# Patient Record
Sex: FEMALE | Race: WHITE | NOT HISPANIC OR LATINO | Employment: OTHER | URBAN - METROPOLITAN AREA
[De-identification: names, ages, dates, MRNs, and addresses within clinical notes are randomized per-mention and may not be internally consistent; named-entity substitution may affect disease eponyms.]

---

## 2024-01-02 NOTE — PROGRESS NOTES
Physical Therapy    Physical Therapy Evaluation and Treatment      Patient Name: Meryl Hester  MRN: [id number]  Today's Date: 1/3/2024  Time Calculation  Start Time: 0947  Stop Time: 1030  Time Calculation (min): 43 min    Assessment:    The patient presents to Physical Therapy with signs and symptoms consistent with the medical diagnosis of Lumbar radiculopathy. Key impairments include: reduced lumbar ROM, difficulty laying supine, reduced right ankle DF strength, and difficulty with functional activities such as group exercise, walking/standing, sleeping.    Standardized testing and measures administered today, including ROM, strength, joint mobility testing, and observation which reveal that the patient has multiple impairments in body structure and functions, activity limitations, and participation restrictions. The patient has personal factors and comorbidities that may serve as barriers affecting plan of care. Today's findings indicate that the patient is of low complexity and would benefit from skilled PT to make measurable and meaningful changes in the above outcome measures and achieve improvements in the patient's functional status and individual goals. The patient verbalized understanding and is in agreement with all goals and plan of care.      Plan:   Pt will be seen 1-2x/week for 10 visits. Potential to achieve rehab goals is good. Plan of care was developed with input and agreement by the patient.    Treatment may include: Therapeutic Exercise (PROM, AA/AROM, Flexibility, Strengthening, Stabilization, HEP instruction). Therapeutic Activities (Transfers, Body Mechanics, Work Related Activities, Closed Chain, Agility and Power). Manual Techniques (Soft tissue Mobilization, Joint Mobilization/Distraction, Muscle Energy Techniques, Lymphatic Drainage, Dry Needling). Neuromuscular Reeducation (Postural Training, Balance/Proprioception, Relaxation Techniques). Biofeedback. Aquatic Exercise. Modalities:  Ultrasound, Cryotherapy, Vasopneumatic with or without Cryotherapy. Electrical Stimulation (TENS/IFC/ Premodulated for pain relief, NMES for Muscle reeducation). Gait Training. Orthotic Fit and Training. Strapping, Kinesiotaping.     Current Problem:   1. Joint stiffness of spine        2. Lumbago with sciatica, right side  Referral to Physical Therapy    Follow Up In Physical Therapy    Follow Up In Physical Therapy      3. Radiculopathy of lumbar region            Subjective    Pt is a 89  year old female with complaints of R sided sciatica and low back pain. Down lateral thigh, lower leg and lateral ankle. Pt goes to fitness center ~5x/week, she exercises regularly.   Sleep: Wakes every 3 hours and sometimes need to distract herself from the pain  PMH: L leg numbness  Pt was not recommended for fusion 10 years ago due to severity of degeneration  Onset: Nov 15, 2023  Aggravating factors/ Functional Limitations: Prolonged standing walking  Alleviating factors: Prednisone, gabapentin before bed, heating pad (tried ice without much help)  Imaging: From [facility]    Pt denies night pain, unrelenting pain, unexplained weight loss 10-20lb in the last 4 weeks, loss of appetite, unusual lumps or growth, unusual fatigue.  Pt denies numbness/ tingling. Pt denies bowel or bladder changes.    Precautions:  Precautions  Precautions Comment: No fall sin last 6 mo    Pain:  Pain Assessment  Pain Assessment: 0-10  Pain Score: 8    Objective     Lumbar ROM (degrees):  Flexion: 75%  Extension: 75%  Sidebend R/L: 75%/50%  Rotation R/L: 100%    HIP AROM (degrees):  Hip Internal Rotation R/L: ~30 rosy  Hip External Rotation R/L:  45 rosy    Flexibility (degrees):   Hamstring 90/90 position R/L: 40 rosy + discomfort    Strength Testing:  Hip Flexion R/L: 3+/3+  Knee extension R/L:  4/4  Knee Flexion R/L: 4-/4-  DF R/L: 4/5    Posture: rounded shoulder  Sitting:  PPT  Standing position: hip flexion  Gait: forward flexed, relying on  RW, reduced dean, reduced initial contact    Observation:  Spasm/Tenderness:  Gait:  SLS:  Stairs:    Outcome Measures:  Other Measures  5x Sit to Stand: 26.6  Oswestry Disablity Index (MADELINE): 44%     Treatments:  Access Code: [email or web address]  URL: [email or web address]  Date: 01/03/2024  Prepared by: Jacqui Ng    Exercises  - Supine Transversus Abdominis Bracing - Hands on Stomach  - 1 x daily - 7 x weekly - 3 sets - 10 reps  - Supine March  - 1 x daily - 7 x weekly - 3 sets - 10 reps  - Seated Flexion Stretch with Swiss Ball  - 1 x daily - 7 x weekly - 3 sets - 10 reps  - Seated Thoracic Flexion and Rotation with Swiss Ball  - 1 x daily - 7 x weekly - 3 sets - 10 reps    Sheet with exercise descriptions and images issued. Skilled intervention utilized in the appropriate selection & application of above exercises. Verbal and tactile cues provided for proper form and technique. Pt demonstrated appropriate form & verbalized understanding of optimal technique for above exercises.      Goals:    Patient will be able to perform 45 minutes of aquatic exercise with reported </= 2/10 pain level   Patient will ambulate up/ down pool ramp without UE assist or decrease UE assist .   Patient will display improved gait quality on pool deck demonstrating improved dean /step through pattern   Patient will ascend/ descend 8 inch step in 3 1/2 feet water with good control without UE assist   Patient will demonstrate dynamic lumbar stabilization (DLS) with good control, performing with UE involvement and paddle resistance at level 5. (level 5 is the hardest/ level 1 is the easiest)   Patient will perform 5x sit to stand from bench without the use of upper extremities </= 15 seconds to show improved lower extremity functional strength.  Patient will perform single leg stance (SLS) in 4 foot water depth without the use of upper extremity assist.   Patient will be able to enter/exit pool via pool ramp and will  not require assistance of chair lift to participate with aquatic session.  Patient will perform/recall >/= 85% of aquatic program without cueing.

## 2024-01-03 ENCOUNTER — EVALUATION (OUTPATIENT)
Dept: PHYSICAL THERAPY | Facility: CLINIC | Age: 89
End: 2024-01-03
Payer: MEDICARE

## 2024-01-03 DIAGNOSIS — M54.16 RADICULOPATHY OF LUMBAR REGION: ICD-10-CM

## 2024-01-03 DIAGNOSIS — M25.60 JOINT STIFFNESS OF SPINE: Primary | ICD-10-CM

## 2024-01-03 DIAGNOSIS — M54.41 LUMBAGO WITH SCIATICA, RIGHT SIDE: ICD-10-CM

## 2024-01-03 PROBLEM — R26.9 GAIT DIFFICULTY: Status: ACTIVE | Noted: 2024-01-03

## 2024-01-03 PROCEDURE — 97110 THERAPEUTIC EXERCISES: CPT | Mod: GP

## 2024-01-03 PROCEDURE — 97161 PT EVAL LOW COMPLEX 20 MIN: CPT | Mod: GP

## 2024-01-03 ASSESSMENT — PAIN SCALES - GENERAL: PAINLEVEL_OUTOF10: 8

## 2024-01-03 ASSESSMENT — PAIN - FUNCTIONAL ASSESSMENT: PAIN_FUNCTIONAL_ASSESSMENT: 0-10

## 2024-01-15 ENCOUNTER — TREATMENT (OUTPATIENT)
Dept: PHYSICAL THERAPY | Facility: CLINIC | Age: 89
End: 2024-01-15
Payer: MEDICARE

## 2024-01-15 DIAGNOSIS — M54.41 LUMBAGO WITH SCIATICA, RIGHT SIDE: ICD-10-CM

## 2024-01-15 DIAGNOSIS — M25.60 JOINT STIFFNESS OF SPINE: Primary | ICD-10-CM

## 2024-01-15 DIAGNOSIS — M54.16 RADICULOPATHY OF LUMBAR REGION: ICD-10-CM

## 2024-01-15 PROCEDURE — 97113 AQUATIC THERAPY/EXERCISES: CPT | Mod: GP,CQ

## 2024-01-15 NOTE — PROGRESS NOTES
Patient Name: Meryl Hester  MRN: [id number]  Today's Date: 1/15/2024  POC   1-2x/week for 10 visits.   #2 of 10     Subjective:  Reports R LE pain down to ankle.  States that she has noticed pain in L LE as well at times to L quad .    Objective:  Instructed in initial skilled aquatic there ex     Assessment:   Pt demos decreased tolerance to loading and active R LE movements. Low reps and exercises introduced due to anticipation of increased soreness. Pt is a fitness center member and completes aquatic based classes independently therefore she demos good awareness on current limitations and is aware of body mechanics.    Plan:   Cont with building current program to improve function.    Treatment :   AQUATIC THERAPEUTIC EXERCISE 73437   30 minutes 2 units   Aquatic gait forward/Bwd/Lateral  with Db support 2 laps each   March 1 minute   HS/GS Stretches NV   RLE nerve glides x10   Hip abduction x10   Hip circles cw/ccw x5 each way   Squats  x10   DLS 3 way Trunk against wall x10 level 1 x10 each  Trunk Rotation with noodle x10    Noodle Pull down  elbows bent  x10   Rows with noodle x10   SKTC 1 x30  Noodle hang                Current Problem:  1. Joint stiffness of spine        2. Lumbago with sciatica, right side  Follow Up In Physical Therapy      3. Radiculopathy of lumbar region                Pain:  6/10   Location: LB/R LE to calf    Precautions: No recent falls

## 2024-01-22 ENCOUNTER — TREATMENT (OUTPATIENT)
Dept: PHYSICAL THERAPY | Facility: CLINIC | Age: 89
End: 2024-01-22
Payer: MEDICARE

## 2024-01-22 DIAGNOSIS — M54.41 LUMBAGO WITH SCIATICA, RIGHT SIDE: ICD-10-CM

## 2024-01-22 PROCEDURE — 97113 AQUATIC THERAPY/EXERCISES: CPT | Mod: GP,CQ

## 2024-01-22 NOTE — PROGRESS NOTES
"Patient Name: Meryl Hester  MRN: [id number]  Today's Date: 1/22/2024  Time Calculation  Start Time: 0928  Stop Time: 1000  Time Calculation (min): 32 min  INSURANCE   #3  of 10      Subjective:  States that today is a good day in relation to B LE pain. Denies any pain currently in LE 's.  States that over the weekend pain resolved in R LE however had increased L groin /lateral pain for 2 days . \"Today woke up and no pain at all in LB /Les.    Objective:  Dumbbell assist for aquatic walking .    Assessment:   Progression of exercises held due to + response with aquatic session.  Dumbbell assist required only for improved trunk control.     Plan:   Progress as able .    Treatment :   AQUATIC THERAPEUTIC EXERCISE 52735   30 minutes 2 units   Aquatic gait forward/Bwd/Lateral  with Db support 2 laps each   March 1 minute   HS/GS Stretches NV   RLE nerve glides x10   Hip abduction x10   Hip circles cw/ccw x5 each way   Squats  x10   DLS 3 way Trunk against wall x10 level 1 x10 each  Trunk Rotation with noodle x10    Noodle Pull down  elbows bent  x10   Rows with noodle x10   SKTC 1 x30  Noodle hang        Current Problem:  1. Lumbago with sciatica, right side  Follow Up In Physical Therapy          Pain:  No pain currently     Precautions: No recent falls        "

## 2024-01-29 ENCOUNTER — TREATMENT (OUTPATIENT)
Dept: PHYSICAL THERAPY | Facility: CLINIC | Age: 89
End: 2024-01-29
Payer: MEDICARE

## 2024-01-29 DIAGNOSIS — M54.16 RADICULOPATHY OF LUMBAR REGION: ICD-10-CM

## 2024-01-29 DIAGNOSIS — M25.60 JOINT STIFFNESS OF SPINE: ICD-10-CM

## 2024-01-29 DIAGNOSIS — M54.41 LUMBAGO WITH SCIATICA, RIGHT SIDE: ICD-10-CM

## 2024-01-29 DIAGNOSIS — R26.9 GAIT DIFFICULTY: Primary | ICD-10-CM

## 2024-01-29 PROCEDURE — 97110 THERAPEUTIC EXERCISES: CPT | Mod: GP

## 2024-01-29 ASSESSMENT — PAIN - FUNCTIONAL ASSESSMENT: PAIN_FUNCTIONAL_ASSESSMENT: 0-10

## 2024-01-29 ASSESSMENT — PAIN SCALES - GENERAL: PAINLEVEL_OUTOF10: 2

## 2024-01-29 NOTE — PROGRESS NOTES
"Physical Therapy    Physical Therapy Treatment      Patient Name: Meryl Hester  MRN: [id number]  Today's Date: 1/29/2024  Time Calculation  Start Time: 1045  Stop Time: 1130  Time Calculation (min): 45 min    Assessment:  Pt able to tolerate most of presented exercises, ~ 25% unable due to pain. Therapy seems to be centralizing symptoms. Pt will benefit from instruction on home exercise and discharge to independent program, as she is regularly very active.        Plan:  Continue per plan of care as tolerated. Patient to continue with HEP each day independently.    Current Problem:   1. Gait difficulty        2. Lumbago with sciatica, right side  Follow Up In Physical Therapy      3. Radiculopathy of lumbar region        4. Joint stiffness of spine              Subjective    Pt reports leg is better, but knee and catching in hip is an issue.  Pain is in middle of back.  Swelling improving with help of cardiologist.     Precautions:  Precautions  Precautions Comment: No fall sin last 6 mo    Pain:  Pain Assessment  Pain Assessment: 0-10  Pain Score: 2    Objective   L>R knee pain with nustep    Lumbar ROM (degrees):  Flexion: 75%  Extension: 75%  Sidebend R/L: 75%/50%  Rotation R/L: 100%    Treatments:  Access Code: [email or web address]    Therapeutic Exercises  Nustep L1 3 min  Supine Transversus Abdominis Bracing - Hands on Stomach    Supine March    Seated Flexion Stretch with Swiss Ball    Seated Thoracic Flexion and Rotation with Swiss Ball  RTB SA row  RTB anti rotation  Posterior lean  Reclined lumbar rotation  Reclined to hooklying march  Reclined BKFO  Reclined SAQ x10 ea  4\" step calf stretch gentle  STS 3x5  Standing balance tandem   Standing march  "

## 2024-02-05 ENCOUNTER — TREATMENT (OUTPATIENT)
Dept: PHYSICAL THERAPY | Facility: CLINIC | Age: 89
End: 2024-02-05
Payer: MEDICARE

## 2024-02-05 DIAGNOSIS — M54.16 RADICULOPATHY OF LUMBAR REGION: ICD-10-CM

## 2024-02-05 DIAGNOSIS — M25.60 JOINT STIFFNESS OF SPINE: ICD-10-CM

## 2024-02-05 DIAGNOSIS — M54.41 LUMBAGO WITH SCIATICA, RIGHT SIDE: ICD-10-CM

## 2024-02-05 DIAGNOSIS — R26.9 GAIT DIFFICULTY: Primary | ICD-10-CM

## 2024-02-05 PROCEDURE — 97110 THERAPEUTIC EXERCISES: CPT | Mod: GP

## 2024-02-05 ASSESSMENT — PAIN - FUNCTIONAL ASSESSMENT: PAIN_FUNCTIONAL_ASSESSMENT: 0-10

## 2024-02-05 ASSESSMENT — PAIN SCALES - GENERAL: PAINLEVEL_OUTOF10: 5 - MODERATE PAIN

## 2024-02-05 NOTE — PROGRESS NOTES
"Physical Therapy    Physical Therapy Treatment      Patient Name: Meryl Hester  MRN: [id number]  Today's Date: 2/5/2024  Time Calculation  Start Time: 1045  Stop Time: 1130  Time Calculation (min): 45 min    Assessment:  Pt prefers to perform HEP independently, and continue her aquatic exercise at fitness center. Updated HEP printed and discussed/demonstrated. Pt will benefit from regular activity to strengthen her knees and back to reduce stress through jt surfaces.      Plan:  Continue per plan of care as tolerated. Patient to continue with HEP each day independently.    Current Problem:   1. Gait difficulty        2. Radiculopathy of lumbar region        3. Joint stiffness of spine                Subjective    Pt reports waking up with more general jt pain than usual. Pt swam for 1 hour yesterday    Precautions:  Precautions  Precautions Comment: No fall sin last 6 mo    Pain:  Pain Assessment  Pain Assessment: 0-10  Pain Score: 2    Objective   Knee discomfort with nustep    Treatments:  Access Code: [email or web address]    Therapeutic Exercises  Nustep L1 3 min  Supine Transversus Abdominis Bracing - Hands on Stomach    Supine March    Seated Flexion Stretch with Swiss Ball    Seated Thoracic Flexion and Rotation with Swiss Ball  RTB SA row  RTB anti rotation  Posterior lean  Reclined lumbar rotation  Reclined to hooklying march  Reclined BKFO  Reclined SAQ x10 ea  4\" step calf stretch gentle  STS 3x5  Standing balance tandem   Standing march  "

## 2024-02-12 ENCOUNTER — APPOINTMENT (OUTPATIENT)
Dept: PHYSICAL THERAPY | Facility: CLINIC | Age: 89
End: 2024-02-12
Payer: MEDICARE

## 2024-02-19 ENCOUNTER — APPOINTMENT (OUTPATIENT)
Dept: PHYSICAL THERAPY | Facility: CLINIC | Age: 89
End: 2024-02-19
Payer: MEDICARE

## 2024-08-07 ENCOUNTER — APPOINTMENT (OUTPATIENT)
Dept: RADIOLOGY | Facility: HOSPITAL | Age: 89
End: 2024-08-07
Payer: MEDICARE

## 2024-08-07 ENCOUNTER — APPOINTMENT (OUTPATIENT)
Dept: CARDIOLOGY | Facility: HOSPITAL | Age: 89
End: 2024-08-07
Payer: MEDICARE

## 2024-08-07 ENCOUNTER — HOSPITAL ENCOUNTER (INPATIENT)
Facility: HOSPITAL | Age: 89
LOS: 2 days | Discharge: HOME HEALTH | End: 2024-08-10
Attending: EMERGENCY MEDICINE | Admitting: HOSPITALIST
Payer: MEDICARE

## 2024-08-07 DIAGNOSIS — R93.89 ABNORMAL CT OF THE CHEST: ICD-10-CM

## 2024-08-07 DIAGNOSIS — R79.89 OTHER SPECIFIED ABNORMAL FINDINGS OF BLOOD CHEMISTRY: ICD-10-CM

## 2024-08-07 DIAGNOSIS — I21.4 NSTEMI (NON-ST ELEVATED MYOCARDIAL INFARCTION) (MULTI): ICD-10-CM

## 2024-08-07 DIAGNOSIS — R55 SYNCOPE, UNSPECIFIED SYNCOPE TYPE: Primary | ICD-10-CM

## 2024-08-07 DIAGNOSIS — I95.9 HYPOTENSION, UNSPECIFIED: ICD-10-CM

## 2024-08-07 DIAGNOSIS — N39.0 URINARY TRACT INFECTION WITHOUT HEMATURIA, SITE UNSPECIFIED: ICD-10-CM

## 2024-08-07 DIAGNOSIS — I10 HYPERTENSION, UNSPECIFIED TYPE: ICD-10-CM

## 2024-08-07 DIAGNOSIS — D64.9 ANEMIA, UNSPECIFIED TYPE: ICD-10-CM

## 2024-08-07 DIAGNOSIS — I27.20 PULMONARY HYPERTENSION (MULTI): ICD-10-CM

## 2024-08-07 DIAGNOSIS — I48.0 PAROXYSMAL ATRIAL FIBRILLATION (MULTI): ICD-10-CM

## 2024-08-07 DIAGNOSIS — I48.91 UNSPECIFIED ATRIAL FIBRILLATION (MULTI): ICD-10-CM

## 2024-08-07 DIAGNOSIS — R91.8 LUNG NODULES: ICD-10-CM

## 2024-08-07 LAB
ABO GROUP (TYPE) IN BLOOD: NORMAL
ABO GROUP (TYPE) IN BLOOD: NORMAL
ALBUMIN SERPL BCP-MCNC: 3.3 G/DL (ref 3.4–5)
ALP SERPL-CCNC: 80 U/L (ref 33–136)
ALT SERPL W P-5'-P-CCNC: 11 U/L (ref 7–45)
ANION GAP SERPL CALC-SCNC: 11 MMOL/L (ref 10–20)
ANTIBODY SCREEN: NORMAL
APPEARANCE UR: CLEAR
APTT PPP: 27 SECONDS (ref 27–38)
AST SERPL W P-5'-P-CCNC: 15 U/L (ref 9–39)
BACTERIA #/AREA URNS AUTO: ABNORMAL /HPF
BASOPHILS # BLD AUTO: 0.04 X10*3/UL (ref 0–0.1)
BASOPHILS NFR BLD AUTO: 0.4 %
BILIRUB SERPL-MCNC: 0.6 MG/DL (ref 0–1.2)
BILIRUB UR STRIP.AUTO-MCNC: NEGATIVE MG/DL
BNP SERPL-MCNC: 238 PG/ML (ref 0–99)
BUN SERPL-MCNC: 7 MG/DL (ref 6–23)
CALCIUM SERPL-MCNC: 8.2 MG/DL (ref 8.6–10.3)
CARDIAC TROPONIN I PNL SERPL HS: 750 NG/L (ref 0–13)
CARDIAC TROPONIN I PNL SERPL HS: 845 NG/L (ref 0–13)
CHLORIDE SERPL-SCNC: 106 MMOL/L (ref 98–107)
CO2 SERPL-SCNC: 23 MMOL/L (ref 21–32)
COLOR UR: COLORLESS
CREAT SERPL-MCNC: 0.83 MG/DL (ref 0.5–1.05)
D DIMER PPP FEU-MCNC: 910 NG/ML FEU
EGFRCR SERPLBLD CKD-EPI 2021: 66 ML/MIN/1.73M*2
EOSINOPHIL # BLD AUTO: 0.17 X10*3/UL (ref 0–0.4)
EOSINOPHIL NFR BLD AUTO: 1.9 %
ERYTHROCYTE [DISTWIDTH] IN BLOOD BY AUTOMATED COUNT: 14.9 % (ref 11.5–14.5)
GLUCOSE SERPL-MCNC: 167 MG/DL (ref 74–99)
GLUCOSE UR STRIP.AUTO-MCNC: NORMAL MG/DL
HCT VFR BLD AUTO: 26 % (ref 36–46)
HGB BLD-MCNC: 8.2 G/DL (ref 12–16)
HOLD SPECIMEN: NORMAL
IMM GRANULOCYTES # BLD AUTO: 0.04 X10*3/UL (ref 0–0.5)
IMM GRANULOCYTES NFR BLD AUTO: 0.4 % (ref 0–0.9)
INR PPP: 1.1 (ref 0.9–1.1)
KETONES UR STRIP.AUTO-MCNC: NEGATIVE MG/DL
LACTATE SERPL-SCNC: 1.3 MMOL/L (ref 0.4–2)
LACTATE SERPL-SCNC: 2.2 MMOL/L (ref 0.4–2)
LEUKOCYTE ESTERASE UR QL STRIP.AUTO: ABNORMAL
LIPASE SERPL-CCNC: 9 U/L (ref 9–82)
LYMPHOCYTES # BLD AUTO: 1.71 X10*3/UL (ref 0.8–3)
LYMPHOCYTES NFR BLD AUTO: 18.8 %
MAGNESIUM SERPL-MCNC: 1.9 MG/DL (ref 1.6–2.4)
MCH RBC QN AUTO: 29.7 PG (ref 26–34)
MCHC RBC AUTO-ENTMCNC: 31.5 G/DL (ref 32–36)
MCV RBC AUTO: 94 FL (ref 80–100)
MONOCYTES # BLD AUTO: 0.77 X10*3/UL (ref 0.05–0.8)
MONOCYTES NFR BLD AUTO: 8.5 %
NEUTROPHILS # BLD AUTO: 6.38 X10*3/UL (ref 1.6–5.5)
NEUTROPHILS NFR BLD AUTO: 70 %
NITRITE UR QL STRIP.AUTO: NEGATIVE
NRBC BLD-RTO: 0 /100 WBCS (ref 0–0)
PH UR STRIP.AUTO: 5.5 [PH]
PLATELET # BLD AUTO: 232 X10*3/UL (ref 150–450)
POTASSIUM SERPL-SCNC: 3.3 MMOL/L (ref 3.5–5.3)
PROT SERPL-MCNC: 5.5 G/DL (ref 6.4–8.2)
PROT UR STRIP.AUTO-MCNC: NEGATIVE MG/DL
PROTHROMBIN TIME: 12.7 SECONDS (ref 9.8–12.8)
RBC # BLD AUTO: 2.76 X10*6/UL (ref 4–5.2)
RBC # UR STRIP.AUTO: NEGATIVE /UL
RBC #/AREA URNS AUTO: ABNORMAL /HPF
RH FACTOR (ANTIGEN D): NORMAL
RH FACTOR (ANTIGEN D): NORMAL
SODIUM SERPL-SCNC: 137 MMOL/L (ref 136–145)
SP GR UR STRIP.AUTO: 1.01
UROBILINOGEN UR STRIP.AUTO-MCNC: NORMAL MG/DL
WBC # BLD AUTO: 9.1 X10*3/UL (ref 4.4–11.3)
WBC #/AREA URNS AUTO: ABNORMAL /HPF

## 2024-08-07 PROCEDURE — 2500000001 HC RX 250 WO HCPCS SELF ADMINISTERED DRUGS (ALT 637 FOR MEDICARE OP): Performed by: HOSPITALIST

## 2024-08-07 PROCEDURE — 99223 1ST HOSP IP/OBS HIGH 75: CPT | Performed by: HOSPITALIST

## 2024-08-07 PROCEDURE — 85610 PROTHROMBIN TIME: CPT | Performed by: EMERGENCY MEDICINE

## 2024-08-07 PROCEDURE — 70450 CT HEAD/BRAIN W/O DYE: CPT | Performed by: RADIOLOGY

## 2024-08-07 PROCEDURE — 2500000004 HC RX 250 GENERAL PHARMACY W/ HCPCS (ALT 636 FOR OP/ED): Performed by: EMERGENCY MEDICINE

## 2024-08-07 PROCEDURE — 85025 COMPLETE CBC W/AUTO DIFF WBC: CPT | Performed by: EMERGENCY MEDICINE

## 2024-08-07 PROCEDURE — 2500000004 HC RX 250 GENERAL PHARMACY W/ HCPCS (ALT 636 FOR OP/ED): Performed by: HOSPITALIST

## 2024-08-07 PROCEDURE — G0378 HOSPITAL OBSERVATION PER HR: HCPCS

## 2024-08-07 PROCEDURE — 71045 X-RAY EXAM CHEST 1 VIEW: CPT

## 2024-08-07 PROCEDURE — 81001 URINALYSIS AUTO W/SCOPE: CPT | Performed by: EMERGENCY MEDICINE

## 2024-08-07 PROCEDURE — 86901 BLOOD TYPING SEROLOGIC RH(D): CPT | Performed by: EMERGENCY MEDICINE

## 2024-08-07 PROCEDURE — 83605 ASSAY OF LACTIC ACID: CPT | Performed by: EMERGENCY MEDICINE

## 2024-08-07 PROCEDURE — 76705 ECHO EXAM OF ABDOMEN: CPT

## 2024-08-07 PROCEDURE — 76705 ECHO EXAM OF ABDOMEN: CPT | Performed by: RADIOLOGY

## 2024-08-07 PROCEDURE — 36415 COLL VENOUS BLD VENIPUNCTURE: CPT | Performed by: EMERGENCY MEDICINE

## 2024-08-07 PROCEDURE — 84484 ASSAY OF TROPONIN QUANT: CPT | Performed by: EMERGENCY MEDICINE

## 2024-08-07 PROCEDURE — 99291 CRITICAL CARE FIRST HOUR: CPT | Mod: 25 | Performed by: EMERGENCY MEDICINE

## 2024-08-07 PROCEDURE — 84075 ASSAY ALKALINE PHOSPHATASE: CPT | Performed by: EMERGENCY MEDICINE

## 2024-08-07 PROCEDURE — 93005 ELECTROCARDIOGRAM TRACING: CPT

## 2024-08-07 PROCEDURE — 83735 ASSAY OF MAGNESIUM: CPT | Performed by: EMERGENCY MEDICINE

## 2024-08-07 PROCEDURE — 2500000001 HC RX 250 WO HCPCS SELF ADMINISTERED DRUGS (ALT 637 FOR MEDICARE OP): Performed by: EMERGENCY MEDICINE

## 2024-08-07 PROCEDURE — 71045 X-RAY EXAM CHEST 1 VIEW: CPT | Performed by: RADIOLOGY

## 2024-08-07 PROCEDURE — 87040 BLOOD CULTURE FOR BACTERIA: CPT | Mod: ELYLAB | Performed by: EMERGENCY MEDICINE

## 2024-08-07 PROCEDURE — 2500000002 HC RX 250 W HCPCS SELF ADMINISTERED DRUGS (ALT 637 FOR MEDICARE OP, ALT 636 FOR OP/ED): Performed by: EMERGENCY MEDICINE

## 2024-08-07 PROCEDURE — 70450 CT HEAD/BRAIN W/O DYE: CPT

## 2024-08-07 PROCEDURE — 85379 FIBRIN DEGRADATION QUANT: CPT | Performed by: EMERGENCY MEDICINE

## 2024-08-07 PROCEDURE — 99221 1ST HOSP IP/OBS SF/LOW 40: CPT | Performed by: SURGERY

## 2024-08-07 PROCEDURE — 87086 URINE CULTURE/COLONY COUNT: CPT | Mod: ELYLAB | Performed by: EMERGENCY MEDICINE

## 2024-08-07 PROCEDURE — 85730 THROMBOPLASTIN TIME PARTIAL: CPT | Performed by: EMERGENCY MEDICINE

## 2024-08-07 PROCEDURE — 2500000002 HC RX 250 W HCPCS SELF ADMINISTERED DRUGS (ALT 637 FOR MEDICARE OP, ALT 636 FOR OP/ED): Performed by: HOSPITALIST

## 2024-08-07 PROCEDURE — 96365 THER/PROPH/DIAG IV INF INIT: CPT

## 2024-08-07 PROCEDURE — 83880 ASSAY OF NATRIURETIC PEPTIDE: CPT | Performed by: EMERGENCY MEDICINE

## 2024-08-07 PROCEDURE — 96361 HYDRATE IV INFUSION ADD-ON: CPT

## 2024-08-07 PROCEDURE — 71275 CT ANGIOGRAPHY CHEST: CPT

## 2024-08-07 PROCEDURE — 83690 ASSAY OF LIPASE: CPT | Performed by: EMERGENCY MEDICINE

## 2024-08-07 PROCEDURE — 2550000001 HC RX 255 CONTRASTS: Performed by: EMERGENCY MEDICINE

## 2024-08-07 RX ORDER — SODIUM CHLORIDE 9 MG/ML
75 INJECTION, SOLUTION INTRAVENOUS CONTINUOUS
Status: DISCONTINUED | OUTPATIENT
Start: 2024-08-07 | End: 2024-08-08

## 2024-08-07 RX ORDER — MV-MIN/FA/VIT K/LUTEIN/ZEAXANT 200MCG-5MG
1 CAPSULE ORAL 2 TIMES DAILY
COMMUNITY

## 2024-08-07 RX ORDER — ACETAMINOPHEN 650 MG/1
650 SUPPOSITORY RECTAL EVERY 4 HOURS PRN
Status: DISCONTINUED | OUTPATIENT
Start: 2024-08-07 | End: 2024-08-10 | Stop reason: HOSPADM

## 2024-08-07 RX ORDER — MULTIVIT-MIN/FA/LYCOPEN/LUTEIN .4-300-25
1 TABLET ORAL DAILY
COMMUNITY

## 2024-08-07 RX ORDER — DULOXETIN HYDROCHLORIDE 30 MG/1
30 CAPSULE, DELAYED RELEASE ORAL
COMMUNITY
Start: 2024-07-03 | End: 2025-06-28

## 2024-08-07 RX ORDER — LANOLIN ALCOHOL/MO/W.PET/CERES
400 CREAM (GRAM) TOPICAL DAILY
Status: DISCONTINUED | OUTPATIENT
Start: 2024-08-07 | End: 2024-08-10 | Stop reason: HOSPADM

## 2024-08-07 RX ORDER — LEVOTHYROXINE SODIUM 88 UG/1
88 TABLET ORAL
COMMUNITY
Start: 2024-04-30

## 2024-08-07 RX ORDER — ACETAMINOPHEN 325 MG/1
650 TABLET ORAL EVERY 6 HOURS PRN
COMMUNITY
Start: 2024-06-27

## 2024-08-07 RX ORDER — DOCUSATE SODIUM 100 MG/1
100 CAPSULE, LIQUID FILLED ORAL 2 TIMES DAILY
COMMUNITY

## 2024-08-07 RX ORDER — CEFTRIAXONE 1 G/50ML
1 INJECTION, SOLUTION INTRAVENOUS ONCE
Status: COMPLETED | OUTPATIENT
Start: 2024-08-07 | End: 2024-08-07

## 2024-08-07 RX ORDER — NAPROXEN SODIUM 220 MG/1
324 TABLET, FILM COATED ORAL ONCE
Status: COMPLETED | OUTPATIENT
Start: 2024-08-07 | End: 2024-08-07

## 2024-08-07 RX ORDER — GABAPENTIN 300 MG/1
300 CAPSULE ORAL NIGHTLY
Status: DISCONTINUED | OUTPATIENT
Start: 2024-08-07 | End: 2024-08-10 | Stop reason: HOSPADM

## 2024-08-07 RX ORDER — FLUTICASONE PROPIONATE 50 MCG
1 SPRAY, SUSPENSION (ML) NASAL DAILY PRN
COMMUNITY

## 2024-08-07 RX ORDER — ONDANSETRON HYDROCHLORIDE 2 MG/ML
4 INJECTION, SOLUTION INTRAVENOUS EVERY 8 HOURS PRN
Status: DISCONTINUED | OUTPATIENT
Start: 2024-08-07 | End: 2024-08-10 | Stop reason: HOSPADM

## 2024-08-07 RX ORDER — ROSUVASTATIN CALCIUM 20 MG/1
20 TABLET, COATED ORAL NIGHTLY
COMMUNITY
Start: 2024-04-30

## 2024-08-07 RX ORDER — ROSUVASTATIN CALCIUM 20 MG/1
20 TABLET, COATED ORAL NIGHTLY
Status: DISCONTINUED | OUTPATIENT
Start: 2024-08-07 | End: 2024-08-10 | Stop reason: HOSPADM

## 2024-08-07 RX ORDER — ACETAMINOPHEN 160 MG/5ML
650 SOLUTION ORAL EVERY 4 HOURS PRN
Status: DISCONTINUED | OUTPATIENT
Start: 2024-08-07 | End: 2024-08-10 | Stop reason: HOSPADM

## 2024-08-07 RX ORDER — ONDANSETRON 4 MG/1
4 TABLET, FILM COATED ORAL EVERY 8 HOURS PRN
Status: DISCONTINUED | OUTPATIENT
Start: 2024-08-07 | End: 2024-08-10 | Stop reason: HOSPADM

## 2024-08-07 RX ORDER — NAPROXEN SODIUM 220 MG/1
81 TABLET, FILM COATED ORAL DAILY
Status: DISCONTINUED | OUTPATIENT
Start: 2024-08-08 | End: 2024-08-08

## 2024-08-07 RX ORDER — ENOXAPARIN SODIUM 100 MG/ML
40 INJECTION SUBCUTANEOUS EVERY 24 HOURS
Status: DISCONTINUED | OUTPATIENT
Start: 2024-08-07 | End: 2024-08-07

## 2024-08-07 RX ORDER — LEVOTHYROXINE SODIUM 88 UG/1
88 TABLET ORAL DAILY
Status: DISCONTINUED | OUTPATIENT
Start: 2024-08-08 | End: 2024-08-10 | Stop reason: HOSPADM

## 2024-08-07 RX ORDER — FLUTICASONE PROPIONATE 50 MCG
1 SPRAY, SUSPENSION (ML) NASAL DAILY PRN
Status: DISCONTINUED | OUTPATIENT
Start: 2024-08-07 | End: 2024-08-10 | Stop reason: HOSPADM

## 2024-08-07 RX ORDER — FUROSEMIDE 20 MG/1
20 TABLET ORAL
COMMUNITY
Start: 2024-04-30

## 2024-08-07 RX ORDER — BACLOFEN 20 MG
500 TABLET ORAL DAILY
COMMUNITY

## 2024-08-07 RX ORDER — BISOPROLOL FUMARATE 5 MG/1
7.5 TABLET, FILM COATED ORAL
Status: DISCONTINUED | OUTPATIENT
Start: 2024-08-08 | End: 2024-08-10 | Stop reason: HOSPADM

## 2024-08-07 RX ORDER — LOSARTAN POTASSIUM 25 MG/1
25 TABLET ORAL
COMMUNITY
Start: 2024-04-30 | End: 2024-08-10 | Stop reason: HOSPADM

## 2024-08-07 RX ORDER — POTASSIUM CHLORIDE 20 MEQ/1
20 TABLET, EXTENDED RELEASE ORAL
COMMUNITY
Start: 2024-04-30

## 2024-08-07 RX ORDER — DULOXETIN HYDROCHLORIDE 30 MG/1
30 CAPSULE, DELAYED RELEASE ORAL
Status: DISCONTINUED | OUTPATIENT
Start: 2024-08-08 | End: 2024-08-10 | Stop reason: HOSPADM

## 2024-08-07 RX ORDER — BISOPROLOL FUMARATE 5 MG/1
7.5 TABLET, FILM COATED ORAL
COMMUNITY
Start: 2024-05-01

## 2024-08-07 RX ORDER — ACETAMINOPHEN 325 MG/1
650 TABLET ORAL EVERY 4 HOURS PRN
Status: DISCONTINUED | OUTPATIENT
Start: 2024-08-07 | End: 2024-08-10 | Stop reason: HOSPADM

## 2024-08-07 RX ORDER — FERROUS SULFATE, DRIED 160(50) MG
1 TABLET, EXTENDED RELEASE ORAL DAILY
COMMUNITY

## 2024-08-07 RX ORDER — MECLIZINE HYDROCHLORIDE 25 MG/1
12.5 TABLET ORAL EVERY 12 HOURS PRN
COMMUNITY
Start: 2024-04-30

## 2024-08-07 RX ORDER — GABAPENTIN 300 MG/1
300 CAPSULE ORAL NIGHTLY
COMMUNITY
Start: 2024-04-30 | End: 2025-04-30

## 2024-08-07 RX ORDER — PANTOPRAZOLE SODIUM 40 MG/1
40 TABLET, DELAYED RELEASE ORAL
Status: DISCONTINUED | OUTPATIENT
Start: 2024-08-08 | End: 2024-08-10 | Stop reason: HOSPADM

## 2024-08-07 RX ORDER — OMEPRAZOLE 40 MG/1
40 CAPSULE, DELAYED RELEASE ORAL
COMMUNITY
Start: 2024-04-30

## 2024-08-07 RX ORDER — POTASSIUM CHLORIDE 20 MEQ/1
40 TABLET, EXTENDED RELEASE ORAL ONCE
Status: COMPLETED | OUTPATIENT
Start: 2024-08-07 | End: 2024-08-07

## 2024-08-07 RX ORDER — MV/FA/DHA/EPA/FISH OIL/SAW/GNK 400MCG-200
500 COMBINATION PACKAGE (EA) ORAL 2 TIMES DAILY
COMMUNITY

## 2024-08-07 RX ORDER — POLYETHYLENE GLYCOL 3350 17 G/17G
17 POWDER, FOR SOLUTION ORAL DAILY PRN
Status: DISCONTINUED | OUTPATIENT
Start: 2024-08-07 | End: 2024-08-10 | Stop reason: HOSPADM

## 2024-08-07 SDOH — SOCIAL STABILITY: SOCIAL INSECURITY: HAS ANYONE EVER THREATENED TO HURT YOUR FAMILY OR YOUR PETS?: NO

## 2024-08-07 SDOH — SOCIAL STABILITY: SOCIAL INSECURITY: DO YOU FEEL ANYONE HAS EXPLOITED OR TAKEN ADVANTAGE OF YOU FINANCIALLY OR OF YOUR PERSONAL PROPERTY?: NO

## 2024-08-07 SDOH — SOCIAL STABILITY: SOCIAL INSECURITY: WERE YOU ABLE TO COMPLETE ALL THE BEHAVIORAL HEALTH SCREENINGS?: YES

## 2024-08-07 SDOH — SOCIAL STABILITY: SOCIAL INSECURITY: HAVE YOU HAD ANY THOUGHTS OF HARMING ANYONE ELSE?: NO

## 2024-08-07 SDOH — SOCIAL STABILITY: SOCIAL INSECURITY: ARE YOU OR HAVE YOU BEEN THREATENED OR ABUSED PHYSICALLY, EMOTIONALLY, OR SEXUALLY BY ANYONE?: NO

## 2024-08-07 SDOH — SOCIAL STABILITY: SOCIAL INSECURITY: DO YOU FEEL UNSAFE GOING BACK TO THE PLACE WHERE YOU ARE LIVING?: NO

## 2024-08-07 SDOH — SOCIAL STABILITY: SOCIAL INSECURITY: ARE THERE ANY APPARENT SIGNS OF INJURIES/BEHAVIORS THAT COULD BE RELATED TO ABUSE/NEGLECT?: NO

## 2024-08-07 SDOH — SOCIAL STABILITY: SOCIAL INSECURITY: HAVE YOU HAD THOUGHTS OF HARMING ANYONE ELSE?: NO

## 2024-08-07 SDOH — SOCIAL STABILITY: SOCIAL INSECURITY: ABUSE: ADULT

## 2024-08-07 SDOH — SOCIAL STABILITY: SOCIAL INSECURITY: DOES ANYONE TRY TO KEEP YOU FROM HAVING/CONTACTING OTHER FRIENDS OR DOING THINGS OUTSIDE YOUR HOME?: NO

## 2024-08-07 ASSESSMENT — ACTIVITIES OF DAILY LIVING (ADL)
JUDGMENT_ADEQUATE_SAFELY_COMPLETE_DAILY_ACTIVITIES: YES
HEARING - RIGHT EAR: DIFFICULTY WITH NOISE
HEARING - LEFT EAR: DIFFICULTY WITH NOISE
ADEQUATE_TO_COMPLETE_ADL: YES
LACK_OF_TRANSPORTATION: NO
WALKS IN HOME: NEEDS ASSISTANCE
GROOMING: NEEDS ASSISTANCE
PATIENT'S MEMORY ADEQUATE TO SAFELY COMPLETE DAILY ACTIVITIES?: YES
DRESSING YOURSELF: NEEDS ASSISTANCE
BATHING: NEEDS ASSISTANCE
FEEDING YOURSELF: NEEDS ASSISTANCE
ASSISTIVE_DEVICE: OTHER (COMMENT)
TOILETING: NEEDS ASSISTANCE
LACK_OF_TRANSPORTATION: NO

## 2024-08-07 ASSESSMENT — LIFESTYLE VARIABLES
HOW OFTEN DO YOU HAVE A DRINK CONTAINING ALCOHOL: NEVER
HAVE YOU EVER FELT YOU SHOULD CUT DOWN ON YOUR DRINKING: NO
AUDIT-C TOTAL SCORE: 0
EVER HAD A DRINK FIRST THING IN THE MORNING TO STEADY YOUR NERVES TO GET RID OF A HANGOVER: NO
TOTAL SCORE: 0
HOW MANY STANDARD DRINKS CONTAINING ALCOHOL DO YOU HAVE ON A TYPICAL DAY: PATIENT DOES NOT DRINK
SKIP TO QUESTIONS 9-10: 1
SUBSTANCE_ABUSE_PAST_12_MONTHS: NO
PRESCIPTION_ABUSE_PAST_12_MONTHS: NO
AUDIT-C TOTAL SCORE: 0
HOW OFTEN DO YOU HAVE 6 OR MORE DRINKS ON ONE OCCASION: NEVER
EVER FELT BAD OR GUILTY ABOUT YOUR DRINKING: NO
HAVE PEOPLE ANNOYED YOU BY CRITICIZING YOUR DRINKING: NO

## 2024-08-07 ASSESSMENT — COGNITIVE AND FUNCTIONAL STATUS - GENERAL
DAILY ACTIVITIY SCORE: 18
MOVING TO AND FROM BED TO CHAIR: A LITTLE
CLIMB 3 TO 5 STEPS WITH RAILING: A LOT
TOILETING: A LITTLE
PATIENT BASELINE BEDBOUND: NO
TURNING FROM BACK TO SIDE WHILE IN FLAT BAD: A LITTLE
EATING MEALS: A LITTLE
STANDING UP FROM CHAIR USING ARMS: A LOT
STANDING UP FROM CHAIR USING ARMS: A LITTLE
DAILY ACTIVITIY SCORE: 18
WALKING IN HOSPITAL ROOM: A LOT
MOVING TO AND FROM BED TO CHAIR: A LITTLE
DRESSING REGULAR UPPER BODY CLOTHING: A LITTLE
MOBILITY SCORE: 18
DRESSING REGULAR LOWER BODY CLOTHING: A LITTLE
TURNING FROM BACK TO SIDE WHILE IN FLAT BAD: A LITTLE
MOVING FROM LYING ON BACK TO SITTING ON SIDE OF FLAT BED WITH BEDRAILS: A LITTLE
EATING MEALS: A LITTLE
WALKING IN HOSPITAL ROOM: A LITTLE
CLIMB 3 TO 5 STEPS WITH RAILING: A LITTLE
DRESSING REGULAR UPPER BODY CLOTHING: A LITTLE
TOILETING: A LITTLE
MOVING FROM LYING ON BACK TO SITTING ON SIDE OF FLAT BED WITH BEDRAILS: A LITTLE
PERSONAL GROOMING: A LITTLE
MOBILITY SCORE: 15
HELP NEEDED FOR BATHING: A LITTLE
HELP NEEDED FOR BATHING: A LITTLE
PERSONAL GROOMING: A LITTLE
DRESSING REGULAR LOWER BODY CLOTHING: A LITTLE

## 2024-08-07 ASSESSMENT — ENCOUNTER SYMPTOMS
UNEXPECTED WEIGHT CHANGE: 0
HEMATOLOGIC/LYMPHATIC NEGATIVE: 1
DIAPHORESIS: 0
BLOOD IN STOOL: 0
FACIAL ASYMMETRY: 0
NUMBNESS: 0
CHILLS: 0
TROUBLE SWALLOWING: 0
FACIAL SWELLING: 0
LIGHT-HEADEDNESS: 1
ABDOMINAL DISTENTION: 0
SPEECH DIFFICULTY: 0
MUSCULOSKELETAL NEGATIVE: 1
CARDIOVASCULAR NEGATIVE: 1
ACTIVITY CHANGE: 0
PSYCHIATRIC NEGATIVE: 1
SORE THROAT: 0
RESPIRATORY NEGATIVE: 1
FATIGUE: 1
FEVER: 0
SEIZURES: 0
RECTAL PAIN: 0
TREMORS: 0
DIARRHEA: 1
VOMITING: 0
ABDOMINAL PAIN: 0
NAUSEA: 1
WEAKNESS: 0
DIZZINESS: 1
CONSTIPATION: 0
HEADACHES: 0
ENDOCRINE NEGATIVE: 1
APPETITE CHANGE: 0
EYES NEGATIVE: 1
VOICE CHANGE: 0
ANAL BLEEDING: 0

## 2024-08-07 ASSESSMENT — COLUMBIA-SUICIDE SEVERITY RATING SCALE - C-SSRS
6. HAVE YOU EVER DONE ANYTHING, STARTED TO DO ANYTHING, OR PREPARED TO DO ANYTHING TO END YOUR LIFE?: NO
1. IN THE PAST MONTH, HAVE YOU WISHED YOU WERE DEAD OR WISHED YOU COULD GO TO SLEEP AND NOT WAKE UP?: NO
1. IN THE PAST MONTH, HAVE YOU WISHED YOU WERE DEAD OR WISHED YOU COULD GO TO SLEEP AND NOT WAKE UP?: NO
6. HAVE YOU EVER DONE ANYTHING, STARTED TO DO ANYTHING, OR PREPARED TO DO ANYTHING TO END YOUR LIFE?: NO
2. HAVE YOU ACTUALLY HAD ANY THOUGHTS OF KILLING YOURSELF?: NO
2. HAVE YOU ACTUALLY HAD ANY THOUGHTS OF KILLING YOURSELF?: NO

## 2024-08-07 ASSESSMENT — PAIN - FUNCTIONAL ASSESSMENT
PAIN_FUNCTIONAL_ASSESSMENT: 0-10

## 2024-08-07 ASSESSMENT — PATIENT HEALTH QUESTIONNAIRE - PHQ9
SUM OF ALL RESPONSES TO PHQ9 QUESTIONS 1 & 2: 0
2. FEELING DOWN, DEPRESSED OR HOPELESS: NOT AT ALL
1. LITTLE INTEREST OR PLEASURE IN DOING THINGS: NOT AT ALL

## 2024-08-07 ASSESSMENT — PAIN SCALES - GENERAL
PAINLEVEL_OUTOF10: 0 - NO PAIN

## 2024-08-07 NOTE — CONSULTS
"Reason For Consult abnormal CT      History Of Present Illness  Meryl Hester is a 89 y.o. female presenting with near syncope patient was recently at [facility] with GI bleed CT showed active extravasation in the sigmoid but follow-up colonoscopy showed only diverticulosis embolization was not performed as angiogram showed no bleeding.  Presented with low blood pressure.  Ultrasound CT shows inflammation or thickening of the gallbladder wall however she has no complaints no nausea no vomiting.     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    Surgical History  She has a past surgical history that includes Other surgical history (11/29/2016); Cataract extraction (11/29/2016); and Other surgical history (11/29/2016).     Social History  She reports that she has quit smoking. Her smoking use included cigarettes. She has never used smokeless tobacco. No history on file for alcohol use and drug use.    Family History  No family history on file.     Allergies  Sulfa (sulfonamide antibiotics)    Review of Systems  Positive for syncope negative for any GI complaints     Physical Exam  Abdomen soft nontender all quadrants     Last Recorded Vitals  Blood pressure 136/63, pulse 87, temperature 36.9 °C (98.4 °F), temperature source Temporal, resp. rate 20, height 1.499 m (4' 11\"), weight 65.2 kg (143 lb 11.8 oz), SpO2 95%.    Relevant Results  US right upper quadrant    Result Date: 8/7/2024  Interpreted By:  Lizbeth Hay, STUDY: US RIGHT UPPER QUADRANT;  8/7/2024 2:40 pm   INDICATION: Signs/Symptoms:abnormal ct, " diarrhea.   COMPARISON: None.   ACCESSION NUMBER(S): [id number]   ORDERING CLINICIAN: AMBROSIO MARIA   TECHNIQUE: Multiple images of the right upper quadrant were obtained.   FINDINGS: LIVER: The liver measures 14.2 cm in longest axis. No definite focal nodules.     GALLBLADDER: The gallbladder is distended, and demonstrates multiple gallstones. No gallbladder wall thickening or surrounding fluid. The gallbladder wall thickness is 0.4 cm. Sonographic Hendrickson's sign is negative.     BILE DUCTS: No evidence of intra or extrahepatic biliary dilatation is identified; the common bile duct measures 1.0 cm.   PANCREAS: Pancreatic body and tail obscured by bowel gas and suboptimally evaluated.   RIGHT KIDNEY: The right kidney measures 10.4 cm in length. The renal cortical echogenicity and thickness are within normal limit.  No hydronephrosis or renal calculi are seen.       Distended gallbladder with gallstones and mild gallbladder wall thickening. No ultrasonic Hendrickson sign. Findings are suspicious for acute or chronic cholecystitis. Nonspecific prominence of the common bile duct.   MACRO: None   Signed by: Lizbeth Hay 8/7/2024 2:54 PM Dictation workstation:   [id number]    ECG 12 lead    Result Date: 8/7/2024  Sinus rhythm with Premature atrial complexes Left axis deviation Right bundle branch block Minimal voltage criteria for LVH, may be normal variant Abnormal ECG When compared with ECG of 07-AUG-2024 12:18, (unconfirmed) Premature atrial complexes are now Present    CT angio chest for pulmonary embolism    Result Date: 8/7/2024  Interpreted By:  Ada Saavedra, STUDY: CT ANGIO CHEST FOR PULMONARY EMBOLISM;  8/7/2024 1:27 pm   INDICATION: Signs/Symptoms:elevated d dimer, elevated trop, syncope.   COMPARISON: 03/04/2011   ACCESSION NUMBER(S): [id number]   ORDERING CLINICIAN: AMBROSIO MARIA   TECHNIQUE: CT of the chest was performed. Sagittal and coronal reconstructions were generated. 75 ML Omnipaque 350 intravenous  contrast given for the examination.  Multiplanar reconstructions of the pulmonary vessels were created on an independent workstation and provided for review.   FINDINGS: Artifact related to overlying upper extremities and motion limits evaluation.   CHEST WALL AND LOWER NECK: No significant axillary lymphadenopathy. 2 cm fat attenuation probable lipoma in the musculature anterior to the left shoulder.   MEDIASTINUM AND LUAN:  1.4 cm precarinal node with fatty hilum. 1.3 cm subcarinal node. Mild gaseous distention of the proximal esophagus.   HEART AND VESSELS:  Limited assessment for PE due to timing of IV contrast bolus and motion artifact. No central PE identified however limited assessment for basilar/peripheral PE. The heart is enlarged. Small pericardial effusion. No thoracic aortic aneurysm. Multifocal atherosclerotic calcifications including the coronary arteries.   LUNGS, PLEURA, LARGE AIRWAYS:  Respiratory motion artifact limits parenchymal evaluation. Mild bullous/emphysematous changes. Subtle interstitial opacities in both lungs. 1.3 cm right mid chest nodule image 130/299. Irregular 3.0 x 1.8 cm opacity with air bronchograms in the right lower lobe. Probable 1.7 cm nodular opacity in the right infrahilar region image 186/299. No significant pleural effusion. The central airways are patent.   UPPER ABDOMEN:  Distended gallbladder with questionable wall thickening and pericholecystic fluid or intramural edema.   BONES:  Kyphoscoliosis and degenerative changes of the thoracic spine.       No central PE however limited assessment for peripheral PE due to technical factors and motion artifact.   Several nodules/masses in the right lung in the setting of emphysema with mild mediastinal lymphadenopathy. Findings could reflect inflammatory or neoplastic/metastatic disease. Further evaluation recommended. Subtle background pulmonary heterogeneity could reflect acute or chronic interstitial disease, the former  including interstitial pneumonia and edema.   Distended gallbladder with questionable wall thickening and pericholecystic fluid. Findings are concerning for acute cholecystitis. Clinical correlation recommended. Further evaluation with ultrasound may be helpful.   MACRO: None.   Signed by: Ada Saavedra 8/7/2024 1:56 PM Dictation workstation:   [id number]    CT head wo IV contrast    Result Date: 8/7/2024  Interpreted By:  Ada Saavedra, STUDY: CT HEAD WO IV CONTRAST;  8/7/2024 1:25 pm   INDICATION: Signs/Symptoms:dizziness.   COMPARISON: None.   ACCESSION NUMBER(S): [id number]   ORDERING CLINICIAN: AMBROSIO MARIA   TECHNIQUE: Unenhanced CT images of the head were obtained.   FINDINGS: Diffuse atrophy with enlargement of the extra-axial spaces, cerebral sulci and ventricular system. 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 Additional patchy periventricular white matter hypodensities bilaterally. No acute intracranial hemorrhage or mass-effect. No midline shift. No extra-axial fluid collection   No focal calvarial lesion. 14 mm partially calcified nodule in the left frontal scalp. 9 mm dense nodule in the left occipital scalp.   The visualized paranasal sinuses are clear.       No acute intracranial hemorrhage or mass-effect.   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   2 nonspecific scalp nodules.   MACRO: None.   Signed by: Ada Saavedra 8/7/2024 1:44 PM Dictation workstation:   [id number]    XR chest 1 view    Result Date: 8/7/2024  Interpreted By:  Lee Sahu, STUDY: XR CHEST 1 VIEW;  8/7/2024 12:31 pm   INDICATION: Signs/Symptoms:syncope.   COMPARISON: 04/18/2015   ACCESSION NUMBER(S): [id number]   ORDERING CLINICIAN: AMBROSIO MARIA   FINDINGS: CARDIOMEDIASTINAL SILHOUETTE AND VASCULATURE:   Cardiac size:  Within normal limits. Aortic shadow:  Within normal limits.   Mediastinal  contours: Within normal limits.   Pulmonary vasculature:  The central vasculature is unremarkable   LUNGS: Lungs are clear.   ABDOMEN AND OTHER FINDINGS: No remarkable upper abdominal findings.   BONES: No acute osseous changes.       1.  No active cardiopulmonary disease.  There has not been significant interval change from the prior exam.   Signed by: Lee Sahu 8/7/2024 12:41 PM Dictation workstation:   [id number]    ECG 12 lead    Result Date: 8/7/2024  Normal sinus rhythm Left axis deviation Right bundle branch block Minimal voltage criteria for LVH, may be normal variant Abnormal ECG When compared with ECG of 19-APR-2015 04:13, Premature ventricular complexes are no longer Present Right bundle branch block is now Present    Flexible Sigmoidoscopy    Result Date: 8/5/2024  [facility] Gastrointestinal Endoscopy Patient Name: Meryl Hester Procedure Date: 8/5/2024 9:15 AM MRN: [id number] Account Number: [id number] YOB: 1931 Admit Type: Inpatient Age: 89 Room: [place] [address] Gender: Female Note Status: Finalized Attending MD: Keo Mark MD, [id number] Procedure:             Colonoscopy Indications:           Hematochezia Providers:             Keo Mark MD Patient Profile:       This is a 89 year old female. Refer to note in                        patient chart for documentation of history and                        physical. Last Colonoscopy: 2012. Referring Physician:   Karen Moore (cnp) (Referring MD) Medicines:             Monitored Anesthesia Care Complications:         No immediate complications. Requesting Provider:   Procedure:             Pre-Anesthesia Assessment:                        - Prior to the procedure, a History and Physical                        was performed, and patient medications and                        allergies were reviewed. The patient is competent.                        The risks and benefits of the procedure and the                        sedation options and  risks were discussed with the                        patient. All questions were answered and informed                        consent was obtained. Patient identification and                        proposed procedure were verified by the physician,                        the nurse, the anesthetist and the technician in                        the procedure room. Mental Status Examination:                        alert and oriented. Airway Examination: normal                        oropharyngeal airway and neck mobility. Respiratory                        Examination: clear to auscultation. CV Examination:                        irregularly irregular rate and rhythm and                        tachycardia noted. Prophylactic Antibiotics: The                        patient does not require prophylactic antibiotics.                        Prior Anticoagulants: The patient has taken Eliquis                        (apixaban), last dose was 2 days prior to                        procedure. ASA Grade Assessment: III - A patient                        with severe systemic disease. After reviewing the                        risks and benefits, the patient was deemed in                        satisfactory condition to undergo the procedure.                        The anesthesia plan was to use monitored anesthesia                        care (MAC). Immediately prior to administration of                        medications, the patient was re-assessed for                        adequacy to receive sedatives. The heart rate,                        respiratory rate, oxygen saturations, blood                        pressure, adequacy of pulmonary ventilation, and                        response to care were monitored throughout the                        procedure. The physical status of the patient was                        re-assessed after the procedure.                        After I obtained informed consent, the scope was                         passed under direct vision. Throughout the                        procedure, the patient's blood pressure, pulse, and                        oxygen saturations were monitored continuously. The                        Colonoscope was introduced through the anus and                        advanced to the transverse colon for evaluation.                        This was the intended extent. The colonoscopy was                        performed without difficulty. The patient tolerated                        the procedure well. The quality of the bowel                        preparation was fair. The rectum was photographed. Moderate Sedation:      MAC anesthesia was administered by the anesthesia team. Total Procedure Duration: 0 hours 3 minutes 48 seconds Findings:      The perianal and digital rectal examinations were normal.      Multiple large-mouthed, medium-mouthed and small-mouthed diverticula      were found in the entire colon. There was no evidence of diverticular      bleeding.      Internal hemorrhoids were found during retroflexion. The hemorrhoids      were medium-sized. Impression:            - Preparation of the colon was fair.                        - Severe diverticulosis in the entire examined                        colon. There was no evidence of diverticular                        bleeding.                        - Internal hemorrhoids.                        - Extent of the exam was to the distal transverse                        colon. This was a limited exam on purpose due to                        patient being in A.fib with RVR. No active                        bleeding. Brown stool was seen throughout the colon Recommendation:        - Advance diet as tolerated and clear liquid diet.                        - Return patient to [facility] for ongoing care.                        - Resume Eliquis (apixaban) at prior dose in 5-7                        days. Discussed with the  patient Risk of recurrent                        bleeding                        - Repeat colonoscopy is not recommended.                        - Patient has a contact number available for                        emergencies. The signs and symptoms of potential                        delayed complications were discussed with the                        patient. Return to normal activities tomorrow.                        Written discharge instructions were provided to the                        patient.                        - Continue present medications. Procedure Code(s):     --- Professional ---                        46743, 53, Colonoscopy, flexible; diagnostic,                        including collection of specimen(s) by brushing or                        washing, when performed (separate procedure) Diagnosis Code(s):     --- Professional ---                        K64.8, Other hemorrhoids                        K92.1, Melena (includes Hematochezia)                        K57.30, Diverticulosis of large intestine without                        perforation or abscess without bleeding CPT copyright 2021 [employer]. All rights reserved. The codes documented in this report are preliminary and upon  review may be revised to meet current compliance requirements. Attending Participation:      I personally performed the entire procedure. Scope In: 9:32:47 AM Scope Out: 9:36:35 AM Keo Mark MD 8/5/2024 9:42:15 AM This report has been signed electronically by Keo Mark MD Number of Addenda: 0 Note Initiated On: 8/5/2024 9:15 AM Estimated Blood Loss:      Estimated blood loss: none.    IR VISCERAL ANGIO (AV,FV,MM,UN)    Result Date: 8/4/2024  * * *Final Report* * * DATE OF EXAM: Aug  4 2024  2:07AM   [facility]   0824  -  IR VISCERAL ARTERY  / ACCESSION #  [id number] PROCEDURE REASON:      * * * * Physician Interpretation * * * *  PROCEDURE: Mesenteric angiography Procedural Personnel Attending  physician(s): YUSEF Son DO Pre-procedure diagnosis: GI bleed Post-procedure diagnosis: Same Indication: Gastrointestinal bleeding Additional clinical history: CTA positive for active hemorrhage into the sigmoid.  Patient on Eliquis.  Reversal agent given. _______________________________________________________________ PROCEDURE SUMMARY: - Arterial access with ultrasound guidance - Selective mesenteric angiography: Inferior mesenteric angiography - Superselective mesenteric angiography: Performed as described below - Additional procedure(s): None PROCEDURE DETAILS: Pre-procedure Consent: Risks, benefits, treatment options, potential complications and personnel to be involved were discussed (including the risks of radiation exposure, contrast and anesthesia administration, and any equipment needed for the procedure to ensure best possible outcome) with the patient and all questions were answered and consent was obtained prior to procedure. Premedicated for contrast allergy: n/a Transfusion of blood products: No Medication reconciliation: The patient's medications and allergies were reviewed in the electronic medical record and reconciled to the proposed procedure/treatment. Jo-procedure discussion: The appropriate elements of the pre-procedure discussion, safety check list and sign-out were performed. Time out: A time out was performed immediately prior to procedure start with the nursing and interventional team, correctly identifying the name, date of birth, procedure, anatomy (including marking of site and side if applicable), patient position, procedure consent form, relevant diagnostic and radiology test results, antibiotic administration if applicable, safety precautions, and procedure-specific equipment needs. Start of procedure: 0126 End of procedure: 0207 Patient position:  Supine Preparation: The site was prepared and draped using all elements of maximal sterile barrier technique including  sterile gloves, sterile gown, cap, mask, large sterile sheet, hand hygiene and cutaneous antisepsis. Antibiotics: None Contrast Contrast agent: OMNIPAQUE 300 Contrast volume (mL): 50 Image Guidance:  Fluoroscopic and sonographic guidance with digital image storage Radiation Dose FLUOROSCOPIC RADIATION SUMMARY: Plane A, Air Kerma:  335.4 mGy Dose Area Product (DAP):   6463.5 uGym2 Fluoro Time:  6:00 min:sec Radiation dose exceed 5 Gy: No If radiation dose exceeded 5 Gy, was counseling and instructional brochure provided:  N/A Anesthesia/sedation Level of anesthesia/sedation: No sedation Anesthesia/sedation administered by: Independent trained observer under attending supervision with continuous monitoring of the patient?s level of consciousness and physiologic status Total intra-service sedation time (minutes): 0 Local anesthesia: 1 % lidocaine Prep The patient was prepped and draped using all elements of maximal sterile barrier technique (cap, mask, sterile gown, sterile gloves, a large sterile sheet, hand hygiene and cutaneous antisepsis), sterile ultrasound gel and sterile ultrasound probe covers. Access Local anesthesia was administered. The vessel was sonographically evaluated and judged to be patent. Real time ultrasound was used to visualize needle entry into the vessel and a permanent image was stored. A 5 F sheath was placed. Vessel accessed: Right common femoral artery Access technique: Micropuncture set with 21 gauge needle Aortography: Not performed Mesenteric angiography and interventions The mesenteric arterial system was catheterized using a 5F SOS catheter. Variant anatomy: None Vessel catheterized: Inferior mesenteric artery Findings: Left colic, sigmoid, and superior rectal vessels patent.  No active bleeding. Vessel catheterized: Super selective catheterization of 3 third order vessels off the DOC supplying the sigmoid and superior rectal area.  This region was known to be the site of active  bleeding Findings: No active bleeding Closure Access site angiography performed: Yes Findings: Patent vessel with appropriate access level Arterial closure technique: Mynx Hemostasis achieved from closure technique: Yes Duration of manual compression (minutes): 4 Additional Details Additional description of procedure: None Equipment details: None Estimated blood loss (mL): Less than 10 Number and Type of Removed Specimens:  0:  Surgical pathology Standardized report: [email or web address] Complications There were no immediate complications . Conclusion The patient was comfortable and was transferred to the recovery room in stable condition. The procedure was performed by the:attending radiologist, without an assistant. The attending radiologist performed the following procedural activities: Entire procedure    IMPRESSION: DOC angiography and superselective angiography of sigmoid branches supplying the known region of bleeding based on CTA.  None of the performed angiographic runs demonstrated active bleeding therefore no intervention was performed.   PLAN: Continued close observation, repeat CBC, and consideration for colonoscopy. Attestation Signer name: DYAN Griffiths DO I attest that I was present for the entire procedure. I reviewed the stored images and agree with the report as written. : PSCLUIS   Transcribe Date/Time: Aug  4 2024  2:50A Dictated by : DYAN GRIFFITHS DO This examination was interpreted and the report reviewed and electronically signed by: DYAN GRIFFITHS DO on Aug  4 2024  3:02AM  EST    FL US guidance for vascular access    Result Date: 8/4/2024  * * *Final Report* * * DATE OF EXAM: Aug  4 2024  2:07AM   [facility]   7765  -  IR  VASCULAR ACCESS GUIDE  / ACCESSION #  [id number] PROCEDURE REASON: bleed      * * * * Physician Interpretation * * * *  PROCEDURE: Mesenteric angiography Procedural Personnel Attending physician(s): YUSEF Griffiths DO Pre-procedure diagnosis: GI bleed  Post-procedure diagnosis: Same Indication: Gastrointestinal bleeding Additional clinical history: CTA positive for active hemorrhage into the sigmoid.  Patient on Eliquis.  Reversal agent given. _______________________________________________________________ PROCEDURE SUMMARY: - Arterial access with ultrasound guidance - Selective mesenteric angiography: Inferior mesenteric angiography - Superselective mesenteric angiography: Performed as described below - Additional procedure(s): None PROCEDURE DETAILS: Pre-procedure Consent: Risks, benefits, treatment options, potential complications and personnel to be involved were discussed (including the risks of radiation exposure, contrast and anesthesia administration, and any equipment needed for the procedure to ensure best possible outcome) with the patient and all questions were answered and consent was obtained prior to procedure. Premedicated for contrast allergy: n/a Transfusion of blood products: No Medication reconciliation: The patient's medications and allergies were reviewed in the electronic medical record and reconciled to the proposed procedure/treatment. Jo-procedure discussion: The appropriate elements of the pre-procedure discussion, safety check list and sign-out were performed. Time out: A time out was performed immediately prior to procedure start with the nursing and interventional team, correctly identifying the name, date of birth, procedure, anatomy (including marking of site and side if applicable), patient position, procedure consent form, relevant diagnostic and radiology test results, antibiotic administration if applicable, safety precautions, and procedure-specific equipment needs. Start of procedure: 0126 End of procedure: 0207 Patient position:  Supine Preparation: The site was prepared and draped using all elements of maximal sterile barrier technique including sterile gloves, sterile gown, cap, mask, large sterile sheet, hand hygiene and  cutaneous antisepsis. Antibiotics: None Contrast Contrast agent: OMNIPAQUE 300 Contrast volume (mL): 50 Image Guidance:  Fluoroscopic and sonographic guidance with digital image storage Radiation Dose FLUOROSCOPIC RADIATION SUMMARY: Plane A, Air Kerma:  335.4 mGy Dose Area Product (DAP):   6463.5 uGym2 Fluoro Time:  6:00 min:sec Radiation dose exceed 5 Gy: No If radiation dose exceeded 5 Gy, was counseling and instructional brochure provided:  N/A Anesthesia/sedation Level of anesthesia/sedation: No sedation Anesthesia/sedation administered by: Independent trained observer under attending supervision with continuous monitoring of the patient?s level of consciousness and physiologic status Total intra-service sedation time (minutes): 0 Local anesthesia: 1 % lidocaine Prep The patient was prepped and draped using all elements of maximal sterile barrier technique (cap, mask, sterile gown, sterile gloves, a large sterile sheet, hand hygiene and cutaneous antisepsis), sterile ultrasound gel and sterile ultrasound probe covers. Access Local anesthesia was administered. The vessel was sonographically evaluated and judged to be patent. Real time ultrasound was used to visualize needle entry into the vessel and a permanent image was stored. A 5 F sheath was placed. Vessel accessed: Right common femoral artery Access technique: Micropuncture set with 21 gauge needle Aortography: Not performed Mesenteric angiography and interventions The mesenteric arterial system was catheterized using a 5F SOS catheter. Variant anatomy: None Vessel catheterized: Inferior mesenteric artery Findings: Left colic, sigmoid, and superior rectal vessels patent.  No active bleeding. Vessel catheterized: Super selective catheterization of 3 third order vessels off the DOC supplying the sigmoid and superior rectal area.  This region was known to be the site of active bleeding Findings: No active bleeding Closure Access site angiography performed: Yes  Findings: Patent vessel with appropriate access level Arterial closure technique: Mynx Hemostasis achieved from closure technique: Yes Duration of manual compression (minutes): 4 Additional Details Additional description of procedure: None Equipment details: None Estimated blood loss (mL): Less than 10 Number and Type of Removed Specimens:  0:  Surgical pathology Standardized report: [email or web address] Complications There were no immediate complications . Conclusion The patient was comfortable and was transferred to the recovery room in stable condition. The procedure was performed by the:attending radiologist, without an assistant. The attending radiologist performed the following procedural activities: Entire procedure    IMPRESSION: DOC angiography and superselective angiography of sigmoid branches supplying the known region of bleeding based on CTA.  None of the performed angiographic runs demonstrated active bleeding therefore no intervention was performed.   PLAN: Continued close observation, repeat CBC, and consideration for colonoscopy. Attestation Signer name: DYAN Griffiths DO I attest that I was present for the entire procedure. I reviewed the stored images and agree with the report as written. : [facility]   Transcribe Date/Time: Aug  4 2024  2:50A Dictated by : DYAN GRIFFITHS DO This examination was interpreted and the report reviewed and electronically signed by: DYAN GRIFFITHS DO on Aug  4 2024  3:02AM  EST    CT angio abdomen pelvis w and or wo IV IV contrast    Result Date: 8/3/2024  * * *Final Report* * * DATE OF EXAM: Aug  3 2024 10:15PM   [facility]   [id number]  -  CTA ABD/PELV WO/W IVCON  / ACCESSION #  [id number] PROCEDURE REASON: GI bleed      * * * * Physician Interpretation * * * *  CT angiography of the abdomen and pelvis with IV contrast Provided history: *  89 years old Female *  GI bleed COMPARISON STUDY: 08/10/2011, CT chest dated 06/20/2024 CT Radiation dose: Integrated Dose-length product  (DLP) for this visit =   1410 mGy*cm. CT Dose Reduction Employed: Automated exposure control(AEC) and iterative recon Multiple axial images were obtained during the arterial phase and at a 90 second delay. Coronal and sagittal reconstructions of both phases were performed. The liver and spleen enhance appropriately without evidence for mass or lesion. The gallbladder is present. The adrenal glands and pancreas are within normal limits. The kidneys are symmetric. No hydronephrosis or hydroureter is seen. Active extravasation is noted in the sigmoid colon in the left lower quadrant just distal to the descending colon.  Intraluminal contrast increases on the delayed images. There is diffuse diverticulosis of the colon. There is atherosclerosis without aneurysm. Masslike opacity is again noted within the right lower lobe and is incompletely included on this study.    IMPRESSION: 1.  Active arterial extravasation within the proximal aspect of the sigmoid colon. 2.  Redemonstration of suspicious mass in the right lower lobe. CRITICAL TEST/RESULTS: Acuity: Critical Finding: Active (arterial) bleeding, e.g. GI, retroperitoneal, aortic rupture, leaking/ruptured aneurysm, etc. Communication:  Communicated with Dr. Cristi Noel on  8/3/2024 10:37 PM   via EPIC Secure Chat. --END OF FINDING-- : [facility]   Transcribe Date/Time: Aug  3 2024 10:42P Dictated by : HAYDER JACKSON MD This examination was interpreted and the report reviewed and electronically signed by: HAYDER JACKSON MD on Aug  3 2024 10:49PM  EST    OCT MACULA CIRRUS OU (BOTH EYES)    Result Date: 7/30/2024  Date of Procedure 7/30/2024. Technician Information Imaging Technician: danny. Interpretation Right Eye Findings include Intraretinal fluid, Subretinal fluid, PED, Drusen, Vitelliform lesion. Left Eye Findings include Drusen, Vitelliform lesion; Negative for Intraretinal fluid, Subretinal fluid. Interval Change Left Eye Stable.     XR foot left 3+  views    Result Date: 7/18/2024  * * *Final Report* * * DATE OF EXAM: Jul 18 2024  2:38PM   [facility]   [id number]  -  XR FOOT 3V AP/LAT/OBL LT  / ACCESSION #  [id number] PROCEDURE REASON: Nondisplaced fracture of proximal phalanx of left great toe, subsequent encounte      * * * * Physician Interpretation * * * *  HISTORY:  Nondisplaced fracture of proximal phalanx of left great toe, subsequent encounter for fracture with routine healing   .  Follow up for fracture of LEFT Great toe TECHNIQUE: XR FOOT 3V AP/LAT/OBL LT COMPARISON: 06/25/2024 radiographs and CT RESULT: Healing intra-articular fracture first proximal phalanx with increased bone resorption at the fracture lines.  Unchanged articular surface depression of the proximal phalanx base.  No other changes. No other significant abnormality. ---------------------------------------------    IMPRESSION: HEALING FRACTURE(S) PROXIMAL PHALANX : [facility]   Transcribe Date/Time: Jul 18 2024  3:19P Dictated by : RASHMI YU MD This examination was interpreted and the report reviewed and electronically signed by: RASHMI YU MD on Jul 18 2024  3:22PM  EST        Assessment/Plan     Abnormal appearing gallbladder however no symptoms recommend HIDA scan    I spent 35 minutes in the professional and overall care of this patient.      Richy Shipley MD

## 2024-08-07 NOTE — CARE PLAN
The clinical goals for the shift include Maintain comfort and safety      Problem: Fall/Injury  Goal: Not fall by end of shift  Outcome: Progressing  Goal: Be free from injury by end of the shift  Outcome: Progressing  Goal: Verbalize understanding of personal risk factors for fall in the hospital  Outcome: Progressing  Goal: Verbalize understanding of risk factor reduction measures to prevent injury from fall in the home  Outcome: Progressing  Goal: Use assistive devices by end of the shift  Outcome: Progressing  Goal: Pace activities to prevent fatigue by end of the shift  Outcome: Progressing

## 2024-08-07 NOTE — H&P
History Of Present Illness  Meryl Hester is a 89 y.o. female with a PMH of HTN, CVA, afib, diverticular disease, HFpEF, hypothyroidism, arthritis presenting to the ED via EMS with near syncope/syncope. Her two daughters are at bedside. Patient reports she was sitting at the kitchen table playing cards with her daughter when she started to feel lightheaded, experiencing cold sweats, nausea, felt like she was going to lose consciousness. Her daughter reports patient did have an episode of diarrhea while this was happening. Patient reports a few episodes of diarrhea yesterday and once this morning. Patient states she did not check her blood pressure this morning, but she did take her losartan. EMS reported low blood pressure with a systolic of 47. Patient was discharged from [facility] yesterday as she was admitted for GI bleed. During her stay there she had a colonoscopy on 8/5 and was diagnosed with a diverticular bleed. Patient denies headache, fevers, chills, hearing changes, vision changes, vertigo, weight loss, abdominal pain, urinary frequency, urinary urgency, dysuria.     In the ED she arrived via EMS where she was found to have low blood pressure with a systolic of 47. EMS gave her a fluid bolus as well as push dose epi and as her blood pressure improved her mental status improved as well. EKG showed normal sinus rhythm with rate of 80. QRS prolonged at 130 as well as right bundle branch block. Poor r wave progression. LAD. Nonspecific T wave inversions in inferior anterior leads.   Blood cultures pending. Lactate 1.3. UA did she UTI. Patient started on Rocephin. . Troponin 845, 750. Occult blood stool negative. H&H low. Hypokalemia. Patient received potassium replacement. Magnesium normal. Lipase normal. PTT/INR normal. US RUQ showed cholecystitis. CTA negative or PE but showed several nodule that are known to the patient. CT head showed no acute intracranial hemorrhage. CXR showed no  active cardiopulmonary disease.      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    Surgical History  She has a past surgical history that includes Other surgical history (11/29/2016); Cataract extraction (11/29/2016); and Other surgical history (11/29/2016).     Social History  She has no history on file for tobacco use, alcohol use, and drug use.    Family History  No family history on file.     Allergies  Sulfa (sulfonamide antibiotics)    Review of Systems   Constitutional:  Positive for fatigue. Negative for activity change, appetite change, chills, diaphoresis, fever and unexpected weight change.   HENT:  Negative for congestion, dental problem, drooling, ear discharge, ear pain, facial swelling, hearing loss, sneezing, sore throat, tinnitus, trouble swallowing and voice change.    Eyes: Negative.    Respiratory: Negative.     Cardiovascular: Negative.    Gastrointestinal:  Positive for diarrhea and nausea. Negative for abdominal distention, abdominal pain, anal bleeding, blood in stool, constipation, rectal pain and vomiting.   Endocrine: Negative.    Genitourinary: Negative.    Musculoskeletal: Negative.    Neurological:  Positive for dizziness, syncope and light-headedness. Negative for tremors, seizures, facial asymmetry, speech difficulty, weakness, numbness and headaches.   Hematological: Negative.    Psychiatric/Behavioral: Negative.            Physical Exam:     General: Alert and oriented x 3, no distress, dry mucous membranes  Cardiovascular: Normal S1-S2, sinus rhythm, no murmurs  Respiratory:  course breath sounds bilaterally  Abdomen: Abdomen is soft, not distended with no tenderness  Extremities: No edema or deformities  Neurology: Alert and oriented x 3, no acute focal deficits      Last Recorded Vitals  /63   Pulse 79   Temp 36 °C (96.8 °F) (Temporal)   Resp 15   Wt 64.4 kg (142 lb)   SpO2 99%          Assessment/Plan   #Syncope/ near syncope   #Hypotension   #Elevated troponin likely secondary to demand ischemia from hypotension   #PAF  Observation status.   Hypotension with EMS with systolic of 47.   Blood pressure currently stable at 109/56. Pulse regular.   Continue to hold blood pressure medications for now.   Orthostatic vitals orders.   Continue fluids.   Monitor on tele   Cardiology consulted.     #Cholecystitis :   Patient does report RUQ pain described as sharp and intermittent in the past. Currently no symptoms.   Will consult surgery     #Anemia   H&H 8.2/26  Patient discharged from [facility] yesterday for diverticular bleed. Patient has colonoscopy with reports of no additional bleeding.   Eliquis on hold for 7 days per gastroenterology team at [place].   Patient currently stable.   Occult blood stool negative.     #Hypokalemia   Replaced and monitor.     #Lung nodules   This is a known condition to the patient.   Consider outpatient follow-up with pulmonology.     DVT ppx: SCDs

## 2024-08-07 NOTE — ED PROVIDER NOTES
89-year-old female presents emergency department via EMS with report of near syncope/syncope.  Patient was reportedly sitting at the kitchen table when she felt like and appeared like she was going to lose consciousness.  Patient did become significantly less responsive and was incontinent of stool.  She was found by EMS to have low blood pressures 47 systolic.  EMS gave patient fluid bolus as well as push dose epi and as her blood pressure improved her mental status improved as well.  She denies any fevers, coughing, or congestion.  Denies any chest pain or significant difficulty breathing.  She did report some nausea no vomiting.  Denies abdominal pain.  Dysuria or constipation.  Patient reports she has been having dark-colored stools and diarrhea.  She has significant history of recent admission at [facility] for GI bleed in which she was evaluated by IR, but they reported the bleeding had stopped.  Patient was taken off of her Eliquis and she had a colonoscopy performed. Chart review shows patient was admitted from 8/3/2024 until yesterday.  Patient was admitted for GI bleed.  During her admission she also history of atrial fibrillation patient was found to have diverticulosis on colonoscopy.  Patient received amiodarone as well as digoxin while admitted for her atrial fibrillation with RVR.  She then converted to normal sinus rhythm.      History provided by:  Patient, medical records, EMS personnel and relative   used: No           ------------------------------------------------------------------------------------------------------------------------------------------    VS: As documented in the triage note and EMR flowsheet from this visit were reviewed.    Review of Systems  Constitutional: no fever, chills reports fatigue and malaise  Eyes: no redness, discharge, pain  HENT: no sore throat, nose bleeds, congestion, rhinorrhea   Cardiovascular: no chest pain, leg edema,  palpitations  Respiratory: no shortness of breath, cough, wheezing  GI: Admits to nausea and diarrhea as well as dark-colored stools no pain, vomiting, constipation, BRBPR, melena  : no dysuria, frequency, hematuria  Musculoskeletal: no neck pain, stiffness,  no joint deformity, swelling  Skin: no rash, erythema, wounds  Neurological: no headache numbness, or tingling reports lightheadedness/dizziness as well as generalized weakness.  Admits to episode of syncope  Psychiatric: no suicidal thoughts, confusion, agitation  Metabolic: no polyuria or polydipsia  Hematologic: Patient has recent GI bleed and was taken off of her Eliquis.  Immunology: No immunocompromise state    [facility]  Nursing notes reviewed and confirmed by me.  Chart review performed including medications, allergies, and medical, surgical, and family history  Visit Vitals  /61   Pulse 84   Temp 35.9 °C (96.6 °F)   Resp 19     Physical Exam  Vitals and nursing note reviewed.   Constitutional:       General: She is not in acute distress.     Appearance: Normal appearance. She is ill-appearing.   HENT:      Head: Normocephalic and atraumatic.      Right Ear: External ear normal.      Left Ear: External ear normal.      Nose: Nose normal. No congestion or rhinorrhea.      Mouth/Throat:      Mouth: Mucous membranes are dry.      Pharynx: No oropharyngeal exudate or posterior oropharyngeal erythema.   Eyes:      Extraocular Movements: Extraocular movements intact.      Conjunctiva/sclera: Conjunctivae normal.      Pupils: Pupils are equal, round, and reactive to light.   Cardiovascular:      Rate and Rhythm: Normal rate and regular rhythm.      Pulses: Normal pulses.      Heart sounds: Normal heart sounds.   Pulmonary:      Effort: Pulmonary effort is normal. No respiratory distress.      Breath sounds: No stridor. No wheezing, rhonchi or rales.      Comments: Coarse breath sounds bilaterally  Abdominal:      General: There is no distension.       Palpations: Abdomen is soft.      Tenderness: There is no abdominal tenderness. There is no guarding or rebound.   Genitourinary:     Comments: Brown watery stool Hemoccult negative  Musculoskeletal:         General: No swelling or deformity. Normal range of motion.      Cervical back: Normal range of motion and neck supple. No rigidity.      Right lower leg: No edema.      Left lower leg: No edema.      Comments: No calf tenderness    Skin:     General: Skin is warm and dry.      Capillary Refill: Capillary refill takes less than 2 seconds.      Coloration: Skin is not jaundiced.      Findings: No rash.   Neurological:      General: No focal deficit present.      Mental Status: She is alert and oriented to person, place, and time.      Sensory: No sensory deficit.      Motor: No weakness.      Comments: Cranial nerves II through XII grossly intact.  NIH stroke scale is 0.   Psychiatric:         Mood and Affect: Mood normal.         Behavior: Behavior normal.        Past Medical History:   Diagnosis Date    Diverticulitis of intestine, part unspecified, without perforation or abscess without bleeding     Diverticulitis    Personal history of other diseases of the circulatory system     History of hypertension    Personal history of other endocrine, nutritional and metabolic disease     History of hypercholesterolemia    Personal history of other endocrine, nutritional and metabolic disease     History of thyroid disease    Vitreomacular adhesion, left eye 12/04/2017    Vitreomacular traction syndrome of left eye    Vitreous degeneration, right eye 12/04/2017    Posterior vitreous detachment of right eye      Past Surgical History:   Procedure Laterality Date    CATARACT EXTRACTION  11/29/2016    Cataract Surgery    OTHER SURGICAL HISTORY  11/29/2016    Iridotomy By YAG Laser    OTHER SURGICAL HISTORY  11/29/2016    Discission Of Secondary Membranous Cataract By Laser      Social History     Socioeconomic History     Marital status:    Tobacco Use    Smoking status: Former     Types: Cigarettes    Smokeless tobacco: Never     Social Determinants of Health     Financial Resource Strain: Low Risk  (8/7/2024)    Overall Financial Resource Strain (CARDIA)     Difficulty of Paying Living Expenses: Not hard at all   Food Insecurity: No Food Insecurity (6/26/2024)    Received from [facility]    Hunger Vital Sign     Worried About Running Out of Food in the Last Year: Never true     Ran Out of Food in the Last Year: Never true   Transportation Needs: No Transportation Needs (8/7/2024)    PRAPARE - Transportation     Lack of Transportation (Medical): No     Lack of Transportation (Non-Medical): No   Physical Activity: Inactive (3/6/2024)    Received from [facility]    Exercise Vital Sign     Days of Exercise per Week: 0 days     Minutes of Exercise per Session: 20 min   Stress: No Stress Concern Present (8/30/2022)    Received from [facility] [employer] - Occupational Stress Questionnaire     Feeling of Stress : Not at all   Social Connections: Moderately Integrated (8/30/2022)    Received from [facility]    Social Connection and Isolation Panel [NHANES]     Frequency of Communication with Friends and Family: More than three times a week     Frequency of Social Gatherings with Friends and Family: More than three times a week     Attends Denominational Services: More than 4 times per year     Active Member of Clubs or Organizations: Yes     Attends Club or Organization Meetings: More than 4 times per year     Marital Status:    Housing Stability: Low Risk  (8/7/2024)    Housing Stability Vital Sign     Unable to Pay for Housing in the Last Year: No     Number of Times Moved in the Last Year: 1     Homeless in the Last Year: No       ------------------------------------------------------------------------------------------------------------------------------------------  US right upper quadrant   Final Result   Distended gallbladder with gallstones and mild gallbladder wall   thickening. No ultrasonic Hendrickson sign. Findings are suspicious for   acute or chronic cholecystitis. Nonspecific prominence of the common   bile duct.        MACRO:   None        Signed by: Lizbeth Hay 8/7/2024 2:54 PM   Dictation workstation:   [id number]      CT angio chest for pulmonary embolism   Final Result   No central PE however limited assessment for peripheral PE due to   technical factors and motion artifact.        Several nodules/masses in the right lung in the setting of emphysema   with mild mediastinal lymphadenopathy. Findings could reflect   inflammatory or neoplastic/metastatic disease. Further evaluation   recommended. Subtle background pulmonary heterogeneity could reflect   acute or chronic interstitial disease, the former including   interstitial pneumonia and edema.        Distended gallbladder with questionable wall thickening and   pericholecystic fluid. Findings are concerning for acute   cholecystitis. Clinical correlation recommended. Further evaluation   with ultrasound may be helpful.        MACRO:   None.        Signed by: Ada Saavedra 8/7/2024 1:56 PM   Dictation workstation:   [id number]      CT head wo IV contrast   Final Result   No acute intracranial hemorrhage or mass-effect.        Low-density probable remote infarct/encephalomalacia in the right   temporoparietal region.        2 nonspecific scalp nodules.        MACRO:   None.        Signed by: Ada Saavedra 8/7/2024 1:44 PM   Dictation workstation:   [id number]      XR chest 1 view   Final Result   1.  No active cardiopulmonary disease.  There has not been   significant interval change from the prior exam.        Signed by: Lee Sahu 8/7/2024 12:41 PM   Dictation workstation:    [id number]      NM hepatobiliary    (Results Pending)      Labs Reviewed   CBC WITH AUTO DIFFERENTIAL - Abnormal       Result Value    WBC 9.1      nRBC 0.0      RBC 2.76 (*)     Hemoglobin 8.2 (*)     Hematocrit 26.0 (*)     MCV 94      MCH 29.7      MCHC 31.5 (*)     RDW 14.9 (*)     Platelets 232      Neutrophils % 70.0      Immature Granulocytes %, Automated 0.4      Lymphocytes % 18.8      Monocytes % 8.5      Eosinophils % 1.9      Basophils % 0.4      Neutrophils Absolute 6.38 (*)     Immature Granulocytes Absolute, Automated 0.04      Lymphocytes Absolute 1.71      Monocytes Absolute 0.77      Eosinophils Absolute 0.17      Basophils Absolute 0.04     COMPREHENSIVE METABOLIC PANEL - Abnormal    Glucose 167 (*)     Sodium 137      Potassium 3.3 (*)     Chloride 106      Bicarbonate 23      Anion Gap 11      Urea Nitrogen 7      Creatinine 0.83      eGFR 66      Calcium 8.2 (*)     Albumin 3.3 (*)     Alkaline Phosphatase 80      Total Protein 5.5 (*)     AST 15      Bilirubin, Total 0.6      ALT 11     B-TYPE NATRIURETIC PEPTIDE - Abnormal     (*)     Narrative:        <100 pg/mL - Heart failure unlikely  100-299 pg/mL - Intermediate probability of acute heart                  failure exacerbation. Correlate with clinical                  context and patient history.    >=300 pg/mL - Heart Failure likely. Correlate with clinical                  context and patient history.    BNP testing is performed using different testing methodology at [facility] than at other [facility] hospitals. Direct result comparisons should only be made within the same method.      D-DIMER, VTE EXCLUSION - Abnormal    D-Dimer, Quantitative VTE Exclusion 910 (*)     Narrative:     The VTE Exclusion D-Dimer assay is reported in ng/mL Fibrinogen Equivalent Units (FEU).    Per 's instructions for use, a value of less than 500 ng/mL (FEU) may help to exclude DVT or PE in outpatients when the assay is  used with a clinical pretest probability assessment.(AEMR must utilize and document eCalc 'Wells Score Deep Vein Thrombosis Risk' for DVT exclusion only. Emergency Department should utilize  Guidelines for Emergency Department Use of the VTE Exclusion D-Dimer and Clinical Pretest probability assessment model for DVT or PE exclusion.)   LACTATE - Abnormal    Lactate 2.2 (*)     Narrative:     Venipuncture immediately after or during the administration of Metamizole may lead to falsely low results. Testing should be performed immediately  prior to Metamizole dosing.   SERIAL TROPONIN-INITIAL - Abnormal    Troponin I, High Sensitivity 845 (*)     Narrative:     Less than 99th percentile of normal range cutoff-  Female and children under 18 years old <14 ng/L; Male <21 ng/L: Negative  Repeat testing should be performed if clinically indicated.     Female and children under 18 years old 14-50 ng/L; Male 21-50 ng/L:  Consistent with possible cardiac damage and possible increased clinical   risk. Serial measurements may help to assess extent of myocardial damage.     >50 ng/L: Consistent with cardiac damage, increased clinical risk and  myocardial infarction. Serial measurements may help assess extent of   myocardial damage.      NOTE: Children less than 1 year old may have higher baseline troponin   levels and results should be interpreted in conjunction with the overall   clinical context.     NOTE: Troponin I testing is performed using a different   testing methodology at [facility] than at other   [facility]. Direct result comparisons should only   be made within the same method.   URINALYSIS WITH REFLEX CULTURE AND MICROSCOPIC - Abnormal    Color, Urine Colorless (*)     Appearance, Urine Clear      Specific Gravity, Urine 1.009      pH, Urine 5.5      Protein, Urine NEGATIVE      Glucose, Urine Normal      Blood, Urine NEGATIVE      Ketones, Urine NEGATIVE      Bilirubin, Urine NEGATIVE       Urobilinogen, Urine Normal      Nitrite, Urine NEGATIVE      Leukocyte Esterase, Urine 250 Ashtyn/µL (*)    SERIAL TROPONIN, 1 HOUR - Abnormal    Troponin I, High Sensitivity 750 (*)     Narrative:     Less than 99th percentile of normal range cutoff-  Female and children under 18 years old <14 ng/L; Male <21 ng/L: Negative  Repeat testing should be performed if clinically indicated.     Female and children under 18 years old 14-50 ng/L; Male 21-50 ng/L:  Consistent with possible cardiac damage and possible increased clinical   risk. Serial measurements may help to assess extent of myocardial damage.     >50 ng/L: Consistent with cardiac damage, increased clinical risk and  myocardial infarction. Serial measurements may help assess extent of   myocardial damage.      NOTE: Children less than 1 year old may have higher baseline troponin   levels and results should be interpreted in conjunction with the overall   clinical context.     NOTE: Troponin I testing is performed using a different   testing methodology at [facility] than at other   [facility]. Direct result comparisons should only   be made within the same method.   MICROSCOPIC ONLY, URINE - Abnormal    WBC, Urine 6-10 (*)     RBC, Urine 3-5      Bacteria, Urine 1+ (*)    BLOOD CULTURE - Normal    Blood Culture Loaded on Instrument - Culture in progress     MAGNESIUM - Normal    Magnesium 1.90     LIPASE - Normal    Lipase 9      Narrative:     Venipuncture immediately after or during the administration of Metamizole may lead to falsely low results. Testing should be performed immediately prior to Metamizole dosing.   PROTIME-INR - Normal    Protime 12.7      INR 1.1     APTT - Normal    aPTT 27      Narrative:     The APTT is no longer used for monitoring Unfractionated Heparin Therapy. For monitoring Heparin Therapy, use the Heparin Assay.   LACTATE - Normal    Lactate 1.3      Narrative:     Venipuncture immediately after or during the  administration of Metamizole may lead to falsely low results. Testing should be performed immediately  prior to Metamizole dosing.   URINE CULTURE   BLOOD CULTURE   TROPONIN SERIES- (INITIAL, 1 HR)    Narrative:     The following orders were created for panel order Troponin I Series, High Sensitivity (0, 1 HR).  Procedure                               Abnormality         Status                     ---------                               -----------         ------                     Troponin I, High Sensiti...[[id number]]  Abnormal            Final result               Troponin, High Sensitivi...[[id number]]  Abnormal            Final result                 Please view results for these tests on the individual orders.   TYPE AND SCREEN    ABO TYPE A      Rh TYPE POS      ANTIBODY SCREEN POS     ABORH    ABO TYPE A      Rh TYPE POS     URINALYSIS WITH REFLEX CULTURE AND MICROSCOPIC    Narrative:     The following orders were created for panel order Urinalysis with Reflex Culture and Microscopic.  Procedure                               Abnormality         Status                     ---------                               -----------         ------                     Urinalysis with Reflex C...[[id number]]  Abnormal            Final result               Extra Urine Gray Tube[[id number]]                            In process                   Please view results for these tests on the individual orders.   EXTRA URINE GRAY TUBE   BB ORDER ONLY - ANTIBODY IDENTIFICATION   POCT OCCULT BLOOD STOOL        Medical Decision Making  EKG interpreted by ED physician: Normal sinus rhythm rate of 80.  [place] and QTc are within normal range.  QRS is prolonged at 130 EKG is consistent with right bundle branch block.  Poor R wave progression.  Left axis deviation.  Nonspecific T wave inversions in inferior anterior leads.  Findings of LVH.  Right bundle branch block is changed from her previous EKG in 2015.    Repeat EKG interpreted by ED  physician: Sinus rhythm with PACs rate of 95.  [place] and QTc within normal range.  QRS is prolonged at 132 consistent with a right bundle branch block.  No significant ST elevations or depressions.  No significant Q waves.  Poor R wave progression.  Left axis deviation.  Nonspecific T wave inversions in inferior and anterior leads.  Findings of LVH.    89-year-old female presents emergency department with report of syncope/near syncope, low blood pressure, and diarrhea.  She has significant history of recent GI bleed.  Per EMS patient required push dose epi and route.  On my exam of the patient she is alert and interactive without focal neurologic deficit.  She is generally weak.  Her blood pressure is maintained with IV fluids.  A thorough workup was obtained given her presenting symptoms.  Lipase within normal range I do not suspect pancreatitis.  D-dimer is elevated and subsequent CT PE study does not show pulmonary embolus.  CT PE study does show multiple lung nodules on the right side which patient and family report are known to them.  I have a low suspicion for pneumonia as patient denies cough, fever, or congestion.  In addition her white count is normal.  CT PE study did show distention of the gallbladder and pericholecystic fluid.  Right upper quadrant is subsequently ordered and pending at time of disposition.  Patient does not have any significant abdominal pain in the right upper quadrant on my exam.  Hospitalist on-call is advised of the pending ultrasound.  UA is concerning for urinary tract infection patient treated with ceftriaxone.  CT head shows findings of previous stroke which is known to the patient no acute intracranial hemorrhage or mass effect.  Chest x-ray does not show acute cardiopulmonary process such as pneumonia, pleural effusion, or pulmonary edema.  BNP is mildly elevated, but chest imaging does not show findings of a CHF exacerbation.  CBC shows anemia that is slightly downtrending from  nines to eights when compared to outside hospital record.  CMP shows hypokalemia which is repleted orally no other significant metabolic abnormalities.  Cardiac enzymes x 2 are significantly elevated, but not increasing.  Patient is given aspirin after discussion of risk and benefits with the patient's history of recent GI bleed.  Will hold off on heparin drip as the troponin is downtrending, patient does not have chest pain, and she has a history of recent GI bleed on Eliquis.  EKG does not show any acute ACS or significant arrhythmia.  I also considered and discussed with patient and family her elevated enzyme may be due to demand ischemia as patient reportedly had very low blood pressures in the field requiring push dose epi.  Given the patient's findings of syncope, UTI, abnormal chest CT, hypokalemia, and elevated cardiac enzyme I recommend admission for further evaluation and treatment.  Patient and family agreeable with this plan.  Case is discussed with hospitalist on-call.       Diagnoses as of 08/07/24 2202   Syncope, unspecified syncope type   NSTEMI (non-ST elevated myocardial infarction) (Multi)   Urinary tract infection without hematuria, site unspecified   Abnormal CT of the chest   Lung nodules   Anemia, unspecified type      1. Syncope, unspecified syncope type        2. NSTEMI (non-ST elevated myocardial infarction) (Multi)        3. Urinary tract infection without hematuria, site unspecified        4. Abnormal CT of the chest        5. Lung nodules        6. Anemia, unspecified type           Critical Care    Performed by: Jak Carbajal DO  Authorized by: Jak Carbajal DO    Critical care provider statement:     Critical care time (minutes):  40    Critical care time was exclusive of:  Separately billable procedures and treating other patients    Critical care was necessary to treat or prevent imminent or life-threatening deterioration of the following conditions:  Cardiac failure (NSTEMI)     Critical care was time spent personally by me on the following activities:  Development of treatment plan with patient or surrogate, evaluation of patient's response to treatment, examination of patient, re-evaluation of patient's condition, pulse oximetry, ordering and review of radiographic studies, ordering and review of laboratory studies, ordering and performing treatments and interventions and review of old charts    Care discussed with: admitting provider         This note was dictated using dragon software and may contain errors related to dictation interpretation errors.      Jak Carbajal,   08/07/24 9822

## 2024-08-08 ENCOUNTER — APPOINTMENT (OUTPATIENT)
Dept: RADIOLOGY | Facility: HOSPITAL | Age: 89
End: 2024-08-08
Payer: MEDICARE

## 2024-08-08 ENCOUNTER — APPOINTMENT (OUTPATIENT)
Dept: CARDIOLOGY | Facility: HOSPITAL | Age: 89
End: 2024-08-08
Payer: MEDICARE

## 2024-08-08 PROBLEM — R55 SYNCOPE, UNSPECIFIED SYNCOPE TYPE: Status: ACTIVE | Noted: 2024-08-08

## 2024-08-08 LAB
ALBUMIN SERPL BCP-MCNC: 3.2 G/DL (ref 3.4–5)
ALP SERPL-CCNC: 79 U/L (ref 33–136)
ALT SERPL W P-5'-P-CCNC: 9 U/L (ref 7–45)
ANION GAP SERPL CALC-SCNC: 9 MMOL/L (ref 10–20)
AORTIC VALVE MEAN GRADIENT: 10 MMHG
AORTIC VALVE PEAK VELOCITY: 2.29 M/S
AST SERPL W P-5'-P-CCNC: 14 U/L (ref 9–39)
AV PEAK GRADIENT: 21 MMHG
AVA (PEAK VEL): 1.18 CM2
AVA (VTI): 1.26 CM2
BILIRUB SERPL-MCNC: 0.4 MG/DL (ref 0–1.2)
BUN SERPL-MCNC: 4 MG/DL (ref 6–23)
CALCIUM SERPL-MCNC: 8.4 MG/DL (ref 8.6–10.3)
CARDIAC TROPONIN I PNL SERPL HS: 838 NG/L (ref 0–13)
CHLORIDE SERPL-SCNC: 108 MMOL/L (ref 98–107)
CO2 SERPL-SCNC: 27 MMOL/L (ref 21–32)
CREAT SERPL-MCNC: 0.64 MG/DL (ref 0.5–1.05)
EGFRCR SERPLBLD CKD-EPI 2021: 83 ML/MIN/1.73M*2
EJECTION FRACTION APICAL 4 CHAMBER: 55.6
EJECTION FRACTION: 48 %
ERYTHROCYTE [DISTWIDTH] IN BLOOD BY AUTOMATED COUNT: 15 % (ref 11.5–14.5)
GLUCOSE SERPL-MCNC: 98 MG/DL (ref 74–99)
HCT VFR BLD AUTO: 24.4 % (ref 36–46)
HGB BLD-MCNC: 7.8 G/DL (ref 12–16)
HOLD SPECIMEN: NORMAL
HOLD SPECIMEN: NORMAL
LEFT ATRIUM VOLUME AREA LENGTH INDEX BSA: 63.7 ML/M2
LEFT VENTRICLE INTERNAL DIMENSION DIASTOLE: 4.5 CM (ref 3.5–6)
LEFT VENTRICULAR OUTFLOW TRACT DIAMETER: 2 CM
LV EJECTION FRACTION BIPLANE: 50 %
MAGNESIUM SERPL-MCNC: 1.87 MG/DL (ref 1.6–2.4)
MCH RBC QN AUTO: 30.4 PG (ref 26–34)
MCHC RBC AUTO-ENTMCNC: 32 G/DL (ref 32–36)
MCV RBC AUTO: 95 FL (ref 80–100)
MITRAL VALVE E/A RATIO: 1.59
NRBC BLD-RTO: 0 /100 WBCS (ref 0–0)
PLATELET # BLD AUTO: 243 X10*3/UL (ref 150–450)
POTASSIUM SERPL-SCNC: 3.7 MMOL/L (ref 3.5–5.3)
PROT SERPL-MCNC: 5.4 G/DL (ref 6.4–8.2)
RBC # BLD AUTO: 2.57 X10*6/UL (ref 4–5.2)
RIGHT VENTRICLE FREE WALL PEAK S': 12.7 CM/S
RIGHT VENTRICLE PEAK SYSTOLIC PRESSURE: 84.4 MMHG
SODIUM SERPL-SCNC: 140 MMOL/L (ref 136–145)
TRICUSPID ANNULAR PLANE SYSTOLIC EXCURSION: 1.7 CM
WBC # BLD AUTO: 6.8 X10*3/UL (ref 4.4–11.3)

## 2024-08-08 PROCEDURE — 93306 TTE W/DOPPLER COMPLETE: CPT | Performed by: INTERNAL MEDICINE

## 2024-08-08 PROCEDURE — 2500000004 HC RX 250 GENERAL PHARMACY W/ HCPCS (ALT 636 FOR OP/ED): Performed by: HOSPITALIST

## 2024-08-08 PROCEDURE — 99223 1ST HOSP IP/OBS HIGH 75: CPT | Performed by: INTERNAL MEDICINE

## 2024-08-08 PROCEDURE — 84484 ASSAY OF TROPONIN QUANT: CPT | Performed by: HOSPITALIST

## 2024-08-08 PROCEDURE — 78226 HEPATOBILIARY SYSTEM IMAGING: CPT

## 2024-08-08 PROCEDURE — A9537 TC99M MEBROFENIN: HCPCS | Performed by: HOSPITALIST

## 2024-08-08 PROCEDURE — 85027 COMPLETE CBC AUTOMATED: CPT | Performed by: NURSE PRACTITIONER

## 2024-08-08 PROCEDURE — 36415 COLL VENOUS BLD VENIPUNCTURE: CPT | Performed by: NURSE PRACTITIONER

## 2024-08-08 PROCEDURE — 97165 OT EVAL LOW COMPLEX 30 MIN: CPT | Mod: GO

## 2024-08-08 PROCEDURE — 84075 ASSAY ALKALINE PHOSPHATASE: CPT | Performed by: NURSE PRACTITIONER

## 2024-08-08 PROCEDURE — 78226 HEPATOBILIARY SYSTEM IMAGING: CPT | Performed by: NUCLEAR MEDICINE

## 2024-08-08 PROCEDURE — 2500000001 HC RX 250 WO HCPCS SELF ADMINISTERED DRUGS (ALT 637 FOR MEDICARE OP): Performed by: HOSPITALIST

## 2024-08-08 PROCEDURE — 99221 1ST HOSP IP/OBS SF/LOW 40: CPT

## 2024-08-08 PROCEDURE — 1200000002 HC GENERAL ROOM WITH TELEMETRY DAILY

## 2024-08-08 PROCEDURE — 83735 ASSAY OF MAGNESIUM: CPT | Performed by: NURSE PRACTITIONER

## 2024-08-08 PROCEDURE — 93306 TTE W/DOPPLER COMPLETE: CPT

## 2024-08-08 PROCEDURE — 2500000002 HC RX 250 W HCPCS SELF ADMINISTERED DRUGS (ALT 637 FOR MEDICARE OP, ALT 636 FOR OP/ED): Performed by: HOSPITALIST

## 2024-08-08 PROCEDURE — 99232 SBSQ HOSP IP/OBS MODERATE 35: CPT | Performed by: HOSPITALIST

## 2024-08-08 PROCEDURE — 97161 PT EVAL LOW COMPLEX 20 MIN: CPT | Mod: GP | Performed by: PHYSICAL THERAPIST

## 2024-08-08 PROCEDURE — 3430000001 HC RX 343 DIAGNOSTIC RADIOPHARMACEUTICALS: Performed by: HOSPITALIST

## 2024-08-08 RX ORDER — KIT FOR THE PREPARATION OF TECHNETIUM TC 99M MEBROFENIN 45 MG/10ML
5.3 INJECTION, POWDER, LYOPHILIZED, FOR SOLUTION INTRAVENOUS
Status: COMPLETED | OUTPATIENT
Start: 2024-08-08 | End: 2024-08-08

## 2024-08-08 RX ORDER — LOSARTAN POTASSIUM 25 MG/1
25 TABLET ORAL DAILY
Status: DISCONTINUED | OUTPATIENT
Start: 2024-08-08 | End: 2024-08-09

## 2024-08-08 ASSESSMENT — COGNITIVE AND FUNCTIONAL STATUS - GENERAL
DRESSING REGULAR UPPER BODY CLOTHING: A LITTLE
MOVING TO AND FROM BED TO CHAIR: A LITTLE
DAILY ACTIVITIY SCORE: 19
WALKING IN HOSPITAL ROOM: A LITTLE
DRESSING REGULAR LOWER BODY CLOTHING: A LITTLE
EATING MEALS: A LITTLE
DRESSING REGULAR UPPER BODY CLOTHING: A LITTLE
DAILY ACTIVITIY SCORE: 18
MOVING FROM LYING ON BACK TO SITTING ON SIDE OF FLAT BED WITH BEDRAILS: A LITTLE
MOVING TO AND FROM BED TO CHAIR: A LITTLE
PERSONAL GROOMING: A LITTLE
CLIMB 3 TO 5 STEPS WITH RAILING: A LITTLE
PERSONAL GROOMING: A LITTLE
MOBILITY SCORE: 18
STANDING UP FROM CHAIR USING ARMS: A LITTLE
STANDING UP FROM CHAIR USING ARMS: A LITTLE
HELP NEEDED FOR BATHING: A LITTLE
TOILETING: A LITTLE
DRESSING REGULAR LOWER BODY CLOTHING: A LITTLE
TURNING FROM BACK TO SIDE WHILE IN FLAT BAD: A LITTLE
MOVING FROM LYING ON BACK TO SITTING ON SIDE OF FLAT BED WITH BEDRAILS: A LITTLE
TURNING FROM BACK TO SIDE WHILE IN FLAT BAD: A LITTLE
CLIMB 3 TO 5 STEPS WITH RAILING: A LITTLE
MOBILITY SCORE: 18
TOILETING: A LITTLE
WALKING IN HOSPITAL ROOM: A LITTLE
HELP NEEDED FOR BATHING: A LITTLE

## 2024-08-08 ASSESSMENT — PAIN - FUNCTIONAL ASSESSMENT
PAIN_FUNCTIONAL_ASSESSMENT: 0-10

## 2024-08-08 ASSESSMENT — ACTIVITIES OF DAILY LIVING (ADL): BATHING_ASSISTANCE: MINIMAL

## 2024-08-08 ASSESSMENT — PAIN SCALES - GENERAL
PAINLEVEL_OUTOF10: 0 - NO PAIN

## 2024-08-08 NOTE — CONSULTS
Inpatient consult to Cardiology  Consult performed by: RICHY Gibbons-CNP  Consult ordered by: Nima Villegas MD  Reason for consult: syncope                                                          Date:   8/8/2024  Patient name:  Meryl Hester  Date of admission:  8/7/2024 12:11 PM  MRN:   [id number]  YOB: 1931  Time of Consult:  8:17 AM    Consulting Cardiologist: RICHY Foster, CNP  Primary Cardiologist:  Dr. Villalobos ([facility])  Referring Provider: Dr. Villegas      Admission Diagnosis:     Syncope and collapse      History of Present Illness:      89-year-old female with past medical history of hypertension, L MCA CVA in February 2024, SIADH, hypothyroidism, paroxysmal atrial fibrillation on anticoagulation, diverticular disease, HFpEF, arthritis who presented to [facility] emergency department with complaints of syncope.  She was accompanied by both of her daughters.  Apparently the patient was sitting at the kitchen table with her family playing cards when she noticed to be feeling lightheaded and experiencing cold sweats, nausea and felt like she was going to lose consciousness.  Her daughter reported that she did have an episode of diarrhea while this was happening.  The patient also reports a few episodes of diarrhea 2 days ago and then once yesterday.  She unfortunately did not take her blood pressure that morning but she did take her losartan.  EMS did report a blood pressure with a systolic reading of 47.  She was just discharged from [facility] 2 days ago.  In the emergency department she was found to have severe low blood pressures.  She was fluid bolused as well as given push dose epinephrine and her blood pressure and mental status improved.  Initial lab work showed a potassium of 3.3, calcium 8.2, albumin 3.3, total protein 5.5, lactate 2.2, , troponin 845, D-dimer 910, hemoglobin 8.2, hematocrit 26.0.  CXR was  negative.  Initial EKG showed normal sinus rhythm with right bundle branch block.  Nonspecific ST wave abnormalities.  No STEMI criteria.  CT angio chest  showed nodules/masses in the right lung in the setting of emphysema with mild mediastinal lymphadenopathy. Findings could reflect inflammatory or neoplastic/metastatic disease. Further evaluation recommended. Subtle background pulmonary heterogeneity could reflect acute or chronic interstitial disease, the former including interstitial pneumonia and edema. Distended gallbladder with questionable wall thickening and pericholecystic fluid. Findings are concerning for acute cholecystitis. Clinical correlation recommended. Further evaluation with ultrasound may be helpful. it seems as though her hemoglobin was 9.3 on discharge from [facility].  Blood pressures have since improved.  General cardiology was consulted for syncope.    Of note she was recently admitted to [facility] due to rectal bleeding.  On presentation to the hospital she was hypertensive with BPs of 180/80.  Labs revealed hemoglobin of 10.6 with a baseline around 12-13.  CTA was performed revealing active arterial extravasation within the proximal aspect of sigmoid colon.  IR was consulted and she underwent an emergent angiography which did not demonstrate active bleeding so no interventions were performed.  While on the stepdown unit she continued to have bloody bowel movements but she remained hemodynamically stable and her lab values revealed stable hemoglobin.  GI was consulted and they recommended to undergo a colonoscopy for further evaluation of colon and bleed.  C-scope was performed on August 5 revealing severe diverticulosis throughout the sigmoid and descending colon however the prep was very poor.  There was no active source of bleeding identified and no interventions were done.  GI recommend holding Eliquis to 5 to 7 days prior to resumption.  They did tell her she is at  high risk for rebleeding with the severe diverticulosis.  Her hospital course was complicated by multiple episodes of A-fib with RVR in which she received multiple boluses of amiodarone along with digoxin.  She subsequently converted to sinus rhythm before discharge.    Allergies:     Allergies   Allergen Reactions    Sulfa (Sulfonamide Antibiotics) Unknown         Past Medical History:     Past Medical History:   Diagnosis Date    Diverticulitis of intestine, part unspecified, without perforation or abscess without bleeding     Diverticulitis    Personal history of other diseases of the circulatory system     History of hypertension    Personal history of other endocrine, nutritional and metabolic disease     History of hypercholesterolemia    Personal history of other endocrine, nutritional and metabolic disease     History of thyroid disease    Vitreomacular adhesion, left eye 12/04/2017    Vitreomacular traction syndrome of left eye    Vitreous degeneration, right eye 12/04/2017    Posterior vitreous detachment of right eye       Past Surgical History:     Past Surgical History:   Procedure Laterality Date    CATARACT EXTRACTION  11/29/2016    Cataract Surgery    OTHER SURGICAL HISTORY  11/29/2016    Iridotomy By YAG Laser    OTHER SURGICAL HISTORY  11/29/2016    Discission Of Secondary Membranous Cataract By Laser       Family History:     No family history on file.    Social History:     Social History     Tobacco Use    Smoking status: Former     Types: Cigarettes    Smokeless tobacco: Never       CURRENT INPATIENT MEDICATIONS    aspirin, 81 mg, oral, Daily  bisoprolol, 7.5 mg, oral, Daily  DULoxetine, 30 mg, oral, Daily  gabapentin, 300 mg, oral, Nightly  levothyroxine, 88 mcg, oral, Daily  magnesium oxide, 400 mg, oral, Daily  pantoprazole, 40 mg, oral, Daily before breakfast  rosuvastatin, 20 mg, oral, Nightly         Current Outpatient Medications   Medication Instructions    acetaminophen (TYLENOL) 650  mg, oral, Every 6 hours PRN    bisoprolol (ZEBETA) 7.5 mg, oral, Daily RT    calcium carbonate-vitamin D3 500 mg-5 mcg (200 unit) tablet 1 tablet, oral, Daily    carboxymethylcellulose (THERATears) 0.25 % ophthalmic solution 1 drop, Both Eyes, 3 times daily PRN    docusate sodium (COLACE) 100 mg, oral, 2 times daily    DULoxetine (CYMBALTA) 30 mg, oral, Daily RT    fluticasone (Flonase) 50 mcg/actuation nasal spray 1 spray, Each Nostril, Daily PRN    furosemide (LASIX) 20 mg, oral, Daily RT    gabapentin (NEURONTIN) 300 mg, oral, Nightly    krill oil 500 mg, oral, 2 times daily    LACTOBACILLUS ACIDOPHILUS ORAL 1 tablet, oral, Daily    levothyroxine (SYNTHROID, LEVOXYL) 88 mcg, oral, Daily RT    losartan (COZAAR) 25 mg, oral, Daily RT    magnesium oxide 500 mg, oral, Daily    meclizine (ANTIVERT) 12.5 mg, oral, Every 12 hours PRN    multivitamin with minerals iron-free (Centrum Silver) 1 tablet, oral, Daily    mv-min-FA-vit K-lutein-zeaxant (PreserVision AREDS 2 Plus MV) 200 mcg-15 mcg- 5 mg-1 mg capsule 1 capsule, oral, 2 times daily    omeprazole (PRILOSEC) 40 mg, oral, Daily RT    potassium chloride CR 20 mEq ER tablet 20 mEq, oral, Daily RT    rosuvastatin (CRESTOR) 20 mg, oral, Nightly        Review of Systems:      12 point review of systems was obtained in detail and is negative other than that detailed above.    Vital Signs:     Vitals:    08/08/24 0336 08/08/24 0801 08/08/24 0804 08/08/24 0806   BP: 122/58 144/67 147/68 170/78   Patient Position:  Lying Sitting Standing   Pulse: 85 86 95 104   Resp:  20 18 20   Temp: 36.2 °C (97.2 °F) 37.1 °C (98.8 °F)     TempSrc:       SpO2: (!) 84% 93% (!) 88% 92%   Weight:       Height:           Intake/Output Summary (Last 24 hours) at 8/8/2024 0817  Last data filed at 8/8/2024 0656  Gross per 24 hour   Intake 1973.75 ml   Output --   Net 1973.75 ml       Wt Readings from Last 4 Encounters:   08/07/24 65.2 kg (143 lb 11.8 oz)       Physical Examination:     Physical  Exam  Vitals and nursing note reviewed.   HENT:      Mouth/Throat:      Mouth: Mucous membranes are moist.   Eyes:      Pupils: Pupils are equal, round, and reactive to light.   Cardiovascular:      Rate and Rhythm: Normal rate and regular rhythm.   Pulmonary:      Effort: Pulmonary effort is normal.      Breath sounds: Decreased air movement present.   Abdominal:      Palpations: Abdomen is soft.   Musculoskeletal:      Cervical back: Normal range of motion.   Skin:     General: Skin is warm and dry.      Capillary Refill: Capillary refill takes 2 to 3 seconds.      Coloration: Skin is pale.   Neurological:      General: No focal deficit present.      Mental Status: She is alert and oriented to person, place, and time. Mental status is at baseline.   Psychiatric:         Mood and Affect: Mood normal.           Lab:     CBC:   Results from last 7 days   Lab Units 08/07/24  1223   WBC AUTO x10*3/uL 9.1   RBC AUTO x10*6/uL 2.76*   HEMOGLOBIN g/dL 8.2*   HEMATOCRIT % 26.0*   MCV fL 94   MCH pg 29.7   MCHC g/dL 31.5*   RDW % 14.9*   PLATELETS AUTO x10*3/uL 232     CMP:    Results from last 7 days   Lab Units 08/07/24  1223   SODIUM mmol/L 137   POTASSIUM mmol/L 3.3*   CHLORIDE mmol/L 106   CO2 mmol/L 23   BUN mg/dL 7   CREATININE mg/dL 0.83   GLUCOSE mg/dL 167*   PROTEIN TOTAL g/dL 5.5*   CALCIUM mg/dL 8.2*   BILIRUBIN TOTAL mg/dL 0.6   ALK PHOS U/L 80   AST U/L 15   ALT U/L 11     BMP:    Results from last 7 days   Lab Units 08/07/24  1223   SODIUM mmol/L 137   POTASSIUM mmol/L 3.3*   CHLORIDE mmol/L 106   CO2 mmol/L 23   BUN mg/dL 7   CREATININE mg/dL 0.83   CALCIUM mg/dL 8.2*   GLUCOSE mg/dL 167*     Magnesium:  Results from last 7 days   Lab Units 08/07/24  1223   MAGNESIUM mg/dL 1.90     Troponin:    Results from last 7 days   Lab Units 08/07/24  1324 08/07/24  1223   TROPHS ng/L 750* 845*     BNP:   Results from last 7 days   Lab Units 08/07/24  1223   BNP pg/mL 238*     Lipid Panel:         Diagnostic Studies:    Echocardiogram completed February 2024  CONCLUSIONS:   - Technically difficult exam due to body habitus, suboptimal positioning and   unable to follow requests,stroke,hr ~130-140bpm.   - Exam indication: Stroke   - The left ventricle is normal in size. Left ventricular systolic function is   normal. EF = 55 ± 5% (visual est.) Left ventricular diastolic function was not   evaluated due to AF.   - The right ventricle is normal in size. Right ventricular systolic function is   normal.   - The left atrial cavity is severely dilated.   - The right atrial cavity is dilated.   - Agitated saline contrast study performed (clip#[id number]) - negative for intracardiac   shunting   - Exam was compared with the prior  echocardiographic exam performed on 7/22/19.    Similar findings.     * * *Final Report* * *     DATE OF EXAM: Apr 13 2021  4:59PM      AFN   0006  -  NM CARDIAC PERF STRESS/PHARM  / ACCESSION #  [id number]     PROCEDURE REASON: Shortness of breath          * * * * Physician Interpretation * * * *      PATIENT:   Name: ANNA SINGH   MRN: [id number]   Age: 89 years   Gender: F     CONCLUSIONS:    1. SPECT Perfusion Study: Normal.    2. There is no scintigraphic evidence for inducible ischemia.    3. No evidence of scarred myocardium.    4. Left ventricle is normal in size. The left ventricle systolic   function is normal.    5. Right ventricle is normal in size.    6. This is a low risk scan.             Gated Stress FBP    LVEF % 59         Prior Study Comparison No prior nuclear cardiology exam available for   comparison; previous outside stress report only - 3/1/11.   Nuclear Med Report:1-Day Tc-Tetrofosmin Gated SPECT Myocardial Perfusion   with Regadenoson Stress: Myocardial perfusion imaging was performed at   rest 30 minutes following the IV injection of Tc-99m tetrofosmin. The   patient received 0.4 mg of regadenoson, via rapid IV push, immediately   followed by Tc-99m tetrofosmin IV. Gated post stress tomographic  imaging   was performed 30 to 60 minutes later. See administered doses below.   [facility]   Date of service: 4/13/2021 12:13:13 PM   Accession #: [id number]   Ordering Physician: Erickson Morin   Requesting Physician:   Indication: SOB with exertion   Interpreting physician: Bernice Carmona MD   Height: 162.56 cm BSA: 1.78 m²   Weight: 69.85 kg  BMI: 26.4 kg/m²     Imaging Protocol         Limitation Reason G.I. uptake, scaling artifact and Diaphragmatic   attenuation.       Exam Type:        Rest                            Stress     Radiopharm: Tc-99m Tetrofosmin               Tc-99m Tetrofosmin    Dosage(mCi):        13.1                             33.4   Stress Agent:                    Regadenoson 0.4mg Supply provided from   Central                                                      Pharmacy       Resting Blood Press: 178/90 mmHg       Image Quality   The overall study imaging quality was deemed to be poor. The following   technical issues were noted: G.I. uptake, scaling artifact and   Diaphragmatic attenuation.     FINDINGS:   Left Ventricle   Wall Motion:   Stress IR:3D - All segments are normal.   Rest IR:3D -   Gated Stress FBP -   Reversibility -     Stress IR:3D                  Stress IR:3D Gated Stress FBP        LVEF:                    59 %   ED Volume:                   54 ml   ES Volume:                   22 ml         TID:     0.89       Perfusion Findings   Stress IR:3D - Summed Score=0   All segments demonstrate normal perfusion.   Rest IR:3D - Summed Score=0   All segments demonstrate normal perfusion.    Stress IR:3D   Rest IR:3D   Summed Score=0 Summed Score=0         LEFT VENTRICLE   The left ventricle is normal in size. Left ventricular systolic function   is normal.       Right Ventricle   The right ventricle is normal in size.     Stress Test Findings:   There is no scintigraphic evidence for inducible ischemia. There is no   evidence of scarring.           US right upper  quadrant    Result Date: 8/7/2024  Interpreted By:  Lizbeth Hay, STUDY: US RIGHT UPPER QUADRANT;  8/7/2024 2:40 pm   INDICATION: Signs/Symptoms:abnormal ct, diarrhea.   COMPARISON: None.   ACCESSION NUMBER(S): [id number]   ORDERING CLINICIAN: AMBROSIO MARIA   TECHNIQUE: Multiple images of the right upper quadrant were obtained.   FINDINGS: LIVER: The liver measures 14.2 cm in longest axis. No definite focal nodules.     GALLBLADDER: The gallbladder is distended, and demonstrates multiple gallstones. No gallbladder wall thickening or surrounding fluid. The gallbladder wall thickness is 0.4 cm. Sonographic Hendrickson's sign is negative.     BILE DUCTS: No evidence of intra or extrahepatic biliary dilatation is identified; the common bile duct measures 1.0 cm.   PANCREAS: Pancreatic body and tail obscured by bowel gas and suboptimally evaluated.   RIGHT KIDNEY: The right kidney measures 10.4 cm in length. The renal cortical echogenicity and thickness are within normal limit.  No hydronephrosis or renal calculi are seen.       Distended gallbladder with gallstones and mild gallbladder wall thickening. No ultrasonic Hendrickson sign. Findings are suspicious for acute or chronic cholecystitis. Nonspecific prominence of the common bile duct.   MACRO: None   Signed by: Lizbeth Hay 8/7/2024 2:54 PM Dictation workstation:   [id number]    ECG 12 lead    Result Date: 8/7/2024  Sinus rhythm with Premature atrial complexes Left axis deviation Right bundle branch block Minimal voltage criteria for LVH, may be normal variant Abnormal ECG When compared with ECG of 07-AUG-2024 12:18, (unconfirmed) Premature atrial complexes are now Present    CT angio chest for pulmonary embolism    Result Date: 8/7/2024  Interpreted By:  Ada Saavedra, STUDY: CT ANGIO CHEST FOR PULMONARY EMBOLISM;  8/7/2024 1:27 pm   INDICATION: Signs/Symptoms:elevated d dimer, elevated trop, syncope.   COMPARISON: 03/04/2011   ACCESSION NUMBER(S): [id number]    ORDERING CLINICIAN: AMBROSIO MARIA   TECHNIQUE: CT of the chest was performed. Sagittal and coronal reconstructions were generated. 75 ML Omnipaque 350 intravenous contrast given for the examination.  Multiplanar reconstructions of the pulmonary vessels were created on an independent workstation and provided for review.   FINDINGS: Artifact related to overlying upper extremities and motion limits evaluation.   CHEST WALL AND LOWER NECK: No significant axillary lymphadenopathy. 2 cm fat attenuation probable lipoma in the musculature anterior to the left shoulder.   MEDIASTINUM AND LUAN:  1.4 cm precarinal node with fatty hilum. 1.3 cm subcarinal node. Mild gaseous distention of the proximal esophagus.   HEART AND VESSELS:  Limited assessment for PE due to timing of IV contrast bolus and motion artifact. No central PE identified however limited assessment for basilar/peripheral PE. The heart is enlarged. Small pericardial effusion. No thoracic aortic aneurysm. Multifocal atherosclerotic calcifications including the coronary arteries.   LUNGS, PLEURA, LARGE AIRWAYS:  Respiratory motion artifact limits parenchymal evaluation. Mild bullous/emphysematous changes. Subtle interstitial opacities in both lungs. 1.3 cm right mid chest nodule image 130/299. Irregular 3.0 x 1.8 cm opacity with air bronchograms in the right lower lobe. Probable 1.7 cm nodular opacity in the right infrahilar region image 186/299. No significant pleural effusion. The central airways are patent.   UPPER ABDOMEN:  Distended gallbladder with questionable wall thickening and pericholecystic fluid or intramural edema.   BONES:  Kyphoscoliosis and degenerative changes of the thoracic spine.       No central PE however limited assessment for peripheral PE due to technical factors and motion artifact.   Several nodules/masses in the right lung in the setting of emphysema with mild mediastinal lymphadenopathy. Findings could reflect inflammatory or  neoplastic/metastatic disease. Further evaluation recommended. Subtle background pulmonary heterogeneity could reflect acute or chronic interstitial disease, the former including interstitial pneumonia and edema.   Distended gallbladder with questionable wall thickening and pericholecystic fluid. Findings are concerning for acute cholecystitis. Clinical correlation recommended. Further evaluation with ultrasound may be helpful.   MACRO: None.   Signed by: Ada Saavedra 8/7/2024 1:56 PM Dictation workstation:   [id number]    CT head wo IV contrast    Result Date: 8/7/2024  Interpreted By:  Ada Saavedra, STUDY: CT HEAD WO IV CONTRAST;  8/7/2024 1:25 pm   INDICATION: Signs/Symptoms:dizziness.   COMPARISON: None.   ACCESSION NUMBER(S): [id number]   ORDERING CLINICIAN: AMBROSIO MARIA   TECHNIQUE: Unenhanced CT images of the head were obtained.   FINDINGS: Diffuse atrophy with enlargement of the extra-axial spaces, cerebral sulci and ventricular system. 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 Additional patchy periventricular white matter hypodensities bilaterally. No acute intracranial hemorrhage or mass-effect. No midline shift. No extra-axial fluid collection   No focal calvarial lesion. 14 mm partially calcified nodule in the left frontal scalp. 9 mm dense nodule in the left occipital scalp.   The visualized paranasal sinuses are clear.       No acute intracranial hemorrhage or mass-effect.   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   2 nonspecific scalp nodules.   MACRO: None.   Signed by: dAa Saavedra 8/7/2024 1:44 PM Dictation workstation:   [id number]    XR chest 1 view    Result Date: 8/7/2024  Interpreted By:  Lee Sahu, STUDY: XR CHEST 1 VIEW;  8/7/2024 12:31 pm   INDICATION: Signs/Symptoms:syncope.   COMPARISON: 04/18/2015   ACCESSION NUMBER(S): [id number]   ORDERING CLINICIAN:  AMBROSIO MARIA   FINDINGS: CARDIOMEDIASTINAL SILHOUETTE AND VASCULATURE:   Cardiac size:  Within normal limits. Aortic shadow:  Within normal limits.   Mediastinal contours: Within normal limits.   Pulmonary vasculature:  The central vasculature is unremarkable   LUNGS: Lungs are clear.   ABDOMEN AND OTHER FINDINGS: No remarkable upper abdominal findings.   BONES: No acute osseous changes.       1.  No active cardiopulmonary disease.  There has not been significant interval change from the prior exam.   Signed by: Lee Sahu 8/7/2024 12:41 PM Dictation workstation:   [id number]    ECG 12 lead    Result Date: 8/7/2024  Normal sinus rhythm Left axis deviation Right bundle branch block Minimal voltage criteria for LVH, may be normal variant Abnormal ECG When compared with ECG of 19-APR-2015 04:13, Premature ventricular complexes are no longer Present Right bundle branch block is now Present        Radiology:     US right upper quadrant   Final Result   Distended gallbladder with gallstones and mild gallbladder wall   thickening. No ultrasonic Hendrickson sign. Findings are suspicious for   acute or chronic cholecystitis. Nonspecific prominence of the common   bile duct.        MACRO:   None        Signed by: Lizbeth Hay 8/7/2024 2:54 PM   Dictation workstation:   [id number]      CT angio chest for pulmonary embolism   Final Result   No central PE however limited assessment for peripheral PE due to   technical factors and motion artifact.        Several nodules/masses in the right lung in the setting of emphysema   with mild mediastinal lymphadenopathy. Findings could reflect   inflammatory or neoplastic/metastatic disease. Further evaluation   recommended. Subtle background pulmonary heterogeneity could reflect   acute or chronic interstitial disease, the former including   interstitial pneumonia and edema.        Distended gallbladder with questionable wall thickening and   pericholecystic fluid. Findings are concerning  for acute   cholecystitis. Clinical correlation recommended. Further evaluation   with ultrasound may be helpful.        MACRO:   None.        Signed by: Ada Saavedra 8/7/2024 1:56 PM   Dictation workstation:   [id number]      CT head wo IV contrast   Final Result   No acute intracranial hemorrhage or mass-effect.        Low-density probable remote infarct/encephalomalacia in the right   temporoparietal region.        2 nonspecific scalp nodules.        MACRO:   None.        Signed by: Ada Saavedra 8/7/2024 1:44 PM   Dictation workstation:   [id number]      XR chest 1 view   Final Result   1.  No active cardiopulmonary disease.  There has not been   significant interval change from the prior exam.        Signed by: Lee Sahu 8/7/2024 12:41 PM   Dictation workstation:   [id number]      NM hepatobiliary    (Results Pending)       Problem List:     Patient Active Problem List   Diagnosis    Radiculopathy of lumbar region    Joint stiffness of spine    Gait difficulty    Syncope and collapse       Assessment:   Syncope  Paroxysmal atrial fibrillation   Remote history of left MCA CVA  Hypothyroidism  Diverticular disease  History of hypertension  Remote history of GI bleeding    Plan:   Admitted to medicine.  Remains on telemetry.  Telemetry shows normal sinus rhythm.  Reviewed EKGs which show sinus rhythm with PACs.  Right bundle branch block.  Nonspecific ST wave abnormalities.  No STEMI criteria.  Supplemental O2, keep SpO2 greater than 92%  Monitor electrolytes, keep potassium greater than 4 and magnesium greater than 2.  Potassium was 3.3 on admission.  Patient has a history of paroxysmal atrial fibrillation and was previously anticoagulated with Eliquis.  She was recently evaluated for active GI bleed at [facility].  She was instructed to stop her Eliquis and has been off of her Eliquis since discharge from the hospital.  Continue beta-blocker  Check orthostatic vital signs  May benefit from JODI  hose  This remains a risk versus benefit situation in regards to anticoagulation.  At her current age and comorbidities along with active GI bleeding would recommend to continue holding any anticoagulation due to severe risk of acute GI bleeding.  I am not sure at her age she would be a good candidate for a Watchman unfortunately.  Low suspicion for ACS, troponins mildly elevated most likely secondary to demand ischemia and hypotension.  She denies any chest pain or increased shortness of breath.  Blood pressure is much better today.  May need to adjust her antihypertensives.  Check orthostatic vital signs  Will continue to follow along with you        [facility]  Adult Gerontology Acute Care Nurse Practitioner  [facility]   [facility]  [phone]    CARDIOLOGIST CONSULT NOTE  I have personally seen and examined patient as well as personally formulated treatment plan.  I have reviewed this note as created by RICHY Newman, CNP and agree with his findings and physical exam.    I have reviewed records from our emergency room as well as recent admissions to [facility]  89-year-old woman with new onset of atrial fibrillation in February of this year diagnosed at time of right MCA stroke.  Was on Eliquis until recent hospitalization at [facility] with significant GI bleeding felt probably related to diverticular disease.  24 hours after discharge from [facility] was sitting playing cards around 10 in the morning suddenly slumped to the table but was still talking to family.  Paramedics came and reportedly blood pressure 74 systolic which did respond to fluid resuscitation also was given a dose of epinephrine.  By the time of coming to the emergency department blood pressures of 120/80.  She reports no shortness of breath or chest tightness prior to this or currently.  She has had no history of coronary disease  excellent control of LDL cholesterol under 70 and normal perfusion study 2021 normal echo as to LV function in February of this year.  She has had no history of myocardial infarction.  She leads a fairly sedentary lifestyle.  She has had no further rectal bleeding since she left [facility] 48 hours ago.  He had slight diarrhea only yesterday.    Currently notes no particular abdominal pain and has no chest tightness.    ECG review 8/7/2024 1334 hrs. normal sinus rhythm right bundle branch block no acute ST changes nonspecific lateral T abnormality not significantly changed from study 1 hour previous  8/7/2024 1218 hrs. normal sinus rhythm right bundle branch block no acute ST change    Troponin flat pattern albeit 800.    Exam: Generally sitting comfortably.  Lungs are clear cardiac exam is a regular rhythm and rate occasional extrasystoles abdomen is soft extremities show no edema    Telemetry: 1 4 beat run of ventricular tachycardia ONE 4 beat run of SVT possibly atrial flutter rate 150    Impression  1.  Near syncopal episode.  She did not have loss of consciousness she was talking to her family during the entire episode.  Nonetheless was quite hypotensive.  Paramedics did not describe rapid or slow heart rate but full details are not available.  She had normal heart rate and blood pressure upon arrival to the emergency room.  In that she responded to fluid resuscitation volume depletion is a possibility.  She just had a colonoscopy the day before and says was not eating great when she was at the hospital.  She did have a light breakfast the morning she had the episode however.  It is possible she was simply volume depleted exacerbated by sitting for a while playing cards and a subsequent vagal event secondary to hypotension.  Is also very possible she had a run of rapid atrial fibrillation or even ventricular tachycardia given the amount of ventricular ectopy evident.  2.   Also with significant  hypokalemia that could have predisposed abnormal rhythm.  Important to maintain normal electrolytes  3.   Would monitor another 24 to 48 hours for atrial fibrillation or ventricular tachycardia.  If either would occur assuming LV function is normal could utilize either sotalol or amiodarone need to have LV function to further assess.  4.  Elevated troponins: Despite troponin elevation ECG is unchanged from prior studies.  She has had no angina or shortness of breath.  I suspect it was related to the hypotension in the setting of severe anemia.  Acute coronary event is unlikely.  Important to assess echo to assure no new wall motion abnormalities.  5.   Paroxysmal atrial fibrillation: She has been described as having atrial fibrillation at [facility] since February.  Evidently required several boluses of IV amnio to control when in intensive care unit recently at the clinic but no antiarrhythmics were begun.  I cannot find documentation as to the exact reason thereof.  I believe she will need antiarrhythmic therapy should she have episodes of rapid A-fib or SVT.  Continue to monitor  6.   Ventricular ectopy: Keep electrolytes controlled, continue bisoprolol, and check echocardiogram  7.   Severe anemia: Recent GI bleeding.  Absolute contraindication for anticoagulation.  No clear reversible source was documented.  She is a candidate for a watchman and should be referred for same.  All of her care has been through [facility] and she at this time would prefer that to be the case and I have told her daughter to contact the cardiologist at [facility] to get referral started for a watchman.  8.  Anemia: She did have colonoscopy diverticulosis of the likely cause may need to transfuse if hemoglobin drops below 8  9.  Aspirin: She has had no evidence of clear-cut vascular disease and aspirin does little if any to reduce risk of embolism from atrial fibrillation.  Also she has significant bleeding may  consider resumption Eliquis without aspirin depending on clinical course    Wilmer Corona MD      Of note, this documentation is completed using the Dragon Dictation system (voice recognition software). There may be spelling and/or grammatical errors that were not corrected prior to final submission.      Electronically signed by LEONOR Gibbons, on 8/8/2024 at 8:17 AM

## 2024-08-08 NOTE — H&P (VIEW-ONLY)
Inpatient consult to Cardiology  Consult performed by: RICHY Gibbons-CNP  Consult ordered by: Nima Villegas MD  Reason for consult: syncope                                                          Date:   8/8/2024  Patient name:  Meryl Hester  Date of admission:  8/7/2024 12:11 PM  MRN:   [id number]  YOB: 1931  Time of Consult:  8:17 AM    Consulting Cardiologist: RICHY Foster, CNP  Primary Cardiologist:  Dr. Villalobos ([facility])  Referring Provider: Dr. Villegas      Admission Diagnosis:     Syncope and collapse      History of Present Illness:      89-year-old female with past medical history of hypertension, L MCA CVA in February 2024, SIADH, hypothyroidism, paroxysmal atrial fibrillation on anticoagulation, diverticular disease, HFpEF, arthritis who presented to [facility] emergency department with complaints of syncope.  She was accompanied by both of her daughters.  Apparently the patient was sitting at the kitchen table with her family playing cards when she noticed to be feeling lightheaded and experiencing cold sweats, nausea and felt like she was going to lose consciousness.  Her daughter reported that she did have an episode of diarrhea while this was happening.  The patient also reports a few episodes of diarrhea 2 days ago and then once yesterday.  She unfortunately did not take her blood pressure that morning but she did take her losartan.  EMS did report a blood pressure with a systolic reading of 47.  She was just discharged from [facility] 2 days ago.  In the emergency department she was found to have severe low blood pressures.  She was fluid bolused as well as given push dose epinephrine and her blood pressure and mental status improved.  Initial lab work showed a potassium of 3.3, calcium 8.2, albumin 3.3, total protein 5.5, lactate 2.2, , troponin 845, D-dimer 910, hemoglobin 8.2, hematocrit 26.0.  CXR was  negative.  Initial EKG showed normal sinus rhythm with right bundle branch block.  Nonspecific ST wave abnormalities.  No STEMI criteria.  CT angio chest  showed nodules/masses in the right lung in the setting of emphysema with mild mediastinal lymphadenopathy. Findings could reflect inflammatory or neoplastic/metastatic disease. Further evaluation recommended. Subtle background pulmonary heterogeneity could reflect acute or chronic interstitial disease, the former including interstitial pneumonia and edema. Distended gallbladder with questionable wall thickening and pericholecystic fluid. Findings are concerning for acute cholecystitis. Clinical correlation recommended. Further evaluation with ultrasound may be helpful. it seems as though her hemoglobin was 9.3 on discharge from [facility].  Blood pressures have since improved.  General cardiology was consulted for syncope.    Of note she was recently admitted to [facility] due to rectal bleeding.  On presentation to the hospital she was hypertensive with BPs of 180/80.  Labs revealed hemoglobin of 10.6 with a baseline around 12-13.  CTA was performed revealing active arterial extravasation within the proximal aspect of sigmoid colon.  IR was consulted and she underwent an emergent angiography which did not demonstrate active bleeding so no interventions were performed.  While on the stepdown unit she continued to have bloody bowel movements but she remained hemodynamically stable and her lab values revealed stable hemoglobin.  GI was consulted and they recommended to undergo a colonoscopy for further evaluation of colon and bleed.  C-scope was performed on August 5 revealing severe diverticulosis throughout the sigmoid and descending colon however the prep was very poor.  There was no active source of bleeding identified and no interventions were done.  GI recommend holding Eliquis to 5 to 7 days prior to resumption.  They did tell her she is at  high risk for rebleeding with the severe diverticulosis.  Her hospital course was complicated by multiple episodes of A-fib with RVR in which she received multiple boluses of amiodarone along with digoxin.  She subsequently converted to sinus rhythm before discharge.    Allergies:     Allergies   Allergen Reactions    Sulfa (Sulfonamide Antibiotics) Unknown         Past Medical History:     Past Medical History:   Diagnosis Date    Diverticulitis of intestine, part unspecified, without perforation or abscess without bleeding     Diverticulitis    Personal history of other diseases of the circulatory system     History of hypertension    Personal history of other endocrine, nutritional and metabolic disease     History of hypercholesterolemia    Personal history of other endocrine, nutritional and metabolic disease     History of thyroid disease    Vitreomacular adhesion, left eye 12/04/2017    Vitreomacular traction syndrome of left eye    Vitreous degeneration, right eye 12/04/2017    Posterior vitreous detachment of right eye       Past Surgical History:     Past Surgical History:   Procedure Laterality Date    CATARACT EXTRACTION  11/29/2016    Cataract Surgery    OTHER SURGICAL HISTORY  11/29/2016    Iridotomy By YAG Laser    OTHER SURGICAL HISTORY  11/29/2016    Discission Of Secondary Membranous Cataract By Laser       Family History:     No family history on file.    Social History:     Social History     Tobacco Use    Smoking status: Former     Types: Cigarettes    Smokeless tobacco: Never       CURRENT INPATIENT MEDICATIONS    aspirin, 81 mg, oral, Daily  bisoprolol, 7.5 mg, oral, Daily  DULoxetine, 30 mg, oral, Daily  gabapentin, 300 mg, oral, Nightly  levothyroxine, 88 mcg, oral, Daily  magnesium oxide, 400 mg, oral, Daily  pantoprazole, 40 mg, oral, Daily before breakfast  rosuvastatin, 20 mg, oral, Nightly         Current Outpatient Medications   Medication Instructions    acetaminophen (TYLENOL) 650  mg, oral, Every 6 hours PRN    bisoprolol (ZEBETA) 7.5 mg, oral, Daily RT    calcium carbonate-vitamin D3 500 mg-5 mcg (200 unit) tablet 1 tablet, oral, Daily    carboxymethylcellulose (THERATears) 0.25 % ophthalmic solution 1 drop, Both Eyes, 3 times daily PRN    docusate sodium (COLACE) 100 mg, oral, 2 times daily    DULoxetine (CYMBALTA) 30 mg, oral, Daily RT    fluticasone (Flonase) 50 mcg/actuation nasal spray 1 spray, Each Nostril, Daily PRN    furosemide (LASIX) 20 mg, oral, Daily RT    gabapentin (NEURONTIN) 300 mg, oral, Nightly    krill oil 500 mg, oral, 2 times daily    LACTOBACILLUS ACIDOPHILUS ORAL 1 tablet, oral, Daily    levothyroxine (SYNTHROID, LEVOXYL) 88 mcg, oral, Daily RT    losartan (COZAAR) 25 mg, oral, Daily RT    magnesium oxide 500 mg, oral, Daily    meclizine (ANTIVERT) 12.5 mg, oral, Every 12 hours PRN    multivitamin with minerals iron-free (Centrum Silver) 1 tablet, oral, Daily    mv-min-FA-vit K-lutein-zeaxant (PreserVision AREDS 2 Plus MV) 200 mcg-15 mcg- 5 mg-1 mg capsule 1 capsule, oral, 2 times daily    omeprazole (PRILOSEC) 40 mg, oral, Daily RT    potassium chloride CR 20 mEq ER tablet 20 mEq, oral, Daily RT    rosuvastatin (CRESTOR) 20 mg, oral, Nightly        Review of Systems:      12 point review of systems was obtained in detail and is negative other than that detailed above.    Vital Signs:     Vitals:    08/08/24 0336 08/08/24 0801 08/08/24 0804 08/08/24 0806   BP: 122/58 144/67 147/68 170/78   Patient Position:  Lying Sitting Standing   Pulse: 85 86 95 104   Resp:  20 18 20   Temp: 36.2 °C (97.2 °F) 37.1 °C (98.8 °F)     TempSrc:       SpO2: (!) 84% 93% (!) 88% 92%   Weight:       Height:           Intake/Output Summary (Last 24 hours) at 8/8/2024 0817  Last data filed at 8/8/2024 0656  Gross per 24 hour   Intake 1973.75 ml   Output --   Net 1973.75 ml       Wt Readings from Last 4 Encounters:   08/07/24 65.2 kg (143 lb 11.8 oz)       Physical Examination:     Physical  Exam  Vitals and nursing note reviewed.   HENT:      Mouth/Throat:      Mouth: Mucous membranes are moist.   Eyes:      Pupils: Pupils are equal, round, and reactive to light.   Cardiovascular:      Rate and Rhythm: Normal rate and regular rhythm.   Pulmonary:      Effort: Pulmonary effort is normal.      Breath sounds: Decreased air movement present.   Abdominal:      Palpations: Abdomen is soft.   Musculoskeletal:      Cervical back: Normal range of motion.   Skin:     General: Skin is warm and dry.      Capillary Refill: Capillary refill takes 2 to 3 seconds.      Coloration: Skin is pale.   Neurological:      General: No focal deficit present.      Mental Status: She is alert and oriented to person, place, and time. Mental status is at baseline.   Psychiatric:         Mood and Affect: Mood normal.           Lab:     CBC:   Results from last 7 days   Lab Units 08/07/24  1223   WBC AUTO x10*3/uL 9.1   RBC AUTO x10*6/uL 2.76*   HEMOGLOBIN g/dL 8.2*   HEMATOCRIT % 26.0*   MCV fL 94   MCH pg 29.7   MCHC g/dL 31.5*   RDW % 14.9*   PLATELETS AUTO x10*3/uL 232     CMP:    Results from last 7 days   Lab Units 08/07/24  1223   SODIUM mmol/L 137   POTASSIUM mmol/L 3.3*   CHLORIDE mmol/L 106   CO2 mmol/L 23   BUN mg/dL 7   CREATININE mg/dL 0.83   GLUCOSE mg/dL 167*   PROTEIN TOTAL g/dL 5.5*   CALCIUM mg/dL 8.2*   BILIRUBIN TOTAL mg/dL 0.6   ALK PHOS U/L 80   AST U/L 15   ALT U/L 11     BMP:    Results from last 7 days   Lab Units 08/07/24  1223   SODIUM mmol/L 137   POTASSIUM mmol/L 3.3*   CHLORIDE mmol/L 106   CO2 mmol/L 23   BUN mg/dL 7   CREATININE mg/dL 0.83   CALCIUM mg/dL 8.2*   GLUCOSE mg/dL 167*     Magnesium:  Results from last 7 days   Lab Units 08/07/24  1223   MAGNESIUM mg/dL 1.90     Troponin:    Results from last 7 days   Lab Units 08/07/24  1324 08/07/24  1223   TROPHS ng/L 750* 845*     BNP:   Results from last 7 days   Lab Units 08/07/24  1223   BNP pg/mL 238*     Lipid Panel:         Diagnostic Studies:    Echocardiogram completed February 2024  CONCLUSIONS:   - Technically difficult exam due to body habitus, suboptimal positioning and   unable to follow requests,stroke,hr ~130-140bpm.   - Exam indication: Stroke   - The left ventricle is normal in size. Left ventricular systolic function is   normal. EF = 55 ± 5% (visual est.) Left ventricular diastolic function was not   evaluated due to AF.   - The right ventricle is normal in size. Right ventricular systolic function is   normal.   - The left atrial cavity is severely dilated.   - The right atrial cavity is dilated.   - Agitated saline contrast study performed (clip#[id number]) - negative for intracardiac   shunting   - Exam was compared with the prior  echocardiographic exam performed on 7/22/19.    Similar findings.     * * *Final Report* * *     DATE OF EXAM: Apr 13 2021  4:59PM      AFN   0006  -  NM CARDIAC PERF STRESS/PHARM  / ACCESSION #  [id number]     PROCEDURE REASON: Shortness of breath          * * * * Physician Interpretation * * * *      PATIENT:   Name: ANNA SINGH   MRN: [id number]   Age: 89 years   Gender: F     CONCLUSIONS:    1. SPECT Perfusion Study: Normal.    2. There is no scintigraphic evidence for inducible ischemia.    3. No evidence of scarred myocardium.    4. Left ventricle is normal in size. The left ventricle systolic   function is normal.    5. Right ventricle is normal in size.    6. This is a low risk scan.             Gated Stress FBP    LVEF % 59         Prior Study Comparison No prior nuclear cardiology exam available for   comparison; previous outside stress report only - 3/1/11.   Nuclear Med Report:1-Day Tc-Tetrofosmin Gated SPECT Myocardial Perfusion   with Regadenoson Stress: Myocardial perfusion imaging was performed at   rest 30 minutes following the IV injection of Tc-99m tetrofosmin. The   patient received 0.4 mg of regadenoson, via rapid IV push, immediately   followed by Tc-99m tetrofosmin IV. Gated post stress tomographic  imaging   was performed 30 to 60 minutes later. See administered doses below.   Formerly [facility]   Date of service: 4/13/2021 12:13:13 PM   Accession #: [id number]   Ordering Physician: Erickson Morin   Requesting Physician:   Indication: SOB with exertion   Interpreting physician: Bernice Carmona MD   Height: 162.56 cm BSA: 1.78 m²   Weight: 69.85 kg  BMI: 26.4 kg/m²     Imaging Protocol         Limitation Reason G.I. uptake, scaling artifact and Diaphragmatic   attenuation.       Exam Type:        Rest                            Stress     Radiopharm: Tc-99m Tetrofosmin               Tc-99m Tetrofosmin    Dosage(mCi):        13.1                             33.4   Stress Agent:                    Regadenoson 0.4mg Supply provided from   Central                                                      Pharmacy       Resting Blood Press: 178/90 mmHg       Image Quality   The overall study imaging quality was deemed to be poor. The following   technical issues were noted: G.I. uptake, scaling artifact and   Diaphragmatic attenuation.     FINDINGS:   Left Ventricle   Wall Motion:   Stress IR:3D - All segments are normal.   Rest IR:3D -   Gated Stress FBP -   Reversibility -     Stress IR:3D                  Stress IR:3D Gated Stress FBP        LVEF:                    59 %   ED Volume:                   54 ml   ES Volume:                   22 ml         TID:     0.89       Perfusion Findings   Stress IR:3D - Summed Score=0   All segments demonstrate normal perfusion.   Rest IR:3D - Summed Score=0   All segments demonstrate normal perfusion.    Stress IR:3D   Rest IR:3D   Summed Score=0 Summed Score=0         LEFT VENTRICLE   The left ventricle is normal in size. Left ventricular systolic function   is normal.       Right Ventricle   The right ventricle is normal in size.     Stress Test Findings:   There is no scintigraphic evidence for inducible ischemia. There is no   evidence of scarring.           US right upper  quadrant    Result Date: 8/7/2024  Interpreted By:  Lizbeth Hay, STUDY: US RIGHT UPPER QUADRANT;  8/7/2024 2:40 pm   INDICATION: Signs/Symptoms:abnormal ct, diarrhea.   COMPARISON: None.   ACCESSION NUMBER(S): [id number]   ORDERING CLINICIAN: AMBROSIO MARIA   TECHNIQUE: Multiple images of the right upper quadrant were obtained.   FINDINGS: LIVER: The liver measures 14.2 cm in longest axis. No definite focal nodules.     GALLBLADDER: The gallbladder is distended, and demonstrates multiple gallstones. No gallbladder wall thickening or surrounding fluid. The gallbladder wall thickness is 0.4 cm. Sonographic Hendrickson's sign is negative.     BILE DUCTS: No evidence of intra or extrahepatic biliary dilatation is identified; the common bile duct measures 1.0 cm.   PANCREAS: Pancreatic body and tail obscured by bowel gas and suboptimally evaluated.   RIGHT KIDNEY: The right kidney measures 10.4 cm in length. The renal cortical echogenicity and thickness are within normal limit.  No hydronephrosis or renal calculi are seen.       Distended gallbladder with gallstones and mild gallbladder wall thickening. No ultrasonic Hendrickson sign. Findings are suspicious for acute or chronic cholecystitis. Nonspecific prominence of the common bile duct.   MACRO: None   Signed by: Lizbeth Hay 8/7/2024 2:54 PM Dictation workstation:   [id number]    ECG 12 lead    Result Date: 8/7/2024  Sinus rhythm with Premature atrial complexes Left axis deviation Right bundle branch block Minimal voltage criteria for LVH, may be normal variant Abnormal ECG When compared with ECG of 07-AUG-2024 12:18, (unconfirmed) Premature atrial complexes are now Present    CT angio chest for pulmonary embolism    Result Date: 8/7/2024  Interpreted By:  Ada Saavedra, STUDY: CT ANGIO CHEST FOR PULMONARY EMBOLISM;  8/7/2024 1:27 pm   INDICATION: Signs/Symptoms:elevated d dimer, elevated trop, syncope.   COMPARISON: 03/04/2011   ACCESSION NUMBER(S): [id number]    ORDERING CLINICIAN: AMBROSIO MARIA   TECHNIQUE: CT of the chest was performed. Sagittal and coronal reconstructions were generated. 75 ML Omnipaque 350 intravenous contrast given for the examination.  Multiplanar reconstructions of the pulmonary vessels were created on an independent workstation and provided for review.   FINDINGS: Artifact related to overlying upper extremities and motion limits evaluation.   CHEST WALL AND LOWER NECK: No significant axillary lymphadenopathy. 2 cm fat attenuation probable lipoma in the musculature anterior to the left shoulder.   MEDIASTINUM AND LUAN:  1.4 cm precarinal node with fatty hilum. 1.3 cm subcarinal node. Mild gaseous distention of the proximal esophagus.   HEART AND VESSELS:  Limited assessment for PE due to timing of IV contrast bolus and motion artifact. No central PE identified however limited assessment for basilar/peripheral PE. The heart is enlarged. Small pericardial effusion. No thoracic aortic aneurysm. Multifocal atherosclerotic calcifications including the coronary arteries.   LUNGS, PLEURA, LARGE AIRWAYS:  Respiratory motion artifact limits parenchymal evaluation. Mild bullous/emphysematous changes. Subtle interstitial opacities in both lungs. 1.3 cm right mid chest nodule image 130/299. Irregular 3.0 x 1.8 cm opacity with air bronchograms in the right lower lobe. Probable 1.7 cm nodular opacity in the right infrahilar region image 186/299. No significant pleural effusion. The central airways are patent.   UPPER ABDOMEN:  Distended gallbladder with questionable wall thickening and pericholecystic fluid or intramural edema.   BONES:  Kyphoscoliosis and degenerative changes of the thoracic spine.       No central PE however limited assessment for peripheral PE due to technical factors and motion artifact.   Several nodules/masses in the right lung in the setting of emphysema with mild mediastinal lymphadenopathy. Findings could reflect inflammatory or  neoplastic/metastatic disease. Further evaluation recommended. Subtle background pulmonary heterogeneity could reflect acute or chronic interstitial disease, the former including interstitial pneumonia and edema.   Distended gallbladder with questionable wall thickening and pericholecystic fluid. Findings are concerning for acute cholecystitis. Clinical correlation recommended. Further evaluation with ultrasound may be helpful.   MACRO: None.   Signed by: Ada Saavedra 8/7/2024 1:56 PM Dictation workstation:   [id number]    CT head wo IV contrast    Result Date: 8/7/2024  Interpreted By:  Ada Saavedra, STUDY: CT HEAD WO IV CONTRAST;  8/7/2024 1:25 pm   INDICATION: Signs/Symptoms:dizziness.   COMPARISON: None.   ACCESSION NUMBER(S): [id number]   ORDERING CLINICIAN: AMBROSIO MARIA   TECHNIQUE: Unenhanced CT images of the head were obtained.   FINDINGS: Diffuse atrophy with enlargement of the extra-axial spaces, cerebral sulci and ventricular system. 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 Additional patchy periventricular white matter hypodensities bilaterally. No acute intracranial hemorrhage or mass-effect. No midline shift. No extra-axial fluid collection   No focal calvarial lesion. 14 mm partially calcified nodule in the left frontal scalp. 9 mm dense nodule in the left occipital scalp.   The visualized paranasal sinuses are clear.       No acute intracranial hemorrhage or mass-effect.   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   2 nonspecific scalp nodules.   MACRO: None.   Signed by: Ada Saavedra 8/7/2024 1:44 PM Dictation workstation:   [id number]    XR chest 1 view    Result Date: 8/7/2024  Interpreted By:  Lee Sahu, STUDY: XR CHEST 1 VIEW;  8/7/2024 12:31 pm   INDICATION: Signs/Symptoms:syncope.   COMPARISON: 04/18/2015   ACCESSION NUMBER(S): [id number]   ORDERING CLINICIAN:  AMBROSIO MARIA   FINDINGS: CARDIOMEDIASTINAL SILHOUETTE AND VASCULATURE:   Cardiac size:  Within normal limits. Aortic shadow:  Within normal limits.   Mediastinal contours: Within normal limits.   Pulmonary vasculature:  The central vasculature is unremarkable   LUNGS: Lungs are clear.   ABDOMEN AND OTHER FINDINGS: No remarkable upper abdominal findings.   BONES: No acute osseous changes.       1.  No active cardiopulmonary disease.  There has not been significant interval change from the prior exam.   Signed by: Lee Sahu 8/7/2024 12:41 PM Dictation workstation:   [id number]    ECG 12 lead    Result Date: 8/7/2024  Normal sinus rhythm Left axis deviation Right bundle branch block Minimal voltage criteria for LVH, may be normal variant Abnormal ECG When compared with ECG of 19-APR-2015 04:13, Premature ventricular complexes are no longer Present Right bundle branch block is now Present        Radiology:     US right upper quadrant   Final Result   Distended gallbladder with gallstones and mild gallbladder wall   thickening. No ultrasonic Hendrickson sign. Findings are suspicious for   acute or chronic cholecystitis. Nonspecific prominence of the common   bile duct.        MACRO:   None        Signed by: Lizbeth Hay 8/7/2024 2:54 PM   Dictation workstation:   [id number]      CT angio chest for pulmonary embolism   Final Result   No central PE however limited assessment for peripheral PE due to   technical factors and motion artifact.        Several nodules/masses in the right lung in the setting of emphysema   with mild mediastinal lymphadenopathy. Findings could reflect   inflammatory or neoplastic/metastatic disease. Further evaluation   recommended. Subtle background pulmonary heterogeneity could reflect   acute or chronic interstitial disease, the former including   interstitial pneumonia and edema.        Distended gallbladder with questionable wall thickening and   pericholecystic fluid. Findings are concerning  for acute   cholecystitis. Clinical correlation recommended. Further evaluation   with ultrasound may be helpful.        MACRO:   None.        Signed by: Ada Saavedra 8/7/2024 1:56 PM   Dictation workstation:   [id number]      CT head wo IV contrast   Final Result   No acute intracranial hemorrhage or mass-effect.        Low-density probable remote infarct/encephalomalacia in the right   temporoparietal region.        2 nonspecific scalp nodules.        MACRO:   None.        Signed by: Ada Saavedra 8/7/2024 1:44 PM   Dictation workstation:   [id number]      XR chest 1 view   Final Result   1.  No active cardiopulmonary disease.  There has not been   significant interval change from the prior exam.        Signed by: Lee Sahu 8/7/2024 12:41 PM   Dictation workstation:   [id number]      NM hepatobiliary    (Results Pending)       Problem List:     Patient Active Problem List   Diagnosis    Radiculopathy of lumbar region    Joint stiffness of spine    Gait difficulty    Syncope and collapse       Assessment:   Syncope  Paroxysmal atrial fibrillation   Remote history of left MCA CVA  Hypothyroidism  Diverticular disease  History of hypertension  Remote history of GI bleeding    Plan:   Admitted to medicine.  Remains on telemetry.  Telemetry shows normal sinus rhythm.  Reviewed EKGs which show sinus rhythm with PACs.  Right bundle branch block.  Nonspecific ST wave abnormalities.  No STEMI criteria.  Supplemental O2, keep SpO2 greater than 92%  Monitor electrolytes, keep potassium greater than 4 and magnesium greater than 2.  Potassium was 3.3 on admission.  Patient has a history of paroxysmal atrial fibrillation and was previously anticoagulated with Eliquis.  She was recently evaluated for active GI bleed at [facility].  She was instructed to stop her Eliquis and has been off of her Eliquis since discharge from the hospital.  Continue beta-blocker  Check orthostatic vital signs  May benefit from JODI  hose  This remains a risk versus benefit situation in regards to anticoagulation.  At her current age and comorbidities along with active GI bleeding would recommend to continue holding any anticoagulation due to severe risk of acute GI bleeding.  I am not sure at her age she would be a good candidate for a Watchman unfortunately.  Low suspicion for ACS, troponins mildly elevated most likely secondary to demand ischemia and hypotension.  She denies any chest pain or increased shortness of breath.  Blood pressure is much better today.  May need to adjust her antihypertensives.  Check orthostatic vital signs  Will continue to follow along with you        [facility]  Adult Gerontology Acute Care Nurse Practitioner  [facility] [place]   [facility]  [phone]    CARDIOLOGIST CONSULT NOTE  I have personally seen and examined patient as well as personally formulated treatment plan.  I have reviewed this note as created by RICHY Newman, CNP and agree with his findings and physical exam.    I have reviewed records from our emergency room as well as recent admissions to [facility]  89-year-old woman with new onset of atrial fibrillation in February of this year diagnosed at time of right MCA stroke.  Was on Eliquis until recent hospitalization at [facility] with significant GI bleeding felt probably related to diverticular disease.  24 hours after discharge from [facility] was sitting playing cards around 10 in the morning suddenly slumped to the table but was still talking to family.  Paramedics came and reportedly blood pressure 74 systolic which did respond to fluid resuscitation also was given a dose of epinephrine.  By the time of coming to the emergency department blood pressures of 120/80.  She reports no shortness of breath or chest tightness prior to this or currently.  She has had no history of coronary disease  excellent control of LDL cholesterol under 70 and normal perfusion study 2021 normal echo as to LV function in February of this year.  She has had no history of myocardial infarction.  She leads a fairly sedentary lifestyle.  She has had no further rectal bleeding since she left [facility] 48 hours ago.  He had slight diarrhea only yesterday.    Currently notes no particular abdominal pain and has no chest tightness.    ECG review 8/7/2024 1334 hrs. normal sinus rhythm right bundle branch block no acute ST changes nonspecific lateral T abnormality not significantly changed from study 1 hour previous  8/7/2024 1218 hrs. normal sinus rhythm right bundle branch block no acute ST change    Troponin flat pattern albeit 800.    Exam: Generally sitting comfortably.  Lungs are clear cardiac exam is a regular rhythm and rate occasional extrasystoles abdomen is soft extremities show no edema    Telemetry: 1 4 beat run of ventricular tachycardia ONE 4 beat run of SVT possibly atrial flutter rate 150    Impression  1.  Near syncopal episode.  She did not have loss of consciousness she was talking to her family during the entire episode.  Nonetheless was quite hypotensive.  Paramedics did not describe rapid or slow heart rate but full details are not available.  She had normal heart rate and blood pressure upon arrival to the emergency room.  In that she responded to fluid resuscitation volume depletion is a possibility.  She just had a colonoscopy the day before and says was not eating great when she was at the hospital.  She did have a light breakfast the morning she had the episode however.  It is possible she was simply volume depleted exacerbated by sitting for a while playing cards and a subsequent vagal event secondary to hypotension.  Is also very possible she had a run of rapid atrial fibrillation or even ventricular tachycardia given the amount of ventricular ectopy evident.  2.   Also with significant  hypokalemia that could have predisposed abnormal rhythm.  Important to maintain normal electrolytes  3.   Would monitor another 24 to 48 hours for atrial fibrillation or ventricular tachycardia.  If either would occur assuming LV function is normal could utilize either sotalol or amiodarone need to have LV function to further assess.  4.  Elevated troponins: Despite troponin elevation ECG is unchanged from prior studies.  She has had no angina or shortness of breath.  I suspect it was related to the hypotension in the setting of severe anemia.  Acute coronary event is unlikely.  Important to assess echo to assure no new wall motion abnormalities.  5.   Paroxysmal atrial fibrillation: She has been described as having atrial fibrillation at [facility] since February.  Evidently required several boluses of IV amnio to control when in intensive care unit recently at the clinic but no antiarrhythmics were begun.  I cannot find documentation as to the exact reason thereof.  I believe she will need antiarrhythmic therapy should she have episodes of rapid A-fib or SVT.  Continue to monitor  6.   Ventricular ectopy: Keep electrolytes controlled, continue bisoprolol, and check echocardiogram  7.   Severe anemia: Recent GI bleeding.  Absolute contraindication for anticoagulation.  No clear reversible source was documented.  She is a candidate for a watchman and should be referred for same.  All of her care has been through [facility] and she at this time would prefer that to be the case and I have told her daughter to contact the cardiologist at [facility] to get referral started for a watchman.  8.  Anemia: She did have colonoscopy diverticulosis of the likely cause may need to transfuse if hemoglobin drops below 8  9.  Aspirin: She has had no evidence of clear-cut vascular disease and aspirin does little if any to reduce risk of embolism from atrial fibrillation.  Also she has significant bleeding may  consider resumption Eliquis without aspirin depending on clinical course    Wilmer Corona MD      Of note, this documentation is completed using the Dragon Dictation system (voice recognition software). There may be spelling and/or grammatical errors that were not corrected prior to final submission.      Electronically signed by LEONOR Gibbons, on 8/8/2024 at 8:17 AM

## 2024-08-08 NOTE — PROGRESS NOTES
Occupational Therapy    Evaluation    Patient Name: Meryl Hester  MRN: [id number]  Today's Date: 8/8/2024  Time Calculation  Start Time: 0959  Stop Time: 1017  Time Calculation (min): 18 min  906/906-B    Assessment  IP OT Assessment  OT Assessment: Decreased balance and strength which limits pts ability to complete ADLs/functional transfers IND  Prognosis: Good  Evaluation/Treatment Tolerance: Patient tolerated treatment well  Medical Staff Made Aware: Yes  End of Session Communication: Bedside nurse  End of Session Patient Position: Up in chair, Alarm on (daughter present)    Plan:  OT Frequency: 2 times per week  OT Discharge Recommendations: Low intensity level of continued care  Equipment Recommended upon Discharge:  (Pt owns rollator, no other equipment recommended at this time)  OT - OK to Discharge: Yes (ok to d/c to next level of care once medically cleared)    Subjective     Current Problem:  1. Syncope, unspecified syncope type  Transthoracic Echo (TTE) Complete    Transthoracic Echo (TTE) Complete      2. NSTEMI (non-ST elevated myocardial infarction) (Multi)        3. Urinary tract infection without hematuria, site unspecified        4. Abnormal CT of the chest        5. Lung nodules        6. Anemia, unspecified type            General:  General  Reason for Referral: self care impairment  Referred By: Dr. Villegas (PT/OT 8/7)  Past Medical History Relevant to Rehab: includes: HTN, HLD, CVA, OA, A-fib, hypothyroidism, HFpEF, smoker, sciatica, diverticulitis, fall 6/2024 with fractured toe  Family/Caregiver Present: Yes (daughter present and supportive)  Prior to Session Communication: Bedside nurse  Patient Position Received: Bed, 3 rail up, Alarm on  General Comment: Pt. is a 89yo who presented to [facility] ED on 8/7/2024 following syncope/near syncopal episode with fevcal incontinence. Systolic BP wi EMS was 47. In ED, UA (+), Troponin 845, 750, BNP = 238.  Occult (-).   CXR (8/7) (-) acute findings  Head CT  (8/7) (-) acute findings, (+) remote infarct/encephalomalacia R temporal lobe  CTA (8/7) (-) PE RUQ US (8/7) (+) distended gall bladder, (+) cholecystitis  Hgb (8/7) 8.2  K (8/7) 3.3  Dx: syncope, NSTEMI, UTI, hypotension, anemia    Precautions:  Medical Precautions: Fall precautions    Vital Signs:       Pain:  Pain Assessment  Pain Assessment: 0-10  0-10 (Numeric) Pain Score: 0 - No pain    Objective     Cognition:  Overall Cognitive Status: Within Functional Limits             Home Living:  Home Living Comments: Pt lives with her daughter in a 1-level house with 1 + 1 JORGE ALBERTO with HR. Tub shower with a seat and grab bars. Laundry located in basement, pt does not go in basement.     Prior Function:  Prior Function Comments: Pt completed all ADLs IND and daughter completed iADLs PTA. Pt ambulated with a rollator PTA and owns a cane. Reports 1 fall in the last 3 months. Does not drive.       ADL:  Eating Assistance: Independent  Grooming Assistance: Stand by  Bathing Assistance: Minimal  UE Dressing Assistance: Stand by  LE Dressing Assistance: Minimal  Toileting Assistance with Device: Minimal (Pt required assist with clothing management, able to complete alexia care in stance at FWW)    Activity Tolerance:  Endurance: Endurance does not limit participation in activity    Bed Mobility/Transfers:   Bed Mobility  Bed Mobility: Yes (Supine to sit EOB with CGA for safety)  Transfers  Transfer: Yes (Sit to/from stand and ambulatory toilet transfer using FWW with CGA for safety)    Ambulation/Gait Training:  Functional Mobility  Functional Mobility Performed: Yes (Pt ambulated throughout room and bathroom using FWW with  CGA for safety.)    Sitting Balance:  Static Sitting Balance  Static Sitting-Comment/Number of Minutes: good  Dynamic Sitting Balance  Dynamic Sitting-Comments: good    Standing Balance:  Static Standing Balance  Static Standing-Comment/Number of Minutes: fair  Dynamic Standing Balance  Dynamic  Standing-Comments: fair    Strength:  Strength Comments: BUE strength grossly 4/5        Hand Function:  Hand Function  Gross Grasp: Functional    Extremities: RUE   RUE : Within Functional Limits and LUE   LUE: Within Functional Limits    Outcome Measures: [facility] Daily Activity  Putting on and taking off regular lower body clothing: A little  Bathing (including washing, rinsing, drying): A little  Putting on and taking off regular upper body clothing: A little  Toileting, which includes using toilet, bedpan or urinal: A little  Taking care of personal grooming such as brushing teeth: A little  Eating Meals: None  Daily Activity - Total Score: 19                    EDUCATION:  Education  Individual(s) Educated: Patient  Education Provided: Fall precautons, Risk and benefits of OT discussed with patient or other, POC discussed and agreed upon  Education Documentation  Body Mechanics, taught by Suzette Diaz OT at 8/8/2024  2:09 PM.  Learner: Patient  Readiness: Acceptance  Method: Explanation, Demonstration  Response: Verbalizes Understanding, Demonstrated Understanding, Needs Reinforcement    ADL Training, taught by Suzette Diaz OT at 8/8/2024  2:09 PM.  Learner: Patient  Readiness: Acceptance  Method: Explanation, Demonstration  Response: Verbalizes Understanding, Demonstrated Understanding, Needs Reinforcement    Goals:   Encounter Problems       Encounter Problems (Active)       OT Goals       Pt will complete all functional transfers with MOD I (Progressing)       Start:  08/08/24    Expected End:  08/22/24            Pt will complete all functional mobility with MOD I (Progressing)       Start:  08/08/24    Expected End:  08/22/24            Pt will complete LB dressing with MOD I (Progressing)       Start:  08/08/24    Expected End:  08/22/24            Pt will complete all toileting tasks with MOD I (Progressing)       Start:  08/08/24    Expected End:  08/22/24

## 2024-08-08 NOTE — PROGRESS NOTES
8/8 TBD Awaiting return from testing. [facility] 18  3 pm upd  declined [facility] and [facility].

## 2024-08-08 NOTE — PROGRESS NOTES
Physical Therapy    Physical Therapy Evaluation    Patient Name: Meryl Hester  MRN: [id number]  Today's Date: 8/8/2024   Time Calculation  Start Time: 0958  Stop Time: 1017  Time Calculation (min): 19 min  906/906-B    Assessment/Plan   PT Assessment  PT Assessment Results: Decreased endurance, Impaired balance, Decreased mobility  Rehab Prognosis: Good  Barriers to Discharge: Stairs  Evaluation/Treatment Tolerance: Patient tolerated treatment well  Medical Staff Made Aware: Yes  Strengths: Support of Caregivers, Premorbid level of function, Living arrangement secure, Ability to acquire knowledge, Access to adaptive/assistive products  Barriers to Participation: Comorbidities  End of Session Communication: Bedside nurse  Assessment Comment: Recommend continued therapy in acute care followed by continued therapy at a low intensity level following D/C.  End of Session Patient Position: Up in chair, Alarm on (Call light within reach)  IP OR SWING BED PT PLAN  Inpatient or Swing Bed: Inpatient  PT Plan  Treatment/Interventions: Bed mobility, Transfer training, Gait training, Balance training, Strengthening, Endurance training, Therapeutic exercise  PT Plan: Ongoing PT  PT Frequency: 2 times per week  PT Discharge Recommendations: Low intensity level of continued care  PT Recommended Transfer Status: Contact guard, Assistive device  Physical Therapy eval completed per MD requisition. P.T. recommendations as outlined above. Recommend D/C from acute care when medically appropriate as deemed by medical staff.    Subjective       General Visit Information:  General  Reason for Referral: impaired mobility  Referred By: Dr. Villegas (PT/OT 8/7)  Past Medical History Relevant to Rehab: includes: HTN, HLD, CVA, OA, A-fib, hypothyroidism, HFpEF, smoker, sciatica, diverticulitis, fall 6/2024 with fractured toe  Family/Caregiver Present: No  Co-Treatment: OT  Co-Treatment Reason: Pt. seen with OT to maximize safety and function  Prior to  Session Communication: Bedside nurse  Patient Position Received: Bed, 3 rail up, Alarm on  Preferred Learning Style: auditory, verbal  General Comment: Pt. is a 89yo who [facility] on 8/7/2024 following syncope/near syncopal episode with fevcal incontinence. Systolic BP wiht EMS was 47. In ED, UA (+), Troponin 845, 750, BNP = 238.  Occult (-).     CXR (8/7) (-) acute findings    Head CT (8/7) (-) acute findings, (+) remote infarct/encephalomalacia R temporal lobe    CTA (8/7) (-) PE   RUQ US (8/7) (+) distended gall bladder, (+) cholecystitis   Hgb (8/7) 8.2    K (8/7) 3.3    Dx: syncope, NSTEMI, UTI, hypotension, anemia    Home Living:  Home Living  Home Living Comments: Pt. lives with her dtr in a 1 level house with 1+1 JORGE ALBERTO with HR. Bed/bath on 1st floor with tub shower with a seat and grab bars. Laundry in basement.    Prior Level of Function:  Prior Function Per Pt/Caregiver Report  Prior Function Comments: Pt. amb with RW PTA and owns a cane. Pt was I with ADLs and dtr completed IADLs PTA. Pt. reported 1 fall in last 3 months. Pt. does not drive.    Precautions:  Precautions  Medical Precautions:  (Orthostatics: Activity order: Ambulate)  Precautions Comment: Per EMR: High fall risk         Objective     Pain:  Pain Assessment  Pain Assessment: 0-10  0-10 (Numeric) Pain Score: 0 - No pain    Cognition:  Cognition  Overall Cognitive Status: Within Functional Limits    General Assessments:  General Observation  General Observation: salazar Brown   Activity Tolerance  Endurance: Endurance does not limit participation in activity                 Dynamic Sitting Balance  Dynamic Sitting-Comments: Good static and dynamic sitting balance  Dynamic Standing Balance  Dynamic Standing-Comments: Fair+ static and dynamic standing balance    Functional Assessments:     Bed Mobility  Bed Mobility: Yes  Bed Mobility 1  Bed Mobility 1: Supine to sitting  Level of Assistance 1: Contact guard  Bed Mobility Comments 1:  CGA for safety  Transfers  Transfer: Yes  Transfer 1  Technique 1: Sit to stand, Stand to sit  Transfer Device 1:  (FWW)  Transfer Level of Assistance 1: Contact guard  Trials/Comments 1: CGA for safety  Ambulation/Gait Training  Ambulation/Gait Training Performed: Yes  Ambulation/Gait Training 1  Surface 1: Level tile  Device 1: Rolling walker  Assistance 1: Contact guard  Quality of Gait 1: Narrow base of support (slow, reciprocal gait)  Comments/Distance (ft) 1: 25' x 2; CGA for safety  Stairs  Stairs: No       Extremity/Trunk Assessments:        RLE   RLE : Within Functional Limits  LLE   LLE : Within Functional Limits    Outcome Measures:     [facility] Basic Mobility  Turning from your back to your side while in a flat bed without using bedrails: A little  Moving from lying on your back to sitting on the side of a flat bed without using bedrails: A little  Moving to and from bed to chair (including a wheelchair): A little  Standing up from a chair using your arms (e.g. wheelchair or bedside chair): A little  To walk in hospital room: A little  Climbing 3-5 steps with railing: A little  Basic Mobility - Total Score: 18                                        Goals:  Encounter Problems       Encounter Problems (Active)       PT Problem       Pt. will transfer supine/sit with MOD I (Progressing)       Start:  08/08/24    Expected End:  08/22/24            Pt. will transfer sit/stand with FWW with MOD I (Progressing)       Start:  08/08/24    Expected End:  08/22/24            Pt.will ambulate 50' with FWW with MOD I (Progressing)       Start:  08/08/24    Expected End:  08/22/24            Pt. will perform 2 x 15 B LE AROM exercises  (Not Progressing)       Start:  08/08/24    Expected End:  08/22/24                 Education Documentation  Mobility Training, taught by Hank Foster, PT at 8/8/2024 12:47 PM.  Learner: Family, Patient  Readiness: Acceptance  Method: Explanation  Response: Verbalizes Understanding,  Needs Reinforcement  Comment: Role of PT, transfers, amb, safety, PT POC

## 2024-08-08 NOTE — PROGRESS NOTES
Meryl Hester is a 89 y.o. female on day 0 of admission presenting with Syncope and collapse.      Subjective   No acute events overnight. Patient states her symptoms have improved. Patient states she feels back to her baseline. Patient denies nausea, vomiting, fever, chills, abdominal pain, diarrhea, headache, chest pain, lightheadedness, dysuria, urinary urgency.        Objective     Last Recorded Vitals  /78 (BP Location: Left arm, Patient Position: Standing) Comment: RN notified  Pulse 104   Temp 37.1 °C (98.8 °F) (Temporal)   Resp 20   Wt 65.2 kg (143 lb 11.8 oz)   SpO2 92%   Intake/Output last 3 Shifts:    Intake/Output Summary (Last 24 hours) at 8/8/2024 1008  Last data filed at 8/8/2024 0948  Gross per 24 hour   Intake 2050 ml   Output --   Net 2050 ml       Admission Weight  Weight: 64.4 kg (142 lb) (08/07/24 1215)    Daily Weight  08/07/24 : 65.2 kg (143 lb 11.8 oz)    Image Results  US right upper quadrant  Narrative: Interpreted By:  Lizbeth Hay,   STUDY:  US RIGHT UPPER QUADRANT;  8/7/2024 2:40 pm      INDICATION:  Signs/Symptoms:abnormal ct, diarrhea.      COMPARISON:  None.      ACCESSION NUMBER(S):  [id number]      ORDERING CLINICIAN:  AMBROSIO MARIA      TECHNIQUE:  Multiple images of the right upper quadrant were obtained.      FINDINGS:  LIVER:  The liver measures 14.2 cm in longest axis. No definite focal nodules.          GALLBLADDER:  The gallbladder is distended, and demonstrates multiple gallstones.  No gallbladder wall thickening or surrounding fluid. The gallbladder  wall thickness is 0.4 cm. Sonographic Hendrickson's sign is negative.          BILE DUCTS:  No evidence of intra or extrahepatic biliary dilatation is  identified; the common bile duct measures 1.0 cm.      PANCREAS:  Pancreatic body and tail obscured by bowel gas and suboptimally  evaluated.      RIGHT KIDNEY:  The right kidney measures 10.4 cm in length. The renal cortical  echogenicity and thickness are within  normal limit.  No  hydronephrosis or renal calculi are seen.      Impression: Distended gallbladder with gallstones and mild gallbladder wall  thickening. No ultrasonic Hendrickson sign. Findings are suspicious for  acute or chronic cholecystitis. Nonspecific prominence of the common  bile duct.      MACRO:  None      Signed by: Lizbeth Hay 8/7/2024 2:54 PM  Dictation workstation:   [id number]  ECG 12 lead  Sinus rhythm with Premature atrial complexes  Left axis deviation  Right bundle branch block  Minimal voltage criteria for LVH, may be normal variant  Abnormal ECG  When compared with ECG of 07-AUG-2024 12:18, (unconfirmed)  Premature atrial complexes are now Present  CT angio chest for pulmonary embolism  Narrative: Interpreted By:  Ada Saavedra,   STUDY:  CT ANGIO CHEST FOR PULMONARY EMBOLISM;  8/7/2024 1:27 pm      INDICATION:  Signs/Symptoms:elevated d dimer, elevated trop, syncope.      COMPARISON:  03/04/2011      ACCESSION NUMBER(S):  [id number]      ORDERING CLINICIAN:  AMBROSIO MARIA      TECHNIQUE:  CT of the chest was performed. Sagittal and coronal reconstructions  were generated. 75 ML Omnipaque 350 intravenous contrast given for  the examination.  Multiplanar reconstructions of the pulmonary  vessels were created on an independent workstation and provided for  review.      FINDINGS:  Artifact related to overlying upper extremities and motion limits  evaluation.      CHEST WALL AND LOWER NECK: No significant axillary lymphadenopathy. 2  cm fat attenuation probable lipoma in the musculature anterior to the  left shoulder.      MEDIASTINUM AND LUAN:  1.4 cm precarinal node with fatty hilum. 1.3  cm subcarinal node. Mild gaseous distention of the proximal esophagus.      HEART AND VESSELS:  Limited assessment for PE due to timing of IV  contrast bolus and motion artifact. No central PE identified however  limited assessment for basilar/peripheral PE. The heart is enlarged.  Small pericardial effusion. No  thoracic aortic aneurysm. Multifocal  atherosclerotic calcifications including the coronary arteries.      LUNGS, PLEURA, LARGE AIRWAYS:  Respiratory motion artifact limits  parenchymal evaluation. Mild bullous/emphysematous changes. Subtle  interstitial opacities in both lungs. 1.3 cm right mid chest nodule  image 130/299. Irregular 3.0 x 1.8 cm opacity with air bronchograms  in the right lower lobe. Probable 1.7 cm nodular opacity in the right  infrahilar region image 186/299. No significant pleural effusion. The  central airways are patent.      UPPER ABDOMEN:  Distended gallbladder with questionable wall  thickening and pericholecystic fluid or intramural edema.      BONES:  Kyphoscoliosis and degenerative changes of the thoracic spine.      Impression: No central PE however limited assessment for peripheral PE due to  technical factors and motion artifact.      Several nodules/masses in the right lung in the setting of emphysema  with mild mediastinal lymphadenopathy. Findings could reflect  inflammatory or neoplastic/metastatic disease. Further evaluation  recommended. Subtle background pulmonary heterogeneity could reflect  acute or chronic interstitial disease, the former including  interstitial pneumonia and edema.      Distended gallbladder with questionable wall thickening and  pericholecystic fluid. Findings are concerning for acute  cholecystitis. Clinical correlation recommended. Further evaluation  with ultrasound may be helpful.      MACRO:  None.      Signed by: Ada Saavedra 8/7/2024 1:56 PM  Dictation workstation:   [id number]  CT head wo IV contrast  Narrative: Interpreted By:  Ada Saavedra,   STUDY:  CT HEAD WO IV CONTRAST;  8/7/2024 1:25 pm      INDICATION:  Signs/Symptoms:dizziness.      COMPARISON:  None.      ACCESSION NUMBER(S):  [id number]      ORDERING CLINICIAN:  AMBROSIO MARIA      TECHNIQUE:  Unenhanced CT images of the head were obtained.      FINDINGS:  Diffuse atrophy with  enlargement of the extra-axial spaces, cerebral  sulci and ventricular system. Low-density probable remote infarct  without associated mass effect in the superficial right  temporoparietal region. Additional patchy periventricular white  matter hypodensities bilaterally. No acute intracranial hemorrhage or  mass-effect. No midline shift. No extra-axial fluid collection      No focal calvarial lesion. 14 mm partially calcified nodule in the  left frontal scalp. 9 mm dense nodule in the left occipital scalp.      The visualized paranasal sinuses are clear.      Impression: No acute intracranial hemorrhage or mass-effect.      Low-density probable remote infarct/encephalomalacia in the right  temporoparietal region.      2 nonspecific scalp nodules.      MACRO:  None.      Signed by: Ada Saavedra 8/7/2024 1:44 PM  Dictation workstation:   [id number]  XR chest 1 view  Narrative: Interpreted By:  Lee Sahu,   STUDY:  XR CHEST 1 VIEW;  8/7/2024 12:31 pm      INDICATION:  Signs/Symptoms:syncope.      COMPARISON:  04/18/2015      ACCESSION NUMBER(S):  [id number]      ORDERING CLINICIAN:  AMBROSIO MARIA      FINDINGS:  CARDIOMEDIASTINAL SILHOUETTE AND VASCULATURE:      Cardiac size:  Within normal limits.  Aortic shadow:  Within normal limits.      Mediastinal contours: Within normal limits.      Pulmonary vasculature:  The central vasculature is unremarkable      LUNGS:  Lungs are clear.      ABDOMEN AND OTHER FINDINGS:  No remarkable upper abdominal findings.      BONES:  No acute osseous changes.      Impression: 1.  No active cardiopulmonary disease.  There has not been  significant interval change from the prior exam.      Signed by: Lee Sahu 8/7/2024 12:41 PM  Dictation workstation:   [id number]  ECG 12 lead  Normal sinus rhythm  Left axis deviation  Right bundle branch block  Minimal voltage criteria for LVH, may be normal variant  Abnormal ECG  When compared with ECG of 19-APR-2015 04:13,  Premature  ventricular complexes are no longer Present  Right bundle branch block is now Present      Scheduled medications  aspirin, 81 mg, oral, Daily  bisoprolol, 7.5 mg, oral, Daily  DULoxetine, 30 mg, oral, Daily  gabapentin, 300 mg, oral, Nightly  levothyroxine, 88 mcg, oral, Daily  losartan, 25 mg, oral, Daily  magnesium oxide, 400 mg, oral, Daily  pantoprazole, 40 mg, oral, Daily before breakfast  rosuvastatin, 20 mg, oral, Nightly      Continuous medications     PRN medications  PRN medications: acetaminophen **OR** acetaminophen **OR** acetaminophen, acetaminophen **OR** acetaminophen **OR** acetaminophen, fluticasone, lubricating eye drops, ondansetron **OR** ondansetron, polyethylene glycol      Physical Exam:      General: Alert and oriented x 3, no distress, dry mucous membranes  Cardiovascular: Normal S1-S2, sinus rhythm, no murmurs  Respiratory: course breath sounds bilaterally  Abdomen: Abdomen is soft, not distended with no tenderness  Extremities: No edema or deformities  Neurology: Alert and oriented x 3, no acute focal deficits       Assessment/Plan     #Syncope/ near syncope likely 2/2 volume depletion   #Hypotension   #Elevated troponin likely secondary to demand ischemia from hypotension   #PAF  Orthostatic vitals stable. Recheck   Blood pressure better today.   Continue to monitor on tele.  Cardiology consult recommendations appreciated.   Continue beta-blocker and losartan per cardiology.   Echo ordered per cardiology.   May need to adjust antihypertensives medications per cardiology management.   Blood cultures normal.     #Cholecystitis :   Patient currently asymptomatic.   General surgery consult recommendations appreciated. Patient will have HIDA scan today at 11am.      #acute on chronic blood loss Anemia   H&H 7.8/24.4, stable   Recent admission for GI bleed at [facility], no source found  Continue to hold Eliquis 5-7 days per [facility] recs at last hospitalization  Patient currently stable with no signs of  bleeding.       #Lung nodules   This is a known condition to the patient.   Consider outpatient follow-up with pulmonology.     DVT: SCD    Code Status: DNRCC-A, discussed with patient and family     RAÚL BORJA

## 2024-08-09 PROBLEM — I21.4 NSTEMI (NON-ST ELEVATED MYOCARDIAL INFARCTION) (MULTI): Status: ACTIVE | Noted: 2024-08-07

## 2024-08-09 PROBLEM — I27.20 PULMONARY HYPERTENSION (MULTI): Status: ACTIVE | Noted: 2024-08-07

## 2024-08-09 LAB
ANION GAP SERPL CALC-SCNC: 9 MMOL/L (ref 10–20)
ATRIAL RATE: 80 BPM
ATRIAL RATE: 95 BPM
BUN SERPL-MCNC: 4 MG/DL (ref 6–23)
CALCIUM SERPL-MCNC: 8.8 MG/DL (ref 8.6–10.3)
CHLORIDE SERPL-SCNC: 106 MMOL/L (ref 98–107)
CO2 SERPL-SCNC: 30 MMOL/L (ref 21–32)
CREAT SERPL-MCNC: 0.53 MG/DL (ref 0.5–1.05)
EGFRCR SERPLBLD CKD-EPI 2021: 86 ML/MIN/1.73M*2
ERYTHROCYTE [DISTWIDTH] IN BLOOD BY AUTOMATED COUNT: 14.9 % (ref 11.5–14.5)
GLUCOSE SERPL-MCNC: 94 MG/DL (ref 74–99)
HCT VFR BLD AUTO: 25.1 % (ref 36–46)
HGB BLD-MCNC: 8 G/DL (ref 12–16)
HOLD SPECIMEN: NORMAL
MAGNESIUM SERPL-MCNC: 1.85 MG/DL (ref 1.6–2.4)
MCH RBC QN AUTO: 30.1 PG (ref 26–34)
MCHC RBC AUTO-ENTMCNC: 31.9 G/DL (ref 32–36)
MCV RBC AUTO: 94 FL (ref 80–100)
NRBC BLD-RTO: 0 /100 WBCS (ref 0–0)
P AXIS: 33 DEGREES
P AXIS: 98 DEGREES
P OFFSET: 181 MS
P OFFSET: 192 MS
P ONSET: 115 MS
P ONSET: 155 MS
PLATELET # BLD AUTO: 270 X10*3/UL (ref 150–450)
POTASSIUM SERPL-SCNC: 3.9 MMOL/L (ref 3.5–5.3)
PR INTERVAL: 138 MS
PR INTERVAL: 152 MS
Q ONSET: 191 MS
Q ONSET: 224 MS
QRS COUNT: 13 BEATS
QRS COUNT: 15 BEATS
QRS DURATION: 130 MS
QRS DURATION: 132 MS
QT INTERVAL: 388 MS
QT INTERVAL: 424 MS
QTC CALCULATION(BAZETT): 487 MS
QTC CALCULATION(BAZETT): 489 MS
QTC FREDERICIA: 452 MS
QTC FREDERICIA: 467 MS
R AXIS: -33 DEGREES
R AXIS: -35 DEGREES
RBC # BLD AUTO: 2.66 X10*6/UL (ref 4–5.2)
SODIUM SERPL-SCNC: 141 MMOL/L (ref 136–145)
T AXIS: -32 DEGREES
T AXIS: -42 DEGREES
T OFFSET: 385 MS
T OFFSET: 436 MS
VENTRICULAR RATE: 80 BPM
VENTRICULAR RATE: 95 BPM
WBC # BLD AUTO: 6.7 X10*3/UL (ref 4.4–11.3)

## 2024-08-09 PROCEDURE — 2720000007 HC OR 272 NO HCPCS: Performed by: INTERNAL MEDICINE

## 2024-08-09 PROCEDURE — 99153 MOD SED SAME PHYS/QHP EA: CPT | Performed by: INTERNAL MEDICINE

## 2024-08-09 PROCEDURE — 2780000003 HC OR 278 NO HCPCS: Performed by: INTERNAL MEDICINE

## 2024-08-09 PROCEDURE — 2500000004 HC RX 250 GENERAL PHARMACY W/ HCPCS (ALT 636 FOR OP/ED): Performed by: INTERNAL MEDICINE

## 2024-08-09 PROCEDURE — 2500000005 HC RX 250 GENERAL PHARMACY W/O HCPCS: Performed by: INTERNAL MEDICINE

## 2024-08-09 PROCEDURE — 4A023N8 MEASUREMENT OF CARDIAC SAMPLING AND PRESSURE, BILATERAL, PERCUTANEOUS APPROACH: ICD-10-PCS | Performed by: INTERNAL MEDICINE

## 2024-08-09 PROCEDURE — 1200000002 HC GENERAL ROOM WITH TELEMETRY DAILY

## 2024-08-09 PROCEDURE — 82374 ASSAY BLOOD CARBON DIOXIDE: CPT | Performed by: HOSPITALIST

## 2024-08-09 PROCEDURE — 36415 COLL VENOUS BLD VENIPUNCTURE: CPT | Performed by: HOSPITALIST

## 2024-08-09 PROCEDURE — 85027 COMPLETE CBC AUTOMATED: CPT | Performed by: HOSPITALIST

## 2024-08-09 PROCEDURE — C1751 CATH, INF, PER/CENT/MIDLINE: HCPCS | Performed by: INTERNAL MEDICINE

## 2024-08-09 PROCEDURE — 2500000001 HC RX 250 WO HCPCS SELF ADMINISTERED DRUGS (ALT 637 FOR MEDICARE OP): Performed by: INTERNAL MEDICINE

## 2024-08-09 PROCEDURE — C1760 CLOSURE DEV, VASC: HCPCS | Performed by: INTERNAL MEDICINE

## 2024-08-09 PROCEDURE — G0269 OCCLUSIVE DEVICE IN VEIN ART: HCPCS | Mod: 59 | Performed by: INTERNAL MEDICINE

## 2024-08-09 PROCEDURE — 97110 THERAPEUTIC EXERCISES: CPT | Mod: GP,CQ

## 2024-08-09 PROCEDURE — 2500000004 HC RX 250 GENERAL PHARMACY W/ HCPCS (ALT 636 FOR OP/ED): Performed by: HOSPITALIST

## 2024-08-09 PROCEDURE — 99233 SBSQ HOSP IP/OBS HIGH 50: CPT | Performed by: INTERNAL MEDICINE

## 2024-08-09 PROCEDURE — 93460 R&L HRT ART/VENTRICLE ANGIO: CPT | Performed by: INTERNAL MEDICINE

## 2024-08-09 PROCEDURE — B2151ZZ FLUOROSCOPY OF LEFT HEART USING LOW OSMOLAR CONTRAST: ICD-10-PCS | Performed by: INTERNAL MEDICINE

## 2024-08-09 PROCEDURE — 99232 SBSQ HOSP IP/OBS MODERATE 35: CPT | Performed by: HOSPITALIST

## 2024-08-09 PROCEDURE — 99152 MOD SED SAME PHYS/QHP 5/>YRS: CPT | Performed by: INTERNAL MEDICINE

## 2024-08-09 PROCEDURE — 2550000001 HC RX 255 CONTRASTS: Performed by: INTERNAL MEDICINE

## 2024-08-09 PROCEDURE — 7100000009 HC PHASE TWO TIME - INITIAL BASE CHARGE: Performed by: INTERNAL MEDICINE

## 2024-08-09 PROCEDURE — 83735 ASSAY OF MAGNESIUM: CPT | Performed by: HOSPITALIST

## 2024-08-09 PROCEDURE — 2500000004 HC RX 250 GENERAL PHARMACY W/ HCPCS (ALT 636 FOR OP/ED): Performed by: NURSE PRACTITIONER

## 2024-08-09 PROCEDURE — 2500000001 HC RX 250 WO HCPCS SELF ADMINISTERED DRUGS (ALT 637 FOR MEDICARE OP): Performed by: HOSPITALIST

## 2024-08-09 PROCEDURE — 7100000010 HC PHASE TWO TIME - EACH INCREMENTAL 1 MINUTE: Performed by: INTERNAL MEDICINE

## 2024-08-09 PROCEDURE — 2500000002 HC RX 250 W HCPCS SELF ADMINISTERED DRUGS (ALT 637 FOR MEDICARE OP, ALT 636 FOR OP/ED): Performed by: HOSPITALIST

## 2024-08-09 PROCEDURE — B2111ZZ FLUOROSCOPY OF MULTIPLE CORONARY ARTERIES USING LOW OSMOLAR CONTRAST: ICD-10-PCS | Performed by: INTERNAL MEDICINE

## 2024-08-09 RX ORDER — LIDOCAINE HYDROCHLORIDE 20 MG/ML
INJECTION, SOLUTION INFILTRATION; PERINEURAL AS NEEDED
Status: DISCONTINUED | OUTPATIENT
Start: 2024-08-09 | End: 2024-08-09 | Stop reason: HOSPADM

## 2024-08-09 RX ORDER — LOSARTAN POTASSIUM 25 MG/1
25 TABLET ORAL ONCE
Status: COMPLETED | OUTPATIENT
Start: 2024-08-09 | End: 2024-08-09

## 2024-08-09 RX ORDER — SODIUM CHLORIDE 9 MG/ML
50 INJECTION, SOLUTION INTRAVENOUS CONTINUOUS
Status: ACTIVE | OUTPATIENT
Start: 2024-08-09 | End: 2024-08-09

## 2024-08-09 RX ORDER — SODIUM CHLORIDE 9 MG/ML
50 INJECTION, SOLUTION INTRAVENOUS CONTINUOUS
Status: DISCONTINUED | OUTPATIENT
Start: 2024-08-09 | End: 2024-08-09

## 2024-08-09 RX ORDER — FENTANYL CITRATE 50 UG/ML
INJECTION, SOLUTION INTRAMUSCULAR; INTRAVENOUS AS NEEDED
Status: DISCONTINUED | OUTPATIENT
Start: 2024-08-09 | End: 2024-08-09 | Stop reason: HOSPADM

## 2024-08-09 RX ORDER — LOSARTAN POTASSIUM 50 MG/1
50 TABLET ORAL DAILY
Status: DISCONTINUED | OUTPATIENT
Start: 2024-08-10 | End: 2024-08-10 | Stop reason: HOSPADM

## 2024-08-09 RX ORDER — MIDAZOLAM HYDROCHLORIDE 1 MG/ML
INJECTION, SOLUTION INTRAMUSCULAR; INTRAVENOUS AS NEEDED
Status: DISCONTINUED | OUTPATIENT
Start: 2024-08-09 | End: 2024-08-09 | Stop reason: HOSPADM

## 2024-08-09 ASSESSMENT — COGNITIVE AND FUNCTIONAL STATUS - GENERAL
TURNING FROM BACK TO SIDE WHILE IN FLAT BAD: A LITTLE
CLIMB 3 TO 5 STEPS WITH RAILING: A LOT
TOILETING: A LITTLE
STANDING UP FROM CHAIR USING ARMS: A LITTLE
CLIMB 3 TO 5 STEPS WITH RAILING: A LOT
MOBILITY SCORE: 17
PERSONAL GROOMING: A LITTLE
HELP NEEDED FOR BATHING: A LITTLE
MOBILITY SCORE: 17
DRESSING REGULAR LOWER BODY CLOTHING: A LITTLE
DAILY ACTIVITIY SCORE: 19
MOVING FROM LYING ON BACK TO SITTING ON SIDE OF FLAT BED WITH BEDRAILS: A LITTLE
STANDING UP FROM CHAIR USING ARMS: A LITTLE
MOVING FROM LYING ON BACK TO SITTING ON SIDE OF FLAT BED WITH BEDRAILS: A LITTLE
MOVING TO AND FROM BED TO CHAIR: A LITTLE
MOVING TO AND FROM BED TO CHAIR: A LITTLE
TURNING FROM BACK TO SIDE WHILE IN FLAT BAD: A LITTLE
WALKING IN HOSPITAL ROOM: A LITTLE
DRESSING REGULAR UPPER BODY CLOTHING: A LITTLE
WALKING IN HOSPITAL ROOM: A LITTLE

## 2024-08-09 ASSESSMENT — PAIN SCALES - GENERAL
PAINLEVEL_OUTOF10: 0 - NO PAIN

## 2024-08-09 ASSESSMENT — PAIN - FUNCTIONAL ASSESSMENT
PAIN_FUNCTIONAL_ASSESSMENT: 0-10

## 2024-08-09 NOTE — CONSULTS
"Consults  Meryl Hester is a 89 y.o. female on day 1 of admission presenting with Syncope and collapse.  General surgery was consulted due to ultrasound CT showing inflammation and thickening of the gallbladder wall.  The patient had no complaints of nausea or vomiting, normal LFTs and bilirubin, no leukocytosis    Subjective   Patient is resting in bed states she has a headache and is requesting Tylenol.  No complaints of pain or tenderness to the right upper quadrant or complaints of nausea or vomiting.       Objective     Physical Exam  Constitutional:       General: She is not in acute distress.     Appearance: Normal appearance.   HENT:      Head: Normocephalic and atraumatic.      Nose: Nose normal.      Mouth/Throat:      Mouth: Mucous membranes are moist.   Cardiovascular:      Rate and Rhythm: Normal rate and regular rhythm.   Pulmonary:      Effort: Pulmonary effort is normal.   Abdominal:      General: There is no distension.      Palpations: Abdomen is soft.      Tenderness: There is no abdominal tenderness. There is no guarding or rebound. Negative signs include Hendrickson's sign.   Musculoskeletal:         General: Normal range of motion.   Skin:     General: Skin is warm and dry.      Capillary Refill: Capillary refill takes less than 2 seconds.   Neurological:      Mental Status: She is alert and oriented to person, place, and time.   Psychiatric:         Mood and Affect: Mood normal.         Behavior: Behavior normal.         Thought Content: Thought content normal.         Last Recorded Vitals  Blood pressure 127/58, pulse 87, temperature 36.2 °C (97.2 °F), resp. rate 17, height 1.499 m (4' 11\"), weight 65.2 kg (143 lb 11.8 oz), SpO2 93%.  Intake/Output last 3 Shifts:  I/O last 3 completed shifts:  In: 2065 (31.7 mL/kg) [I.V.:1015 (15.6 mL/kg); IV Piggyback:1050]  Out: 600 (9.2 mL/kg) [Urine:600 (0.3 mL/kg/hr)]  Weight: 65.2 kg     Medications    Relevant Results  Results for orders placed or performed " during the hospital encounter of 08/07/24 (from the past 24 hour(s))   Comprehensive metabolic panel   Result Value Ref Range    Glucose 98 74 - 99 mg/dL    Sodium 140 136 - 145 mmol/L    Potassium 3.7 3.5 - 5.3 mmol/L    Chloride 108 (H) 98 - 107 mmol/L    Bicarbonate 27 21 - 32 mmol/L    Anion Gap 9 (L) 10 - 20 mmol/L    Urea Nitrogen 4 (L) 6 - 23 mg/dL    Creatinine 0.64 0.50 - 1.05 mg/dL    eGFR 83 >60 mL/min/1.73m*2    Calcium 8.4 (L) 8.6 - 10.3 mg/dL    Albumin 3.2 (L) 3.4 - 5.0 g/dL    Alkaline Phosphatase 79 33 - 136 U/L    Total Protein 5.4 (L) 6.4 - 8.2 g/dL    AST 14 9 - 39 U/L    Bilirubin, Total 0.4 0.0 - 1.2 mg/dL    ALT 9 7 - 45 U/L   CBC   Result Value Ref Range    WBC 6.8 4.4 - 11.3 x10*3/uL    nRBC 0.0 0.0 - 0.0 /100 WBCs    RBC 2.57 (L) 4.00 - 5.20 x10*6/uL    Hemoglobin 7.8 (L) 12.0 - 16.0 g/dL    Hematocrit 24.4 (L) 36.0 - 46.0 %    MCV 95 80 - 100 fL    MCH 30.4 26.0 - 34.0 pg    MCHC 32.0 32.0 - 36.0 g/dL    RDW 15.0 (H) 11.5 - 14.5 %    Platelets 243 150 - 450 x10*3/uL   Magnesium   Result Value Ref Range    Magnesium 1.87 1.60 - 2.40 mg/dL   SST TOP   Result Value Ref Range    Extra Tube Hold for add-ons.    Troponin I, High Sensitivity   Result Value Ref Range    Troponin I, High Sensitivity 838 (HH) 0 - 13 ng/L   Transthoracic Echo (TTE) Complete   Result Value Ref Range    AV mn grad 10.0 mmHg    AV pk terrell 2.29 m/s    LV Biplane EF 50 %    LVOT diam 2.00 cm    MV E/A ratio 1.59     Tricuspid annular plane systolic excursion 1.7 cm    LA vol index A/L 63.7 ml/m2    LV EF 48 %    RV free wall pk S' 12.70 cm/s    RVSP 84.4 mmHg    LVIDd 4.50 cm    Aortic Valve Area by Continuity of Peak Velocity 1.18 cm2    AV pk grad 21.0 mmHg    Aortic Valve Area by Continuity of VTI 1.26 cm2    LV A4C EF 55.6       Transthoracic Echo (TTE) Complete    Result Date: 8/8/2024          [place] [id number]  Tel [phone] Fax [phone] TRANSTHORACIC ECHOCARDIOGRAM  REPORT Patient Name:      ANNA SINGH         Reading Physician:    [id number] Marbin Veronica MD, [facility] Study Date:        8/8/2024             Ordering Provider:    [id number] PAZ FINNEY MRN/PID:           [id number]             Fellow: Accession#:        [id number]         Nurse: Date of Birth/Age: 4/16/1931 / 89 years Sonographer:          Jodi Peterson CARLOS Gender:            F                    Additional Staff: Height:            149.86 cm            Admit Date:           8/7/2024 Weight:            64.86 kg             Admission Status:     Inpatient -                                                               Routine BSA / BMI:         1.60 m2 / 28.88      Department Location:  [place] [address]                                      Echo Lab Blood Pressure: 144 /67 mmHg Study Type:    TRANSTHORACIC ECHO (TTE) COMPLETE Diagnosis/ICD: Unspecified atrial fibrillation-I48.91; Elevated Troponin-R79.89;                Hypotension, unspecified-I95.9 Patient History: Pertinent History: HTN, Hyperlipidemia, A-Fib, CHF, CVA and Elevated Troponin,                    Hypotension, Hx Syncope. Study Detail: The following Echo studies were performed: 2D, M-Mode, Doppler and               color flow.  PHYSICIAN INTERPRETATION: Left Ventricle: Left ventricular ejection fraction is mildly decreased, by visual estimate at 45-50%. There are no regional wall motion abnormalities. The left ventricular cavity size is normal. The left ventricular septal wall thickness is normal. There is mildly increased left ventricular posterior wall thickness. Spectral Doppler shows a pseudonormal pattern of left ventricular diastolic filling. Left Atrium: The left atrium is severely dilated. Left atrial volume index 63.1 mL/m2. Right Ventricle: The right ventricle is mildly enlarged. There is mildly  reduced right ventricular systolic function. Right Atrium: The right atrium is normal in size. Aortic Valve: The aortic valve is trileaflet. There is mild to moderate aortic valve cusp calcification. The aortic valve dimensionless index is 0.40. There is mild aortic valve regurgitation. The peak instantaneous gradient of the aortic valve is 21.0 mmHg. The mean gradient of the aortic valve is 10.0 mmHg. Peak velocity across the aortic valve is 2.29 m/s with a peak gradient 21 mmHg and mean gradient 10 mmHg. Dimensionless index 0.40. Aortic valve area 1.26 cm2. Values consistent with mild aortic stenosis. 1-2+ aortic regurgitation. Mitral Valve: The mitral valve is mildly thickened. There is mild to moderate mitral valve regurgitation. The mitral regurgitant orifice area is 21 mm2. The mitral regurgitant volume is 39.97 ml. 2+ mitral regurgitation. MR ERO is 0.21cm2. MR Volume 40 ml. Tricuspid Valve: The tricuspid valve is structurally normal. There is mild to moderate tricuspid regurgitation. 2+ tricuspid regurgitation with estimated RVSP of 71mmHg consistent with severely elevated right-sided pressures. Pulmonic Valve: The pulmonic valve is structurally normal. There is mild pulmonic valve regurgitation. Pericardium: There is no pericardial effusion noted. Aorta: The aortic root is normal. Systemic Veins: The inferior vena cava appears to be of normal size. There is IVC inspiratory collapse greater than 50%.  CONCLUSIONS:  1. Left ventricular ejection fraction is mildly decreased, by visual estimate at 45-50%.  2. Spectral Doppler shows a pseudonormal pattern of left ventricular diastolic filling.  3. There is mildly reduced right ventricular systolic function.  4. Mildly enlarged right ventricle.  5. The left atrium is severely dilated.  6. 2+ mitral regurgitation.     MR ERO is 0.21cm2.     MR Volume 40 ml.  7. Mild to moderate mitral valve regurgitation.  8. 2+ tricuspid regurgitation with estimated RVSP of  71mmHg consistent with severely elevated right-sided pressures.  9. Mild to moderate tricuspid regurgitation. 10. Peak velocity across the aortic valve is 2.29 m/s with a peak gradient 21 mmHg and mean gradient 10 mmHg. Dimensionless index 0.40. Aortic valve area 1.26 cm2. Values consistent with mild aortic stenosis. 1-2+ aortic regurgitation. 11. Mild aortic valve regurgitation. 12. No previous available for comparison. QUANTITATIVE DATA SUMMARY: 2D MEASUREMENTS:                           Normal Ranges: Ao Root d:     3.50 cm    (2.0-3.7cm) LAs:           4.80 cm    (2.7-4.0cm) IVSd:          1.00 cm    (0.6-1.1cm) LVPWd:         1.20 cm    (0.6-1.1cm) LVIDd:         4.50 cm    (3.9-5.9cm) LVIDs:         3.80 cm LV Mass Index: 109.5 g/m2 LV % FS        15.6 % LA VOLUME:                               Normal Ranges: LA Vol A4C:        101.4 ml   (22+/-6mL/m2) LA Vol A2C:        90.6 ml LA Vol BP:         101.8 ml LA Vol Index A4C:  63.4ml/m2 LA Vol Index A2C:  56.6 ml/m2 LA Vol Index BP:   63.7 ml/m2 LA Area A4C:       28.9 cm2 LA Area A2C:       25.7 cm2 LA Major Axis A4C: 7.0 cm LA Major Axis A2C: 6.2 cm LA Volume Index:   61.7 ml/m2 LA Vol A4C:        101.0 ml LA Vol A2C:        87.0 ml LA Vol Index BSA:  58.8 ml/m2 RA VOLUME BY A/L METHOD:                               Normal Ranges: RA Vol A4C:        53.5 ml    (8.3-19.5ml) RA Vol Index A4C:  33.4 ml/m2 RA Area A4C:       18.6 cm2 RA Major Axis A4C: 5.5 cm LV SYSTOLIC FUNCTION BY 2D PLANIMETRY (MOD):                      Normal Ranges: EF-A4C View:    56 % (>=55%) EF-A2C View:    44 % EF-Biplane:     50 % EF-Visual:      48 % LV EF Reported: 48 % LV DIASTOLIC FUNCTION:                         Normal Ranges: MV Peak E:    1.44 m/s  (0.7-1.2 m/s) MV Peak A:    0.91 m/s  (0.42-0.7 m/s) E/A Ratio:    1.59      (1.0-2.2) MV e'         0.084 m/s (>8.0) MV lateral e' 0.11 m/s MV medial e'  0.06 m/s E/e' Ratio:   17.07     (<8.0) MITRAL VALVE:                       Normal Ranges: MV Vmax:    1.48 m/s (<=1.3m/s) MV peak P.8 mmHg (<5mmHg) MV mean P.0 mmHg (<48mmHg) MV DT:      140 msec (150-240msec) MITRAL INSUFFICIENCY:                           Normal Ranges: PISA Radius:  0.7 cm MR VTI:       190.00 cm MR Vmax:      584.00 cm/s MR Alias Delbert: 39.9 cm/s MR Volume:    39.97 ml MR Flow Rt:   122.84 ml/s MR EROA:      21 mm2 AORTIC VALVE:                                    Normal Ranges: AoV Vmax:                2.29 m/s  (<=1.7m/s) AoV Peak P.0 mmHg (<20mmHg) AoV Mean PG:             10.0 mmHg (1.7-11.5mmHg) LVOT Max Delbert:            0.86 m/s  (<=1.1m/s) AoV VTI:                 44.90 cm  (18-25cm) LVOT VTI:                18.00 cm LVOT Diameter:           2.00 cm   (1.8-2.4cm) AoV Area, VTI:           1.26 cm2  (2.5-5.5cm2) AoV Area,Vmax:           1.18 cm2  (2.5-4.5cm2) AoV Dimensionless Index: 0.40 AORTIC INSUFFICIENCY: AI Vmax:       4.61 m/s AI Half-time:  309 msec AI Decel Rate: 437.00 cm/s2  RIGHT VENTRICLE: RV Basal 4.20 cm RV Mid   3.60 cm RV Major 6.4 cm TAPSE:   17.2 mm RV s'    0.13 m/s TRICUSPID VALVE/RVSP:                             Normal Ranges: Peak TR Velocity: 4.51 m/s RV Syst Pressure: 84.4 mmHg (< 30mmHg) IVC Diam:         1.70 cm PULMONIC VALVE:                         Normal Ranges: PV Accel Time: 127 msec (>120ms) PV Max Delbert:    0.9 m/s  (0.6-0.9m/s) PV Max PG:     3.4 mmHg PV Mean P.0 mmHg PV VTI:        18.70 cm  [id number] Marbin Veronica MD, FACC Electronically signed on 2024 at 3:55:53 PM  ** Final **     NM hepatobiliary    Result Date: 2024  Interpreted By:  Tate Raymnod and Elsamaloty Mazzin STUDY: NM HEPATOBILIARY;  2024 1:50 pm   INDICATION: Signs/Symptoms:Abnormal gallbladder on ultrasound.   COMPARISON: Right upper quadrant ultrasound dated 2024.   ACCESSION NUMBER(S): [id number]   ORDERING CLINICIAN: PALMIRA LEONARD   TECHNIQUE: DIVISION OF NUCLEAR MEDICINE HEPATOBILIARY SCAN (HIDA)   The patient  received an intravenous dose of  5.3 mCi of Tc-99m mebrofenin (Choletec).  Sequential images of the upper abdomen were then acquired over the next 120 minutes.   FINDINGS: There is prompt accumulation of activity within the liver and normal subsequent excretion via the biliary ductal system into the small bowel. The gallbladder is not visualized through 120 minutes of imaging.       Nonvisualization of the gallbladder through 120 minutes of imaging, which in the appropriate clinical setting is suggestive of cystic duct obstruction / acute cholecystitis. Please note that extended fasting can have a similar appearance.   I personally reviewed the images/study and I agree with the findings as stated by Khadar Medina MD (resident). This study was interpreted at [place], [place].   MACRO: None   Signed by: Tate Raymond 8/8/2024 2:18 PM Dictation workstation:   [id number]    US right upper quadrant    Result Date: 8/7/2024  Interpreted By:  Lizbeth Hay, STUDY: US RIGHT UPPER QUADRANT;  8/7/2024 2:40 pm   INDICATION: Signs/Symptoms:abnormal ct, diarrhea.   COMPARISON: None.   ACCESSION NUMBER(S): [id number]   ORDERING CLINICIAN: AMBROSIO MARIA   TECHNIQUE: Multiple images of the right upper quadrant were obtained.   FINDINGS: LIVER: The liver measures 14.2 cm in longest axis. No definite focal nodules.     GALLBLADDER: The gallbladder is distended, and demonstrates multiple gallstones. No gallbladder wall thickening or surrounding fluid. The gallbladder wall thickness is 0.4 cm. Sonographic Hendrickson's sign is negative.     BILE DUCTS: No evidence of intra or extrahepatic biliary dilatation is identified; the common bile duct measures 1.0 cm.   PANCREAS: Pancreatic body and tail obscured by bowel gas and suboptimally evaluated.   RIGHT KIDNEY: The right kidney measures 10.4 cm in length. The renal cortical echogenicity and thickness are within normal limit.  No  hydronephrosis or renal calculi are seen.       Distended gallbladder with gallstones and mild gallbladder wall thickening. No ultrasonic Hendrickson sign. Findings are suspicious for acute or chronic cholecystitis. Nonspecific prominence of the common bile duct.   MACRO: None   Signed by: Lizbeth Hay 8/7/2024 2:54 PM Dictation workstation:   [id number]    ECG 12 lead    Result Date: 8/7/2024  Sinus rhythm with Premature atrial complexes Left axis deviation Right bundle branch block Minimal voltage criteria for LVH, may be normal variant Abnormal ECG When compared with ECG of 07-AUG-2024 12:18, (unconfirmed) Premature atrial complexes are now Present    CT angio chest for pulmonary embolism    Result Date: 8/7/2024  Interpreted By:  Ada Saavedra, STUDY: CT ANGIO CHEST FOR PULMONARY EMBOLISM;  8/7/2024 1:27 pm   INDICATION: Signs/Symptoms:elevated d dimer, elevated trop, syncope.   COMPARISON: 03/04/2011   ACCESSION NUMBER(S): [id number]   ORDERING CLINICIAN: AMBROSIO MARIA   TECHNIQUE: CT of the chest was performed. Sagittal and coronal reconstructions were generated. 75 ML Omnipaque 350 intravenous contrast given for the examination.  Multiplanar reconstructions of the pulmonary vessels were created on an independent workstation and provided for review.   FINDINGS: Artifact related to overlying upper extremities and motion limits evaluation.   CHEST WALL AND LOWER NECK: No significant axillary lymphadenopathy. 2 cm fat attenuation probable lipoma in the musculature anterior to the left shoulder.   MEDIASTINUM AND LUAN:  1.4 cm precarinal node with fatty hilum. 1.3 cm subcarinal node. Mild gaseous distention of the proximal esophagus.   HEART AND VESSELS:  Limited assessment for PE due to timing of IV contrast bolus and motion artifact. No central PE identified however limited assessment for basilar/peripheral PE. The heart is enlarged. Small pericardial effusion. No thoracic aortic aneurysm. Multifocal atherosclerotic  calcifications including the coronary arteries.   LUNGS, PLEURA, LARGE AIRWAYS:  Respiratory motion artifact limits parenchymal evaluation. Mild bullous/emphysematous changes. Subtle interstitial opacities in both lungs. 1.3 cm right mid chest nodule image 130/299. Irregular 3.0 x 1.8 cm opacity with air bronchograms in the right lower lobe. Probable 1.7 cm nodular opacity in the right infrahilar region image 186/299. No significant pleural effusion. The central airways are patent.   UPPER ABDOMEN:  Distended gallbladder with questionable wall thickening and pericholecystic fluid or intramural edema.   BONES:  Kyphoscoliosis and degenerative changes of the thoracic spine.       No central PE however limited assessment for peripheral PE due to technical factors and motion artifact.   Several nodules/masses in the right lung in the setting of emphysema with mild mediastinal lymphadenopathy. Findings could reflect inflammatory or neoplastic/metastatic disease. Further evaluation recommended. Subtle background pulmonary heterogeneity could reflect acute or chronic interstitial disease, the former including interstitial pneumonia and edema.   Distended gallbladder with questionable wall thickening and pericholecystic fluid. Findings are concerning for acute cholecystitis. Clinical correlation recommended. Further evaluation with ultrasound may be helpful.   MACRO: None.   Signed by: Ada Saavedra 8/7/2024 1:56 PM Dictation workstation:   [id number]    CT head wo IV contrast    Result Date: 8/7/2024  Interpreted By:  Ada Saavedra, STUDY: CT HEAD WO IV CONTRAST;  8/7/2024 1:25 pm   INDICATION: Signs/Symptoms:dizziness.   COMPARISON: None.   ACCESSION NUMBER(S): [id number]   ORDERING CLINICIAN: AMBROSIO MARIA   TECHNIQUE: Unenhanced CT images of the head were obtained.   FINDINGS: Diffuse atrophy with enlargement of the extra-axial spaces, cerebral sulci and ventricular system. Low-density probable remote infarct without  associated mass effect in the superficial right temporoparietal region. Additional patchy periventricular white matter hypodensities bilaterally. No acute intracranial hemorrhage or mass-effect. No midline shift. No extra-axial fluid collection   No focal calvarial lesion. 14 mm partially calcified nodule in the left frontal scalp. 9 mm dense nodule in the left occipital scalp.   The visualized paranasal sinuses are clear.       No acute intracranial hemorrhage or mass-effect.   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   2 nonspecific scalp nodules.   MACRO: None.   Signed by: Ada Saavedra 8/7/2024 1:44 PM Dictation workstation:   [id number]    XR chest 1 view    Result Date: 8/7/2024  Interpreted By:  Lee Sahu, STUDY: XR CHEST 1 VIEW;  8/7/2024 12:31 pm   INDICATION: Signs/Symptoms:syncope.   COMPARISON: 04/18/2015   ACCESSION NUMBER(S): [id number]   ORDERING CLINICIAN: AMBROSIO MARIA   FINDINGS: CARDIOMEDIASTINAL SILHOUETTE AND VASCULATURE:   Cardiac size:  Within normal limits. Aortic shadow:  Within normal limits.   Mediastinal contours: Within normal limits.   Pulmonary vasculature:  The central vasculature is unremarkable   LUNGS: Lungs are clear.   ABDOMEN AND OTHER FINDINGS: No remarkable upper abdominal findings.   BONES: No acute osseous changes.       1.  No active cardiopulmonary disease.  There has not been significant interval change from the prior exam.   Signed by: Lee Sahu 8/7/2024 12:41 PM Dictation workstation:   [id number]    ECG 12 lead    Result Date: 8/7/2024  Normal sinus rhythm Left axis deviation Right bundle branch block Minimal voltage criteria for LVH, may be normal variant Abnormal ECG When compared with ECG of 19-APR-2015 04:13, Premature ventricular complexes are no longer Present Right bundle branch block is now Present    Flexible Sigmoidoscopy    Result Date: 8/5/2024  [facility] Gastrointestinal Endoscopy Patient  Name: Meryl Hester Procedure Date: 8/5/2024 9:15 AM MRN: [id number] Account Number: [id number] YOB: 1931 Admit Type: Inpatient Age: 89 Room: [facility] 01 Gender: Female Note Status: Finalized Attending MD: Keo Mark MD, [id number] Procedure:             Colonoscopy Indications:           Hematochezia Providers:             Keo Mark MD Patient Profile:       This is a 89 year old female. Refer to note in                        patient chart for documentation of history and                        physical. Last Colonoscopy: 2012. Referring Physician:   Karen Moore (cnp) (Referring MD) Medicines:             Monitored Anesthesia Care Complications:         No immediate complications. Requesting Provider:   Procedure:             Pre-Anesthesia Assessment:                        - Prior to the procedure, a History and Physical                        was performed, and patient medications and                        allergies were reviewed. The patient is competent.                        The risks and benefits of the procedure and the                        sedation options and risks were discussed with the                        patient. All questions were answered and informed                        consent was obtained. Patient identification and                        proposed procedure were verified by the physician,                        the nurse, the anesthetist and the technician in                        the procedure room. Mental Status Examination:                        alert and oriented. Airway Examination: normal                        oropharyngeal airway and neck mobility. Respiratory                        Examination: clear to auscultation. CV Examination:                        irregularly irregular rate and rhythm and                        tachycardia noted. Prophylactic Antibiotics: The                        patient does not require prophylactic antibiotics.                         Prior Anticoagulants: The patient has taken Eliquis                        (apixaban), last dose was 2 days prior to                        procedure. ASA Grade Assessment: III - A patient                        with severe systemic disease. After reviewing the                        risks and benefits, the patient was deemed in                        satisfactory condition to undergo the procedure.                        The anesthesia plan was to use monitored anesthesia                        care (MAC). Immediately prior to administration of                        medications, the patient was re-assessed for                        adequacy to receive sedatives. The heart rate,                        respiratory rate, oxygen saturations, blood                        pressure, adequacy of pulmonary ventilation, and                        response to care were monitored throughout the                        procedure. The physical status of the patient was                        re-assessed after the procedure.                        After I obtained informed consent, the scope was                        passed under direct vision. Throughout the                        procedure, the patient's blood pressure, pulse, and                        oxygen saturations were monitored continuously. The                        Colonoscope was introduced through the anus and                        advanced to the transverse colon for evaluation.                        This was the intended extent. The colonoscopy was                        performed without difficulty. The patient tolerated                        the procedure well. The quality of the bowel                        preparation was fair. The rectum was photographed. Moderate Sedation:      MAC anesthesia was administered by the anesthesia team. Total Procedure Duration: 0 hours 3 minutes 48 seconds Findings:      The perianal and digital rectal examinations  were normal.      Multiple large-mouthed, medium-mouthed and small-mouthed diverticula      were found in the entire colon. There was no evidence of diverticular      bleeding.      Internal hemorrhoids were found during retroflexion. The hemorrhoids      were medium-sized. Impression:            - Preparation of the colon was fair.                        - Severe diverticulosis in the entire examined                        colon. There was no evidence of diverticular                        bleeding.                        - Internal hemorrhoids.                        - Extent of the exam was to the distal transverse                        colon. This was a limited exam on purpose due to                        patient being in A.fib with RVR. No active                        bleeding. Brown stool was seen throughout the colon Recommendation:        - Advance diet as tolerated and clear liquid diet.                        - [facility] to [facility] care.                        - Resume Eliquis (apixaban) at prior dose in 5-7                        days. Discussed with the patient Risk of recurrent                        bleeding                        - Repeat colonoscopy is not recommended.                        - Patient has a contact number available for                        emergencies. The signs and symptoms of potential                        delayed complications were discussed with the                        patient. Return to normal activities tomorrow.                        Written discharge instructions were provided to the                        patient.                        - Continue present medications. Procedure Code(s):     --- Professional ---                        62157, 53, Colonoscopy, flexible; diagnostic,                        including collection of specimen(s) by brushing or                        washing, when performed (separate procedure) Diagnosis Code(s):      --- Professional ---                        K64.8, Other hemorrhoids                        K92.1, Melena (includes Hematochezia)                        K57.30, Diverticulosis of large intestine without                        perforation or abscess without bleeding CPT copyright 2021 [employer]. All rights reserved. The codes documented in this report are preliminary and upon  review may be revised to meet current compliance requirements. Attending Participation:      I personally performed the entire procedure. Scope In: 9:32:47 AM Scope Out: 9:36:35 AM Keo Mark MD 8/5/2024 9:42:15 AM This report has been signed electronically by Keo Mark MD Number of Addenda: 0 Note Initiated On: 8/5/2024 9:15 AM Estimated Blood Loss:      Estimated blood loss: none.    IR VISCERAL ANGIO (AV,FV,MM,UN)    Result Date: 8/4/2024  * * *Final Report* * * DATE OF EXAM: Aug  4 2024  2:07AM   [facility]   [id number]  -  IR VISCERAL ARTERY  / ACCESSION #  [id number] PROCEDURE REASON:      * * * * Physician Interpretation * * * *  PROCEDURE: Mesenteric angiography Procedural Personnel Attending physician(s): YUSEF Son DO Pre-procedure diagnosis: GI bleed Post-procedure diagnosis: Same Indication: Gastrointestinal bleeding Additional clinical history: CTA positive for active hemorrhage into the sigmoid.  Patient on Eliquis.  Reversal agent given. _______________________________________________________________ PROCEDURE SUMMARY: - Arterial access with ultrasound guidance - Selective mesenteric angiography: Inferior mesenteric angiography - Superselective mesenteric angiography: Performed as described below - Additional procedure(s): None PROCEDURE DETAILS: Pre-procedure Consent: Risks, benefits, treatment options, potential complications and personnel to be involved were discussed (including the risks of radiation exposure, contrast and anesthesia administration, and any equipment needed for the procedure to  ensure best possible outcome) with the patient and all questions were answered and consent was obtained prior to procedure. Premedicated for contrast allergy: n/a Transfusion of blood products: No Medication reconciliation: The patient's medications and allergies were reviewed in the electronic medical record and reconciled to the proposed procedure/treatment. Jo-procedure discussion: The appropriate elements of the pre-procedure discussion, safety check list and sign-out were performed. Time out: A time out was performed immediately prior to procedure start with the nursing and interventional team, correctly identifying the name, date of birth, procedure, anatomy (including marking of site and side if applicable), patient position, procedure consent form, relevant diagnostic and radiology test results, antibiotic administration if applicable, safety precautions, and procedure-specific equipment needs. Start of procedure: 0126 End of procedure: 0207 Patient position:  Supine Preparation: The site was prepared and draped using all elements of maximal sterile barrier technique including sterile gloves, sterile gown, cap, mask, large sterile sheet, hand hygiene and cutaneous antisepsis. Antibiotics: None Contrast Contrast agent: OMNIPAQUE 300 Contrast volume (mL): 50 Image Guidance:  Fluoroscopic and sonographic guidance with digital image storage Radiation Dose FLUOROSCOPIC RADIATION SUMMARY: Plane A, Air Kerma:  335.4 mGy Dose Area Product (DAP):   6463.5 uGym2 Fluoro Time:  6:00 min:sec Radiation dose exceed 5 Gy: No If radiation dose exceeded 5 Gy, was counseling and instructional brochure provided:  N/A Anesthesia/sedation Level of anesthesia/sedation: No sedation Anesthesia/sedation administered by: Independent trained observer under attending supervision with continuous monitoring of the patient?s level of consciousness and physiologic status Total intra-service sedation time (minutes): 0 Local anesthesia: 1 %  lidocaine Prep The patient was prepped and draped using all elements of maximal sterile barrier technique (cap, mask, sterile gown, sterile gloves, a large sterile sheet, hand hygiene and cutaneous antisepsis), sterile ultrasound gel and sterile ultrasound probe covers. Access Local anesthesia was administered. The vessel was sonographically evaluated and judged to be patent. Real time ultrasound was used to visualize needle entry into the vessel and a permanent image was stored. A 5 F sheath was placed. Vessel accessed: Right common femoral artery Access technique: Micropuncture set with 21 gauge needle Aortography: Not performed Mesenteric angiography and interventions The mesenteric arterial system was catheterized using a 5F SOS catheter. Variant anatomy: None Vessel catheterized: Inferior mesenteric artery Findings: Left colic, sigmoid, and superior rectal vessels patent.  No active bleeding. Vessel catheterized: Super selective catheterization of 3 third order vessels off the DOC supplying the sigmoid and superior rectal area.  This region was known to be the site of active bleeding Findings: No active bleeding Closure Access site angiography performed: Yes Findings: Patent vessel with appropriate access level Arterial closure technique: Mynx Hemostasis achieved from closure technique: Yes Duration of manual compression (minutes): 4 Additional Details Additional description of procedure: None Equipment details: None Estimated blood loss (mL): Less than 10 Number and Type of Removed Specimens:  0:  Surgical pathology Standardized report: [email or web address]_v3 Complications There were no immediate complications . Conclusion The patient was comfortable and was transferred to the recovery room in stable condition. The procedure was performed by the:attending radiologist, without an assistant. The attending radiologist performed the following procedural activities: Entire procedure    IMPRESSION: DOC angiography  and superselective angiography of sigmoid branches supplying the known region of bleeding based on CTA.  None of the performed angiographic runs demonstrated active bleeding therefore no intervention was performed.   PLAN: Continued close observation, repeat CBC, and consideration for colonoscopy. Attestation Signer name: DYAN Griffiths DO I attest that I was present for the entire procedure. I reviewed the stored images and agree with the report as written. : PSCB   Transcribe Date/Time: Aug  4 2024  2:50A Dictated by : DYAN GRIFFITHS DO This examination was interpreted and the report reviewed and electronically signed by: DYAN GRIFFITHS DO on Aug  4 2024  3:02AM  EST    FL US guidance for vascular access    Result Date: 8/4/2024  * * *Final Report* * * DATE OF EXAM: Aug  4 2024  2:07AM   [facility]   [id number]  -  IR  VASCULAR ACCESS GUIDE  / ACCESSION #  [id number] PROCEDURE REASON: bleed      * * * * Physician Interpretation * * * *  PROCEDURE: Mesenteric angiography Procedural Personnel Attending physician(s): YUSEF Griffiths DO Pre-procedure diagnosis: GI bleed Post-procedure diagnosis: Same Indication: Gastrointestinal bleeding Additional clinical history: CTA positive for active hemorrhage into the sigmoid.  Patient on Eliquis.  Reversal agent given. _______________________________________________________________ PROCEDURE SUMMARY: - Arterial access with ultrasound guidance - Selective mesenteric angiography: Inferior mesenteric angiography - Superselective mesenteric angiography: Performed as described below - Additional procedure(s): None PROCEDURE DETAILS: Pre-procedure Consent: Risks, benefits, treatment options, potential complications and personnel to be involved were discussed (including the risks of radiation exposure, contrast and anesthesia administration, and any equipment needed for the procedure to ensure best possible outcome) with the patient and all questions were answered and consent  was obtained prior to procedure. Premedicated for contrast allergy: n/a Transfusion of blood products: No Medication reconciliation: The patient's medications and allergies were reviewed in the electronic medical record and reconciled to the proposed procedure/treatment. Jo-procedure discussion: The appropriate elements of the pre-procedure discussion, safety check list and sign-out were performed. Time out: A time out was performed immediately prior to procedure start with the nursing and interventional team, correctly identifying the name, date of birth, procedure, anatomy (including marking of site and side if applicable), patient position, procedure consent form, relevant diagnostic and radiology test results, antibiotic administration if applicable, safety precautions, and procedure-specific equipment needs. Start of procedure: 0126 End of procedure: 0207 Patient position:  Supine Preparation: The site was prepared and draped using all elements of maximal sterile barrier technique including sterile gloves, sterile gown, cap, mask, large sterile sheet, hand hygiene and cutaneous antisepsis. Antibiotics: None Contrast Contrast agent: OMNIPAQUE 300 Contrast volume (mL): 50 Image Guidance:  Fluoroscopic and sonographic guidance with digital image storage Radiation Dose FLUOROSCOPIC RADIATION SUMMARY: Plane A, Air Kerma:  335.4 mGy Dose Area Product (DAP):   6463.5 uGym2 Fluoro Time:  6:00 min:sec Radiation dose exceed 5 Gy: No If radiation dose exceeded 5 Gy, was counseling and instructional brochure provided:  N/A Anesthesia/sedation Level of anesthesia/sedation: No sedation Anesthesia/sedation administered by: Independent trained observer under attending supervision with continuous monitoring of the patient?s level of consciousness and physiologic status Total intra-service sedation time (minutes): 0 Local anesthesia: 1 % lidocaine Prep The patient was prepped and draped using all elements of maximal sterile  barrier technique (cap, mask, sterile gown, sterile gloves, a large sterile sheet, hand hygiene and cutaneous antisepsis), sterile ultrasound gel and sterile ultrasound probe covers. Access Local anesthesia was administered. The vessel was sonographically evaluated and judged to be patent. Real time ultrasound was used to visualize needle entry into the vessel and a permanent image was stored. A 5 F sheath was placed. Vessel accessed: Right common femoral artery Access technique: Micropuncture set with 21 gauge needle Aortography: Not performed Mesenteric angiography and interventions The mesenteric arterial system was catheterized using a 5F SOS catheter. Variant anatomy: None Vessel catheterized: Inferior mesenteric artery Findings: Left colic, sigmoid, and superior rectal vessels patent.  No active bleeding. Vessel catheterized: Super selective catheterization of 3 third order vessels off the DOC supplying the sigmoid and superior rectal area.  This region was known to be the site of active bleeding Findings: No active bleeding Closure Access site angiography performed: Yes Findings: Patent vessel with appropriate access level Arterial closure technique: Mynx Hemostasis achieved from closure technique: Yes Duration of manual compression (minutes): 4 Additional Details Additional description of procedure: None Equipment details: None Estimated blood loss (mL): Less than 10 Number and Type of Removed Specimens:  0:  Surgical pathology Standardized report: [email or web address]_v3 Complications There were no immediate complications . Conclusion The patient was comfortable and was transferred to the recovery room in stable condition. The procedure was performed by the:attending radiologist, without an assistant. The attending radiologist performed the following procedural activities: Entire procedure    IMPRESSION: DOC angiography and superselective angiography of sigmoid branches supplying the known region of bleeding  based on CTA.  None of the performed angiographic runs demonstrated active bleeding therefore no intervention was performed.   PLAN: Continued close observation, repeat CBC, and consideration for colonoscopy. Attestation Signer name: DYAN Griffiths DO I attest that I was present for the entire procedure. I reviewed the stored images and agree with the report as written. : COOPER   Transcribe Date/Time: Aug  4 2024  2:50A Dictated by : DYAN GRIFFITHS DO This examination was interpreted and the report reviewed and electronically signed by: DYAN GRIFFITHS DO on Aug  4 2024  3:02AM  EST    CT angio abdomen pelvis w and or wo IV IV contrast    Result Date: 8/3/2024  * * *Final Report* * * DATE OF EXAM: Aug  3 2024 10:15PM   [facility]   0467  -  CTA ABD/PELV WO/W IVCON  / ACCESSION #  [id number] PROCEDURE REASON: GI bleed      * * * * Physician Interpretation * * * *  CT angiography of the abdomen and pelvis with IV contrast Provided history: *  89 years old Female *  GI bleed COMPARISON STUDY: 08/10/2011, CT chest dated 06/20/2024 CT Radiation dose: Integrated Dose-length product (DLP) for this visit =   1410 mGy*cm. CT Dose Reduction Employed: Automated exposure control(AEC) and iterative recon Multiple axial images were obtained during the arterial phase and at a 90 second delay. Coronal and sagittal reconstructions of both phases were performed. The liver and spleen enhance appropriately without evidence for mass or lesion. The gallbladder is present. The adrenal glands and pancreas are within normal limits. The kidneys are symmetric. No hydronephrosis or hydroureter is seen. Active extravasation is noted in the sigmoid colon in the left lower quadrant just distal to the descending colon.  Intraluminal contrast increases on the delayed images. There is diffuse diverticulosis of the colon. There is atherosclerosis without aneurysm. Masslike opacity is again noted within the right lower lobe and is incompletely included  on this study.    IMPRESSION: 1.  Active arterial extravasation within the proximal aspect of the sigmoid colon. 2.  Redemonstration of suspicious mass in the right lower lobe. CRITICAL TEST/RESULTS: Acuity: Critical Finding: Active (arterial) bleeding, e.g. GI, retroperitoneal, aortic rupture, leaking/ruptured aneurysm, etc. Communication:  Communicated with Dr. Cristi Noel on  8/3/2024 10:37 PM   via EPIC Secure Chat. --END OF FINDING-- : COOPER   Transcribe Date/Time: Aug  3 2024 10:42P Dictated by : HAYDER JACKSON MD This examination was interpreted and the report reviewed and electronically signed by: HAYDER JACKSON MD on Aug  3 2024 10:49PM  EST    OCT MACULA CIRRUS OU (BOTH EYES)    Result Date: 7/30/2024  Date of Procedure 7/30/2024. Technician Information Imaging Technician: danny. Interpretation Right Eye Findings include Intraretinal fluid, Subretinal fluid, PED, Drusen, Vitelliform lesion. Left Eye Findings include Drusen, Vitelliform lesion; Negative for Intraretinal fluid, Subretinal fluid. Interval Change Left Eye Stable.     XR foot left 3+ views    Result Date: 7/18/2024  * * *Final Report* * * DATE OF EXAM: Jul 18 2024  2:38PM   [facility]   [id number]  -  XR FOOT 3V AP/LAT/OBL LT  / ACCESSION #  [id number] PROCEDURE REASON: Nondisplaced fracture of proximal phalanx of left great toe, subsequent encounte      * * * * Physician Interpretation * * * *  HISTORY:  Nondisplaced fracture of proximal phalanx of left great toe, subsequent encounter for fracture with routine healing   .  Follow up for fracture of LEFT Great toe TECHNIQUE: XR FOOT 3V AP/LAT/OBL LT COMPARISON: 06/25/2024 radiographs and CT RESULT: Healing intra-articular fracture first proximal phalanx with increased bone resorption at the fracture lines.  Unchanged articular surface depression of the proximal phalanx base.  No other changes. No other significant abnormality. ---------------------------------------------    IMPRESSION: HEALING  FRACTURE(S) PROXIMAL PHALANX : COOPER   Transcribe Date/Time: Jul 18 2024  3:19P Dictated by : RASHMI YU MD This examination was interpreted and the report reviewed and electronically signed by: RASHMI YU MD on Jul 18 2024  3:22PM  EST      Assessment/Plan   89-year-old female who was admitted with syncope and collapse.  The patient was hypotensive on presentation and was found to have inflammation with thickening of the gallbladder wall on CT and ultrasound.  The patient had a HIDA scan that was positive however, no leukocytosis, no nausea, vomiting, pain, tenderness on palpation to the right upper quadrant, normal LFTs and bilirubin.  Patient states that she does not wish to undergo surgery if it is required.  Considering the patient's stable clinical presentation and lack of pain, the patient was discussed with Dr. Shipley and no surgical intervention is required at this time.  Spoke with the patient at length and she is agreeable to the plan.  General surgery will sign off.  Please reach out for any questions or concerns that may arise.    Principal Problem:    Syncope and collapse  Active Problems:    Syncope, unspecified syncope type           I spent 31 minutes in the professional and overall care of this patient.    *These notes are being done using Dragon voice recognition technology and may include unintended errors with respect to translation of words, typographical errors or grammar errors which may not have been identified prior to finalization of the chart note.    Tena Arcos, APRN-CNP

## 2024-08-09 NOTE — PROGRESS NOTES
Meryl Hester is a 89 y.o. female on day 1 of admission presenting with Syncope and collapse.      Subjective   Patient states she is doing well and feels back to her baseline. Patient denies nausea, vomiting, fever, chills, diarrhea, abdominal pain, lightheadedness, chest pain, dizziness, edema, palpitations.        Objective     Last Recorded Vitals  /70   Pulse 85   Temp 37.1 °C (98.8 °F)   Resp 21   Wt 65.2 kg (143 lb 11.8 oz)   SpO2 97%   Intake/Output last 3 Shifts:    Intake/Output Summary (Last 24 hours) at 8/9/2024 1226  Last data filed at 8/9/2024 0431  Gross per 24 hour   Intake --   Output 250 ml   Net -250 ml       Admission Weight  Weight: 64.4 kg (142 lb) (08/07/24 1215)    Daily Weight  08/07/24 : 65.2 kg (143 lb 11.8 oz)    Image Results  ECG 12 lead  Sinus rhythm with Premature atrial complexes  Left axis deviation  Right bundle branch block  Minimal voltage criteria for LVH, may be normal variant  Abnormal ECG  When compared with ECG of 07-AUG-2024 12:18, (unconfirmed)  Premature atrial complexes are now Present  See ED provider note for full interpretation and clinical correlation  Confirmed by Rosana Diaz ([id number]) on 8/9/2024 11:45:12 AM  ECG 12 lead  Normal sinus rhythm  Left axis deviation  Right bundle branch block  Minimal voltage criteria for LVH, may be normal variant  Abnormal ECG  When compared with ECG of 19-APR-2015 04:13,  Premature ventricular complexes are no longer Present  Right bundle branch block is now Present  See ED provider note for full interpretation and clinical correlation  Confirmed by Rosana Diaz ([id number]) on 8/9/2024 11:36:48 AM      Scheduled medications  bisoprolol, 7.5 mg, oral, Daily  DULoxetine, 30 mg, oral, Daily  gabapentin, 300 mg, oral, Nightly  levothyroxine, 88 mcg, oral, Daily  losartan, 25 mg, oral, Daily  magnesium oxide, 400 mg, oral, Daily  pantoprazole, 40 mg, oral, Daily before breakfast  rosuvastatin, 20 mg, oral, Nightly      Continuous  medications     PRN medications  PRN medications: acetaminophen **OR** acetaminophen **OR** acetaminophen, acetaminophen **OR** acetaminophen **OR** acetaminophen, fluticasone, lubricating eye drops, ondansetron **OR** ondansetron, polyethylene glycol      Physical Exam:      General: Alert and oriented x 3, no distress, dry mucous membranes  Cardiovascular: Normal S1-S2, sinus rhythm, no murmurs  Respiratory: course breath sounds bilaterally  Abdomen: Abdomen is soft, not distended with no tenderness  Extremities: No edema or deformities  Neurology: Alert and oriented x 3, no acute focal deficits       Assessment/Plan     #Syncope/ near syncope likely 2/2 volume depletion   #Hypotension   #Elevated troponin likely secondary to demand ischemia from hypotension   #PAF  #Pulmonary hypertension   #Diastolic CHF 2/2 hypertension   Cardiology consult recommendations appreciated.   Continue beta-blocker and losartan per cardiology.   Echo showed LVEF 45-50%  Left and right heart cath pending per cardiology   May need to adjust antihypertensives medications per cardiology management pending cath    #Cholecystitis :   Patient currently asymptomatic.   General surgery consult recommendations appreciated.  No surgical intervention at this time.   Patient should follow-up with primary care provider.      #Acute on chronic blood loss Anemia   H&H stable   Recent admission for GI bleed at [facility], no source found  Continue to hold Eliquis 5-7 days per [facility] recs at last hospitalization  Patient currently stable with no signs of bleeding.     #Lung nodules   This is a known condition to the patient.   Consider outpatient follow-up with pulmonology.     DVT: SCD    Code Status: DNRCC-A, discussed with patient and family     RAÚL BORJA

## 2024-08-09 NOTE — NURSING NOTE
Patient arrived from cath lab, s/p LHC/RHC. Left groin site closed with vascade, site CDI, soft. Left pedal pulses palpable. Patient A&Ox3 and has no c/o at this time. Will continue to monitor.

## 2024-08-09 NOTE — POST-PROCEDURE NOTE
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

## 2024-08-09 NOTE — DOCUMENTATION CLARIFICATION NOTE
"    PATIENT:               ANNA SINGH  ACCT #:                  [id number]  MRN:                       [id number]  :                       [id number]  ADMIT DATE:       2024 12:11 PM  DISCH DATE:  RESPONDING PROVIDER #:        [id number]          PROVIDER RESPONSE TEXT:    NSTEMI ruled out, elevated troponins 2/2 demand ischemia    CDI QUERY TEXT:    Clarification    Instruction:    Based on your assessment of the patient and the clinical information, please provide the requested documentation by clicking on the appropriate radio button and enter any additional information if prompted.    Question: Please further clarify the diagnosis of NSTEMI as    When answering this query, please exercise your independent professional judgment. The fact that a question is being asked, does not imply that any particular answer is desired or expected.    The patient's clinical indicators include:  Clinical Information:  The patient is a 89 year old female who presented to the ED with syncope.  She was found to be likely volume depleted with ABLA and demand ischemia.  Her PMH includes HTN, HFpEF, hypothyroidism, pAF and a hx of smoking.    Documented Diagnosis:  \"NSTEMI\" is documented on the  ED Note by Dr. Carbajal.    ED Note  \"Denies any chest pain or significant difficulty breathing.\"  \"Cardiac enzymes x 2 are significantly elevated, but not increasing.. Will hold off on heparin drip as the troponin is downtrending, patient does not have chest pain.. EKG does not show any acute ACS or significant arrhythmia.\"  \"NSTEMI\"    HP  \"Elevated troponin likely secondary to demand ischemia from hypotension\"    Clinical Indicators and Documentation:  -Vital Signs: :  36, 84, 16, 113/48, 100 percent on RA.  -Troponin trend:  :  845, 750.  :  838.  -EKG findings:  1216  \"Sinus rhythm with Premature atrial complexes.  Left axis deviation.  Right bundle branch block.  Minimal voltage criteria for LVH, may be normal " "variant\"  -ECHO findings: 8/8 \"Left Ventricle: Left ventricular ejection fraction is mildly decreased, by visual estimate at 45-50 percent. There are no regional wall motion abnormalities. The left ventricular cavity size is normal. The left ventricular septal wall thickness is normal. There is mildly increased left ventricular posterior wall thickness. Spectral Doppler shows a pseudonormal pattern of left ventricular diastolic filling.  Left Atrium: The left atrium is severely dilated. Left atrial volume index 63.1 mL/m2.  Right Ventricle: The right ventricle is mildly enlarged. There is mildly reduced right ventricular systolic function.\"  -Physical Exam or Documented/Presenting symptoms:  Presented with syncope  -Cardiac Interventions:  Cardiology consult, EKGs and Echocardiogram  -Cardiac Consult:  Yes    Treatment:  Troponin trending, Echocardiogram, EKGs    Risk Factors:  Anemia, volume depletion  Options provided:  -- NSTEMI as evidenced by, Please specify additional information below  -- NSTEMI ruled out, elevated troponins 2/2 demand ischemia  -- Other - I will add my own diagnosis  -- Refer to Clinical Documentation Reviewer    Query created by: Abi Chun on 8/9/2024 9:36 AM      Electronically signed by:  GERALD WOMACK MD 8/9/2024 11:18 AM          "

## 2024-08-09 NOTE — PROGRESS NOTES
Cardiology Progress Note  Patient: Meryl Hester  Unit/Bed: 906/906-B  YOB: 1931  MRN: [id number]  Acct: [id number]   Admitting Diagnosis: Syncope and collapse [R55]  Lung nodules [R91.8]  Abnormal CT of the chest [R93.89]  NSTEMI (non-ST elevated myocardial infarction) (Multi) [I21.4]  Urinary tract infection without hematuria, site unspecified [N39.0]  Syncope, unspecified syncope type [R55]  Anemia, unspecified type [D64.9]  Date:  8/7/2024  Hospital Day: 1  Attending: Nima Villegas MD   Rounding Cardiologist:  Wilmer Corona MD,   Primary Cardiologist:  [facility] Tej Villalobos MD       See addendum at the end of this note    Complaint:  Chief Complaint   Patient presents with    Syncope   With marked hypotension on presentation    SUBJECTIVE    8/9/2024: She has been up in a chair no further dizziness or lightheadedness.  No palpitations.  She is not short of breath at rest but has chronic dyspnea just dressing herself.  She has had no chest fullness or pressure.  No lower extremity edema.  She has no abdominal pain she has had no hematochezia.    8/8/2024 initial consultation.  Patient had been discharged from [facility] after acute GI bleeding felt diverticular in etiology.  Had colonoscopy and discharged the following day.  Was sitting playing cards in the morning and suddenly became very lightheaded lay down on the table did not lose consciousness.  Arrival of paramedics showed her blood pressure to be 74 systolic she was given fluid with a marked improvement of her symptoms.  She had been without chest pain, shortness of breath or palpitation.  Earlier this month admission of the [facility] with hypertension and acute GI bleeding emergent angiography was pursued because of significant extravasation noted in the sigmoid colon but no acute bleeding demonstrated at time of angiography.  She had had multiple bloody bowel movements at that time.  Also with episodes of atrial  fibrillation for which intermittent IV amiodarone was given at [facility] but she was not discharged on antiarrhythmic therapy.  Primary event felt probably related to relative volume depletion after not eating or drinking well after colonoscopy the prior day.  Was also noted to have hypokalemia potentially precipitating dysrhythmia and she was continued on monitoring.  Troponins elevated at 800 but plateau.  No acute EKG changes with chronic right bundle branch block.    ER CT scan no evidence of pulmonary emboli with several nodules/masses right lung and significant emphysema.  Also increased interstitial markings.    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    Echocardiogram 8/8/2024 here  1. Left ventricular ejection fraction is mildly decreased, by visual estimate at 45-50%.   2. Spectral Doppler shows a pseudonormal pattern of left ventricular diastolic filling.   3. There is mildly reduced right ventricular systolic function.   4. Mildly enlarged right ventricle.   5. The left atrium is severely dilated.   6. 2+ mitral regurgitation.      MR ERO is 0.21cm2.      MR Volume 40 ml.   7. Mild to moderate mitral valve regurgitation.   8. 2+ tricuspid regurgitation with estimated RVSP of 71mmHg consistent with severely elevated right-sided pressures.   9. Mild to moderate tricuspid regurgitation.  10. Peak velocity across the aortic valve is 2.29 m/s with a peak gradient 21 mmHg and mean gradient 10 mmHg. Dimensionless index 0.40. Aortic valve area 1.26 cm2. Values consistent with mild aortic stenosis. 1-2+ aortic regurgitation.  11. Mild aortic valve regurgitation.      VITALS      Vitals:    08/08/24 2347  08/09/24 0431 08/09/24 0527 08/09/24 0734   BP: 124/60 (!) 182/85 167/77 138/66   BP Location:       Patient Position:       Pulse: 79 81 78 91   Resp:    21   Temp: 35.7 °C (96.3 °F) 36.3 °C (97.3 °F)  36.4 °C (97.5 °F)   TempSrc:       SpO2: 92% 94%  92%   Weight:       Height:           Intake/Output Summary (Last 24 hours) at 8/9/2024 1136  Last data filed at 8/9/2024 0431  Gross per 24 hour   Intake 15 ml   Output 850 ml   Net -835 ml      Wt Readings from Last 4 Encounters:   08/07/24 65.2 kg (143 lb 11.8 oz)        Allergies:  Allergies   Allergen Reactions    Sulfa (Sulfonamide Antibiotics) Unknown        PHYSICAL EXAM   Physical Exam      LABS   CBC:   Results from last 7 days   Lab Units 08/09/24  0548 08/08/24  0922 08/07/24  1223   WBC AUTO x10*3/uL 6.7 6.8 9.1   RBC AUTO x10*6/uL 2.66* 2.57* 2.76*   HEMOGLOBIN g/dL 8.0* 7.8* 8.2*   HEMATOCRIT % 25.1* 24.4* 26.0*   MCV fL 94 95 94   MCH pg 30.1 30.4 29.7   MCHC g/dL 31.9* 32.0 31.5*   RDW % 14.9* 15.0* 14.9*   PLATELETS AUTO x10*3/uL 270 243 232     CMP:    Results from last 7 days   Lab Units 08/09/24  0548 08/08/24  0922 08/07/24  1223   SODIUM mmol/L 141 140 137   POTASSIUM mmol/L 3.9 3.7 3.3*   CHLORIDE mmol/L 106 108* 106   CO2 mmol/L 30 27 23   BUN mg/dL 4* 4* 7   CREATININE mg/dL 0.53 0.64 0.83   GLUCOSE mg/dL 94 98 167*   PROTEIN TOTAL g/dL  --  5.4* 5.5*   CALCIUM mg/dL 8.8 8.4* 8.2*   BILIRUBIN TOTAL mg/dL  --  0.4 0.6   ALK PHOS U/L  --  79 80   AST U/L  --  14 15   ALT U/L  --  9 11     BMP:    Results from last 7 days   Lab Units 08/09/24  0548 08/08/24  0922 08/07/24  1223   SODIUM mmol/L 141 140 137   POTASSIUM mmol/L 3.9 3.7 3.3*   CHLORIDE mmol/L 106 108* 106   CO2 mmol/L 30 27 23   BUN mg/dL 4* 4* 7   CREATININE mg/dL 0.53 0.64 0.83   CALCIUM mg/dL 8.8 8.4* 8.2*   GLUCOSE mg/dL 94 98 167*     Magnesium:  Results from last 7 days   Lab Units 08/09/24  0548 08/08/24  0922 08/07/24  1223   MAGNESIUM mg/dL 1.85 1.87 1.90     Troponin:     Results from last 7 days   Lab Units 08/08/24  0922 08/07/24  1324 08/07/24  1223   TROPHS ng/L 838* 750* 845*     BNP:   Results from last 7 days   Lab Units 08/07/24  1223   BNP pg/mL 238*     Lipid Panel:         bisoprolol, 7.5 mg, oral, Daily  DULoxetine, 30 mg, oral, Daily  gabapentin, 300 mg, oral, Nightly  levothyroxine, 88 mcg, oral, Daily  losartan, 25 mg, oral, Daily  magnesium oxide, 400 mg, oral, Daily  pantoprazole, 40 mg, oral, Daily before breakfast  rosuvastatin, 20 mg, oral, Nightly           Current Outpatient Medications   Medication Instructions    acetaminophen (TYLENOL) 650 mg, oral, Every 6 hours PRN    bisoprolol (ZEBETA) 7.5 mg, oral, Daily RT    calcium carbonate-vitamin D3 500 mg-5 mcg (200 unit) tablet 1 tablet, oral, Daily    carboxymethylcellulose (THERATears) 0.25 % ophthalmic solution 1 drop, Both Eyes, 3 times daily PRN    docusate sodium (COLACE) 100 mg, oral, 2 times daily    DULoxetine (CYMBALTA) 30 mg, oral, Daily RT    fluticasone (Flonase) 50 mcg/actuation nasal spray 1 spray, Each Nostril, Daily PRN    furosemide (LASIX) 20 mg, oral, Daily RT    gabapentin (NEURONTIN) 300 mg, oral, Nightly    krill oil 500 mg, oral, 2 times daily    LACTOBACILLUS ACIDOPHILUS ORAL 1 tablet, oral, Daily    levothyroxine (SYNTHROID, LEVOXYL) 88 mcg, oral, Daily RT    losartan (COZAAR) 25 mg, oral, Daily RT    magnesium oxide 500 mg, oral, Daily    meclizine (ANTIVERT) 12.5 mg, oral, Every 12 hours PRN    multivitamin with minerals iron-free (Centrum Silver) 1 tablet, oral, Daily    mv-min-FA-vit K-lutein-zeaxant (PreserVision AREDS 2 Plus MV) 200 mcg-15 mcg- 5 mg-1 mg capsule 1 capsule, oral, 2 times daily    omeprazole (PRILOSEC) 40 mg, oral, Daily RT    potassium chloride CR 20 mEq ER tablet 20 mEq, oral, Daily RT    rosuvastatin (CRESTOR) 20 mg, oral, Nightly        Transthoracic Echo (TTE) Complete    Result Date: 8/8/2024          [address] [address]   Tel [phone] Fax [phone] TRANSTHORACIC ECHOCARDIOGRAM REPORT Patient Name:      ANNA SINGH         Reading Physician:    [id number] Marbin Veronica MD, [facility] Study Date:        8/8/2024             Ordering Provider:    [id number] PAZ FINNEY MRN/PID:           [id number]             Fellow: Accession#:        [id number]         Nurse: Date of Birth/Age: 4/16/1931 / 89 years Sonographer:          Jodi Peterson [facility] Gender:            F                    Additional Staff: Height:            149.86 cm            Admit Date:           8/7/2024 Weight:            64.86 kg             Admission Status:     Inpatient -                                                               Routine BSA / BMI:         1.60 m2 / 28.88      Department Location:  [place] [address]                                      Echo Lab Blood Pressure: 144 /67 mmHg Study Type:    TRANSTHORACIC ECHO (TTE) COMPLETE Diagnosis/ICD: Unspecified atrial fibrillation-I48.91; Elevated Troponin-R79.89;                Hypotension, unspecified-I95.9 Patient History: Pertinent History: HTN, Hyperlipidemia, A-Fib, CHF, CVA and Elevated Troponin,                    Hypotension, Hx Syncope. Study Detail: The following Echo studies were performed: 2D, M-Mode, Doppler and               color flow.  PHYSICIAN INTERPRETATION: Left Ventricle: Left ventricular ejection fraction is mildly decreased, by visual estimate at 45-50%. There are no regional wall motion abnormalities. The left ventricular cavity size is normal. The left ventricular septal wall thickness is normal. There is mildly increased left ventricular posterior wall thickness. Spectral Doppler shows a pseudonormal pattern of left ventricular diastolic filling. Left Atrium: The left atrium is severely dilated. Left atrial volume index 63.1 mL/m2. Right  Ventricle: The right ventricle is mildly enlarged. There is mildly reduced right ventricular systolic function. Right Atrium: The right atrium is normal in size. Aortic Valve: The aortic valve is trileaflet. There is mild to moderate aortic valve cusp calcification. The aortic valve dimensionless index is 0.40. There is mild aortic valve regurgitation. The peak instantaneous gradient of the aortic valve is 21.0 mmHg. The mean gradient of the aortic valve is 10.0 mmHg. Peak velocity across the aortic valve is 2.29 m/s with a peak gradient 21 mmHg and mean gradient 10 mmHg. Dimensionless index 0.40. Aortic valve area 1.26 cm2. Values consistent with mild aortic stenosis. 1-2+ aortic regurgitation. Mitral Valve: The mitral valve is mildly thickened. There is mild to moderate mitral valve regurgitation. The mitral regurgitant orifice area is 21 mm2. The mitral regurgitant volume is 39.97 ml. 2+ mitral regurgitation. MR ERO is 0.21cm2. MR Volume 40 ml. Tricuspid Valve: The tricuspid valve is structurally normal. There is mild to moderate tricuspid regurgitation. 2+ tricuspid regurgitation with estimated RVSP of 71mmHg consistent with severely elevated right-sided pressures. Pulmonic Valve: The pulmonic valve is structurally normal. There is mild pulmonic valve regurgitation. Pericardium: There is no pericardial effusion noted. Aorta: The aortic root is normal. Systemic Veins: The inferior vena cava appears to be of normal size. There is IVC inspiratory collapse greater than 50%.  CONCLUSIONS:  1. Left ventricular ejection fraction is mildly decreased, by visual estimate at 45-50%.  2. Spectral Doppler shows a pseudonormal pattern of left ventricular diastolic filling.  3. There is mildly reduced right ventricular systolic function.  4. Mildly enlarged right ventricle.  5. The left atrium is severely dilated.  6. 2+ mitral regurgitation.     MR ERO is 0.21cm2.     MR Volume 40 ml.  7. Mild to moderate mitral valve  regurgitation.  8. 2+ tricuspid regurgitation with estimated RVSP of 71mmHg consistent with severely elevated right-sided pressures.  9. Mild to moderate tricuspid regurgitation. 10. Peak velocity across the aortic valve is 2.29 m/s with a peak gradient 21 mmHg and mean gradient 10 mmHg. Dimensionless index 0.40. Aortic valve area 1.26 cm2. Values consistent with mild aortic stenosis. 1-2+ aortic regurgitation. 11. Mild aortic valve regurgitation. 12. No previous available for comparison. QUANTITATIVE DATA SUMMARY: 2D MEASUREMENTS:                           Normal Ranges: Ao Root d:     3.50 cm    (2.0-3.7cm) LAs:           4.80 cm    (2.7-4.0cm) IVSd:          1.00 cm    (0.6-1.1cm) LVPWd:         1.20 cm    (0.6-1.1cm) LVIDd:         4.50 cm    (3.9-5.9cm) LVIDs:         3.80 cm LV Mass Index: 109.5 g/m2 LV % FS        15.6 % LA VOLUME:                               Normal Ranges: LA Vol A4C:        101.4 ml   (22+/-6mL/m2) LA Vol A2C:        90.6 ml LA Vol BP:         101.8 ml LA Vol Index A4C:  63.4ml/m2 LA Vol Index A2C:  56.6 ml/m2 LA Vol Index BP:   63.7 ml/m2 LA Area A4C:       28.9 cm2 LA Area A2C:       25.7 cm2 LA Major Axis A4C: 7.0 cm LA Major Axis A2C: 6.2 cm LA Volume Index:   61.7 ml/m2 LA Vol A4C:        101.0 ml LA Vol A2C:        87.0 ml LA Vol Index BSA:  58.8 ml/m2 RA VOLUME BY A/L METHOD:                               Normal Ranges: RA Vol A4C:        53.5 ml    (8.3-19.5ml) RA Vol Index A4C:  33.4 ml/m2 RA Area A4C:       18.6 cm2 RA Major Axis A4C: 5.5 cm LV SYSTOLIC FUNCTION BY 2D PLANIMETRY (MOD):                      Normal Ranges: EF-A4C View:    56 % (>=55%) EF-A2C View:    44 % EF-Biplane:     50 % EF-Visual:      48 % LV EF Reported: 48 % LV DIASTOLIC FUNCTION:                         Normal Ranges: MV Peak E:    1.44 m/s  (0.7-1.2 m/s) MV Peak A:    0.91 m/s  (0.42-0.7 m/s) E/A Ratio:    1.59      (1.0-2.2) MV e'         0.084 m/s (>8.0) MV lateral e' 0.11 m/s MV medial e'  0.06 m/s  E/e' Ratio:   17.07     (<8.0) MITRAL VALVE:                      Normal Ranges: MV Vmax:    1.48 m/s (<=1.3m/s) MV peak P.8 mmHg (<5mmHg) MV mean P.0 mmHg (<48mmHg) MV DT:      140 msec (150-240msec) MITRAL INSUFFICIENCY:                           Normal Ranges: PISA Radius:  0.7 cm MR VTI:       190.00 cm MR Vmax:      584.00 cm/s MR Alias Delbert: 39.9 cm/s MR Volume:    39.97 ml MR Flow Rt:   122.84 ml/s MR EROA:      21 mm2 AORTIC VALVE:                                    Normal Ranges: AoV Vmax:                2.29 m/s  (<=1.7m/s) AoV Peak P.0 mmHg (<20mmHg) AoV Mean PG:             10.0 mmHg (1.7-11.5mmHg) LVOT Max Delbert:            0.86 m/s  (<=1.1m/s) AoV VTI:                 44.90 cm  (18-25cm) LVOT VTI:                18.00 cm LVOT Diameter:           2.00 cm   (1.8-2.4cm) AoV Area, VTI:           1.26 cm2  (2.5-5.5cm2) AoV Area,Vmax:           1.18 cm2  (2.5-4.5cm2) AoV Dimensionless Index: 0.40 AORTIC INSUFFICIENCY: AI Vmax:       4.61 m/s AI Half-time:  309 msec AI Decel Rate: 437.00 cm/s2  RIGHT VENTRICLE: RV Basal 4.20 cm RV Mid   3.60 cm RV Major 6.4 cm TAPSE:   17.2 mm RV s'    0.13 m/s TRICUSPID VALVE/RVSP:                             Normal Ranges: Peak TR Velocity: 4.51 m/s RV Syst Pressure: 84.4 mmHg (< 30mmHg) IVC Diam:         1.70 cm PULMONIC VALVE:                         Normal Ranges: PV Accel Time: 127 msec (>120ms) PV Max Delbert:    0.9 m/s  (0.6-0.9m/s) PV Max PG:     3.4 mmHg PV Mean P.0 mmHg PV VTI:        18.70 cm  [id number] Marbin Veronica MD, FACC Electronically signed on 2024 at 3:55:53 PM  ** Final **     NM hepatobiliary    Result Date: 2024  Interpreted By:  Tate Raymond and Elsamaloty Mazzin STUDY: NM HEPATOBILIARY;  2024 1:50 pm   INDICATION: Signs/Symptoms:Abnormal gallbladder on ultrasound.   COMPARISON: Right upper quadrant ultrasound dated 2024.   ACCESSION NUMBER(S): [id number]   ORDERING CLINICIAN: PALMIRA LEONARD   TECHNIQUE:  DIVISION OF NUCLEAR MEDICINE HEPATOBILIARY SCAN (HIDA)   The patient received an intravenous dose of  5.3 mCi of Tc-99m mebrofenin (Choletec).  Sequential images of the upper abdomen were then acquired over the next 120 minutes.   FINDINGS: There is prompt accumulation of activity within the liver and normal subsequent excretion via the biliary ductal system into the small bowel. The gallbladder is not visualized through 120 minutes of imaging.       Nonvisualization of the gallbladder through 120 minutes of imaging, which in the appropriate clinical setting is suggestive of cystic duct obstruction / acute cholecystitis. Please note that extended fasting can have a similar appearance.   I personally reviewed the images/study and I agree with the findings as stated by Khadar Medina MD (resident). This study was interpreted at [place], [place].   MACRO: None   Signed by: Tate Raymond 8/8/2024 2:18 PM Dictation workstation:   [id number]    US right upper quadrant    Result Date: 8/7/2024  Interpreted By:  Lizbeth Hay, STUDY: US RIGHT UPPER QUADRANT;  8/7/2024 2:40 pm   INDICATION: Signs/Symptoms:abnormal ct, diarrhea.   COMPARISON: None.   ACCESSION NUMBER(S): [id number]   ORDERING CLINICIAN: AMBROSIO MARIA   TECHNIQUE: Multiple images of the right upper quadrant were obtained.   FINDINGS: LIVER: The liver measures 14.2 cm in longest axis. No definite focal nodules.     GALLBLADDER: The gallbladder is distended, and demonstrates multiple gallstones. No gallbladder wall thickening or surrounding fluid. The gallbladder wall thickness is 0.4 cm. Sonographic Hendrickson's sign is negative.     BILE DUCTS: No evidence of intra or extrahepatic biliary dilatation is identified; the common bile duct measures 1.0 cm.   PANCREAS: Pancreatic body and tail obscured by bowel gas and suboptimally evaluated.   RIGHT KIDNEY: The right kidney measures 10.4 cm in length. The renal  cortical echogenicity and thickness are within normal limit.  No hydronephrosis or renal calculi are seen.       Distended gallbladder with gallstones and mild gallbladder wall thickening. No ultrasonic Hendrickson sign. Findings are suspicious for acute or chronic cholecystitis. Nonspecific prominence of the common bile duct.   MACRO: None   Signed by: Lizbeth Hay 8/7/2024 2:54 PM Dictation workstation:   [id number]    ECG 12 lead    Result Date: 8/7/2024  Sinus rhythm with Premature atrial complexes Left axis deviation Right bundle branch block Minimal voltage criteria for LVH, may be normal variant Abnormal ECG When compared with ECG of 07-AUG-2024 12:18, (unconfirmed) Premature atrial complexes are now Present    CT angio chest for pulmonary embolism    Result Date: 8/7/2024  Interpreted By:  Ada Saavedra, STUDY: CT ANGIO CHEST FOR PULMONARY EMBOLISM;  8/7/2024 1:27 pm   INDICATION: Signs/Symptoms:elevated d dimer, elevated trop, syncope.   COMPARISON: 03/04/2011   ACCESSION NUMBER(S): [id number]   ORDERING CLINICIAN: AMBROSIO MARIA   TECHNIQUE: CT of the chest was performed. Sagittal and coronal reconstructions were generated. 75 ML Omnipaque 350 intravenous contrast given for the examination.  Multiplanar reconstructions of the pulmonary vessels were created on an independent workstation and provided for review.   FINDINGS: Artifact related to overlying upper extremities and motion limits evaluation.   CHEST WALL AND LOWER NECK: No significant axillary lymphadenopathy. 2 cm fat attenuation probable lipoma in the musculature anterior to the left shoulder.   MEDIASTINUM AND LUAN:  1.4 cm precarinal node with fatty hilum. 1.3 cm subcarinal node. Mild gaseous distention of the proximal esophagus.   HEART AND VESSELS:  Limited assessment for PE due to timing of IV contrast bolus and motion artifact. No central PE identified however limited assessment for basilar/peripheral PE. The heart is enlarged. Small pericardial  effusion. No thoracic aortic aneurysm. Multifocal atherosclerotic calcifications including the coronary arteries.   LUNGS, PLEURA, LARGE AIRWAYS:  Respiratory motion artifact limits parenchymal evaluation. Mild bullous/emphysematous changes. Subtle interstitial opacities in both lungs. 1.3 cm right mid chest nodule image 130/299. Irregular 3.0 x 1.8 cm opacity with air bronchograms in the right lower lobe. Probable 1.7 cm nodular opacity in the right infrahilar region image 186/299. No significant pleural effusion. The central airways are patent.   UPPER ABDOMEN:  Distended gallbladder with questionable wall thickening and pericholecystic fluid or intramural edema.   BONES:  Kyphoscoliosis and degenerative changes of the thoracic spine.       No central PE however limited assessment for peripheral PE due to technical factors and motion artifact.   Several nodules/masses in the right lung in the setting of emphysema with mild mediastinal lymphadenopathy. Findings could reflect inflammatory or neoplastic/metastatic disease. Further evaluation recommended. Subtle background pulmonary heterogeneity could reflect acute or chronic interstitial disease, the former including interstitial pneumonia and edema.   Distended gallbladder with questionable wall thickening and pericholecystic fluid. Findings are concerning for acute cholecystitis. Clinical correlation recommended. Further evaluation with ultrasound may be helpful.   MACRO: None.   Signed by: Ada Saavedra 8/7/2024 1:56 PM Dictation workstation:   [id number]    CT head wo IV contrast    Result Date: 8/7/2024  Interpreted By:  Ada Saavedra, STUDY: CT HEAD WO IV CONTRAST;  8/7/2024 1:25 pm   INDICATION: Signs/Symptoms:dizziness.   COMPARISON: None.   ACCESSION NUMBER(S): [id number]   ORDERING CLINICIAN: AMBROSIO MARIA   TECHNIQUE: Unenhanced CT images of the head were obtained.   FINDINGS: Diffuse atrophy with enlargement of the extra-axial spaces, cerebral sulci  and ventricular system. 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 Additional patchy periventricular white matter hypodensities bilaterally. No acute intracranial hemorrhage or mass-effect. No midline shift. No extra-axial fluid collection   No focal calvarial lesion. 14 mm partially calcified nodule in the left frontal scalp. 9 mm dense nodule in the left occipital scalp.   The visualized paranasal sinuses are clear.       No acute intracranial hemorrhage or mass-effect.   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   2 nonspecific scalp nodules.   MACRO: None.   Signed by: Ada Saavedra 8/7/2024 1:44 PM Dictation workstation:   [id number]    XR chest 1 view    Result Date: 8/7/2024  Interpreted By:  Lee Sahu, STUDY: XR CHEST 1 VIEW;  8/7/2024 12:31 pm   INDICATION: Signs/Symptoms:syncope.   COMPARISON: 04/18/2015   ACCESSION NUMBER(S): [id number]   ORDERING CLINICIAN: AMBROSIO MARIA   FINDINGS: CARDIOMEDIASTINAL SILHOUETTE AND VASCULATURE:   Cardiac size:  Within normal limits. Aortic shadow:  Within normal limits.   Mediastinal contours: Within normal limits.   Pulmonary vasculature:  The central vasculature is unremarkable   LUNGS: Lungs are clear.   ABDOMEN AND OTHER FINDINGS: No remarkable upper abdominal findings.   BONES: No acute osseous changes.       1.  No active cardiopulmonary disease.  There has not been significant interval change from the prior exam.   Signed by: Lee Sahu 8/7/2024 12:41 PM Dictation workstation:   [id number]    ECG 12 lead    Result Date: 8/7/2024  Normal sinus rhythm Left axis deviation Right bundle branch block Minimal voltage criteria for LVH, may be normal variant Abnormal ECG When compared with ECG of 19-APR-2015 04:13, Premature ventricular complexes are no longer Present Right bundle branch block is now Present       No results found for this or  any previous visit from the past 1095 days.                 Encounter Date: 24   ECG 12 lead   Result Value    Ventricular Rate 95    Atrial Rate 95    NJ Interval 152    QRS Duration 132    QT Interval 388    QTC Calculation(Bazett) 487    P Axis 33    R Axis -35    T Axis -42    QRS Count 15    Q Onset 191    P Onset 115    P Offset 181    T Offset 385    QTC Fredericia 452    Narrative    Sinus rhythm with Premature atrial complexes  Left axis deviation  Right bundle branch block  Minimal voltage criteria for LVH, may be normal variant  Abnormal ECG  When compared with ECG of 07-AUG-2024 12:18, (unconfirmed)  Premature atrial complexes are now Present        Tele Monitorin2024 sinus frequent PVCs no VT no A-fib    Assessment     Patient Active Problem List   Diagnosis    Radiculopathy of lumbar region    Joint stiffness of spine    Gait difficulty    Syncope and collapse    Syncope, unspecified syncope type          Impression/plan:  Severe pulmonary hypertension: Echocardiogram at [facility] 2024 did not describe RV dysfunction or LV dysfunction but there was no TR to calculate RVSP.  I suspect the most likely etiology is diastolic CHF secondary to hypertension and blood pressure is suboptimally controlled at this point and was at the [facility] earlier this month.  CT scan did not demonstrate large vessel PE small vessel PE could not be excluded.  She does have a component of emphysema on CT scan though not noted on chest x-ray.  She has been dyspneic for some time.  Elevated troponins troponins remain over 700 no significant ST changes and no chest pressure.  However LV function now shows mild hypokinesis not described on the echo of 2024.  In light of elevated enzymes and severe pulmonary hypertension and continued exertional dyspnea I believe she would benefit from right and left heart catheterization to determine left ventricular end-diastolic  pressure and right ventricular systolic pressure as well as assessment of coronary arteries.  She is a very active vibrant 89-year-old and I think reasonable to pursue coronary angiogram.  Discussed risks and benefits with she and her family at the bedside.  This including risk of myocardial infarction, stroke, arterial injury, renal failure, contrast reaction.  She understands these risks and agrees to proceed.  She has had contrast in the past without reaction.  Paroxysmal atrial fibrillation: Fortunately no recurrence here in the hospital.  She cannot take Eliquis or full anticoagulation at this point secondary to recent diverticular bleed.  This does not exclude aspirin Plavix at this point if she were to have severe coronary disease.  GI felt that Eliquis could be resumed about a week from now.  If significant elevation left ventricular end-diastolic pressure noted at time of cath would consider adding Jardiance and diuretic regularly to her medical regimen  Hypertension: Increase losartan to 50 daily with plan of adding Jardiance and thiazide diuretic after catheterization pending results.        Addendum: 8/9/2024 4:13 PM: Coronary angiography with 60% proximal LAD, 70% small ramus and moderate distal RCA disease.  No vessel in need of intervention.  I suspect elevated troponin secondary to hypotension which then could have caused ischemia in the setting of moderate disease.  Her dyspnea is related to pulmonary disease as well as diastolic dysfunction.  Current LVEDP upper normal.  RVSP however only 40 as opposed to the 70 suggested by echocardiography.  As an outpatient we will consider VQ scan and pulmonary function studies as CT did suggest emphysema.  Blood pressure needs tighter control she has been on losartan would increase to 50.  Resume regular diuretic.  Consider Jardiance but that can be assessed as an outpatient.  As long as no recurrent atrial fibrillation or dysrhythmia potentially could be  discharged tomorrow needs follow-up with [facility] cardiology within 2 weeks and I discussed that with the patient's daughters. If a.fib---amio.    Electronically signed by Wilmer Corona MD on 8/9/2024 at 11:36 AM

## 2024-08-09 NOTE — PROGRESS NOTES
"Physical Therapy    Physical Therapy Treatment    Patient Name: Meryl Hester  MRN: [id number]  Today's Date: 8/9/2024  Time Calculation  Start Time: 1335  Stop Time: 1352  Time Calculation (min): 17 min     ELYCVEPINVPool/ELY Card *    Assessment/Plan   End of Session Communication: Bedside nurse (Per RN states pt was in a chair for most of day and has been able to tolerate sitting in an upright position.)  Assessment Comment: Patient presents with good effort throughout todays session. Willing to participate in exercises with moderate cues to better understand task at hand. Simple commands have best carry over. Call light and all needs in reach.  End of Session Patient Position: Up in chair, Alarm on        General Visit Information:   PT  Visit  PT Received On: 08/09/24  General  Prior to Session Communication: Bedside nurse  Patient Position Received: Bed, 3 rail up, Alarm on  General Comment: Pt agreeable to therapy however asking for bed exercises only due to going to cardiac cath lab at 3pm and does not want to \"work her heart too hard\" before going.  Subjective     Precautions:  Precautions  Medical Precautions: Fall precautions ([place])  Precautions Comment: Per EMR: High fall risk     Objective     Pain:  Pain Assessment  Pain Assessment: 0-10  0-10 (Numeric) Pain Score: 0 - No pain         Treatments:  Therapeutic Exercise  Therapeutic Exercise Performed: Yes  Therapeutic Exercise Activity 1: supine, BLE ankle pumps, SAQ, heel slides, hip ABD/ADD 15x with VC/demo for proper performance. Required moderate explanation for performance of exercises due to some difficulty understanding task at home. Manual resistance provided for optimal LE strengthening to improve stability and endurance for functional mobility.                         Outcome Measures:  [facility] Basic Mobility  Turning from your back to your side while in a flat bed without using bedrails: A little  Moving from lying on your back to sitting on the " side of a flat bed without using bedrails: A little  Moving to and from bed to chair (including a wheelchair): A little  Standing up from a chair using your arms (e.g. wheelchair or bedside chair): A little  To walk in hospital room: A little  Climbing 3-5 steps with railing: A lot  Basic Mobility - Total Score: 17  Education Documentation  Mobility Training, taught by Cinthya Matthew PTA at 8/9/2024  2:35 PM.  Learner: Patient  Readiness: Acceptance  Method: Explanation, Demonstration  Response: Verbalizes Understanding, Needs Reinforcement    Education Comments  No comments found.        EDUCATION:  Outpatient Education  Education Comment: Education provided on importance of therapy and functional mobility.  Encounter Problems       Encounter Problems (Active)       PT Problem       Pt. will transfer supine/sit with MOD I (Not Progressing)       Start:  08/08/24    Expected End:  08/22/24            Pt. will transfer sit/stand with FWW with MOD I (Not Progressing)       Start:  08/08/24    Expected End:  08/22/24            Pt.will ambulate 50' with FWW with MOD I (Not Progressing)       Start:  08/08/24    Expected End:  08/22/24            Pt. will perform 2 x 15 B LE AROM exercises  (Progressing)       Start:  08/08/24    Expected End:  08/22/24

## 2024-08-10 VITALS
HEART RATE: 76 BPM | RESPIRATION RATE: 16 BRPM | BODY MASS INDEX: 28.98 KG/M2 | WEIGHT: 143.74 LBS | TEMPERATURE: 97.5 F | DIASTOLIC BLOOD PRESSURE: 74 MMHG | SYSTOLIC BLOOD PRESSURE: 173 MMHG | HEIGHT: 59 IN | OXYGEN SATURATION: 92 %

## 2024-08-10 LAB
ANION GAP SERPL CALC-SCNC: 9 MMOL/L (ref 10–20)
BACTERIA UR CULT: ABNORMAL
BUN SERPL-MCNC: 4 MG/DL (ref 6–23)
CALCIUM SERPL-MCNC: 9.1 MG/DL (ref 8.6–10.3)
CHLORIDE SERPL-SCNC: 105 MMOL/L (ref 98–107)
CO2 SERPL-SCNC: 31 MMOL/L (ref 21–32)
CREAT SERPL-MCNC: 0.6 MG/DL (ref 0.5–1.05)
EGFRCR SERPLBLD CKD-EPI 2021: 84 ML/MIN/1.73M*2
ERYTHROCYTE [DISTWIDTH] IN BLOOD BY AUTOMATED COUNT: 15.1 % (ref 11.5–14.5)
GLUCOSE SERPL-MCNC: 97 MG/DL (ref 74–99)
HCT VFR BLD AUTO: 25.9 % (ref 36–46)
HGB BLD-MCNC: 8.2 G/DL (ref 12–16)
HOLD SPECIMEN: NORMAL
MAGNESIUM SERPL-MCNC: 1.78 MG/DL (ref 1.6–2.4)
MCH RBC QN AUTO: 29.7 PG (ref 26–34)
MCHC RBC AUTO-ENTMCNC: 31.7 G/DL (ref 32–36)
MCV RBC AUTO: 94 FL (ref 80–100)
NRBC BLD-RTO: 0 /100 WBCS (ref 0–0)
PLATELET # BLD AUTO: 331 X10*3/UL (ref 150–450)
POTASSIUM SERPL-SCNC: 4.1 MMOL/L (ref 3.5–5.3)
RBC # BLD AUTO: 2.76 X10*6/UL (ref 4–5.2)
SODIUM SERPL-SCNC: 141 MMOL/L (ref 136–145)
WBC # BLD AUTO: 8.1 X10*3/UL (ref 4.4–11.3)

## 2024-08-10 PROCEDURE — 2500000002 HC RX 250 W HCPCS SELF ADMINISTERED DRUGS (ALT 637 FOR MEDICARE OP, ALT 636 FOR OP/ED): Performed by: HOSPITALIST

## 2024-08-10 PROCEDURE — 80048 BASIC METABOLIC PNL TOTAL CA: CPT | Performed by: HOSPITALIST

## 2024-08-10 PROCEDURE — 36415 COLL VENOUS BLD VENIPUNCTURE: CPT | Performed by: HOSPITALIST

## 2024-08-10 PROCEDURE — 2500000001 HC RX 250 WO HCPCS SELF ADMINISTERED DRUGS (ALT 637 FOR MEDICARE OP): Performed by: HOSPITALIST

## 2024-08-10 PROCEDURE — 2500000004 HC RX 250 GENERAL PHARMACY W/ HCPCS (ALT 636 FOR OP/ED): Performed by: HOSPITALIST

## 2024-08-10 PROCEDURE — 2500000001 HC RX 250 WO HCPCS SELF ADMINISTERED DRUGS (ALT 637 FOR MEDICARE OP): Performed by: INTERNAL MEDICINE

## 2024-08-10 PROCEDURE — 83735 ASSAY OF MAGNESIUM: CPT | Performed by: HOSPITALIST

## 2024-08-10 PROCEDURE — 99239 HOSP IP/OBS DSCHRG MGMT >30: CPT | Performed by: HOSPITALIST

## 2024-08-10 PROCEDURE — 99232 SBSQ HOSP IP/OBS MODERATE 35: CPT | Performed by: NURSE PRACTITIONER

## 2024-08-10 PROCEDURE — 85027 COMPLETE CBC AUTOMATED: CPT | Performed by: HOSPITALIST

## 2024-08-10 RX ORDER — LOSARTAN POTASSIUM 50 MG/1
50 TABLET ORAL DAILY
Qty: 30 TABLET | Refills: 3 | Status: SHIPPED | OUTPATIENT
Start: 2024-08-11 | End: 2024-12-09

## 2024-08-10 ASSESSMENT — COGNITIVE AND FUNCTIONAL STATUS - GENERAL
PERSONAL GROOMING: A LITTLE
DAILY ACTIVITIY SCORE: 19
DRESSING REGULAR UPPER BODY CLOTHING: A LITTLE
WALKING IN HOSPITAL ROOM: A LITTLE
HELP NEEDED FOR BATHING: A LITTLE
MOVING TO AND FROM BED TO CHAIR: A LITTLE
CLIMB 3 TO 5 STEPS WITH RAILING: A LOT
MOBILITY SCORE: 17
DRESSING REGULAR LOWER BODY CLOTHING: A LITTLE
MOVING FROM LYING ON BACK TO SITTING ON SIDE OF FLAT BED WITH BEDRAILS: A LITTLE
STANDING UP FROM CHAIR USING ARMS: A LITTLE
TURNING FROM BACK TO SIDE WHILE IN FLAT BAD: A LITTLE
TOILETING: A LITTLE

## 2024-08-10 ASSESSMENT — PAIN SCALES - GENERAL: PAINLEVEL_OUTOF10: 0 - NO PAIN

## 2024-08-10 ASSESSMENT — PAIN - FUNCTIONAL ASSESSMENT: PAIN_FUNCTIONAL_ASSESSMENT: 0-10

## 2024-08-10 NOTE — DISCHARGE SUMMARY
Discharge Diagnosis  Syncope and collapse    Discharge Meds     Your medication list        CHANGE how you take these medications        Instructions Last Dose Given Next Dose Due   losartan 50 mg tablet  Commonly known as: Cozaar  Start taking on: August 11, 2024  What changed:   medication strength  how much to take  when to take this      Take 1 tablet (50 mg) by mouth once daily.              CONTINUE taking these medications        Instructions Last Dose Given Next Dose Due   acetaminophen 325 mg tablet  Commonly known as: Tylenol           bisoprolol 5 mg tablet  Commonly known as: Zebeta           calcium carbonate-vitamin D3 500 mg-5 mcg (200 unit) tablet           carboxymethylcellulose 0.25 % ophthalmic solution  Commonly known as: THERATears           Centrum Silver 0.4 mg-300 mcg- 250 mcg  Generic drug: multivitamin with minerals iron-free           docusate sodium 100 mg capsule  Commonly known as: Colace           DULoxetine 30 mg DR capsule  Commonly known as: Cymbalta           fluticasone 50 mcg/actuation nasal spray  Commonly known as: Flonase           furosemide 20 mg tablet  Commonly known as: Lasix           gabapentin 300 mg capsule  Commonly known as: Neurontin           krill oil 500 mg capsule           LACTOBACILLUS ACIDOPHILUS ORAL           levothyroxine 88 mcg tablet  Commonly known as: Synthroid, Levoxyl           magnesium oxide 500 mg magnesium tablet           meclizine 25 mg tablet  Commonly known as: Antivert           omeprazole 40 mg DR capsule  Commonly known as: PriLOSEC           potassium chloride CR 20 mEq ER tablet  Commonly known as: Klor-Con M20           PreserVision AREDS 2 Plus  mcg-15 mcg- 5 mg-1 mg capsule  Generic drug: mv-min-FA-vit K-lutein-zeaxant           rosuvastatin 20 mg tablet  Commonly known as: Crestor                     Where to Get Your Medications        These medications were sent to [facility] [place], [place] - [address]  [address]  [address] [id number]      Phone: [phone]   losartan 50 mg tablet         Test Results Pending At Discharge  Pending Labs       Order Current Status    BB ORDER ONLY - Antibody Identification In process    Blood Culture Preliminary result    Blood Culture Preliminary result    Urine Culture Preliminary result            Hospital Course  #Syncope/ near syncope likely 2/2 volume depletion   #Hypotension   #Elevated troponin likely secondary to demand ischemia from hypotension   #PAF  #Pulmonary hypertension   #Diastolic CHF 2/2 hypertension   Cardiology consult recommendations appreciated.   Continue beta-blocker and losartan per cardiology.   Echo showed LVEF 45-50%  Left and right heart cath showed mild disease   Increased losartan to 50mg daily  Cardiology recommended holding off on aspirin and plavix until follow up with Dr. Corona, cardiology to arrange  -discussed   -also holding eliquis until follow up as well.     #Cholecystitis : seen incidentally on U/S and HIDA  Patient currently asymptomatic.   General surgery consult recommendations appreciated.  No surgical intervention at this time.   Patient should follow-up with primary care provider.      #Acute on chronic blood loss Anemia   H&H stable   Recent admission for GI bleed at [facility], no source found  Continue to hold  Patient currently stable with no signs of bleeding.     #Lung nodules   This is a known condition to the patient.   Consider outpatient follow-up with pulmonology.     Code Status: DNRCC-A, discussed with patient and family     Discharged home with home care (RN) and healthy at home in stable condition. Greater than 30 minutes of clinical time spent caring for this patient.     Pertinent Physical Exam At Time of Discharge  Gen: NAD  HEENT: EOM, MMM  CV: RRR, no murmurs rubs or gallops  Resp: coarse rhonchi b/l   Abdomen: soft, NT,+BS  LE: No edema    Nima Villegas MD

## 2024-08-10 NOTE — PROGRESS NOTES
Cardiology Progress Note  Patient: Meryl Hester  Unit/Bed: 906/906-B  YOB: 1931  MRN: [id number]  Acct: [id number]   Admitting Diagnosis: Syncope and collapse [R55]  Lung nodules [R91.8]  Abnormal CT of the chest [R93.89]  NSTEMI (non-ST elevated myocardial infarction) (Multi) [I21.4]  Urinary tract infection without hematuria, site unspecified [N39.0]  Syncope, unspecified syncope type [R55]  Anemia, unspecified type [D64.9]  Date:  8/7/2024  Hospital Day: 2  Attending: Nima Villegas MD   Rounding Cardiologist:  RICHY Gibbons-CNP,   Primary Cardiologist:  [facility] Tej Villalobos MD       See addendum at the end of this note    Complaint:  Chief Complaint   Patient presents with    Syncope   With marked hypotension on presentation    SUBJECTIVE    8/9/2024: She has been up in a chair no further dizziness or lightheadedness.  No palpitations.  She is not short of breath at rest but has chronic dyspnea just dressing herself.  She has had no chest fullness or pressure.  No lower extremity edema.  She has no abdominal pain she has had no hematochezia.    8/8/2024 initial consultation.  Patient had been discharged from [facility] after acute GI bleeding felt diverticular in etiology.  Had colonoscopy and discharged the following day.  Was sitting playing cards in the morning and suddenly became very lightheaded lay down on the table did not lose consciousness.  Arrival of paramedics showed her blood pressure to be 74 systolic she was given fluid with a marked improvement of her symptoms.  She had been without chest pain, shortness of breath or palpitation.  Earlier this month admission of the [facility] with hypertension and acute GI bleeding emergent angiography was pursued because of significant extravasation noted in the sigmoid colon but no acute bleeding demonstrated at time of angiography.  She had had multiple bloody bowel movements at that time.  Also with episodes  of atrial fibrillation for which intermittent IV amiodarone was given at [facility] but she was not discharged on antiarrhythmic therapy.  Primary event felt probably related to relative volume depletion after not eating or drinking well after colonoscopy the prior day.  Was also noted to have hypokalemia potentially precipitating dysrhythmia and she was continued on monitoring.  Troponins elevated at 800 but plateau.  No acute EKG changes with chronic right bundle branch block.    ER CT scan no evidence of pulmonary emboli with several nodules/masses right lung and significant emphysema.  Also increased interstitial markings.    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    Echocardiogram 8/8/2024 here  1. Left ventricular ejection fraction is mildly decreased, by visual estimate at 45-50%.   2. Spectral Doppler shows a pseudonormal pattern of left ventricular diastolic filling.   3. There is mildly reduced right ventricular systolic function.   4. Mildly enlarged right ventricle.   5. The left atrium is severely dilated.   6. 2+ mitral regurgitation.      MR ERO is 0.21cm2.      MR Volume 40 ml.   7. Mild to moderate mitral valve regurgitation.   8. 2+ tricuspid regurgitation with estimated RVSP of 71mmHg consistent with severely elevated right-sided pressures.   9. Mild to moderate tricuspid regurgitation.  10. Peak velocity across the aortic valve is 2.29 m/s with a peak gradient 21 mmHg and mean gradient 10 mmHg. Dimensionless index 0.40. Aortic valve area 1.26 cm2. Values consistent with mild aortic stenosis. 1-2+ aortic regurgitation.  11. Mild aortic valve regurgitation.      VITALS      Vitals:     08/09/24 2047 08/10/24 0011 08/10/24 0500 08/10/24 0730   BP:  172/79 154/66 173/74   BP Location:   Right arm    Patient Position:   Lying    Pulse: 82 73 66 76   Resp:   16    Temp:  35.9 °C (96.6 °F) 36.5 °C (97.7 °F) 36.4 °C (97.5 °F)   TempSrc:   Temporal    SpO2:  93% 95% 92%   Weight:       Height:           Intake/Output Summary (Last 24 hours) at 8/10/2024 0927  Last data filed at 8/10/2024 0630  Gross per 24 hour   Intake 875.84 ml   Output 1405 ml   Net -529.16 ml      Wt Readings from Last 4 Encounters:   08/07/24 65.2 kg (143 lb 11.8 oz)        Allergies:  Allergies   Allergen Reactions    Sulfa (Sulfonamide Antibiotics) Unknown        PHYSICAL EXAM   Physical Exam  Vitals and nursing note reviewed.   Constitutional:       Appearance: Normal appearance. She is well-developed.   HENT:      Head: Normocephalic.      Mouth/Throat:      Mouth: Mucous membranes are moist.   Eyes:      Pupils: Pupils are equal, round, and reactive to light.   Cardiovascular:      Rate and Rhythm: Normal rate and regular rhythm.   Pulmonary:      Effort: Pulmonary effort is normal.      Breath sounds: Decreased air movement present.   Abdominal:      Palpations: Abdomen is soft.   Skin:     General: Skin is warm and dry.      Capillary Refill: Capillary refill takes less than 2 seconds.   Neurological:      Mental Status: She is alert. Mental status is at baseline.   Psychiatric:         Mood and Affect: Mood normal.         Behavior: Behavior is cooperative.           LABS   CBC:   Results from last 7 days   Lab Units 08/10/24  0643 08/09/24  0548 08/08/24  0922   WBC AUTO x10*3/uL 8.1 6.7 6.8   RBC AUTO x10*6/uL 2.76* 2.66* 2.57*   HEMOGLOBIN g/dL 8.2* 8.0* 7.8*   HEMATOCRIT % 25.9* 25.1* 24.4*   MCV fL 94 94 95   MCH pg 29.7 30.1 30.4   MCHC g/dL 31.7* 31.9* 32.0   RDW % 15.1* 14.9* 15.0*   PLATELETS AUTO x10*3/uL 331 270 243     CMP:    Results from last 7 days   Lab Units 08/10/24  0643 08/09/24  0548 08/08/24  0922  08/07/24  1223   SODIUM mmol/L 141 141 140 137   POTASSIUM mmol/L 4.1 3.9 3.7 3.3*   CHLORIDE mmol/L 105 106 108* 106   CO2 mmol/L 31 30 27 23   BUN mg/dL 4* 4* 4* 7   CREATININE mg/dL 0.60 0.53 0.64 0.83   GLUCOSE mg/dL 97 94 98 167*   PROTEIN TOTAL g/dL  --   --  5.4* 5.5*   CALCIUM mg/dL 9.1 8.8 8.4* 8.2*   BILIRUBIN TOTAL mg/dL  --   --  0.4 0.6   ALK PHOS U/L  --   --  79 80   AST U/L  --   --  14 15   ALT U/L  --   --  9 11     BMP:    Results from last 7 days   Lab Units 08/10/24  0643 08/09/24  0548 08/08/24  0922   SODIUM mmol/L 141 141 140   POTASSIUM mmol/L 4.1 3.9 3.7   CHLORIDE mmol/L 105 106 108*   CO2 mmol/L 31 30 27   BUN mg/dL 4* 4* 4*   CREATININE mg/dL 0.60 0.53 0.64   CALCIUM mg/dL 9.1 8.8 8.4*   GLUCOSE mg/dL 97 94 98     Magnesium:  Results from last 7 days   Lab Units 08/10/24  0643 08/09/24  0548 08/08/24  0922   MAGNESIUM mg/dL 1.78 1.85 1.87     Troponin:    Results from last 7 days   Lab Units 08/08/24  0922 08/07/24  1324 08/07/24  1223   TROPHS ng/L 838* 750* 845*     BNP:   Results from last 7 days   Lab Units 08/07/24  1223   BNP pg/mL 238*     Lipid Panel:         bisoprolol, 7.5 mg, oral, Daily  DULoxetine, 30 mg, oral, Daily  gabapentin, 300 mg, oral, Nightly  levothyroxine, 88 mcg, oral, Daily  losartan, 50 mg, oral, Daily  magnesium oxide, 400 mg, oral, Daily  pantoprazole, 40 mg, oral, Daily before breakfast  rosuvastatin, 20 mg, oral, Nightly           Current Outpatient Medications   Medication Instructions    acetaminophen (TYLENOL) 650 mg, oral, Every 6 hours PRN    bisoprolol (ZEBETA) 7.5 mg, oral, Daily RT    calcium carbonate-vitamin D3 500 mg-5 mcg (200 unit) tablet 1 tablet, oral, Daily    carboxymethylcellulose (THERATears) 0.25 % ophthalmic solution 1 drop, Both Eyes, 3 times daily PRN    docusate sodium (COLACE) 100 mg, oral, 2 times daily    DULoxetine (CYMBALTA) 30 mg, oral, Daily RT    fluticasone (Flonase) 50 mcg/actuation nasal spray 1 spray, Each Nostril, Daily  PRN    furosemide (LASIX) 20 mg, oral, Daily RT    gabapentin (NEURONTIN) 300 mg, oral, Nightly    krill oil 500 mg, oral, 2 times daily    LACTOBACILLUS ACIDOPHILUS ORAL 1 tablet, oral, Daily    levothyroxine (SYNTHROID, LEVOXYL) 88 mcg, oral, Daily RT    losartan (COZAAR) 25 mg, oral, Daily RT    magnesium oxide 500 mg, oral, Daily    meclizine (ANTIVERT) 12.5 mg, oral, Every 12 hours PRN    multivitamin with minerals iron-free (Centrum Silver) 1 tablet, oral, Daily    mv-min-FA-vit K-lutein-zeaxant (PreserVision AREDS 2 Plus MV) 200 mcg-15 mcg- 5 mg-1 mg capsule 1 capsule, oral, 2 times daily    omeprazole (PRILOSEC) 40 mg, oral, Daily RT    potassium chloride CR 20 mEq ER tablet 20 mEq, oral, Daily RT    rosuvastatin (CRESTOR) 20 mg, oral, Nightly        Transthoracic Echo (TTE) Complete    Result Date: 8/8/2024          [place] [address]  Tel [phone] Fax [phone] TRANSTHORACIC ECHOCARDIOGRAM REPORT Patient Name:      ANNA Moran Physician:    [id number] Marbin Veronica MD, [facility] Study Date:        8/8/2024             Ordering Provider:    [id number] FINNEY MRN/PID:           [id number]             Fellow: Accession#:        [id number]         Nurse: Date of Birth/Age: 4/16/1931 / 89 years Sonographer:          Jodi Peterson RDCS Gender:            F                    Additional Staff: Height:            149.86 cm            Admit Date:           8/7/2024 Weight:            64.86 kg             Admission Status:     Inpatient -                                                               Routine BSA / BMI:         1.60 m2 / 28.88      Department Location:  [place] [address]                                      Echo Lab Blood Pressure: 144 /67 mmHg Study Type:    TRANSTHORACIC ECHO (TTE) COMPLETE  Diagnosis/ICD: Unspecified atrial fibrillation-I48.91; Elevated Troponin-R79.89;                Hypotension, unspecified-I95.9 Patient History: Pertinent History: HTN, Hyperlipidemia, A-Fib, CHF, CVA and Elevated Troponin,                    Hypotension, Hx Syncope. Study Detail: The following Echo studies were performed: 2D, M-Mode, Doppler and               color flow.  PHYSICIAN INTERPRETATION: Left Ventricle: Left ventricular ejection fraction is mildly decreased, by visual estimate at 45-50%. There are no regional wall motion abnormalities. The left ventricular cavity size is normal. The left ventricular septal wall thickness is normal. There is mildly increased left ventricular posterior wall thickness. Spectral Doppler shows a pseudonormal pattern of left ventricular diastolic filling. Left Atrium: The left atrium is severely dilated. Left atrial volume index 63.1 mL/m2. Right Ventricle: The right ventricle is mildly enlarged. There is mildly reduced right ventricular systolic function. Right Atrium: The right atrium is normal in size. Aortic Valve: The aortic valve is trileaflet. There is mild to moderate aortic valve cusp calcification. The aortic valve dimensionless index is 0.40. There is mild aortic valve regurgitation. The peak instantaneous gradient of the aortic valve is 21.0 mmHg. The mean gradient of the aortic valve is 10.0 mmHg. Peak velocity across the aortic valve is 2.29 m/s with a peak gradient 21 mmHg and mean gradient 10 mmHg. Dimensionless index 0.40. Aortic valve area 1.26 cm2. Values consistent with mild aortic stenosis. 1-2+ aortic regurgitation. Mitral Valve: The mitral valve is mildly thickened. There is mild to moderate mitral valve regurgitation. The mitral regurgitant orifice area is 21 mm2. The mitral regurgitant volume is 39.97 ml. 2+ mitral regurgitation. MR ERO is 0.21cm2. MR Volume 40 ml. Tricuspid Valve: The tricuspid valve is structurally normal. There is mild to moderate  tricuspid regurgitation. 2+ tricuspid regurgitation with estimated RVSP of 71mmHg consistent with severely elevated right-sided pressures. Pulmonic Valve: The pulmonic valve is structurally normal. There is mild pulmonic valve regurgitation. Pericardium: There is no pericardial effusion noted. Aorta: The aortic root is normal. Systemic Veins: The inferior vena cava appears to be of normal size. There is IVC inspiratory collapse greater than 50%.  CONCLUSIONS:  1. Left ventricular ejection fraction is mildly decreased, by visual estimate at 45-50%.  2. Spectral Doppler shows a pseudonormal pattern of left ventricular diastolic filling.  3. There is mildly reduced right ventricular systolic function.  4. Mildly enlarged right ventricle.  5. The left atrium is severely dilated.  6. 2+ mitral regurgitation.     MR ERO is 0.21cm2.     MR Volume 40 ml.  7. Mild to moderate mitral valve regurgitation.  8. 2+ tricuspid regurgitation with estimated RVSP of 71mmHg consistent with severely elevated right-sided pressures.  9. Mild to moderate tricuspid regurgitation. 10. Peak velocity across the aortic valve is 2.29 m/s with a peak gradient 21 mmHg and mean gradient 10 mmHg. Dimensionless index 0.40. Aortic valve area 1.26 cm2. Values consistent with mild aortic stenosis. 1-2+ aortic regurgitation. 11. Mild aortic valve regurgitation. 12. No previous available for comparison. QUANTITATIVE DATA SUMMARY: 2D MEASUREMENTS:                           Normal Ranges: Ao Root d:     3.50 cm    (2.0-3.7cm) LAs:           4.80 cm    (2.7-4.0cm) IVSd:          1.00 cm    (0.6-1.1cm) LVPWd:         1.20 cm    (0.6-1.1cm) LVIDd:         4.50 cm    (3.9-5.9cm) LVIDs:         3.80 cm LV Mass Index: 109.5 g/m2 LV % FS        15.6 % LA VOLUME:                               Normal Ranges: LA Vol A4C:        101.4 ml   (22+/-6mL/m2) LA Vol A2C:        90.6 ml LA Vol BP:         101.8 ml LA Vol Index A4C:  63.4ml/m2 LA Vol Index A2C:  56.6 ml/m2 LA  Vol Index BP:   63.7 ml/m2 LA Area A4C:       28.9 cm2 LA Area A2C:       25.7 cm2 LA Major Axis A4C: 7.0 cm LA Major Axis A2C: 6.2 cm LA Volume Index:   61.7 ml/m2 LA Vol A4C:        101.0 ml LA Vol A2C:        87.0 ml LA Vol Index BSA:  58.8 ml/m2 RA VOLUME BY A/L METHOD:                               Normal Ranges: RA Vol A4C:        53.5 ml    (8.3-19.5ml) RA Vol Index A4C:  33.4 ml/m2 RA Area A4C:       18.6 cm2 RA Major Axis A4C: 5.5 cm LV SYSTOLIC FUNCTION BY 2D PLANIMETRY (MOD):                      Normal Ranges: EF-A4C View:    56 % (>=55%) EF-A2C View:    44 % EF-Biplane:     50 % EF-Visual:      48 % LV EF Reported: 48 % LV DIASTOLIC FUNCTION:                         Normal Ranges: MV Peak E:    1.44 m/s  (0.7-1.2 m/s) MV Peak A:    0.91 m/s  (0.42-0.7 m/s) E/A Ratio:    1.59      (1.0-2.2) MV e'         0.084 m/s (>8.0) MV lateral e' 0.11 m/s MV medial e'  0.06 m/s E/e' Ratio:   17.07     (<8.0) MITRAL VALVE:                      Normal Ranges: MV Vmax:    1.48 m/s (<=1.3m/s) MV peak P.8 mmHg (<5mmHg) MV mean P.0 mmHg (<48mmHg) MV DT:      140 msec (150-240msec) MITRAL INSUFFICIENCY:                           Normal Ranges: PISA Radius:  0.7 cm MR VTI:       190.00 cm MR Vmax:      584.00 cm/s MR Alias Delbert: 39.9 cm/s MR Volume:    39.97 ml MR Flow Rt:   122.84 ml/s MR EROA:      21 mm2 AORTIC VALVE:                                    Normal Ranges: AoV Vmax:                2.29 m/s  (<=1.7m/s) AoV Peak P.0 mmHg (<20mmHg) AoV Mean PG:             10.0 mmHg (1.7-11.5mmHg) LVOT Max Delbert:            0.86 m/s  (<=1.1m/s) AoV VTI:                 44.90 cm  (18-25cm) LVOT VTI:                18.00 cm LVOT Diameter:           2.00 cm   (1.8-2.4cm) AoV Area, VTI:           1.26 cm2  (2.5-5.5cm2) AoV Area,Vmax:           1.18 cm2  (2.5-4.5cm2) AoV Dimensionless Index: 0.40 AORTIC INSUFFICIENCY: AI Vmax:       4.61 m/s AI Half-time:  309 msec AI Decel Rate: 437.00 cm/s2  RIGHT VENTRICLE: RV  Basal 4.20 cm RV Mid   3.60 cm RV Major 6.4 cm TAPSE:   17.2 mm RV s'    0.13 m/s TRICUSPID VALVE/RVSP:                             Normal Ranges: Peak TR Velocity: 4.51 m/s RV Syst Pressure: 84.4 mmHg (< 30mmHg) IVC Diam:         1.70 cm PULMONIC VALVE:                         Normal Ranges: PV Accel Time: 127 msec (>120ms) PV Max Delbert:    0.9 m/s  (0.6-0.9m/s) PV Max PG:     3.4 mmHg PV Mean P.0 mmHg PV VTI:        18.70 cm  [id number] Marbin Veronica MD, [facility] Electronically signed on 2024 at 3:55:53 PM  ** Final **     NM hepatobiliary    Result Date: 2024  Interpreted By:  Tate Raymond,  Jimmy Rubalcava STUDY: NM HEPATOBILIARY;  2024 1:50 pm   INDICATION: Signs/Symptoms:Abnormal gallbladder on ultrasound.   COMPARISON: Right upper quadrant ultrasound dated 2024.   ACCESSION NUMBER(S): [id number]   ORDERING CLINICIAN: PALMIRA LEONARD   TECHNIQUE: DIVISION OF NUCLEAR MEDICINE HEPATOBILIARY SCAN (HIDA)   The patient received an intravenous dose of  5.3 mCi of Tc-99m mebrofenin (Choletec).  Sequential images of the upper abdomen were then acquired over the next 120 minutes.   FINDINGS: There is prompt accumulation of activity within the liver and normal subsequent excretion via the biliary ductal system into the small bowel. The gallbladder is not visualized through 120 minutes of imaging.       Nonvisualization of the gallbladder through 120 minutes of imaging, which in the appropriate clinical setting is suggestive of cystic duct obstruction / acute cholecystitis. Please note that extended fasting can have a similar appearance.   I personally reviewed the images/study and I agree with the findings as stated by Khadar Medina MD (resident). This study was interpreted at [place], [place].   MACRO: None   Signed by: Tate Raymond 2024 2:18 PM Dictation workstation:   [id number]     right upper quadrant    Result Date:  8/7/2024  Interpreted By:  Lizbeth Hay, STUDY: US RIGHT UPPER QUADRANT;  8/7/2024 2:40 pm   INDICATION: Signs/Symptoms:abnormal ct, diarrhea.   COMPARISON: None.   ACCESSION NUMBER(S): [id number]   ORDERING CLINICIAN: AMBROSIO MARIA   TECHNIQUE: Multiple images of the right upper quadrant were obtained.   FINDINGS: LIVER: The liver measures 14.2 cm in longest axis. No definite focal nodules.     GALLBLADDER: The gallbladder is distended, and demonstrates multiple gallstones. No gallbladder wall thickening or surrounding fluid. The gallbladder wall thickness is 0.4 cm. Sonographic Hendrickson's sign is negative.     BILE DUCTS: No evidence of intra or extrahepatic biliary dilatation is identified; the common bile duct measures 1.0 cm.   PANCREAS: Pancreatic body and tail obscured by bowel gas and suboptimally evaluated.   RIGHT KIDNEY: The right kidney measures 10.4 cm in length. The renal cortical echogenicity and thickness are within normal limit.  No hydronephrosis or renal calculi are seen.       Distended gallbladder with gallstones and mild gallbladder wall thickening. No ultrasonic Hendrickson sign. Findings are suspicious for acute or chronic cholecystitis. Nonspecific prominence of the common bile duct.   MACRO: None   Signed by: Lizbeth Hay 8/7/2024 2:54 PM Dictation workstation:   [id number]    ECG 12 lead    Result Date: 8/7/2024  Sinus rhythm with Premature atrial complexes Left axis deviation Right bundle branch block Minimal voltage criteria for LVH, may be normal variant Abnormal ECG When compared with ECG of 07-AUG-2024 12:18, (unconfirmed) Premature atrial complexes are now Present    CT angio chest for pulmonary embolism    Result Date: 8/7/2024  Interpreted By:  Ada Saavedra, STUDY: CT ANGIO CHEST FOR PULMONARY EMBOLISM;  8/7/2024 1:27 pm   INDICATION: Signs/Symptoms:elevated d dimer, elevated trop, syncope.   COMPARISON: 03/04/2011   ACCESSION NUMBER(S): [id number]   ORDERING CLINICIAN: AMBROSIO  MARIA   TECHNIQUE: CT of the chest was performed. Sagittal and coronal reconstructions were generated. 75 ML Omnipaque 350 intravenous contrast given for the examination.  Multiplanar reconstructions of the pulmonary vessels were created on an independent workstation and provided for review.   FINDINGS: Artifact related to overlying upper extremities and motion limits evaluation.   CHEST WALL AND LOWER NECK: No significant axillary lymphadenopathy. 2 cm fat attenuation probable lipoma in the musculature anterior to the left shoulder.   MEDIASTINUM AND LUAN:  1.4 cm precarinal node with fatty hilum. 1.3 cm subcarinal node. Mild gaseous distention of the proximal esophagus.   HEART AND VESSELS:  Limited assessment for PE due to timing of IV contrast bolus and motion artifact. No central PE identified however limited assessment for basilar/peripheral PE. The heart is enlarged. Small pericardial effusion. No thoracic aortic aneurysm. Multifocal atherosclerotic calcifications including the coronary arteries.   LUNGS, PLEURA, LARGE AIRWAYS:  Respiratory motion artifact limits parenchymal evaluation. Mild bullous/emphysematous changes. Subtle interstitial opacities in both lungs. 1.3 cm right mid chest nodule image 130/299. Irregular 3.0 x 1.8 cm opacity with air bronchograms in the right lower lobe. Probable 1.7 cm nodular opacity in the right infrahilar region image 186/299. No significant pleural effusion. The central airways are patent.   UPPER ABDOMEN:  Distended gallbladder with questionable wall thickening and pericholecystic fluid or intramural edema.   BONES:  Kyphoscoliosis and degenerative changes of the thoracic spine.       No central PE however limited assessment for peripheral PE due to technical factors and motion artifact.   Several nodules/masses in the right lung in the setting of emphysema with mild mediastinal lymphadenopathy. Findings could reflect inflammatory or neoplastic/metastatic disease.  Further evaluation recommended. Subtle background pulmonary heterogeneity could reflect acute or chronic interstitial disease, the former including interstitial pneumonia and edema.   Distended gallbladder with questionable wall thickening and pericholecystic fluid. Findings are concerning for acute cholecystitis. Clinical correlation recommended. Further evaluation with ultrasound may be helpful.   MACRO: None.   Signed by: Ada Saavedra 8/7/2024 1:56 PM Dictation workstation:   [id number]    CT head wo IV contrast    Result Date: 8/7/2024  Interpreted By:  Ada Saavedra, STUDY: CT HEAD WO IV CONTRAST;  8/7/2024 1:25 pm   INDICATION: Signs/Symptoms:dizziness.   COMPARISON: None.   ACCESSION NUMBER(S): [id number]   ORDERING CLINICIAN: AMBROSIO MARIA   TECHNIQUE: Unenhanced CT images of the head were obtained.   FINDINGS: Diffuse atrophy with enlargement of the extra-axial spaces, cerebral sulci and ventricular system. 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 Additional patchy periventricular white matter hypodensities bilaterally. No acute intracranial hemorrhage or mass-effect. No midline shift. No extra-axial fluid collection   No focal calvarial lesion. 14 mm partially calcified nodule in the left frontal scalp. 9 mm dense nodule in the left occipital scalp.   The visualized paranasal sinuses are clear.       No acute intracranial hemorrhage or mass-effect.   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   2 nonspecific scalp nodules.   MACRO: None.   Signed by: Ada Saavedra 8/7/2024 1:44 PM Dictation workstation:   [id number]    XR chest 1 view    Result Date: 8/7/2024  Interpreted By:  Lee Sahu, STUDY: XR CHEST 1 VIEW;  8/7/2024 12:31 pm   INDICATION: Signs/Symptoms:syncope.   COMPARISON: 04/18/2015   ACCESSION NUMBER(S): [id number]   ORDERING CLINICIAN: AMBROSIO MARIA   FINDINGS:  CARDIOMEDIASTINAL SILHOUETTE AND VASCULATURE:   Cardiac size:  Within normal limits. Aortic shadow:  Within normal limits.   Mediastinal contours: Within normal limits.   Pulmonary vasculature:  The central vasculature is unremarkable   LUNGS: Lungs are clear.   ABDOMEN AND OTHER FINDINGS: No remarkable upper abdominal findings.   BONES: No acute osseous changes.       1.  No active cardiopulmonary disease.  There has not been significant interval change from the prior exam.   Signed by: Lee Sahu 2024 12:41 PM Dictation workstation:   [id number]    ECG 12 lead    Result Date: 2024  Normal sinus rhythm Left axis deviation Right bundle branch block Minimal voltage criteria for LVH, may be normal variant Abnormal ECG When compared with ECG of 2015 04:13, Premature ventricular complexes are no longer Present Right bundle branch block is now Present       No results found for this or any previous visit from the past 1095 days.                 Encounter Date: 24   ECG 12 lead   Result Value    Ventricular Rate 95    Atrial Rate 95    ME Interval 152    QRS Duration 132    QT Interval 388    QTC Calculation(Bazett) 487    P Axis 33    R Axis -35    T Axis -42    QRS Count 15    Q Onset 191    P Onset 115    P Offset 181    T Offset 385    QTC Fredericia 452    Narrative    Sinus rhythm with Premature atrial complexes  Left axis deviation  Right bundle branch block  Minimal voltage criteria for LVH, may be normal variant  Abnormal ECG  When compared with ECG of 07-AUG-2024 12:18, (unconfirmed)  Premature atrial complexes are now Present  See ED provider note for full interpretation and clinical correlation  Confirmed by Rosana Diaz ([id number]) on 2024 11:45:12 AM        Tele Monitorin2024 sinus frequent PVCs no VT no A-fib    Assessment     Patient Active Problem List   Diagnosis    Radiculopathy of lumbar region    Joint stiffness of spine    Gait difficulty    Syncope and collapse     Syncope, unspecified syncope type    NSTEMI (non-ST elevated myocardial infarction) (Multi)    Pulmonary hypertension (Multi)          Impression/plan:  Severe pulmonary hypertension: Echocardiogram at [facility] February 2024 did not describe RV dysfunction or LV dysfunction but there was no TR to calculate RVSP.  I suspect the most likely etiology is diastolic CHF secondary to hypertension and blood pressure is suboptimally controlled at this point and was at the [facility] at [facility] earlier this month.  CT scan did not demonstrate large vessel PE small vessel PE could not be excluded.  She does have a component of emphysema on CT scan though not noted on chest x-ray.  She has been dyspneic for some time.  Elevated troponins troponins remain over 700 no significant ST changes and no chest pressure.  However LV function now shows mild hypokinesis not described on the echo of February 2024.  In light of elevated enzymes and severe pulmonary hypertension and continued exertional dyspnea I believe she would benefit from right and left heart catheterization to determine left ventricular end-diastolic pressure and right ventricular systolic pressure as well as assessment of coronary arteries.  She is a very active vibrant 89-year-old and I think reasonable to pursue coronary angiogram.  Discussed risks and benefits with she and her family at the bedside.  This including risk of myocardial infarction, stroke, arterial injury, renal failure, contrast reaction.  She understands these risks and agrees to proceed.  She has had contrast in the past without reaction.  Paroxysmal atrial fibrillation: Fortunately no recurrence here in the hospital.  She cannot take Eliquis or full anticoagulation at this point secondary to recent diverticular bleed.  This does not exclude aspirin Plavix at this point if she were to have severe coronary disease.  GI felt that Eliquis could be resumed about a week from  now.  If significant elevation left ventricular end-diastolic pressure noted at time of cath would consider adding Jardiance and diuretic regularly to her medical regimen  Hypertension: Increase losartan to 50 daily with plan of adding Jardiance and thiazide diuretic after catheterization pending results.        Addendum: 8/9/2024 4:13 PM: Coronary angiography with 60% proximal LAD, 70% small ramus and moderate distal RCA disease.  No vessel in need of intervention.  I suspect elevated troponin secondary to hypotension which then could have caused ischemia in the setting of moderate disease.  Her dyspnea is related to pulmonary disease as well as diastolic dysfunction.  Current LVEDP upper normal.  RVSP however only 40 as opposed to the 70 suggested by echocardiography.  As an outpatient we will consider VQ scan and pulmonary function studies as CT did suggest emphysema.  Blood pressure needs tighter control she has been on losartan would increase to 50.  Resume regular diuretic.  Consider Jardiance but that can be assessed as an outpatient.  As long as no recurrent atrial fibrillation or dysrhythmia potentially could be discharged tomorrow needs follow-up with [facility] cardiology within 2 weeks and I discussed that with the patient's daughters. If a.fib---amio.    8/10/24  Severe pulmonary hypertension  Three-vessel CAD with LV measured at 40%  Paroxysmal atrial fibrillation  Hypertension  Ischemic cardiomyopathy with an EF of 45 to 50%    Telemetry shows normal sinus rhythm with no further arrhythmias or atrial fibrillation.  Unfortunately cannot be on Eliquis or full anticoagulation at this point secondary to recent diverticular bleed.  BP still slightly high, she received first dose of increased losartan today.  Can consider the addition of Jardiance for GDMT of heart failure however that can be addressed outpatient with [facility].  No further cardiac interventions to be had  Patient instructed to  follow-up within 2 weeks at [facility] with her normal cardiologist  General Cardiology to sign off      Omero Joseph AGACNP-BC  Adult Gerontology Acute Care Nurse Practitioner  [facility]   [facility]  [phone]            Electronically signed by RICHY Gibbons-CNP on 8/10/2024 at 9:27 AM

## 2024-08-11 LAB
BACTERIA BLD CULT: NORMAL
BACTERIA BLD CULT: NORMAL

## 2024-08-11 RX ORDER — APIXABAN 5 MG/1
5 TABLET, FILM COATED ORAL 2 TIMES DAILY
COMMUNITY
Start: 2024-08-08 | End: 2024-08-17 | Stop reason: ALTCHOICE

## 2024-08-12 ENCOUNTER — DOCUMENTATION (OUTPATIENT)
Dept: CARE COORDINATION | Facility: CLINIC | Age: 89
End: 2024-08-12
Payer: MEDICARE

## 2024-08-12 LAB
BACTERIA BLD CULT: NORMAL
BB ANTIBODY IDENTIFICATION: NORMAL
CASE #: NORMAL

## 2024-08-14 ENCOUNTER — PATIENT OUTREACH (OUTPATIENT)
Dept: HOME HEALTH SERVICES | Age: 89
End: 2024-08-14
Payer: MEDICARE

## 2024-08-14 SDOH — SOCIAL STABILITY: SOCIAL NETWORK: ARE YOU MARRIED, WIDOWED, DIVORCED, SEPARATED, NEVER MARRIED, OR LIVING WITH A PARTNER?: WIDOWED

## 2024-08-14 SDOH — HEALTH STABILITY: MENTAL HEALTH: HOW OFTEN DO YOU HAVE 6 OR MORE DRINKS ON ONE OCCASION?: NEVER

## 2024-08-14 SDOH — ECONOMIC STABILITY: TRANSPORTATION INSECURITY
IN THE PAST 12 MONTHS, HAS LACK OF TRANSPORTATION KEPT YOU FROM MEETINGS, WORK, OR FROM GETTING THINGS NEEDED FOR DAILY LIVING?: NO

## 2024-08-14 SDOH — HEALTH STABILITY: PHYSICAL HEALTH: ON AVERAGE, HOW MANY MINUTES DO YOU ENGAGE IN EXERCISE AT THIS LEVEL?: 0 MIN

## 2024-08-14 SDOH — HEALTH STABILITY: MENTAL HEALTH: HOW OFTEN DO YOU HAVE A DRINK CONTAINING ALCOHOL?: NEVER

## 2024-08-14 SDOH — HEALTH STABILITY: MENTAL HEALTH
STRESS IS WHEN SOMEONE FEELS TENSE, NERVOUS, ANXIOUS, OR CAN'T SLEEP AT NIGHT BECAUSE THEIR MIND IS TROUBLED. HOW STRESSED ARE YOU?: NOT AT ALL

## 2024-08-14 SDOH — SOCIAL STABILITY: SOCIAL NETWORK: HOW OFTEN DO YOU GET TOGETHER WITH FRIENDS OR RELATIVES?: MORE THAN THREE TIMES A WEEK

## 2024-08-14 SDOH — ECONOMIC STABILITY: INCOME INSECURITY: IN THE PAST 12 MONTHS, HAS THE ELECTRIC, GAS, OIL, OR WATER COMPANY THREATENED TO SHUT OFF SERVICE IN YOUR HOME?: NO

## 2024-08-14 SDOH — SOCIAL STABILITY: SOCIAL NETWORK
IN A TYPICAL WEEK, HOW MANY TIMES DO YOU TALK ON THE PHONE WITH FAMILY, FRIENDS, OR NEIGHBORS?: MORE THAN THREE TIMES A WEEK

## 2024-08-14 SDOH — ECONOMIC STABILITY: FOOD INSECURITY: WITHIN THE PAST 12 MONTHS, YOU WORRIED THAT YOUR FOOD WOULD RUN OUT BEFORE YOU GOT MONEY TO BUY MORE.: NEVER TRUE

## 2024-08-14 SDOH — HEALTH STABILITY: MENTAL HEALTH
HOW OFTEN DO YOU NEED TO HAVE SOMEONE HELP YOU WHEN YOU READ INSTRUCTIONS, PAMPHLETS, OR OTHER WRITTEN MATERIAL FROM YOUR DOCTOR OR PHARMACY?: NEVER

## 2024-08-14 SDOH — SOCIAL STABILITY: SOCIAL INSECURITY: WITHIN THE LAST YEAR, HAVE YOU BEEN AFRAID OF YOUR PARTNER OR EX-PARTNER?: NO

## 2024-08-14 SDOH — SOCIAL STABILITY: SOCIAL NETWORK
DO YOU BELONG TO ANY CLUBS OR ORGANIZATIONS SUCH AS CHURCH GROUPS UNIONS, FRATERNAL OR ATHLETIC GROUPS, OR SCHOOL GROUPS?: NO

## 2024-08-14 SDOH — ECONOMIC STABILITY: HOUSING INSECURITY: AT ANY TIME IN THE PAST 12 MONTHS, WERE YOU HOMELESS OR LIVING IN A SHELTER (INCLUDING NOW)?: NO

## 2024-08-14 SDOH — ECONOMIC STABILITY: TRANSPORTATION INSECURITY
IN THE PAST 12 MONTHS, HAS THE LACK OF TRANSPORTATION KEPT YOU FROM MEDICAL APPOINTMENTS OR FROM GETTING MEDICATIONS?: NO

## 2024-08-14 SDOH — SOCIAL STABILITY: SOCIAL INSECURITY: WITHIN THE LAST YEAR, HAVE YOU BEEN HUMILIATED OR EMOTIONALLY ABUSED IN OTHER WAYS BY YOUR PARTNER OR EX-PARTNER?: NO

## 2024-08-14 SDOH — SOCIAL STABILITY: SOCIAL INSECURITY
WITHIN THE LAST YEAR, HAVE TO BEEN RAPED OR FORCED TO HAVE ANY KIND OF SEXUAL ACTIVITY BY YOUR PARTNER OR EX-PARTNER?: NO

## 2024-08-14 SDOH — SOCIAL STABILITY: SOCIAL INSECURITY
WITHIN THE LAST YEAR, HAVE YOU BEEN KICKED, HIT, SLAPPED, OR OTHERWISE PHYSICALLY HURT BY YOUR PARTNER OR EX-PARTNER?: NO

## 2024-08-14 SDOH — ECONOMIC STABILITY: HOUSING INSECURITY: IN THE PAST 12 MONTHS, HOW MANY TIMES HAVE YOU MOVED WHERE YOU WERE LIVING?: 1

## 2024-08-14 SDOH — ECONOMIC STABILITY: INCOME INSECURITY: IN THE LAST 12 MONTHS, WAS THERE A TIME WHEN YOU WERE NOT ABLE TO PAY THE MORTGAGE OR RENT ON TIME?: NO

## 2024-08-14 SDOH — SOCIAL STABILITY: SOCIAL NETWORK: HOW OFTEN DO YOU ATTENT MEETINGS OF THE CLUB OR ORGANIZATION YOU BELONG TO?: NEVER

## 2024-08-14 SDOH — ECONOMIC STABILITY: FOOD INSECURITY: WITHIN THE PAST 12 MONTHS, THE FOOD YOU BOUGHT JUST DIDN'T LAST AND YOU DIDN'T HAVE MONEY TO GET MORE.: NEVER TRUE

## 2024-08-14 SDOH — SOCIAL STABILITY: SOCIAL NETWORK: HOW OFTEN DO YOU ATTEND CHURCH OR RELIGIOUS SERVICES?: 1 TO 4 TIMES PER YEAR

## 2024-08-14 SDOH — HEALTH STABILITY: PHYSICAL HEALTH: ON AVERAGE, HOW MANY DAYS PER WEEK DO YOU ENGAGE IN MODERATE TO STRENUOUS EXERCISE (LIKE A BRISK WALK)?: 0 DAYS

## 2024-08-14 SDOH — ECONOMIC STABILITY: INCOME INSECURITY: HOW HARD IS IT FOR YOU TO PAY FOR THE VERY BASICS LIKE FOOD, HOUSING, MEDICAL CARE, AND HEATING?: NOT HARD AT ALL

## 2024-08-14 SDOH — HEALTH STABILITY: MENTAL HEALTH: HOW MANY STANDARD DRINKS CONTAINING ALCOHOL DO YOU HAVE ON A TYPICAL DAY?: PATIENT DOES NOT DRINK

## 2024-08-14 ASSESSMENT — LIFESTYLE VARIABLES
SKIP TO QUESTIONS 9-10: 1
AUDIT-C TOTAL SCORE: 0

## 2024-08-14 NOTE — PROGRESS NOTES
Patient currently working with healthy at home team, MADDIE will review chart for any future needs in 2 weeks.     PERRY DonahueN, RN   [phone]   ACO Department

## 2024-08-14 NOTE — PROGRESS NOTES
"  1010- Pt daughter, Amada, called into [facility] to enroll mother into [facility]. Pt lives at home with her. No SDOH risks. Pt has h/o A fib and HR ranging from 90's up to low hundreds, agreeable to have Masimo set up to monitor HR (req medic visit 8/16 for set up). Pt has good follow up appts set up per daughter. Pt was sent home with losartan 50 mg  once daily but pt has been taking 25 mg losartan daily \"since BP has been good and her BP dropped very low when she took 50 mg in hospital.\" Informed her I would check with provider to let her know which dose to give mother. Initial [facility] set up 8/17 at 1500, daily calls around 1700 (please call dtr Amada cell # [phone])      1035- Per Dr Velasquez, her goal bp is 140/90 given her age  And she should take 25mg daily . I called Amada's cell phone and no answer- left VM  that pt should be taking 25 mg losartan daily.           Patient's Address:   [address] [place] [id number]  **  If this is not the address patient will receive services - alert team and address in EMR**       Patient Contacts:  Extended Emergency Contact Information  Primary Emergency Contact: Melody Claros  Home Phone: [phone]  Relation: Child  Secondary Emergency Contact: Amada Lemus  Mobile Phone: [phone]  Relation: Daughter   needed? No                                Patient's Preferred Phone: [phone]  Patient's E-mail: [email or web address]                                      "

## 2024-08-16 ENCOUNTER — PATIENT OUTREACH (OUTPATIENT)
Dept: HOME HEALTH SERVICES | Age: 89
End: 2024-08-16
Payer: MEDICARE

## 2024-08-16 NOTE — PROGRESS NOTES
Daily call complete spoke with daughter Amada.  Reports patient is doing very good today [place] her family.   Denies Sob or distress, currently resting on the couch.  Per Amada a Medic will setup Masimo on 8/17/24 kristina to they were not home today.      /64, HR 66    No questions at this time.  Appointments reviewed

## 2024-08-17 ENCOUNTER — PATIENT OUTREACH (OUTPATIENT)
Dept: HOME HEALTH SERVICES | Age: 89
End: 2024-08-17

## 2024-08-17 ENCOUNTER — TELEMEDICINE (OUTPATIENT)
Dept: CARE COORDINATION | Age: 89
End: 2024-08-17
Payer: MEDICARE

## 2024-08-17 ENCOUNTER — DOCUMENTATION (OUTPATIENT)
Dept: HOME HEALTH SERVICES | Age: 89
End: 2024-08-17

## 2024-08-17 VITALS
DIASTOLIC BLOOD PRESSURE: 62 MMHG | SYSTOLIC BLOOD PRESSURE: 114 MMHG | HEART RATE: 77 BPM | RESPIRATION RATE: 26 BRPM | OXYGEN SATURATION: 97 %

## 2024-08-17 DIAGNOSIS — R55 SYNCOPE, UNSPECIFIED SYNCOPE TYPE: ICD-10-CM

## 2024-08-17 DIAGNOSIS — I10 HYPERTENSION, UNSPECIFIED TYPE: ICD-10-CM

## 2024-08-17 DIAGNOSIS — I21.4 NSTEMI (NON-ST ELEVATED MYOCARDIAL INFARCTION) (MULTI): Primary | ICD-10-CM

## 2024-08-17 RX ORDER — LOSARTAN POTASSIUM 25 MG/1
25 TABLET ORAL DAILY
Start: 2024-08-17 | End: 2024-12-15

## 2024-08-17 NOTE — PROGRESS NOTES
Daily Call Note: [facility] Initial call with the patient, daughter Amada, and CNP ANNABEL Messina. The patient stated that she is doing well - Daughter Amada stated that they have no issues or concerns to report today. She denies further episodes of syncope or dizziness. She was started on Masimo monitoring this afternoon - does not use supplemental O2. The medications were reviewed by the provider, and confirmed that she is taking Losartan 25 mg instead of 50 mg, due to the patient's concern for potential hypotension. The order was updated. Provider instructed them to continue monitoring the blood pressure to follow the trend, in case the numbers begin to rise and medication needs adjustment. The both verbalized understanding.  /62   Pulse 77   Resp 26   SpO2 97%     Daily Weight:  There were no vitals filed for this visit.   Last 3 Weights:  Wt Readings from Last 7 Encounters:   08/07/24 65.2 kg (143 lb 11.8 oz)       Masimo Device: Yes   Masimo Clinical Impression: stable on room air    Virtual Visits--Scheduled (Most Recent Date at Top)  Follow up Appointments  Recent Visits  No visits were found meeting these conditions.  Showing recent visits within past 30 days and meeting all other requirements  Future Appointments  No visits were found meeting these conditions.  Showing future appointments within next 90 days and meeting all other requirements       Frequency of RN Calls & Virtual Visits per Team Agreement: [facility] Home Frequency: Daily

## 2024-08-17 NOTE — PROGRESS NOTES
[facility] Assessment    Patient's Address:   [place] [address]  **  If this is not the address patient will receive services - alert team and address in EMR**       Patient Contacts:  Extended Emergency Contact Information  Primary Emergency Contact: Melody Claros  Home Phone: [phone]  Relation: Child  Secondary Emergency Contact: Amada Lemus  Mobile Phone: [phone]  Relation: Daughter   needed? No                                Patient's Preferred Phone: [phone]  Patient's E-mail: [email or web address]           Visited pt today to set up remote monitoring. Pt was set up on tablet as she does not have a smart phone. Already owns a blood pressure cuff and scale, per daughter she will keep a record of daily vitals.   Assessment shows LS CTA bilaterally, no edema to lower extrem, vital signs all WNL. No complaints. All findings reported to team prior to clearing scene.

## 2024-08-18 ENCOUNTER — PATIENT OUTREACH (OUTPATIENT)
Dept: HOME HEALTH SERVICES | Age: 89
End: 2024-08-18
Payer: MEDICARE

## 2024-08-18 NOTE — PROGRESS NOTES
Daily Call Note:       Pt no longer showing up on Masimo, called and LVM with the daughter, Amada.      Addendum: no return call ended up calling another number in the system. Able to get a hold of Amada at that number, and helped her troubleshoot and pt is now back on monitor.      Pt Education:   Barriers:   Topics for Daily Review:   Pt demonstrates clear understanding:     Daily Weight:  There were no vitals filed for this visit.   Last 3 Weights:  Wt Readings from Last 7 Encounters:   08/07/24 65.2 kg (143 lb 11.8 oz)       Masimo Device:    Masimo Clinical Impression:     Virtual Visits--Scheduled (Most Recent Date at Top)  Follow up Appointments  Recent Visits  No visits were found meeting these conditions.  Showing recent visits within past 30 days and meeting all other requirements  Future Appointments  No visits were found meeting these conditions.  Showing future appointments within next 90 days and meeting all other requirements       Frequency of RN Calls & Virtual Visits per Team Agreement:     Medication issues Addressed (what was done):     Follow up appointments scheduled by [facility] Staff:   Referrals made by [facility] staff:

## 2024-08-19 ENCOUNTER — PATIENT OUTREACH (OUTPATIENT)
Dept: HOME HEALTH SERVICES | Age: 89
End: 2024-08-19
Payer: MEDICARE

## 2024-08-19 VITALS — DIASTOLIC BLOOD PRESSURE: 67 MMHG | SYSTOLIC BLOOD PRESSURE: 131 MMHG | OXYGEN SATURATION: 98 % | HEART RATE: 81 BPM

## 2024-08-19 NOTE — PROGRESS NOTES
Daily Call Note: Daily call completed - the patient was out of the home - spoke to daughter Amada who stated the patient was doing very well. She was doing activities without dizziness or weakness. Today's BP was 131/67. She is getting around the home with assistance. She reported that she has no chest pain or discomfort, no respiratory issues, no physical complaints of any kind.    Daily Weight:  There were no vitals filed for this visit.   Last 3 Weights:  Wt Readings from Last 7 Encounters:   08/07/24 65.2 kg (143 lb 11.8 oz)       Masimo Device: Yes   Masimo Clinical Impression: stable - off monitor at time of call - out of the home.    Virtual Visits--Scheduled (Most Recent Date at Top)  Follow up Appointments  Recent Visits  No visits were found meeting these conditions.  Showing recent visits within past 30 days and meeting all other requirements  Future Appointments  No visits were found meeting these conditions.  Showing future appointments within next 90 days and meeting all other requirements       Frequency of RN Calls & Virtual Visits per Team Agreement: [facility] Frequency: Daily

## 2024-08-21 ENCOUNTER — PATIENT OUTREACH (OUTPATIENT)
Dept: HOME HEALTH SERVICES | Age: 89
End: 2024-08-21
Payer: MEDICARE

## 2024-08-21 NOTE — PROGRESS NOTES
"Pt not transmitting on Masimo- Spoke with pt daughter, pt is currently off Monitor since she is \"out and about for a while.\"           Patient's Address:   [place] [address]  **  If this is not the address patient will receive services - alert team and address in EMR**       Patient Contacts:  Extended Emergency Contact Information  Primary Emergency Contact: Melody Claros  Home Phone: [phone]  Relation: Child  Secondary Emergency Contact: Amada Lemus  Mobile Phone: [phone]  Relation: Daughter   needed? No                                Patient's Preferred Phone: [phone]  Patient's E-mail: [email or web address]                                      "

## 2024-08-22 ENCOUNTER — PATIENT OUTREACH (OUTPATIENT)
Dept: HOME HEALTH SERVICES | Age: 89
End: 2024-08-22
Payer: MEDICARE

## 2024-08-22 VITALS
RESPIRATION RATE: 17 BRPM | DIASTOLIC BLOOD PRESSURE: 74 MMHG | SYSTOLIC BLOOD PRESSURE: 150 MMHG | OXYGEN SATURATION: 98 % | HEART RATE: 79 BPM

## 2024-08-22 NOTE — PROGRESS NOTES
Daily Call Note: Daily call complete, spoke with patient's daughter, Amada. She states patient is doing good. She denies pain and SOB. SpO2 98% on RA. Vital signs stable on masimo monitor. She states appetite is good and denies GI/ issues. Reviewed upcoming appointments. No new concerns. Next [facility] 8/23/24 at 1300, verbalized understanding. No other questions at this time.     Pt Education: per POC  Barriers: none  Topics for Daily Review: BP  Pt demonstrates clear understanding: Yes    Daily Weight:  There were no vitals filed for this visit.   Last 3 Weights:  Wt Readings from Last 7 Encounters:   08/07/24 65.2 kg (143 lb 11.8 oz)       Masimo Device: No   Masimo Clinical Impression: n/a    Virtual Visits--Scheduled (Most Recent Date at Top)  Follow up Appointments  Recent Visits  No visits were found meeting these conditions.  Showing recent visits within past 30 days and meeting all other requirements  Future Appointments  No visits were found meeting these conditions.  Showing future appointments within next 90 days and meeting all other requirements       Frequency of RN Calls & Virtual Visits per Team Agreement: [facility] Home Frequency: Daily    Medication issues Addressed (what was done): none    Follow up appointments scheduled by [facility] Staff: none  Referrals made by [facility] staff: none

## 2024-08-23 ENCOUNTER — PATIENT OUTREACH (OUTPATIENT)
Dept: HOME HEALTH SERVICES | Age: 89
End: 2024-08-23

## 2024-08-23 ENCOUNTER — APPOINTMENT (OUTPATIENT)
Dept: CARE COORDINATION | Age: 89
End: 2024-08-23
Payer: MEDICARE

## 2024-08-23 VITALS
DIASTOLIC BLOOD PRESSURE: 61 MMHG | SYSTOLIC BLOOD PRESSURE: 133 MMHG | RESPIRATION RATE: 22 BRPM | HEART RATE: 88 BPM | OXYGEN SATURATION: 98 %

## 2024-08-23 DIAGNOSIS — I10 HYPERTENSION, UNSPECIFIED TYPE: ICD-10-CM

## 2024-08-23 DIAGNOSIS — I21.4 NSTEMI (NON-ST ELEVATED MYOCARDIAL INFARCTION) (MULTI): ICD-10-CM

## 2024-08-23 DIAGNOSIS — R55 SYNCOPE, UNSPECIFIED SYNCOPE TYPE: Primary | ICD-10-CM

## 2024-08-23 NOTE — PROGRESS NOTES
Weekly [facility] call completed with Daughter cami and Tiffanie Rodriguez NP patient states that she is doing ok no more blood in her still so they are still holding Eliquis she sees cardio this week. Patient is ok to come off Sarsyso she has been stable explained the process. No medication needs questions and concerns addressed. Next [facility] 8/31 @ 1300

## 2024-08-24 ENCOUNTER — PATIENT OUTREACH (OUTPATIENT)
Dept: HOME HEALTH SERVICES | Age: 89
End: 2024-08-24
Payer: MEDICARE

## 2024-08-24 VITALS — SYSTOLIC BLOOD PRESSURE: 131 MMHG | DIASTOLIC BLOOD PRESSURE: 70 MMHG

## 2024-08-24 NOTE — PROGRESS NOTES
Spoke with pts daughter Amada. She states her mother has been doing well. BP was 131/70. She has no questions or concerns for us.         Patient's Address:   [place] [address]  **  If this is not the address patient will receive services - alert team and address in EMR**       Patient Contacts:  Extended Emergency Contact Information  Primary Emergency Contact: Melody Claros  Home Phone: [phone]  Relation: Child  Secondary Emergency Contact: Amada Lemus  Mobile Phone: [phone]  Relation: Daughter   needed? No                                Patient's Preferred Phone: [phone]  Patient's E-mail: [email or web address]

## 2024-08-27 ENCOUNTER — PATIENT OUTREACH (OUTPATIENT)
Dept: CARE COORDINATION | Age: 89
End: 2024-08-27
Payer: MEDICARE

## 2024-08-27 NOTE — PROGRESS NOTES
Daily Call Note:   LVM.      Daily Weight:  There were no vitals filed for this visit.   Last 3 Weights:  Wt Readings from Last 7 Encounters:   08/07/24 65.2 kg (143 lb 11.8 oz)       Virtual Visits--Scheduled (Most Recent Date at Top)  Follow up Appointments  Recent Visits  No visits were found meeting these conditions.  Showing recent visits within past 30 days and meeting all other requirements  Future Appointments  No visits were found meeting these conditions.  Showing future appointments within next 90 days and meeting all other requirements

## 2024-08-28 ENCOUNTER — APPOINTMENT (OUTPATIENT)
Dept: CARDIOLOGY | Facility: CLINIC | Age: 89
End: 2024-08-28
Payer: MEDICARE

## 2024-08-28 ENCOUNTER — TELEPHONE (OUTPATIENT)
Dept: CARDIOLOGY | Facility: CLINIC | Age: 89
End: 2024-08-28

## 2024-08-28 VITALS
BODY MASS INDEX: 27.21 KG/M2 | DIASTOLIC BLOOD PRESSURE: 66 MMHG | HEART RATE: 75 BPM | HEIGHT: 59 IN | WEIGHT: 135 LBS | SYSTOLIC BLOOD PRESSURE: 122 MMHG

## 2024-08-28 DIAGNOSIS — I48.0 PAROXYSMAL A-FIB (MULTI): ICD-10-CM

## 2024-08-28 DIAGNOSIS — I50.22 CHF (CONGESTIVE HEART FAILURE), NYHA CLASS II, CHRONIC, SYSTOLIC (MULTI): ICD-10-CM

## 2024-08-28 DIAGNOSIS — I25.10 CORONARY ARTERY DISEASE INVOLVING NATIVE CORONARY ARTERY OF NATIVE HEART WITHOUT ANGINA PECTORIS: ICD-10-CM

## 2024-08-28 DIAGNOSIS — I25.5 CARDIOMYOPATHY, ISCHEMIC: ICD-10-CM

## 2024-08-28 PROBLEM — I50.21: Status: ACTIVE | Noted: 2024-08-28

## 2024-08-28 PROCEDURE — G2211 COMPLEX E/M VISIT ADD ON: HCPCS | Performed by: INTERNAL MEDICINE

## 2024-08-28 PROCEDURE — 93000 ELECTROCARDIOGRAM COMPLETE: CPT | Performed by: INTERNAL MEDICINE

## 2024-08-28 PROCEDURE — 1159F MED LIST DOCD IN RCRD: CPT | Performed by: INTERNAL MEDICINE

## 2024-08-28 PROCEDURE — 1111F DSCHRG MED/CURRENT MED MERGE: CPT | Performed by: INTERNAL MEDICINE

## 2024-08-28 PROCEDURE — 99215 OFFICE O/P EST HI 40 MIN: CPT | Performed by: INTERNAL MEDICINE

## 2024-08-28 PROCEDURE — 1036F TOBACCO NON-USER: CPT | Performed by: INTERNAL MEDICINE

## 2024-08-28 RX ORDER — ASPIRIN 81 MG/1
81 TABLET ORAL DAILY
Qty: 30 TABLET | Refills: 11 | Status: SHIPPED | OUTPATIENT
Start: 2024-08-28 | End: 2025-08-28

## 2024-08-28 NOTE — PROGRESS NOTES
Patient:  Meryl Hester  YOB: 1931  MRN: [id number]       Impression/Plan:     Diagnoses and all orders for this visit:  Paroxysmal A-fib (Multi)  -     During her several days at [facility], no recurrent atrial fibrillation.  -     High likelihood of recurrence.  Rate is well-controlled on today's ECG just rate of 74 on beta-blocker.  -      I think given her advanced age and slight decrease in ejection fraction amiodarone well would be optimal if agent to use.  I would like to obtain Holter monitor to assure she does not have significant episodes of bradycardia prior to instituting such an agent.  She evidently was not put back on amiodarone after IV and was used in therapy in [facility] because she had no recurrent A-fib after a particular rapid episode  -    Continue current beta-blocker dose for now.  -     Holter Or Event Cardiac Monitor; Future  -    The most important issue is that the patient be referred for Watchman device.  She has had a stroke secondary to atrial fibrillation..  She has GI bleeding with very rapid bleeding on 1 occasion such that angiography was attempted but fortunately she stopped bleeding spontaneously.  Gastroenterology does not feel that she is not a candidate for anticoagulation I think that very accurate.  Will therefore refer to Dr. Joyce in the near future.  -     Does have bifascicular block with left intaglio block and right bundle branch block  -     She is aware of the need to go to the emergency department should atrial fibrillation recur.  If so would utilize amiodarone    Coronary artery disease involving native coronary artery of native heart without angina pectoris  -     Had not had documentation of significant coronary disease in the past  -     Cardiac catheterization with diffuse disease most 50 to 60% none needed revascularization of any of these vessels would continue current statin which is allowed aggressive reduction of LDL cholesterol to less  than 50 and continue same.  Would also add aspirin 81 mg a day she has not had bleeding on aspirin in the past.  Cardiomyopathy, ischemic   systolic CHF (congestive heart failure), NYHA class 2 (Multi)  -     Currently functional class II systolic CHF  -     She is on AR-2 blocker and bisoprolol at this time.  -     Clinically she is well compensated at this time.  Will consider adding Jardiance and Aldactone at at next evaluation in 2 weeks.  Would obtain CBC and BMP in 1 to 2 weeks with further recommendations based on results of the lab work and Zio patch findings.                              Shared Decision Tool - Referral for Left Atrial Appendage Occlusion    My patient Meryl Hester 4/16/1931 has been evaluated by me and is referred to [facility] for a left atrial appendage implant due thromboembolic stroke risk from atrial fibrillation with CHADS2 score >= 2 or a [id number] score >= 3.    This patient is not a good candidate for long term anticoagulation for the following reason(s):    This patient however should be able to tolerate short term anticoagulation as necessary for LAAO device implant.  My Patient and I, the referring physician, have reviewed the following:  Yes  Atrial Fibrillation increases the risk of stroke.  Yes Anticoagulants or blood thinners help reduce the risk of stroke for most people.  Yes    Blood thinners may cause minor or serious bleeding issues.  Yes  Blood thinners include the medications aspirin, warfarin, and NOAC (dabigatran, edoxaban, rivaroxaban, apixaban...), each with unique risk and safety profiles.  Yes We reviewed his/her anticoagulation history and previous experiences.  Yes We discussed lack of other alternative treatments available to prevent stroke risk.  Yes We discussed the LAAO device implant vs taking anticoagulation to reduce stroke risk.  Yes We referred the patient to a LAAO outpatient clinic for further explanation and consideration.          Yes The LAAO  device has been adequately explained along with the risks and benefits of treatment. Alternative treatment has been discussed with all questions answered. After discussion of the above considerations, it has been decided to be evaluated for the LAAO therapies.    Reviewed and approved by PAZ FINNEY on 8/28/24 at 5:47 PM.           Chief Complaint/Active Symptoms:       Meryl Hester is a 89 y.o. female who presents with hypertension, paroxysmal atrial fibrillation, CVA February 2024 secondary to atrial fibrillation, severe and diffuse diverticulosis with recurrent GI bleeding, coronary artery disease with diffuse 50 to 60% stenosis at coronary angiography August 2024.  She had been admitted to [facility] in late July with recurrent GI bleeding rectal bleeding.  During that hospital stay had multiple runs of rapid atrial fibrillation treated transiently with intravenous amiodarone but was not kept on that agent.  Had to undergo 2 colonoscopies was discharged the day after the second colonoscopy.  Was admitted to [facility] there was syncopal episode felt related to volume depletion.  She was hydrated and improved in symptoms but troponins were elevated and echo suggested right ventricular systolic pressure over 70 mmHg.  In light of these findings she underwent coronary angiography as delineated below that did show 50 to 60% diffuse disease in 190% lesion in a small ramus.  EF 40% with RVSP found to be just 43 mmHg.    At time of discharge plan was for her to follow-up at [facility] where her previous care was she prefers to follow here for her cardiac issues.  She says she feels better than she was in the hospital.  She has had no further dizzy spells or episodes of lightheadedness.  No neurologic symptoms to suggest recurrent stroke.  She has had no palpitations though when she was in A-fib was minimally symptomatic.  She has had no further rectal bleeding.  She does tire out rather easily.  She  has been eating and drinking well she is sleeping fairly well also.  No lower extremity edema.    Went through the various issues during hospital stay with patient and daughters.    Lab work 8/10/2024 magnesium normal hemoglobin 8.2 stable for her sodium 141 potassium 4.1 BUN of 4 creatinine 0.6  Cholesterol level June 2024 118 triglycerides 157 HDL 51 LDL 36    ECG normal sinus rhythm left anterior hemiblock right bundle branch block    8/8/2024 echocardiogram   1. Left ventricular ejection fraction is mildly decreased, by visual estimate at 45-50%.   2. Spectral Doppler shows a pseudonormal pattern of left ventricular diastolic filling.   3. There is mildly reduced right ventricular systolic function.   4. Mildly enlarged right ventricle.   5. The left atrium is severely dilated.   6. 2+ mitral regurgitation.      MR ERO is 0.21cm2.      MR Volume 40 ml.   7. Mild to moderate mitral valve regurgitation.   8. 2+ tricuspid regurgitation with estimated RVSP of 71mmHg consistent with severely elevated right-sided pressures.   9. Mild to moderate tricuspid regurgitation.  10. Peak velocity across the aortic valve is 2.29 m/s with a peak gradient 21 mmHg and mean gradient 10 mmHg. Dimensionless index 0.40. Aortic valve area 1.26 cm2. Values consistent with mild aortic stenosis. 1-2+ aortic regurgitation.  11. Mild aortic valve regurgitation.    8/9/2024 cardiac catheterization   1. The entire Left Main: <10% stenosis.   2. Proximal LAD Lesion: The percent stenosis is 60%.   3. Mid LAD Lesion: The percent stenosis is 50%.   4. Prox Ramus CX Lesion: The percent stenosis is 90%,small in size.   5. Proximal and mid RCA Lesion: The percent stenosis is 40%.   6. Distal RCA Lesion: The percent stenosis is 50%.   7. Right atrial mean pressure 4 mmHg.   8. Right ventricular pressure 39/7 mmHg.   9. Pulmonary artery pressure 43/16 mmHg, mean pressure 27 mmHg.  10. Pulmonary capillary wedge mean pressure 10 mmHg.  11. Cardiac  output 6.3 L/min by Daniel method.  12. Cardiac index 3.9 L/min/mï¿½ by Daniel method.  13. The Left Ventricular Ejection Fraction is 40%.         Past medical history  Hypertension, severe  February 2024 left MCA CVA felt secondary to atrial fibrillation  Paroxysmal atrial fibrillation July 2024 at time of rectal bleeding multiple episodes of rapid atrial fibrillation [facility].  Severe extensive diverticulosis with recurrent GI bleeding, found to have rapid bleeding on CTA urgent angiography was pursued by interventional radiology [facility] August 2024 but no interventions pursued as active bleeding stopped spontaneously.  Felt to be very high risk for recurrent bleeding.  Osteoarthritis  Hypothyroidism  Coronary artery disease cath August 2024 as noted above diffuse disease  Pulmonary hypertension echo August 2024 suggested RVSP 70 Halbrecht right heart catheterization RVSP at 43 mmHg  Prior COPD          Review of Systems: Unremarkable except as noted above    Meds     Current Outpatient Medications   Medication Instructions    acetaminophen (TYLENOL) 650 mg, oral, Every 6 hours PRN    bisoprolol (ZEBETA) 7.5 mg, oral, Daily RT    calcium carbonate-vitamin D3 500 mg-5 mcg (200 unit) tablet 1 tablet, oral, Daily    carboxymethylcellulose (THERATears) 0.25 % ophthalmic solution 1 drop, Both Eyes, 3 times daily PRN    docusate sodium (COLACE) 100 mg, oral, 2 times daily    DULoxetine (CYMBALTA) 30 mg, oral, Daily RT    fluticasone (Flonase) 50 mcg/actuation nasal spray 1 spray, Each Nostril, Daily PRN    furosemide (LASIX) 20 mg, oral, Daily RT    gabapentin (NEURONTIN) 300 mg, oral, Nightly    krill oil 500 mg, oral, 2 times daily    LACTOBACILLUS ACIDOPHILUS ORAL 1 tablet, oral, Daily    levothyroxine (SYNTHROID, LEVOXYL) 88 mcg, oral, Daily RT    losartan (COZAAR) 25 mg, oral, Daily    magnesium oxide 500 mg, oral, Daily    multivitamin with minerals iron-free (Centrum Silver) 1 tablet, oral,  "Daily    mv-min-FA-vit K-lutein-zeaxant (PreserVision AREDS 2 Plus MV) 200 mcg-15 mcg- 5 mg-1 mg capsule 1 capsule, oral, 2 times daily    omeprazole (PRILOSEC) 40 mg, oral, Daily RT    potassium chloride CR 20 mEq ER tablet 20 mEq, oral, Daily RT    rosuvastatin (CRESTOR) 20 mg, oral, Nightly        Allergies     Allergies   Allergen Reactions    Lisinopril Cough    Sulfa (Sulfonamide Antibiotics) Unknown         Annotated Problems     Specialty Problems          Cardiology Problems    NSTEMI (non-ST elevated myocardial infarction) (Multi)        Problem List     Patient Active Problem List    Diagnosis Date Noted    Syncope, unspecified syncope type 08/08/2024    Syncope and collapse 08/07/2024    Radiculopathy of lumbar region 01/03/2024    Joint stiffness of spine 01/03/2024    Gait difficulty 01/03/2024    NSTEMI (non-ST elevated myocardial infarction) (Multi) 08/07/2024    Pulmonary hypertension (Multi) 08/07/2024       Objective:     Vitals:    08/28/24 1050   BP: 122/66   BP Location: Left arm   Patient Position: Sitting   Pulse: 75   Weight: 61.2 kg (135 lb)   Height: 1.499 m (4' 11\")      Wt Readings from Last 4 Encounters:   08/28/24 61.2 kg (135 lb)   08/07/24 65.2 kg (143 lb 11.8 oz)           LAB:     Lab Results   Component Value Date    WBC 8.1 08/10/2024    HGB 8.2 (L) 08/10/2024    HCT 25.9 (L) 08/10/2024     08/10/2024    ALT 9 08/08/2024    AST 14 08/08/2024     08/10/2024    K 4.1 08/10/2024     08/10/2024    CREATININE 0.60 08/10/2024    BUN 4 (L) 08/10/2024    CO2 31 08/10/2024    INR 1.1 08/07/2024    HGBA1C 5.6 02/17/2024       Diagnostic Studies:     CT-CT ANGIO CHEST FOR PULMONARY EMBOLISM IMPORT    Result Date: 8/14/2024  Images were obtained outside of [facility]    ECG 12 lead    Result Date: 8/9/2024  Sinus rhythm with Premature atrial complexes Left axis deviation Right bundle branch block Minimal voltage criteria for LVH, may be normal variant " Abnormal ECG When compared with ECG of 07-AUG-2024 12:18, (unconfirmed) Premature atrial complexes are now Present See ED provider note for full interpretation and clinical correlation Confirmed by Rosana Diaz ([id number]) on 8/9/2024 11:45:12 AM    ECG 12 lead    Result Date: 8/9/2024  Normal sinus rhythm Left axis deviation Right bundle branch block Minimal voltage criteria for LVH, may be normal variant Abnormal ECG When compared with ECG of 19-APR-2015 04:13, Premature ventricular complexes are no longer Present Right bundle branch block is now Present See ED provider note for full interpretation and clinical correlation Confirmed by Rosana Diaz ([id number]) on 8/9/2024 11:36:48 AM    Transthoracic Echo (TTE) Complete    Result Date: 8/8/2024          [place] [address]  Tel [phone] Fax [phone] TRANSTHORACIC ECHOCARDIOGRAM REPORT Patient Name:      ANNA Moran Physician:    [id number] Marbin Veronica MD, [facility] Study Date:        8/8/2024             Ordering Provider:    [id number] FINNEY MRN/PID:           [id number]             Fellow: Accession#:        [id number]         Nurse: Date of Birth/Age: 4/16/1931 / 89 years Sonographer:          Jodi Peterson [facility] Gender:            F                    Additional Staff: Height:            149.86 cm            Admit Date:           8/7/2024 Weight:            64.86 kg             Admission Status:     Inpatient -                                                               Routine BSA / BMI:         1.60 m2 / 28.88      Department Location:  [place] [address]                                      Echo Lab Blood Pressure: 144 /67 mmHg Study Type:    TRANSTHORACIC ECHO (TTE) COMPLETE Diagnosis/ICD: Unspecified atrial fibrillation-I48.91; Elevated Troponin-R79.89;                 Hypotension, unspecified-I95.9 Patient History: Pertinent History: HTN, Hyperlipidemia, A-Fib, CHF, CVA and Elevated Troponin,                    Hypotension, Hx Syncope. Study Detail: The following Echo studies were performed: 2D, M-Mode, Doppler and               color flow.  PHYSICIAN INTERPRETATION: Left Ventricle: Left ventricular ejection fraction is mildly decreased, by visual estimate at 45-50%. There are no regional wall motion abnormalities. The left ventricular cavity size is normal. The left ventricular septal wall thickness is normal. There is mildly increased left ventricular posterior wall thickness. Spectral Doppler shows a pseudonormal pattern of left ventricular diastolic filling. Left Atrium: The left atrium is severely dilated. Left atrial volume index 63.1 mL/m2. Right Ventricle: The right ventricle is mildly enlarged. There is mildly reduced right ventricular systolic function. Right Atrium: The right atrium is normal in size. Aortic Valve: The aortic valve is trileaflet. There is mild to moderate aortic valve cusp calcification. The aortic valve dimensionless index is 0.40. There is mild aortic valve regurgitation. The peak instantaneous gradient of the aortic valve is 21.0 mmHg. The mean gradient of the aortic valve is 10.0 mmHg. Peak velocity across the aortic valve is 2.29 m/s with a peak gradient 21 mmHg and mean gradient 10 mmHg. Dimensionless index 0.40. Aortic valve area 1.26 cm2. Values consistent with mild aortic stenosis. 1-2+ aortic regurgitation. Mitral Valve: The mitral valve is mildly thickened. There is mild to moderate mitral valve regurgitation. The mitral regurgitant orifice area is 21 mm2. The mitral regurgitant volume is 39.97 ml. 2+ mitral regurgitation. MR ERO is 0.21cm2. MR Volume 40 ml. Tricuspid Valve: The tricuspid valve is structurally normal. There is mild to moderate tricuspid regurgitation. 2+ tricuspid regurgitation with estimated RVSP of 71mmHg consistent  with severely elevated right-sided pressures. Pulmonic Valve: The pulmonic valve is structurally normal. There is mild pulmonic valve regurgitation. Pericardium: There is no pericardial effusion noted. Aorta: The aortic root is normal. Systemic Veins: The inferior vena cava appears to be of normal size. There is IVC inspiratory collapse greater than 50%.  CONCLUSIONS:  1. Left ventricular ejection fraction is mildly decreased, by visual estimate at 45-50%.  2. Spectral Doppler shows a pseudonormal pattern of left ventricular diastolic filling.  3. There is mildly reduced right ventricular systolic function.  4. Mildly enlarged right ventricle.  5. The left atrium is severely dilated.  6. 2+ mitral regurgitation.     MR ERO is 0.21cm2.     MR Volume 40 ml.  7. Mild to moderate mitral valve regurgitation.  8. 2+ tricuspid regurgitation with estimated RVSP of 71mmHg consistent with severely elevated right-sided pressures.  9. Mild to moderate tricuspid regurgitation. 10. Peak velocity across the aortic valve is 2.29 m/s with a peak gradient 21 mmHg and mean gradient 10 mmHg. Dimensionless index 0.40. Aortic valve area 1.26 cm2. Values consistent with mild aortic stenosis. 1-2+ aortic regurgitation. 11. Mild aortic valve regurgitation. 12. No previous available for comparison. QUANTITATIVE DATA SUMMARY: 2D MEASUREMENTS:                           Normal Ranges: Ao Root d:     3.50 cm    (2.0-3.7cm) LAs:           4.80 cm    (2.7-4.0cm) IVSd:          1.00 cm    (0.6-1.1cm) LVPWd:         1.20 cm    (0.6-1.1cm) LVIDd:         4.50 cm    (3.9-5.9cm) LVIDs:         3.80 cm LV Mass Index: 109.5 g/m2 LV % FS        15.6 % LA VOLUME:                               Normal Ranges: LA Vol A4C:        101.4 ml   (22+/-6mL/m2) LA Vol A2C:        90.6 ml LA Vol BP:         101.8 ml LA Vol Index A4C:  63.4ml/m2 LA Vol Index A2C:  56.6 ml/m2 LA Vol Index BP:   63.7 ml/m2 LA Area A4C:       28.9 cm2 LA Area A2C:       25.7 cm2 LA Major  Axis A4C: 7.0 cm LA Major Axis A2C: 6.2 cm LA Volume Index:   61.7 ml/m2 LA Vol A4C:        101.0 ml LA Vol A2C:        87.0 ml LA Vol Index BSA:  58.8 ml/m2 RA VOLUME BY A/L METHOD:                               Normal Ranges: RA Vol A4C:        53.5 ml    (8.3-19.5ml) RA Vol Index A4C:  33.4 ml/m2 RA Area A4C:       18.6 cm2 RA Major Axis A4C: 5.5 cm LV SYSTOLIC FUNCTION BY 2D PLANIMETRY (MOD):                      Normal Ranges: EF-A4C View:    56 % (>=55%) EF-A2C View:    44 % EF-Biplane:     50 % EF-Visual:      48 % LV EF Reported: 48 % LV DIASTOLIC FUNCTION:                         Normal Ranges: MV Peak E:    1.44 m/s  (0.7-1.2 m/s) MV Peak A:    0.91 m/s  (0.42-0.7 m/s) E/A Ratio:    1.59      (1.0-2.2) MV e'         0.084 m/s (>8.0) MV lateral e' 0.11 m/s MV medial e'  0.06 m/s E/e' Ratio:   17.07     (<8.0) MITRAL VALVE:                      Normal Ranges: MV Vmax:    1.48 m/s (<=1.3m/s) MV peak P.8 mmHg (<5mmHg) MV mean P.0 mmHg (<48mmHg) MV DT:      140 msec (150-240msec) MITRAL INSUFFICIENCY:                           Normal Ranges: PISA Radius:  0.7 cm MR VTI:       190.00 cm MR Vmax:      584.00 cm/s MR Alias Delbert: 39.9 cm/s MR Volume:    39.97 ml MR Flow Rt:   122.84 ml/s MR EROA:      21 mm2 AORTIC VALVE:                                    Normal Ranges: AoV Vmax:                2.29 m/s  (<=1.7m/s) AoV Peak P.0 mmHg (<20mmHg) AoV Mean PG:             10.0 mmHg (1.7-11.5mmHg) LVOT Max Delbert:            0.86 m/s  (<=1.1m/s) AoV VTI:                 44.90 cm  (18-25cm) LVOT VTI:                18.00 cm LVOT Diameter:           2.00 cm   (1.8-2.4cm) AoV Area, VTI:           1.26 cm2  (2.5-5.5cm2) AoV Area,Vmax:           1.18 cm2  (2.5-4.5cm2) AoV Dimensionless Index: 0.40 AORTIC INSUFFICIENCY: AI Vmax:       4.61 m/s AI Half-time:  309 msec AI Decel Rate: 437.00 cm/s2  RIGHT VENTRICLE: RV Basal 4.20 cm RV Mid   3.60 cm RV Major 6.4 cm TAPSE:   17.2 mm RV s'    0.13 m/s TRICUSPID  VALVE/RVSP:                             Normal Ranges: Peak TR Velocity: 4.51 m/s RV Syst Pressure: 84.4 mmHg (< 30mmHg) IVC Diam:         1.70 cm PULMONIC VALVE:                         Normal Ranges: PV Accel Time: 127 msec (>120ms) PV Max Delbert:    0.9 m/s  (0.6-0.9m/s) PV Max PG:     3.4 mmHg PV Mean P.0 mmHg PV VTI:        18.70 cm  [id number] Marbin Veronica MD, [facility] Electronically signed on 2024 at 3:55:53 PM  ** Final **     NM hepatobiliary    Result Date: 2024  Interpreted By:  Tate Raymond and Elsamaloty Mazzin STUDY: NM HEPATOBILIARY;  2024 1:50 pm   INDICATION: Signs/Symptoms:Abnormal gallbladder on ultrasound.   COMPARISON: Right upper quadrant ultrasound dated 2024.   ACCESSION NUMBER(S): [id number]   ORDERING CLINICIAN: PALMIRA LEONARD   TECHNIQUE: DIVISION OF NUCLEAR MEDICINE HEPATOBILIARY SCAN (HIDA)   The patient received an intravenous dose of  5.3 mCi of Tc-99m mebrofenin (Choletec).  Sequential images of the upper abdomen were then acquired over the next 120 minutes.   FINDINGS: There is prompt accumulation of activity within the liver and normal subsequent excretion via the biliary ductal system into the small bowel. The gallbladder is not visualized through 120 minutes of imaging.       Nonvisualization of the gallbladder through 120 minutes of imaging, which in the appropriate clinical setting is suggestive of cystic duct obstruction / acute cholecystitis. Please note that extended fasting can have a similar appearance.   I personally reviewed the images/study and I agree with the findings as stated by Khadar Medina MD (resident). This study was interpreted at [facility], [place], [place].   MACRO: None   Signed by: Tate Raymond 2024 2:18 PM Dictation workstation:   [id number]    US right upper quadrant    Result Date: 2024  Interpreted By:  Lizbeth Hay, STUDY: US RIGHT UPPER QUADRANT;  2024 2:40 pm   INDICATION:  Signs/Symptoms:abnormal ct, diarrhea.   COMPARISON: None.   ACCESSION NUMBER(S): [id number]   ORDERING CLINICIAN: AMBROSIO MARIA   TECHNIQUE: Multiple images of the right upper quadrant were obtained.   FINDINGS: LIVER: The liver measures 14.2 cm in longest axis. No definite focal nodules.     GALLBLADDER: The gallbladder is distended, and demonstrates multiple gallstones. No gallbladder wall thickening or surrounding fluid. The gallbladder wall thickness is 0.4 cm. Sonographic Hendrickson's sign is negative.     BILE DUCTS: No evidence of intra or extrahepatic biliary dilatation is identified; the common bile duct measures 1.0 cm.   PANCREAS: Pancreatic body and tail obscured by bowel gas and suboptimally evaluated.   RIGHT KIDNEY: The right kidney measures 10.4 cm in length. The renal cortical echogenicity and thickness are within normal limit.  No hydronephrosis or renal calculi are seen.       Distended gallbladder with gallstones and mild gallbladder wall thickening. No ultrasonic Hendrickson sign. Findings are suspicious for acute or chronic cholecystitis. Nonspecific prominence of the common bile duct.   MACRO: None   Signed by: Lizbeth Hay 8/7/2024 2:54 PM Dictation workstation:   [id number]    CT angio chest for pulmonary embolism    Result Date: 8/7/2024  Interpreted By:  Ada Saavedra, STUDY: CT ANGIO CHEST FOR PULMONARY EMBOLISM;  8/7/2024 1:27 pm   INDICATION: Signs/Symptoms:elevated d dimer, elevated trop, syncope.   COMPARISON: 03/04/2011   ACCESSION NUMBER(S): [id number]   ORDERING CLINICIAN: AMBROSIO MARIA   TECHNIQUE: CT of the chest was performed. Sagittal and coronal reconstructions were generated. 75 ML Omnipaque 350 intravenous contrast given for the examination.  Multiplanar reconstructions of the pulmonary vessels were created on an independent workstation and provided for review.   FINDINGS: Artifact related to overlying upper extremities and motion limits evaluation.   CHEST WALL AND LOWER NECK: No  significant axillary lymphadenopathy. 2 cm fat attenuation probable lipoma in the musculature anterior to the left shoulder.   MEDIASTINUM AND LUAN:  1.4 cm precarinal node with fatty hilum. 1.3 cm subcarinal node. Mild gaseous distention of the proximal esophagus.   HEART AND VESSELS:  Limited assessment for PE due to timing of IV contrast bolus and motion artifact. No central PE identified however limited assessment for basilar/peripheral PE. The heart is enlarged. Small pericardial effusion. No thoracic aortic aneurysm. Multifocal atherosclerotic calcifications including the coronary arteries.   LUNGS, PLEURA, LARGE AIRWAYS:  Respiratory motion artifact limits parenchymal evaluation. Mild bullous/emphysematous changes. Subtle interstitial opacities in both lungs. 1.3 cm right mid chest nodule image 130/299. Irregular 3.0 x 1.8 cm opacity with air bronchograms in the right lower lobe. Probable 1.7 cm nodular opacity in the right infrahilar region image 186/299. No significant pleural effusion. The central airways are patent.   UPPER ABDOMEN:  Distended gallbladder with questionable wall thickening and pericholecystic fluid or intramural edema.   BONES:  Kyphoscoliosis and degenerative changes of the thoracic spine.       No central PE however limited assessment for peripheral PE due to technical factors and motion artifact.   Several nodules/masses in the right lung in the setting of emphysema with mild mediastinal lymphadenopathy. Findings could reflect inflammatory or neoplastic/metastatic disease. Further evaluation recommended. Subtle background pulmonary heterogeneity could reflect acute or chronic interstitial disease, the former including interstitial pneumonia and edema.   Distended gallbladder with questionable wall thickening and pericholecystic fluid. Findings are concerning for acute cholecystitis. Clinical correlation recommended. Further evaluation with ultrasound may be helpful.   MACRO: None.    Signed by: Ada Saavedra 8/7/2024 1:56 PM Dictation workstation:   [id number]    CT head wo IV contrast    Result Date: 8/7/2024  Interpreted By:  Ada Saavedra, STUDY: CT HEAD WO IV CONTRAST;  8/7/2024 1:25 pm   INDICATION: Signs/Symptoms:dizziness.   COMPARISON: None.   ACCESSION NUMBER(S): [id number]   ORDERING CLINICIAN: AMBROSIO MARIA   TECHNIQUE: Unenhanced CT images of the head were obtained.   FINDINGS: Diffuse atrophy with enlargement of the extra-axial spaces, cerebral sulci and ventricular system. 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 Additional patchy periventricular white matter hypodensities bilaterally. No acute intracranial hemorrhage or mass-effect. No midline shift. No extra-axial fluid collection   No focal calvarial lesion. 14 mm partially calcified nodule in the left frontal scalp. 9 mm dense nodule in the left occipital scalp.   The visualized paranasal sinuses are clear.       No acute intracranial hemorrhage or mass-effect.   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   2 nonspecific scalp nodules.   MACRO: None.   Signed by: Ada Saavedra 8/7/2024 1:44 PM Dictation workstation:   [id number]    XR chest 1 view    Result Date: 8/7/2024  Interpreted By:  Lee Sahu, STUDY: XR CHEST 1 VIEW;  8/7/2024 12:31 pm   INDICATION: Signs/Symptoms:syncope.   COMPARISON: 04/18/2015   ACCESSION NUMBER(S): [id number]   ORDERING CLINICIAN: AMBROSIO MARIA   FINDINGS: CARDIOMEDIASTINAL SILHOUETTE AND VASCULATURE:   Cardiac size:  Within normal limits. Aortic shadow:  Within normal limits.   Mediastinal contours: Within normal limits.   Pulmonary vasculature:  The central vasculature is unremarkable   LUNGS: Lungs are clear.   ABDOMEN AND OTHER FINDINGS: No remarkable upper abdominal findings.   BONES: No acute osseous changes.       1.  No active cardiopulmonary disease.  There has  not been significant interval change from the prior exam.   Signed by: Lee Sahu 8/7/2024 12:41 PM Dictation workstation:   [id number]        Radiology:     No orders to display       Physical Exam     General Appearance: alert and oriented to person, place and time, in no acute distress  Cardiovascular: normal rate, occasional extrasystole regular rhythm, normal S1 and S2, 1/6 TR/MR murmur, no rubs, clicks, or gallops,  no JVD  Pulmonary/Chest: clear to auscultation bilaterally- no wheezes, rales or rhonchi, normal air movement, no respiratory distress  Abdomen: soft, non-tender, non-distended, normal bowel sounds, no masses   Extremities: no cyanosis, clubbing or edema  Skin: warm and dry, no rash or erythema  Eyes: EOMI  Neck: supple and non-tender without mass, no thyromegaly   Neurological: alert, oriented, normal speech, no focal findings or movement disorder noted  Vascular: Pulses 2+      Scribe Attestation  By signing my name below, I, Griselda Sinha MA, Scribe   attest that this documentation has been prepared under the direction and in the presence of Wilmer Corona MD.

## 2024-08-28 NOTE — PATIENT INSTRUCTIONS
START ASPIRIN 81 MG DAILY   LABS IN 1-2 WEEKS     Exercise diet weight loss program.     Stay plenty HYDRATED     Use My Chart portal for reviewing records, testing and contacting office.      Please bring all medicines, vitamins, and herbal supplements with you in original bottles to every appointment!!!!    Prescriptions will not be filled unless you are compliant with your follow up appointments or have a follow up appointment scheduled as per instruction of your physician. Refills should be requested at the time of your visit.

## 2024-08-30 ENCOUNTER — PATIENT OUTREACH (OUTPATIENT)
Dept: HOME HEALTH SERVICES | Age: 89
End: 2024-08-30
Payer: MEDICARE

## 2024-08-30 VITALS — DIASTOLIC BLOOD PRESSURE: 72 MMHG | SYSTOLIC BLOOD PRESSURE: 110 MMHG

## 2024-08-30 NOTE — PROGRESS NOTES
Daily call completed patient is doing well today her /72 she had an appointment with cardiology Wednesday only medication changes was [facility] no [facility] needs questions and concerns addressed

## 2024-08-31 ENCOUNTER — PATIENT OUTREACH (OUTPATIENT)
Dept: HOME HEALTH SERVICES | Age: 89
End: 2024-08-31

## 2024-08-31 ENCOUNTER — APPOINTMENT (OUTPATIENT)
Dept: CARE COORDINATION | Age: 89
End: 2024-08-31
Payer: MEDICARE

## 2024-08-31 NOTE — PROGRESS NOTES
Daily Call Note: [facility] weekly call not completed due to unsuccessful attempts to contact the patient and daughter Amada twice. A voicemail message was left requesting call back within 5 minutes to complete appointment, or follow up to reschedule the appointment.

## 2024-09-03 ENCOUNTER — PATIENT OUTREACH (OUTPATIENT)
Dept: HOME HEALTH SERVICES | Age: 89
End: 2024-09-03
Payer: MEDICARE

## 2024-09-12 ENCOUNTER — APPOINTMENT (OUTPATIENT)
Dept: CARDIOLOGY | Facility: CLINIC | Age: 89
End: 2024-09-12
Payer: MEDICARE

## 2024-09-12 DIAGNOSIS — I48.0 PAROXYSMAL A-FIB (MULTI): ICD-10-CM

## 2024-09-12 PROCEDURE — 93246 EXT ECG>7D<15D RECORDING: CPT | Performed by: INTERNAL MEDICINE

## 2024-09-12 PROCEDURE — 93248 EXT ECG>7D<15D REV&INTERPJ: CPT | Performed by: INTERNAL MEDICINE

## 2024-09-13 ENCOUNTER — PATIENT OUTREACH (OUTPATIENT)
Dept: CARE COORDINATION | Facility: CLINIC | Age: 89
End: 2024-09-13
Payer: MEDICARE

## 2024-09-13 NOTE — PROGRESS NOTES
First attempt made to reach patient for care management and no contact made with patient. Left voicemail with my name and contact information.     KELLEE Donahue, RN   [phone]   AC[facility] Department

## 2024-09-26 ENCOUNTER — PATIENT OUTREACH (OUTPATIENT)
Dept: CARE COORDINATION | Facility: CLINIC | Age: 89
End: 2024-09-26
Payer: MEDICARE

## 2024-09-26 NOTE — PROGRESS NOTES
Second attempt made to reach patient for transitions of care outreach call  and no contact made with patient. Left voicemail message with my name and contact information. Will continue patient in transtions of care and outreach.     KELLEE Donahue, RN   [phone]   [facility] Department

## 2024-10-10 ENCOUNTER — APPOINTMENT (OUTPATIENT)
Dept: CARDIOLOGY | Facility: CLINIC | Age: 89
End: 2024-10-10
Payer: MEDICARE

## 2024-10-10 ENCOUNTER — PATIENT OUTREACH (OUTPATIENT)
Dept: CARE COORDINATION | Facility: CLINIC | Age: 89
End: 2024-10-10

## 2024-10-10 VITALS
HEART RATE: 79 BPM | BODY MASS INDEX: 27.05 KG/M2 | WEIGHT: 137.8 LBS | DIASTOLIC BLOOD PRESSURE: 70 MMHG | SYSTOLIC BLOOD PRESSURE: 144 MMHG | HEIGHT: 60 IN

## 2024-10-10 DIAGNOSIS — I48.0 PAROXYSMAL A-FIB (MULTI): ICD-10-CM

## 2024-10-10 DIAGNOSIS — I10 HYPERTENSION, UNSPECIFIED TYPE: ICD-10-CM

## 2024-10-10 PROCEDURE — 99214 OFFICE O/P EST MOD 30 MIN: CPT | Performed by: INTERNAL MEDICINE

## 2024-10-10 PROCEDURE — 3077F SYST BP >= 140 MM HG: CPT | Performed by: INTERNAL MEDICINE

## 2024-10-10 PROCEDURE — G2211 COMPLEX E/M VISIT ADD ON: HCPCS | Performed by: INTERNAL MEDICINE

## 2024-10-10 PROCEDURE — 3078F DIAST BP <80 MM HG: CPT | Performed by: INTERNAL MEDICINE

## 2024-10-10 PROCEDURE — 1159F MED LIST DOCD IN RCRD: CPT | Performed by: INTERNAL MEDICINE

## 2024-10-10 RX ORDER — LOSARTAN POTASSIUM 25 MG/1
25 TABLET ORAL 2 TIMES DAILY
Qty: 180 TABLET | Refills: 3 | Status: SHIPPED | OUTPATIENT
Start: 2024-10-10 | End: 2025-10-10

## 2024-10-10 NOTE — PATIENT INSTRUCTIONS
INCREASE LOSARTAN 25MG DAILY   LABS IN 2 WEEKS- [facility]  FOLLOW UP WITH BIRGIT IN 2 MONTHS   FOLLOW UP DR. FINNEY 6 MONTHS     Exercise diet weight loss program.     Stay plenty HYDRATED     Use My Chart portal for reviewing records, testing and contacting office.      Please bring all medicines, vitamins, and herbal supplements with you in original bottles to every appointment!!    DID YOU KNOW  We have a pharmacy here in the [facility].  They can fill all prescriptions, not just cardiac medications.  Prescriptions from other pharmacies can easily be transferred to the  pharmacy by the  pharmacist on site.   pharmacies offer FREE HOME DELIVERY on medications.   Typically prescriptions can be ready in 10 - 15 minutes. Pharmacy phone # [phone].    Prescriptions will not be filled unless you are compliant with your follow up appointments or have a follow up appointment scheduled as per instruction of your physician. Refills should be requested at the time of your visit.

## 2024-10-10 NOTE — PROGRESS NOTES
Patient:  Meryl Hester  YOB: 1931  MRN: [id number]       Impression/Plan:     Diagnoses and all orders for this visit:  Paroxysmal A-fib (Multi)  -    She is currently quite comfortable without dyspnea without palpitation  -    When she was in [facility] earlier this year required IV amiodarone to stabilize rapid atrial fibrillation in the setting of acute GI bleed and hypotension.  Fortunately since that time she has had no recurrence but at high risk of such recurrence indeed Holter does show 1 to 2% of asymptomatic atrial fibrillation.  -     Continue current dose of bisoprolol cannot have full anticoagulation burden of A-fib is fairly low and would not justify at least at this point institution of antiarrhythmic therapy  -     She would greatly benefit with the risk reduction afforded by a Watchman device and she is scheduled to see Dr. Chris Joyce on October 23 have encouraged her to keep that appointment.  She had very severe GI bleeding in the past and would not trial full anticoagulation  Hypertension, unspecified type  -     losartan (Cozaar) 25 mg tablet; Take 1 tablet (25 mg) by mouth 2 times a day.  -   Suboptimally controlled here and at home as well were pressures range from 1 30-1 60 therefore increase losartan to 50 with a BMP in 2 weeks      Chief Complaint/Active Symptoms:       Meryl Hester is a 89 y.o. female who presents with hypertension, paroxysmal atrial fibrillation, CVA February 2024 secondary to atrial fibrillation, severe and diffuse diverticulosis with recurrent GI bleeding, coronary artery disease with diffuse 50 to 60% stenosis at coronary angiography August 2024.  She had been admitted to [facility] in late July with recurrent GI bleeding rectal bleeding.  During that hospital stay had multiple runs of rapid atrial fibrillation treated transiently with intravenous amiodarone but was not kept on that agent.  Had to undergo 2 colonoscopies was  discharged the day after the second colonoscopy.  Was admitted to [facility] there was syncopal episode felt related to volume depletion.  She was hydrated and improved in symptoms but troponins were elevated and echo suggested right ventricular systolic pressure over 70 mmHg.  In light of these findings she underwent coronary angiography as delineated below that did show 50 to 60% diffuse disease in 190% lesion in a small ramus.  EF 40% with RVSP found to be just 43 mmHg. .      Holter Monitor  1.  Patient with symptoms correlating to PACs but very infrequent symptoms  2.  Predominantly sinus rhythm with average heart rate 74 bpm normal heart rate variability  3.  1 to 2% burden atrial fibrillation.  Computer reading had identified 50 episodes of SVT but on review of tracings most consistent with atrial fibrillation longest atrial fibrillation was 1 hour with an average rate of 99.  4.  2 episodes of probable ventricular tachycardia with wide-complex minimally irregular rates 5-10 beats.  Cannot exclude A-fib with aberrancy  5.  No evidence of heart block    She denies angina, dyspnea, palpitation, edema, lightheadedness or syncope.  She has had no symptoms of claudication or neurologic deterioration.  There have been no hospitalizations or emergency room visits since last office visit.                   Review of Systems: Unremarkable except as noted above    Meds     Current Outpatient Medications   Medication Instructions    acetaminophen (TYLENOL) 650 mg, oral, Every 6 hours PRN    aspirin 81 mg, oral, Daily    bisoprolol (ZEBETA) 7.5 mg, oral, Daily RT    calcium carbonate-vitamin D3 500 mg-5 mcg (200 unit) tablet 1 tablet, oral, Daily    carboxymethylcellulose (THERATears) 0.25 % ophthalmic solution 1 drop, Both Eyes, 3 times daily PRN    docusate sodium (COLACE) 100 mg, oral, 2 times daily    DULoxetine (CYMBALTA) 30 mg, oral, Daily RT    fluticasone (Flonase) 50 mcg/actuation nasal spray 1 spray, Each Nostril,  Daily PRN    furosemide (LASIX) 20 mg, oral, Daily RT    gabapentin (NEURONTIN) 300 mg, oral, Nightly    krill oil 500 mg, oral, 2 times daily    LACTOBACILLUS ACIDOPHILUS ORAL 1 tablet, oral, Daily    levothyroxine (SYNTHROID, LEVOXYL) 88 mcg, oral, Daily RT    losartan (COZAAR) 25 mg, oral, Daily    magnesium oxide 500 mg, oral, Daily    multivitamin with minerals iron-free (Centrum Silver) 1 tablet, oral, Daily    mv-min-FA-vit K-lutein-zeaxant (PreserVision AREDS 2 Plus MV) 200 mcg-15 mcg- 5 mg-1 mg capsule 1 capsule, oral, 2 times daily    omeprazole (PRILOSEC) 40 mg, oral, Daily RT    potassium chloride CR 20 mEq ER tablet 20 mEq, oral, Daily RT    rosuvastatin (CRESTOR) 20 mg, oral, Nightly        Allergies     Allergies   Allergen Reactions    Lisinopril Cough    Sulfa (Sulfonamide Antibiotics) Unknown         Annotated Problems     Specialty Problems          Cardiology Problems    Acute systolic CHF (congestive heart failure), NYHA class 2    Cardiomyopathy, ischemic    Coronary artery disease involving native coronary artery of native heart without angina pectoris    Paroxysmal A-fib (Multi)    NSTEMI (non-ST elevated myocardial infarction) (Multi)        Problem List     Patient Active Problem List    Diagnosis Date Noted    Acute systolic CHF (congestive heart failure), NYHA class 2 08/28/2024    Cardiomyopathy, ischemic 08/28/2024    Coronary artery disease involving native coronary artery of native heart without angina pectoris 08/28/2024    Paroxysmal A-fib (Multi) 08/28/2024    Syncope, unspecified syncope type 08/08/2024    Syncope and collapse 08/07/2024    Radiculopathy of lumbar region 01/03/2024    Joint stiffness of spine 01/03/2024    Gait difficulty 01/03/2024    NSTEMI (non-ST elevated myocardial infarction) (Multi) 08/07/2024    Pulmonary hypertension (Multi) 08/07/2024       Objective:     Vitals:    10/10/24 1244   BP: 144/70   BP Location: Left arm   Patient Position: Sitting   Pulse:  "79   Weight: 62.5 kg (137 lb 12.8 oz)   Height: 1.511 m (4' 11.5\")      Wt Readings from Last 4 Encounters:   10/10/24 62.5 kg (137 lb 12.8 oz)   08/28/24 61.2 kg (135 lb)   08/07/24 65.2 kg (143 lb 11.8 oz)           LAB:     Lab Results   Component Value Date    WBC 8.1 08/10/2024    HGB 8.2 (L) 08/10/2024    HCT 25.9 (L) 08/10/2024     08/10/2024    ALT 9 08/08/2024    AST 14 08/08/2024     08/10/2024    K 4.1 08/10/2024     08/10/2024    CREATININE 0.60 08/10/2024    BUN 4 (L) 08/10/2024    CO2 31 08/10/2024    INR 1.1 08/07/2024    HGBA1C 5.2 09/03/2024       Diagnostic Studies:     CT-CT ANGIO CHEST FOR PULMONARY EMBOLISM IMPORT    Result Date: 8/14/2024  Images were obtained outside of [facility]    ECG 12 lead    Result Date: 8/9/2024  Sinus rhythm with Premature atrial complexes Left axis deviation Right bundle branch block Minimal voltage criteria for LVH, may be normal variant Abnormal ECG When compared with ECG of 07-AUG-2024 12:18, (unconfirmed) Premature atrial complexes are now Present See ED provider note for full interpretation and clinical correlation Confirmed by Rosana Diaz ([id number]) on 8/9/2024 11:45:12 AM    ECG 12 lead    Result Date: 8/9/2024  Normal sinus rhythm Left axis deviation Right bundle branch block Minimal voltage criteria for LVH, may be normal variant Abnormal ECG When compared with ECG of 19-APR-2015 04:13, Premature ventricular complexes are no longer Present Right bundle branch block is now Present See ED provider note for full interpretation and clinical correlation Confirmed by Rosana Diaz ([id number]) on 8/9/2024 11:36:48 AM    Transthoracic Echo (TTE) Complete    Result Date: 8/8/2024          [place] [address]  Tel [phone] Fax [phone] TRANSTHORACIC ECHOCARDIOGRAM REPORT Patient Name:      ANNA SINGH         Reading Physician:    [id number] Marbin                                                "                Jeremías PICHARDO, [facility] Study Date:        8/8/2024             Ordering Provider:    [id number] PAZ FINNEY MRN/PID:           [id number]             Fellow: Accession#:        [id number]         Nurse: Date of Birth/Age: 4/16/1931 / 89 years Sonographer:          Jodi Peterson RDCS Gender:            F                    Additional Staff: Height:            149.86 cm            Admit Date:           8/7/2024 Weight:            64.86 kg             Admission Status:     Inpatient -                                                               Routine BSA / BMI:         1.60 m2 / 28.88      Department Location:  [facility]/m2                                      Echo Lab Blood Pressure: 144 /67 mmHg Study Type:    TRANSTHORACIC ECHO (TTE) COMPLETE Diagnosis/ICD: Unspecified atrial fibrillation-I48.91; Elevated Troponin-R79.89;                Hypotension, unspecified-I95.9 Patient History: Pertinent History: HTN, Hyperlipidemia, A-Fib, CHF, CVA and Elevated Troponin,                    Hypotension, Hx Syncope. Study Detail: The following Echo studies were performed: 2D, M-Mode, Doppler and               color flow.  PHYSICIAN INTERPRETATION: Left Ventricle: Left ventricular ejection fraction is mildly decreased, by visual estimate at 45-50%. There are no regional wall motion abnormalities. The left ventricular cavity size is normal. The left ventricular septal wall thickness is normal. There is mildly increased left ventricular posterior wall thickness. Spectral Doppler shows a pseudonormal pattern of left ventricular diastolic filling. Left Atrium: The left atrium is severely dilated. Left atrial volume index 63.1 mL/m2. Right Ventricle: The right ventricle is mildly enlarged. There is mildly reduced right ventricular systolic function. Right Atrium: The right atrium is normal in size. Aortic Valve: The aortic valve is  trileaflet. There is mild to moderate aortic valve cusp calcification. The aortic valve dimensionless index is 0.40. There is mild aortic valve regurgitation. The peak instantaneous gradient of the aortic valve is 21.0 mmHg. The mean gradient of the aortic valve is 10.0 mmHg. Peak velocity across the aortic valve is 2.29 m/s with a peak gradient 21 mmHg and mean gradient 10 mmHg. Dimensionless index 0.40. Aortic valve area 1.26 cm2. Values consistent with mild aortic stenosis. 1-2+ aortic regurgitation. Mitral Valve: The mitral valve is mildly thickened. There is mild to moderate mitral valve regurgitation. The mitral regurgitant orifice area is 21 mm2. The mitral regurgitant volume is 39.97 ml. 2+ mitral regurgitation. MR ERO is 0.21cm2. MR Volume 40 ml. Tricuspid Valve: The tricuspid valve is structurally normal. There is mild to moderate tricuspid regurgitation. 2+ tricuspid regurgitation with estimated RVSP of 71mmHg consistent with severely elevated right-sided pressures. Pulmonic Valve: The pulmonic valve is structurally normal. There is mild pulmonic valve regurgitation. Pericardium: There is no pericardial effusion noted. Aorta: The aortic root is normal. Systemic Veins: The inferior vena cava appears to be of normal size. There is IVC inspiratory collapse greater than 50%.  CONCLUSIONS:  1. Left ventricular ejection fraction is mildly decreased, by visual estimate at 45-50%.  2. Spectral Doppler shows a pseudonormal pattern of left ventricular diastolic filling.  3. There is mildly reduced right ventricular systolic function.  4. Mildly enlarged right ventricle.  5. The left atrium is severely dilated.  6. 2+ mitral regurgitation.     MR ERO is 0.21cm2.     MR Volume 40 ml.  7. Mild to moderate mitral valve regurgitation.  8. 2+ tricuspid regurgitation with estimated RVSP of 71mmHg consistent with severely elevated right-sided pressures.  9. Mild to moderate tricuspid regurgitation. 10. Peak velocity across  the aortic valve is 2.29 m/s with a peak gradient 21 mmHg and mean gradient 10 mmHg. Dimensionless index 0.40. Aortic valve area 1.26 cm2. Values consistent with mild aortic stenosis. 1-2+ aortic regurgitation. 11. Mild aortic valve regurgitation. 12. No previous available for comparison. QUANTITATIVE DATA SUMMARY: 2D MEASUREMENTS:                           Normal Ranges: Ao Root d:     3.50 cm    (2.0-3.7cm) LAs:           4.80 cm    (2.7-4.0cm) IVSd:          1.00 cm    (0.6-1.1cm) LVPWd:         1.20 cm    (0.6-1.1cm) LVIDd:         4.50 cm    (3.9-5.9cm) LVIDs:         3.80 cm LV Mass Index: 109.5 g/m2 LV % FS        15.6 % LA VOLUME:                               Normal Ranges: LA Vol A4C:        101.4 ml   (22+/-6mL/m2) LA Vol A2C:        90.6 ml LA Vol BP:         101.8 ml LA Vol Index A4C:  63.4ml/m2 LA Vol Index A2C:  56.6 ml/m2 LA Vol Index BP:   63.7 ml/m2 LA Area A4C:       28.9 cm2 LA Area A2C:       25.7 cm2 LA Major Axis A4C: 7.0 cm LA Major Axis A2C: 6.2 cm LA Volume Index:   61.7 ml/m2 LA Vol A4C:        101.0 ml LA Vol A2C:        87.0 ml LA Vol Index BSA:  58.8 ml/m2 RA VOLUME BY A/L METHOD:                               Normal Ranges: RA Vol A4C:        53.5 ml    (8.3-19.5ml) RA Vol Index A4C:  33.4 ml/m2 RA Area A4C:       18.6 cm2 RA Major Axis A4C: 5.5 cm LV SYSTOLIC FUNCTION BY 2D PLANIMETRY (MOD):                      Normal Ranges: EF-A4C View:    56 % (>=55%) EF-A2C View:    44 % EF-Biplane:     50 % EF-Visual:      48 % LV EF Reported: 48 % LV DIASTOLIC FUNCTION:                         Normal Ranges: MV Peak E:    1.44 m/s  (0.7-1.2 m/s) MV Peak A:    0.91 m/s  (0.42-0.7 m/s) E/A Ratio:    1.59      (1.0-2.2) MV e'         0.084 m/s (>8.0) MV lateral e' 0.11 m/s MV medial e'  0.06 m/s E/e' Ratio:   17.07     (<8.0) MITRAL VALVE:                      Normal Ranges: MV Vmax:    1.48 m/s (<=1.3m/s) MV peak P.8 mmHg (<5mmHg) MV mean P.0 mmHg (<48mmHg) MV DT:      140 msec  (150-240msec) MITRAL INSUFFICIENCY:                           Normal Ranges: PISA Radius:  0.7 cm MR VTI:       190.00 cm MR Vmax:      584.00 cm/s MR Alias Delbert: 39.9 cm/s MR Volume:    39.97 ml MR Flow Rt:   122.84 ml/s MR EROA:      21 mm2 AORTIC VALVE:                                    Normal Ranges: AoV Vmax:                2.29 m/s  (<=1.7m/s) AoV Peak P.0 mmHg (<20mmHg) AoV Mean PG:             10.0 mmHg (1.7-11.5mmHg) LVOT Max Delbert:            0.86 m/s  (<=1.1m/s) AoV VTI:                 44.90 cm  (18-25cm) LVOT VTI:                18.00 cm LVOT Diameter:           2.00 cm   (1.8-2.4cm) AoV Area, VTI:           1.26 cm2  (2.5-5.5cm2) AoV Area,Vmax:           1.18 cm2  (2.5-4.5cm2) AoV Dimensionless Index: 0.40 AORTIC INSUFFICIENCY: AI Vmax:       4.61 m/s AI Half-time:  309 msec AI Decel Rate: 437.00 cm/s2  RIGHT VENTRICLE: RV Basal 4.20 cm RV Mid   3.60 cm RV Major 6.4 cm TAPSE:   17.2 mm RV s'    0.13 m/s TRICUSPID VALVE/RVSP:                             Normal Ranges: Peak TR Velocity: 4.51 m/s RV Syst Pressure: 84.4 mmHg (< 30mmHg) IVC Diam:         1.70 cm PULMONIC VALVE:                         Normal Ranges: PV Accel Time: 127 msec (>120ms) PV Max Delbert:    0.9 m/s  (0.6-0.9m/s) PV Max PG:     3.4 mmHg PV Mean P.0 mmHg PV VTI:        18.70 cm  [id number] Marbin Veronica MD, FACC Electronically signed on 2024 at 3:55:53 PM  ** Final **     NM hepatobiliary    Result Date: 2024  Interpreted By:  Tate Raymond and Elsamaloty Mazzin STUDY: NM HEPATOBILIARY;  2024 1:50 pm   INDICATION: Signs/Symptoms:Abnormal gallbladder on ultrasound.   COMPARISON: Right upper quadrant ultrasound dated 2024.   ACCESSION NUMBER(S): [id number]   ORDERING CLINICIAN: PALMIRA LEONARD   TECHNIQUE: DIVISION OF NUCLEAR MEDICINE HEPATOBILIARY SCAN (HIDA)   The patient received an intravenous dose of  5.3 mCi of Tc-99m mebrofenin (Choletec).  Sequential images of the upper abdomen were then  acquired over the next 120 minutes.   FINDINGS: There is prompt accumulation of activity within the liver and normal subsequent excretion via the biliary ductal system into the small bowel. The gallbladder is not visualized through 120 minutes of imaging.       Nonvisualization of the gallbladder through 120 minutes of imaging, which in the appropriate clinical setting is suggestive of cystic duct obstruction / acute cholecystitis. Please note that extended fasting can have a similar appearance.   I personally reviewed the images/study and I agree with the findings as stated by Khadar Medina MD (resident). This study was interpreted at [place], [place].   MACRO: None   Signed by: Tate Raymond 8/8/2024 2:18 PM Dictation workstation:   [id number]    US right upper quadrant    Result Date: 8/7/2024  Interpreted By:  Lizbeth Hay, STUDY: US RIGHT UPPER QUADRANT;  8/7/2024 2:40 pm   INDICATION: Signs/Symptoms:abnormal ct, diarrhea.   COMPARISON: None.   ACCESSION NUMBER(S): [id number]   ORDERING CLINICIAN: AMBROSIO MARIA   TECHNIQUE: Multiple images of the right upper quadrant were obtained.   FINDINGS: LIVER: The liver measures 14.2 cm in longest axis. No definite focal nodules.     GALLBLADDER: The gallbladder is distended, and demonstrates multiple gallstones. No gallbladder wall thickening or surrounding fluid. The gallbladder wall thickness is 0.4 cm. Sonographic Hendrickson's sign is negative.     BILE DUCTS: No evidence of intra or extrahepatic biliary dilatation is identified; the common bile duct measures 1.0 cm.   PANCREAS: Pancreatic body and tail obscured by bowel gas and suboptimally evaluated.   RIGHT KIDNEY: The right kidney measures 10.4 cm in length. The renal cortical echogenicity and thickness are within normal limit.  No hydronephrosis or renal calculi are seen.       Distended gallbladder with gallstones and mild gallbladder wall thickening. No  ultrasonic Hendrickson sign. Findings are suspicious for acute or chronic cholecystitis. Nonspecific prominence of the common bile duct.   MACRO: None   Signed by: Lizbeth Hay 8/7/2024 2:54 PM Dictation workstation:   [id number]    CT angio chest for pulmonary embolism    Result Date: 8/7/2024  Interpreted By:  Ada Saavedra, STUDY: CT ANGIO CHEST FOR PULMONARY EMBOLISM;  8/7/2024 1:27 pm   INDICATION: Signs/Symptoms:elevated d dimer, elevated trop, syncope.   COMPARISON: 03/04/2011   ACCESSION NUMBER(S): [id number]   ORDERING CLINICIAN: AMBROSIO MARIA   TECHNIQUE: CT of the chest was performed. Sagittal and coronal reconstructions were generated. 75 ML Omnipaque 350 intravenous contrast given for the examination.  Multiplanar reconstructions of the pulmonary vessels were created on an independent workstation and provided for review.   FINDINGS: Artifact related to overlying upper extremities and motion limits evaluation.   CHEST WALL AND LOWER NECK: No significant axillary lymphadenopathy. 2 cm fat attenuation probable lipoma in the musculature anterior to the left shoulder.   MEDIASTINUM AND LUAN:  1.4 cm precarinal node with fatty hilum. 1.3 cm subcarinal node. Mild gaseous distention of the proximal esophagus.   HEART AND VESSELS:  Limited assessment for PE due to timing of IV contrast bolus and motion artifact. No central PE identified however limited assessment for basilar/peripheral PE. The heart is enlarged. Small pericardial effusion. No thoracic aortic aneurysm. Multifocal atherosclerotic calcifications including the coronary arteries.   LUNGS, PLEURA, LARGE AIRWAYS:  Respiratory motion artifact limits parenchymal evaluation. Mild bullous/emphysematous changes. Subtle interstitial opacities in both lungs. 1.3 cm right mid chest nodule image 130/299. Irregular 3.0 x 1.8 cm opacity with air bronchograms in the right lower lobe. Probable 1.7 cm nodular opacity in the right infrahilar region image 186/299. No  significant pleural effusion. The central airways are patent.   UPPER ABDOMEN:  Distended gallbladder with questionable wall thickening and pericholecystic fluid or intramural edema.   BONES:  Kyphoscoliosis and degenerative changes of the thoracic spine.       No central PE however limited assessment for peripheral PE due to technical factors and motion artifact.   Several nodules/masses in the right lung in the setting of emphysema with mild mediastinal lymphadenopathy. Findings could reflect inflammatory or neoplastic/metastatic disease. Further evaluation recommended. Subtle background pulmonary heterogeneity could reflect acute or chronic interstitial disease, the former including interstitial pneumonia and edema.   Distended gallbladder with questionable wall thickening and pericholecystic fluid. Findings are concerning for acute cholecystitis. Clinical correlation recommended. Further evaluation with ultrasound may be helpful.   MACRO: None.   Signed by: Ada Saavedra 8/7/2024 1:56 PM Dictation workstation:   [id number]    CT head wo IV contrast    Result Date: 8/7/2024  Interpreted By:  Ada Saavedra, STUDY: CT HEAD WO IV CONTRAST;  8/7/2024 1:25 pm   INDICATION: Signs/Symptoms:dizziness.   COMPARISON: None.   ACCESSION NUMBER(S): [id number]   ORDERING CLINICIAN: AMBROSIO MARIA   TECHNIQUE: Unenhanced CT images of the head were obtained.   FINDINGS: Diffuse atrophy with enlargement of the extra-axial spaces, cerebral sulci and ventricular system. 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 Additional patchy periventricular white matter hypodensities bilaterally. No acute intracranial hemorrhage or mass-effect. No midline shift. No extra-axial fluid collection   No focal calvarial lesion. 14 mm partially calcified nodule in the left frontal scalp. 9 mm dense nodule in the left occipital scalp.   The visualized paranasal sinuses are  clear.       No acute intracranial hemorrhage or mass-effect.   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   2 nonspecific scalp nodules.   MACRO: None.   Signed by: Ada Saavedra 8/7/2024 1:44 PM Dictation workstation:   [id number]    XR chest 1 view    Result Date: 8/7/2024  Interpreted By:  Lee Sahu, STUDY: XR CHEST 1 VIEW;  8/7/2024 12:31 pm   INDICATION: Signs/Symptoms:syncope.   COMPARISON: 04/18/2015   ACCESSION NUMBER(S): [id number]   ORDERING CLINICIAN: AMBROSIO MARIA   FINDINGS: CARDIOMEDIASTINAL SILHOUETTE AND VASCULATURE:   Cardiac size:  Within normal limits. Aortic shadow:  Within normal limits.   Mediastinal contours: Within normal limits.   Pulmonary vasculature:  The central vasculature is unremarkable   LUNGS: Lungs are clear.   ABDOMEN AND OTHER FINDINGS: No remarkable upper abdominal findings.   BONES: No acute osseous changes.       1.  No active cardiopulmonary disease.  There has not been significant interval change from the prior exam.   Signed by: Lee Sahu 8/7/2024 12:41 PM Dictation workstation:   [id number]        Radiology:     No orders to display       Physical Exam     General Appearance: alert and oriented to person, place and time, in no acute distress  Cardiovascular: normal rate, regular rhythm, normal S1 and S2, no murmurs, rubs, clicks, or gallops,  no JVD  Pulmonary/Chest: clear to auscultation bilaterally- no wheezes, rales or rhonchi, normal air movement, no respiratory distress  Abdomen: soft, non-tender, non-distended, normal bowel sounds, no masses   Extremities: no cyanosis, clubbing or edema  Skin: warm and dry, no rash or erythema  Eyes: EOMI  Neck: supple and non-tender without mass, no thyromegaly   Neurological: alert, oriented, normal speech, no focal findings or movement disorder noted  Vascular:

## 2024-10-21 NOTE — PROGRESS NOTES
Cardio: Dr. Chloe RUCKER was asked by Dr. Corona to evaluate this patient in consultation for evaluation of left atrial appendage closure.    The patient is a 89-year-old female with coronary artery disease including ischemic cardiomyopathy and history of non-ST elevation myocardial infarction, pulmonary hypertension, right heart failure with mildly reduced ejection fraction, LVEF 45%, and paroxysmal atrial fibrillation.      She had a stroke in February 2024 and was placed on Eliquis. Patient is now on aspirin-alone strategy.  She has a history of gait instability, syncope and also lower GI bleeding.  She has had a recent complicated medical course.    In July she was admitted to [facility] with recurrent GI bleeding from rectal bleeding.  During that stay she had multiple runs of atrial fibrillation treated with intravenous amiodarone.  She had a subsequent admission to [facility] from syncope and volume depletion.    Finally the patient has history of gait instability including falling.  In June she fell after tripping, broke her toe and had facial trauma/head trauma.  She was evaluated in the emergency room.    Given the patient's significant gait instability, fall risk, syncope and history of recent GI bleeding,  the patient is referred for consideration of left atrial appendage closure for stroke risk reduction.       ROS:  Constitutional: no fatigue, no fevers, no body aches  Eyes: no acute eye problems, no blurred vision, no diplopia, no eye pain  ENT:  no acute hearing loss, no earache, no sore throat  Cardiovascular: dyspnea on exertion, no chest pain  Respiratory: no chronic cough, not coughing up sputum, no wheezing that is consistent with asthma  Gastrointestinal: no acute bowel complaints  Musculoskeletal: no acute arthralgias, no acute myalgias, no acute joint swelling  Skin: no skin rashes, no change in skin color and pigmentation, no skin lesions and no skin lumps.   Neurological: no  headaches, no dizziness, no tingling, no fainting and no limb weakness.   Psychiatric:  no suicidal ideation, no confusion, no personality change and no emotional problems.   Hematologic/Lymphatic: no bleeding issues, other then mentioned in HPI  All other systems have been reviewed and are negative for complaint.     Physical Exam:     Visit Vitals  Smoking Status Former        Constitutional: alert and in no acute distress.   Eyes: no erythema, swelling or discharge from the eye .   Ears, Nose, Mouth, and Throat: external inspection of ears and nose is normal , lips, teeth, and gums are normal with good dentition  and oropharynx normal with no erythema, edema, exudate or lesions .   Neck: neck is supple, symmetric, trachea midline, no masses  and no thyromegaly .   Pulmonary: no increased work of breathing or signs of respiratory distress , lungs clear to auscultation. , normal percussion of chest  and chest palpation normal .   Cardiovascular: RRR, soft systolic ejection murmur,  no leg edema, non-displaced PMI, no S3 or S4  Abdomen: abdomen non-tender, no masses  and no hepatomegaly .           Skin:  no skin lesions          Neurologic: non-focal neurologic examination.      Psychiatric judgment and insight is normal , oriented to person, place and time , normal mood and affect .       Labs:    Results for orders placed or performed during the hospital encounter of 08/07/24   ECG 12 lead    Collection Time: 08/07/24 12:19 PM   Result Value Ref Range    Ventricular Rate 80 BPM    Atrial Rate 80 BPM    GA Interval 138 ms    QRS Duration 130 ms    QT Interval 424 ms    QTC Calculation(Bazett) 489 ms    P Axis 98 degrees    R Axis -33 degrees    T Axis -32 degrees    QRS Count 13 beats    Q Onset 224 ms    P Onset 155 ms    P Offset 192 ms    T Offset 436 ms    QTC Fredericia 467 ms   CBC and Auto Differential    Collection Time: 08/07/24 12:23 PM   Result Value Ref Range    WBC 9.1 4.4 - 11.3 x10*3/uL    nRBC 0.0  0.0 - 0.0 /100 WBCs    RBC 2.76 (L) 4.00 - 5.20 x10*6/uL    Hemoglobin 8.2 (L) 12.0 - 16.0 g/dL    Hematocrit 26.0 (L) 36.0 - 46.0 %    MCV 94 80 - 100 fL    MCH 29.7 26.0 - 34.0 pg    MCHC 31.5 (L) 32.0 - 36.0 g/dL    RDW 14.9 (H) 11.5 - 14.5 %    Platelets 232 150 - 450 x10*3/uL    Neutrophils % 70.0 40.0 - 80.0 %    Immature Granulocytes %, Automated 0.4 0.0 - 0.9 %    Lymphocytes % 18.8 13.0 - 44.0 %    Monocytes % 8.5 2.0 - 10.0 %    Eosinophils % 1.9 0.0 - 6.0 %    Basophils % 0.4 0.0 - 2.0 %    Neutrophils Absolute 6.38 (H) 1.60 - 5.50 x10*3/uL    Immature Granulocytes Absolute, Automated 0.04 0.00 - 0.50 x10*3/uL    Lymphocytes Absolute 1.71 0.80 - 3.00 x10*3/uL    Monocytes Absolute 0.77 0.05 - 0.80 x10*3/uL    Eosinophils Absolute 0.17 0.00 - 0.40 x10*3/uL    Basophils Absolute 0.04 0.00 - 0.10 x10*3/uL   Comprehensive Metabolic Panel    Collection Time: 08/07/24 12:23 PM   Result Value Ref Range    Glucose 167 (H) 74 - 99 mg/dL    Sodium 137 136 - 145 mmol/L    Potassium 3.3 (L) 3.5 - 5.3 mmol/L    Chloride 106 98 - 107 mmol/L    Bicarbonate 23 21 - 32 mmol/L    Anion Gap 11 10 - 20 mmol/L    Urea Nitrogen 7 6 - 23 mg/dL    Creatinine 0.83 0.50 - 1.05 mg/dL    eGFR 66 >60 mL/min/1.73m*2    Calcium 8.2 (L) 8.6 - 10.3 mg/dL    Albumin 3.3 (L) 3.4 - 5.0 g/dL    Alkaline Phosphatase 80 33 - 136 U/L    Total Protein 5.5 (L) 6.4 - 8.2 g/dL    AST 15 9 - 39 U/L    Bilirubin, Total 0.6 0.0 - 1.2 mg/dL    ALT 11 7 - 45 U/L   Magnesium    Collection Time: 08/07/24 12:23 PM   Result Value Ref Range    Magnesium 1.90 1.60 - 2.40 mg/dL   Lipase    Collection Time: 08/07/24 12:23 PM   Result Value Ref Range    Lipase 9 9 - 82 U/L   B-Type Natriuretic Peptide    Collection Time: 08/07/24 12:23 PM   Result Value Ref Range     (H) 0 - 99 pg/mL   D-Dimer, Quantitative VTE Exclusion    Collection Time: 08/07/24 12:23 PM   Result Value Ref Range    D-Dimer, Quantitative VTE Exclusion 910 (H) <=500 ng/mL FEU    Protime-INR    Collection Time: 08/07/24 12:23 PM   Result Value Ref Range    Protime 12.7 9.8 - 12.8 seconds    INR 1.1 0.9 - 1.1   aPTT    Collection Time: 08/07/24 12:23 PM   Result Value Ref Range    aPTT 27 27 - 38 seconds   Lactate    Collection Time: 08/07/24 12:23 PM   Result Value Ref Range    Lactate 2.2 (H) 0.4 - 2.0 mmol/L   Type and Screen    Collection Time: 08/07/24 12:23 PM   Result Value Ref Range    ABO TYPE A     Rh TYPE POS     ANTIBODY SCREEN POS    Troponin I, High Sensitivity, Initial    Collection Time: 08/07/24 12:23 PM   Result Value Ref Range    Troponin I, High Sensitivity 845 (HH) 0 - 13 ng/L   BB ORDER ONLY - Antibody Identification    Collection Time: 08/07/24 12:23 PM   Result Value Ref Range    Antibody ID Anti-Fya     CASE #     Abo/Rh    Collection Time: 08/07/24 12:23 PM   Result Value Ref Range    ABO TYPE A     Rh TYPE POS    Troponin, High Sensitivity, 1 Hour    Collection Time: 08/07/24  1:24 PM   Result Value Ref Range    Troponin I, High Sensitivity 750 (HH) 0 - 13 ng/L   ECG 12 lead    Collection Time: 08/07/24  1:36 PM   Result Value Ref Range    Ventricular Rate 95 BPM    Atrial Rate 95 BPM    MT Interval 152 ms    QRS Duration 132 ms    QT Interval 388 ms    QTC Calculation(Bazett) 487 ms    P Axis 33 degrees    R Axis -35 degrees    T Axis -42 degrees    QRS Count 15 beats    Q Onset 191 ms    P Onset 115 ms    P Offset 181 ms    T Offset 385 ms    QTC Fredericia 452 ms   Urine Culture    Collection Time: 08/07/24  2:04 PM    Specimen: Clean Catch/Voided; Urine   Result Value Ref Range    Urine Culture >100,000 Klebsiella pneumoniae/variicola (A)        Susceptibility    Klebsiella pneumoniae/variicola - MICROSCAN     Ampicillin  Resistant mcg/mL     Amoxicillin/Clavulanate  Susceptible mcg/mL     Ampicillin/Sulbactam  Susceptible mcg/mL     Cefazolin  Susceptible mcg/mL     Cefazolin (uncomplicated UTIs only)  Susceptible mcg/mL     Ciprofloxacin  Susceptible mcg/mL      Gentamicin  Susceptible mcg/mL     Nitrofurantoin  Susceptible mcg/mL     Piperacillin/Tazobactam  Susceptible mcg/mL     Trimethoprim/Sulfamethoxazole  Susceptible mcg/mL   Urinalysis with Reflex Culture and Microscopic    Collection Time: 08/07/24  2:04 PM   Result Value Ref Range    Color, Urine Colorless (N) Light-Yellow, Yellow, Dark-Yellow    Appearance, Urine Clear Clear    Specific Gravity, Urine 1.009 1.005 - 1.035    pH, Urine 5.5 5.0, 5.5, 6.0, 6.5, 7.0, 7.5, 8.0    Protein, Urine NEGATIVE NEGATIVE, 10 (TRACE), 20 (TRACE) mg/dL    Glucose, Urine Normal Normal mg/dL    Blood, Urine NEGATIVE NEGATIVE    Ketones, Urine NEGATIVE NEGATIVE mg/dL    Bilirubin, Urine NEGATIVE NEGATIVE    Urobilinogen, Urine Normal Normal mg/dL    Nitrite, Urine NEGATIVE NEGATIVE    Leukocyte Esterase, Urine 250 Ashtyn/µL (A) NEGATIVE   Extra Urine Gray Tube    Collection Time: 08/07/24  2:04 PM   Result Value Ref Range    Extra Tube Hold for add-ons.    Microscopic Only, Urine    Collection Time: 08/07/24  2:04 PM   Result Value Ref Range    WBC, Urine 6-10 (A) 1-5, NONE /HPF    RBC, Urine 3-5 NONE, 1-2, 3-5 /HPF    Bacteria, Urine 1+ (A) NONE SEEN /HPF   Blood Culture    Collection Time: 08/07/24  2:52 PM    Specimen: Peripheral Venipuncture; Blood culture   Result Value Ref Range    Blood Culture No growth at 4 days -  FINAL REPORT    Lactate    Collection Time: 08/07/24  3:03 PM   Result Value Ref Range    Lactate 1.3 0.4 - 2.0 mmol/L   SST TOP    Collection Time: 08/07/24  3:07 PM   Result Value Ref Range    Extra Tube Hold for add-ons.    Blood Culture    Collection Time: 08/07/24  3:12 PM    Specimen: Peripheral Venipuncture; Blood culture   Result Value Ref Range    Blood Culture No growth at 4 days -  FINAL REPORT    Comprehensive metabolic panel    Collection Time: 08/08/24  9:22 AM   Result Value Ref Range    Glucose 98 74 - 99 mg/dL    Sodium 140 136 - 145 mmol/L    Potassium 3.7 3.5 - 5.3 mmol/L    Chloride 108 (H)  98 - 107 mmol/L    Bicarbonate 27 21 - 32 mmol/L    Anion Gap 9 (L) 10 - 20 mmol/L    Urea Nitrogen 4 (L) 6 - 23 mg/dL    Creatinine 0.64 0.50 - 1.05 mg/dL    eGFR 83 >60 mL/min/1.73m*2    Calcium 8.4 (L) 8.6 - 10.3 mg/dL    Albumin 3.2 (L) 3.4 - 5.0 g/dL    Alkaline Phosphatase 79 33 - 136 U/L    Total Protein 5.4 (L) 6.4 - 8.2 g/dL    AST 14 9 - 39 U/L    Bilirubin, Total 0.4 0.0 - 1.2 mg/dL    ALT 9 7 - 45 U/L   CBC    Collection Time: 08/08/24  9:22 AM   Result Value Ref Range    WBC 6.8 4.4 - 11.3 x10*3/uL    nRBC 0.0 0.0 - 0.0 /100 WBCs    RBC 2.57 (L) 4.00 - 5.20 x10*6/uL    Hemoglobin 7.8 (L) 12.0 - 16.0 g/dL    Hematocrit 24.4 (L) 36.0 - 46.0 %    MCV 95 80 - 100 fL    MCH 30.4 26.0 - 34.0 pg    MCHC 32.0 32.0 - 36.0 g/dL    RDW 15.0 (H) 11.5 - 14.5 %    Platelets 243 150 - 450 x10*3/uL   Magnesium    Collection Time: 08/08/24  9:22 AM   Result Value Ref Range    Magnesium 1.87 1.60 - 2.40 mg/dL   SST TOP    Collection Time: 08/08/24  9:22 AM   Result Value Ref Range    Extra Tube Hold for add-ons.    Troponin I, High Sensitivity    Collection Time: 08/08/24  9:22 AM   Result Value Ref Range    Troponin I, High Sensitivity 838 (HH) 0 - 13 ng/L   Transthoracic Echo (TTE) Complete    Collection Time: 08/08/24  2:35 PM   Result Value Ref Range    AV mn grad 10.0 mmHg    AV pk terrell 2.29 m/s    LV Biplane EF 50 %    LVOT diam 2.00 cm    MV E/A ratio 1.59     Tricuspid annular plane systolic excursion 1.7 cm    LA vol index A/L 63.7 ml/m2    LV EF 48 %    RV free wall pk S' 12.70 cm/s    RVSP 84.4 mmHg    LVIDd 4.50 cm    Aortic Valve Area by Continuity of Peak Velocity 1.18 cm2    AV pk grad 21.0 mmHg    Aortic Valve Area by Continuity of VTI 1.26 cm2    LV A4C EF 55.6    Basic Metabolic Panel    Collection Time: 08/09/24  5:48 AM   Result Value Ref Range    Glucose 94 74 - 99 mg/dL    Sodium 141 136 - 145 mmol/L    Potassium 3.9 3.5 - 5.3 mmol/L    Chloride 106 98 - 107 mmol/L    Bicarbonate 30 21 - 32 mmol/L     Anion Gap 9 (L) 10 - 20 mmol/L    Urea Nitrogen 4 (L) 6 - 23 mg/dL    Creatinine 0.53 0.50 - 1.05 mg/dL    eGFR 86 >60 mL/min/1.73m*2    Calcium 8.8 8.6 - 10.3 mg/dL   CBC    Collection Time: 08/09/24  5:48 AM   Result Value Ref Range    WBC 6.7 4.4 - 11.3 x10*3/uL    nRBC 0.0 0.0 - 0.0 /100 WBCs    RBC 2.66 (L) 4.00 - 5.20 x10*6/uL    Hemoglobin 8.0 (L) 12.0 - 16.0 g/dL    Hematocrit 25.1 (L) 36.0 - 46.0 %    MCV 94 80 - 100 fL    MCH 30.1 26.0 - 34.0 pg    MCHC 31.9 (L) 32.0 - 36.0 g/dL    RDW 14.9 (H) 11.5 - 14.5 %    Platelets 270 150 - 450 x10*3/uL   Magnesium    Collection Time: 08/09/24  5:48 AM   Result Value Ref Range    Magnesium 1.85 1.60 - 2.40 mg/dL   SST TOP    Collection Time: 08/09/24  5:48 AM   Result Value Ref Range    Extra Tube Hold for add-ons.    Basic Metabolic Panel    Collection Time: 08/10/24  6:43 AM   Result Value Ref Range    Glucose 97 74 - 99 mg/dL    Sodium 141 136 - 145 mmol/L    Potassium 4.1 3.5 - 5.3 mmol/L    Chloride 105 98 - 107 mmol/L    Bicarbonate 31 21 - 32 mmol/L    Anion Gap 9 (L) 10 - 20 mmol/L    Urea Nitrogen 4 (L) 6 - 23 mg/dL    Creatinine 0.60 0.50 - 1.05 mg/dL    eGFR 84 >60 mL/min/1.73m*2    Calcium 9.1 8.6 - 10.3 mg/dL   CBC    Collection Time: 08/10/24  6:43 AM   Result Value Ref Range    WBC 8.1 4.4 - 11.3 x10*3/uL    nRBC 0.0 0.0 - 0.0 /100 WBCs    RBC 2.76 (L) 4.00 - 5.20 x10*6/uL    Hemoglobin 8.2 (L) 12.0 - 16.0 g/dL    Hematocrit 25.9 (L) 36.0 - 46.0 %    MCV 94 80 - 100 fL    MCH 29.7 26.0 - 34.0 pg    MCHC 31.7 (L) 32.0 - 36.0 g/dL    RDW 15.1 (H) 11.5 - 14.5 %    Platelets 331 150 - 450 x10*3/uL   Magnesium    Collection Time: 08/10/24  6:43 AM   Result Value Ref Range    Magnesium 1.78 1.60 - 2.40 mg/dL   SST TOP    Collection Time: 08/10/24  6:43 AM   Result Value Ref Range    Extra Tube Hold for add-ons.           Medications:    Current Outpatient Medications   Medication Instructions    acetaminophen (TYLENOL) 650 mg, oral, Every 6 hours PRN     aspirin 81 mg, oral, Daily    bisoprolol (ZEBETA) 7.5 mg, oral, Daily RT    calcium carbonate-vitamin D3 500 mg-5 mcg (200 unit) tablet 1 tablet, oral, Daily    carboxymethylcellulose (THERATears) 0.25 % ophthalmic solution 1 drop, Both Eyes, 3 times daily PRN    docusate sodium (COLACE) 100 mg, oral, 2 times daily    DULoxetine (CYMBALTA) 30 mg, oral, Daily RT    fluticasone (Flonase) 50 mcg/actuation nasal spray 1 spray, Each Nostril, Daily PRN    furosemide (LASIX) 20 mg, oral, Daily RT    gabapentin (NEURONTIN) 300 mg, oral, Nightly    krill oil 500 mg, oral, 2 times daily    LACTOBACILLUS ACIDOPHILUS ORAL 1 tablet, oral, Daily    levothyroxine (SYNTHROID, LEVOXYL) 88 mcg, oral, Daily RT    losartan (COZAAR) 25 mg, oral, 2 times daily    magnesium oxide 500 mg, oral, Daily    multivitamin with minerals iron-free (Centrum Silver) 1 tablet, oral, Daily    mv-min-FA-vit K-lutein-zeaxant (PreserVision AREDS 2 Plus MV) 200 mcg-15 mcg- 5 mg-1 mg capsule 1 capsule, oral, 2 times daily    omeprazole (PRILOSEC) 40 mg, oral, Daily RT    potassium chloride CR 20 mEq ER tablet 20 mEq, oral, Daily RT    rosuvastatin (CRESTOR) 20 mg, oral, Nightly          Assessment/Plan:      This is a 89-year-old female with multiple medical issues, as described, including paroxysmal atrial fibrillation with GI bleeding, gait instability, mechanical fall with head trauma and significant bodily injury, and syncope.      The CHADS-VASC score is 4 and HAS-BLED score is 4. The patient is at increased risk of both bleeding and stroke.  As such the patient is a reasonable candidate for consideration of left atrial appendage occluder placement.    Today we discussed the left atrial appendage closure procedure. The patient was given written educational handout materials and watched an educational video. All risks, benefits and alternative were discussed.     The risks discussed included but were not limited to vascular complications, sedation  related complications, risk of MI, CVA, device embolization, pericardial tamponade and death. The patient verbalized understanding and decided to proceed.         The patient requires CT for planning and to exclude ÁNGEL thrombus. In addition, the patient will also need a CT scan 4 months after the procedure, to evaluate the device for position, thrombus and alexia-device leak.. Following device implant, strategy will be for dual antiplatelet therapy with aspirin and clopidogrel for 6 months then aspirin for life      Thank you, Dr. Corona, for this consultation and for allowing me to participate in the care of this patient.      Liam Joyce MD  , Interventional Cardiology Fellowship Program,   [facility] [place]   [employer] [facility] [employer]  Office Phone Number: [phone]

## 2024-10-21 NOTE — H&P (VIEW-ONLY)
Cardio: Dr. Chloe RUCKER was asked by Dr. Corona to evaluate this patient in consultation for evaluation of left atrial appendage closure.    The patient is a 89-year-old female with coronary artery disease including ischemic cardiomyopathy and history of non-ST elevation myocardial infarction, pulmonary hypertension, right heart failure with mildly reduced ejection fraction, LVEF 45%, and paroxysmal atrial fibrillation.      She had a stroke in February 2024 and was placed on Eliquis. Patient is now on aspirin-alone strategy.  She has a history of gait instability, syncope and also lower GI bleeding.  She has had a recent complicated medical course.    In July she was admitted to [facility] with recurrent GI bleeding from rectal bleeding.  During that stay she had multiple runs of atrial fibrillation treated with intravenous amiodarone.  She had a subsequent admission to [facility] from syncope and volume depletion.    Finally the patient has history of gait instability including falling.  In June she fell after tripping, broke her toe and had facial trauma/head trauma.  She was evaluated in the emergency room.    Given the patient's significant gait instability, fall risk, syncope and history of recent GI bleeding,  the patient is referred for consideration of left atrial appendage closure for stroke risk reduction.       ROS:  Constitutional: no fatigue, no fevers, no body aches  Eyes: no acute eye problems, no blurred vision, no diplopia, no eye pain  ENT:  no acute hearing loss, no earache, no sore throat  Cardiovascular: dyspnea on exertion, no chest pain  Respiratory: no chronic cough, not coughing up sputum, no wheezing that is consistent with asthma  Gastrointestinal: no acute bowel complaints  Musculoskeletal: no acute arthralgias, no acute myalgias, no acute joint swelling  Skin: no skin rashes, no change in skin color and pigmentation, no skin lesions and no skin lumps.   Neurological: no  headaches, no dizziness, no tingling, no fainting and no limb weakness.   Psychiatric:  no suicidal ideation, no confusion, no personality change and no emotional problems.   Hematologic/Lymphatic: no bleeding issues, other then mentioned in HPI  All other systems have been reviewed and are negative for complaint.     Physical Exam:     Visit Vitals  Smoking Status Former        Constitutional: alert and in no acute distress.   Eyes: no erythema, swelling or discharge from the eye .   Ears, Nose, Mouth, and Throat: external inspection of ears and nose is normal , lips, teeth, and gums are normal with good dentition  and oropharynx normal with no erythema, edema, exudate or lesions .   Neck: neck is supple, symmetric, trachea midline, no masses  and no thyromegaly .   Pulmonary: no increased work of breathing or signs of respiratory distress , lungs clear to auscultation. , normal percussion of chest  and chest palpation normal .   Cardiovascular: RRR, soft systolic ejection murmur,  no leg edema, non-displaced PMI, no S3 or S4  Abdomen: abdomen non-tender, no masses  and no hepatomegaly .           Skin:  no skin lesions          Neurologic: non-focal neurologic examination.      Psychiatric judgment and insight is normal , oriented to person, place and time , normal mood and affect .       Labs:    Results for orders placed or performed during the hospital encounter of 08/07/24   ECG 12 lead    Collection Time: 08/07/24 12:19 PM   Result Value Ref Range    Ventricular Rate 80 BPM    Atrial Rate 80 BPM    KY Interval 138 ms    QRS Duration 130 ms    QT Interval 424 ms    QTC Calculation(Bazett) 489 ms    P Axis 98 degrees    R Axis -33 degrees    T Axis -32 degrees    QRS Count 13 beats    Q Onset 224 ms    P Onset 155 ms    P Offset 192 ms    T Offset 436 ms    QTC Fredericia 467 ms   CBC and Auto Differential    Collection Time: 08/07/24 12:23 PM   Result Value Ref Range    WBC 9.1 4.4 - 11.3 x10*3/uL    nRBC 0.0  0.0 - 0.0 /100 WBCs    RBC 2.76 (L) 4.00 - 5.20 x10*6/uL    Hemoglobin 8.2 (L) 12.0 - 16.0 g/dL    Hematocrit 26.0 (L) 36.0 - 46.0 %    MCV 94 80 - 100 fL    MCH 29.7 26.0 - 34.0 pg    MCHC 31.5 (L) 32.0 - 36.0 g/dL    RDW 14.9 (H) 11.5 - 14.5 %    Platelets 232 150 - 450 x10*3/uL    Neutrophils % 70.0 40.0 - 80.0 %    Immature Granulocytes %, Automated 0.4 0.0 - 0.9 %    Lymphocytes % 18.8 13.0 - 44.0 %    Monocytes % 8.5 2.0 - 10.0 %    Eosinophils % 1.9 0.0 - 6.0 %    Basophils % 0.4 0.0 - 2.0 %    Neutrophils Absolute 6.38 (H) 1.60 - 5.50 x10*3/uL    Immature Granulocytes Absolute, Automated 0.04 0.00 - 0.50 x10*3/uL    Lymphocytes Absolute 1.71 0.80 - 3.00 x10*3/uL    Monocytes Absolute 0.77 0.05 - 0.80 x10*3/uL    Eosinophils Absolute 0.17 0.00 - 0.40 x10*3/uL    Basophils Absolute 0.04 0.00 - 0.10 x10*3/uL   Comprehensive Metabolic Panel    Collection Time: 08/07/24 12:23 PM   Result Value Ref Range    Glucose 167 (H) 74 - 99 mg/dL    Sodium 137 136 - 145 mmol/L    Potassium 3.3 (L) 3.5 - 5.3 mmol/L    Chloride 106 98 - 107 mmol/L    Bicarbonate 23 21 - 32 mmol/L    Anion Gap 11 10 - 20 mmol/L    Urea Nitrogen 7 6 - 23 mg/dL    Creatinine 0.83 0.50 - 1.05 mg/dL    eGFR 66 >60 mL/min/1.73m*2    Calcium 8.2 (L) 8.6 - 10.3 mg/dL    Albumin 3.3 (L) 3.4 - 5.0 g/dL    Alkaline Phosphatase 80 33 - 136 U/L    Total Protein 5.5 (L) 6.4 - 8.2 g/dL    AST 15 9 - 39 U/L    Bilirubin, Total 0.6 0.0 - 1.2 mg/dL    ALT 11 7 - 45 U/L   Magnesium    Collection Time: 08/07/24 12:23 PM   Result Value Ref Range    Magnesium 1.90 1.60 - 2.40 mg/dL   Lipase    Collection Time: 08/07/24 12:23 PM   Result Value Ref Range    Lipase 9 9 - 82 U/L   B-Type Natriuretic Peptide    Collection Time: 08/07/24 12:23 PM   Result Value Ref Range     (H) 0 - 99 pg/mL   D-Dimer, Quantitative VTE Exclusion    Collection Time: 08/07/24 12:23 PM   Result Value Ref Range    D-Dimer, Quantitative VTE Exclusion 910 (H) <=500 ng/mL FEU    Protime-INR    Collection Time: 08/07/24 12:23 PM   Result Value Ref Range    Protime 12.7 9.8 - 12.8 seconds    INR 1.1 0.9 - 1.1   aPTT    Collection Time: 08/07/24 12:23 PM   Result Value Ref Range    aPTT 27 27 - 38 seconds   Lactate    Collection Time: 08/07/24 12:23 PM   Result Value Ref Range    Lactate 2.2 (H) 0.4 - 2.0 mmol/L   Type and Screen    Collection Time: 08/07/24 12:23 PM   Result Value Ref Range    ABO TYPE A     Rh TYPE POS     ANTIBODY SCREEN POS    Troponin I, High Sensitivity, Initial    Collection Time: 08/07/24 12:23 PM   Result Value Ref Range    Troponin I, High Sensitivity 845 (HH) 0 - 13 ng/L   BB ORDER ONLY - Antibody Identification    Collection Time: 08/07/24 12:23 PM   Result Value Ref Range    Antibody ID Anti-Fya     CASE #     Abo/Rh    Collection Time: 08/07/24 12:23 PM   Result Value Ref Range    ABO TYPE A     Rh TYPE POS    Troponin, High Sensitivity, 1 Hour    Collection Time: 08/07/24  1:24 PM   Result Value Ref Range    Troponin I, High Sensitivity 750 (HH) 0 - 13 ng/L   ECG 12 lead    Collection Time: 08/07/24  1:36 PM   Result Value Ref Range    Ventricular Rate 95 BPM    Atrial Rate 95 BPM    RI Interval 152 ms    QRS Duration 132 ms    QT Interval 388 ms    QTC Calculation(Bazett) 487 ms    P Axis 33 degrees    R Axis -35 degrees    T Axis -42 degrees    QRS Count 15 beats    Q Onset 191 ms    P Onset 115 ms    P Offset 181 ms    T Offset 385 ms    QTC Fredericia 452 ms   Urine Culture    Collection Time: 08/07/24  2:04 PM    Specimen: Clean Catch/Voided; Urine   Result Value Ref Range    Urine Culture >100,000 Klebsiella pneumoniae/variicola (A)        Susceptibility    Klebsiella pneumoniae/variicola - MICROSCAN     Ampicillin  Resistant mcg/mL     Amoxicillin/Clavulanate  Susceptible mcg/mL     Ampicillin/Sulbactam  Susceptible mcg/mL     Cefazolin  Susceptible mcg/mL     Cefazolin (uncomplicated UTIs only)  Susceptible mcg/mL     Ciprofloxacin  Susceptible mcg/mL      Gentamicin  Susceptible mcg/mL     Nitrofurantoin  Susceptible mcg/mL     Piperacillin/Tazobactam  Susceptible mcg/mL     Trimethoprim/Sulfamethoxazole  Susceptible mcg/mL   Urinalysis with Reflex Culture and Microscopic    Collection Time: 08/07/24  2:04 PM   Result Value Ref Range    Color, Urine Colorless (N) Light-Yellow, Yellow, Dark-Yellow    Appearance, Urine Clear Clear    Specific Gravity, Urine 1.009 1.005 - 1.035    pH, Urine 5.5 5.0, 5.5, 6.0, 6.5, 7.0, 7.5, 8.0    Protein, Urine NEGATIVE NEGATIVE, 10 (TRACE), 20 (TRACE) mg/dL    Glucose, Urine Normal Normal mg/dL    Blood, Urine NEGATIVE NEGATIVE    Ketones, Urine NEGATIVE NEGATIVE mg/dL    Bilirubin, Urine NEGATIVE NEGATIVE    Urobilinogen, Urine Normal Normal mg/dL    Nitrite, Urine NEGATIVE NEGATIVE    Leukocyte Esterase, Urine 250 Ashtyn/µL (A) NEGATIVE   Extra Urine Gray Tube    Collection Time: 08/07/24  2:04 PM   Result Value Ref Range    Extra Tube Hold for add-ons.    Microscopic Only, Urine    Collection Time: 08/07/24  2:04 PM   Result Value Ref Range    WBC, Urine 6-10 (A) 1-5, NONE /HPF    RBC, Urine 3-5 NONE, 1-2, 3-5 /HPF    Bacteria, Urine 1+ (A) NONE SEEN /HPF   Blood Culture    Collection Time: 08/07/24  2:52 PM    Specimen: Peripheral Venipuncture; Blood culture   Result Value Ref Range    Blood Culture No growth at 4 days -  FINAL REPORT    Lactate    Collection Time: 08/07/24  3:03 PM   Result Value Ref Range    Lactate 1.3 0.4 - 2.0 mmol/L   SST TOP    Collection Time: 08/07/24  3:07 PM   Result Value Ref Range    Extra Tube Hold for add-ons.    Blood Culture    Collection Time: 08/07/24  3:12 PM    Specimen: Peripheral Venipuncture; Blood culture   Result Value Ref Range    Blood Culture No growth at 4 days -  FINAL REPORT    Comprehensive metabolic panel    Collection Time: 08/08/24  9:22 AM   Result Value Ref Range    Glucose 98 74 - 99 mg/dL    Sodium 140 136 - 145 mmol/L    Potassium 3.7 3.5 - 5.3 mmol/L    Chloride 108 (H)  98 - 107 mmol/L    Bicarbonate 27 21 - 32 mmol/L    Anion Gap 9 (L) 10 - 20 mmol/L    Urea Nitrogen 4 (L) 6 - 23 mg/dL    Creatinine 0.64 0.50 - 1.05 mg/dL    eGFR 83 >60 mL/min/1.73m*2    Calcium 8.4 (L) 8.6 - 10.3 mg/dL    Albumin 3.2 (L) 3.4 - 5.0 g/dL    Alkaline Phosphatase 79 33 - 136 U/L    Total Protein 5.4 (L) 6.4 - 8.2 g/dL    AST 14 9 - 39 U/L    Bilirubin, Total 0.4 0.0 - 1.2 mg/dL    ALT 9 7 - 45 U/L   CBC    Collection Time: 08/08/24  9:22 AM   Result Value Ref Range    WBC 6.8 4.4 - 11.3 x10*3/uL    nRBC 0.0 0.0 - 0.0 /100 WBCs    RBC 2.57 (L) 4.00 - 5.20 x10*6/uL    Hemoglobin 7.8 (L) 12.0 - 16.0 g/dL    Hematocrit 24.4 (L) 36.0 - 46.0 %    MCV 95 80 - 100 fL    MCH 30.4 26.0 - 34.0 pg    MCHC 32.0 32.0 - 36.0 g/dL    RDW 15.0 (H) 11.5 - 14.5 %    Platelets 243 150 - 450 x10*3/uL   Magnesium    Collection Time: 08/08/24  9:22 AM   Result Value Ref Range    Magnesium 1.87 1.60 - 2.40 mg/dL   SST TOP    Collection Time: 08/08/24  9:22 AM   Result Value Ref Range    Extra Tube Hold for add-ons.    Troponin I, High Sensitivity    Collection Time: 08/08/24  9:22 AM   Result Value Ref Range    Troponin I, High Sensitivity 838 (HH) 0 - 13 ng/L   Transthoracic Echo (TTE) Complete    Collection Time: 08/08/24  2:35 PM   Result Value Ref Range    AV mn grad 10.0 mmHg    AV pk terrell 2.29 m/s    LV Biplane EF 50 %    LVOT diam 2.00 cm    MV E/A ratio 1.59     Tricuspid annular plane systolic excursion 1.7 cm    LA vol index A/L 63.7 ml/m2    LV EF 48 %    RV free wall pk S' 12.70 cm/s    RVSP 84.4 mmHg    LVIDd 4.50 cm    Aortic Valve Area by Continuity of Peak Velocity 1.18 cm2    AV pk grad 21.0 mmHg    Aortic Valve Area by Continuity of VTI 1.26 cm2    LV A4C EF 55.6    Basic Metabolic Panel    Collection Time: 08/09/24  5:48 AM   Result Value Ref Range    Glucose 94 74 - 99 mg/dL    Sodium 141 136 - 145 mmol/L    Potassium 3.9 3.5 - 5.3 mmol/L    Chloride 106 98 - 107 mmol/L    Bicarbonate 30 21 - 32 mmol/L     Anion Gap 9 (L) 10 - 20 mmol/L    Urea Nitrogen 4 (L) 6 - 23 mg/dL    Creatinine 0.53 0.50 - 1.05 mg/dL    eGFR 86 >60 mL/min/1.73m*2    Calcium 8.8 8.6 - 10.3 mg/dL   CBC    Collection Time: 08/09/24  5:48 AM   Result Value Ref Range    WBC 6.7 4.4 - 11.3 x10*3/uL    nRBC 0.0 0.0 - 0.0 /100 WBCs    RBC 2.66 (L) 4.00 - 5.20 x10*6/uL    Hemoglobin 8.0 (L) 12.0 - 16.0 g/dL    Hematocrit 25.1 (L) 36.0 - 46.0 %    MCV 94 80 - 100 fL    MCH 30.1 26.0 - 34.0 pg    MCHC 31.9 (L) 32.0 - 36.0 g/dL    RDW 14.9 (H) 11.5 - 14.5 %    Platelets 270 150 - 450 x10*3/uL   Magnesium    Collection Time: 08/09/24  5:48 AM   Result Value Ref Range    Magnesium 1.85 1.60 - 2.40 mg/dL   SST TOP    Collection Time: 08/09/24  5:48 AM   Result Value Ref Range    Extra Tube Hold for add-ons.    Basic Metabolic Panel    Collection Time: 08/10/24  6:43 AM   Result Value Ref Range    Glucose 97 74 - 99 mg/dL    Sodium 141 136 - 145 mmol/L    Potassium 4.1 3.5 - 5.3 mmol/L    Chloride 105 98 - 107 mmol/L    Bicarbonate 31 21 - 32 mmol/L    Anion Gap 9 (L) 10 - 20 mmol/L    Urea Nitrogen 4 (L) 6 - 23 mg/dL    Creatinine 0.60 0.50 - 1.05 mg/dL    eGFR 84 >60 mL/min/1.73m*2    Calcium 9.1 8.6 - 10.3 mg/dL   CBC    Collection Time: 08/10/24  6:43 AM   Result Value Ref Range    WBC 8.1 4.4 - 11.3 x10*3/uL    nRBC 0.0 0.0 - 0.0 /100 WBCs    RBC 2.76 (L) 4.00 - 5.20 x10*6/uL    Hemoglobin 8.2 (L) 12.0 - 16.0 g/dL    Hematocrit 25.9 (L) 36.0 - 46.0 %    MCV 94 80 - 100 fL    MCH 29.7 26.0 - 34.0 pg    MCHC 31.7 (L) 32.0 - 36.0 g/dL    RDW 15.1 (H) 11.5 - 14.5 %    Platelets 331 150 - 450 x10*3/uL   Magnesium    Collection Time: 08/10/24  6:43 AM   Result Value Ref Range    Magnesium 1.78 1.60 - 2.40 mg/dL   SST TOP    Collection Time: 08/10/24  6:43 AM   Result Value Ref Range    Extra Tube Hold for add-ons.           Medications:    Current Outpatient Medications   Medication Instructions    acetaminophen (TYLENOL) 650 mg, oral, Every 6 hours PRN     aspirin 81 mg, oral, Daily    bisoprolol (ZEBETA) 7.5 mg, oral, Daily RT    calcium carbonate-vitamin D3 500 mg-5 mcg (200 unit) tablet 1 tablet, oral, Daily    carboxymethylcellulose (THERATears) 0.25 % ophthalmic solution 1 drop, Both Eyes, 3 times daily PRN    docusate sodium (COLACE) 100 mg, oral, 2 times daily    DULoxetine (CYMBALTA) 30 mg, oral, Daily RT    fluticasone (Flonase) 50 mcg/actuation nasal spray 1 spray, Each Nostril, Daily PRN    furosemide (LASIX) 20 mg, oral, Daily RT    gabapentin (NEURONTIN) 300 mg, oral, Nightly    krill oil 500 mg, oral, 2 times daily    LACTOBACILLUS ACIDOPHILUS ORAL 1 tablet, oral, Daily    levothyroxine (SYNTHROID, LEVOXYL) 88 mcg, oral, Daily RT    losartan (COZAAR) 25 mg, oral, 2 times daily    magnesium oxide 500 mg, oral, Daily    multivitamin with minerals iron-free (Centrum Silver) 1 tablet, oral, Daily    mv-min-FA-vit K-lutein-zeaxant (PreserVision AREDS 2 Plus MV) 200 mcg-15 mcg- 5 mg-1 mg capsule 1 capsule, oral, 2 times daily    omeprazole (PRILOSEC) 40 mg, oral, Daily RT    potassium chloride CR 20 mEq ER tablet 20 mEq, oral, Daily RT    rosuvastatin (CRESTOR) 20 mg, oral, Nightly          Assessment/Plan:      This is a 89-year-old female with multiple medical issues, as described, including paroxysmal atrial fibrillation with GI bleeding, gait instability, mechanical fall with head trauma and significant bodily injury, and syncope.      The CHADS-VASC score is 4 and HAS-BLED score is 4. The patient is at increased risk of both bleeding and stroke.  As such the patient is a reasonable candidate for consideration of left atrial appendage occluder placement.    Today we discussed the left atrial appendage closure procedure. The patient was given written educational handout materials and watched an educational video. All risks, benefits and alternative were discussed.     The risks discussed included but were not limited to vascular complications, sedation  related complications, risk of MI, CVA, device embolization, pericardial tamponade and death. The patient verbalized understanding and decided to proceed.         The patient requires CT for planning and to exclude ÁNGEL thrombus. In addition, the patient will also need a CT scan 4 months after the procedure, to evaluate the device for position, thrombus and alexia-device leak.. Following device implant, strategy will be for dual antiplatelet therapy with aspirin and clopidogrel for 6 months then aspirin for life      Thank you, Dr. Corona, for this consultation and for allowing me to participate in the care of this patient.      Liam Joyce MD  , Interventional Cardiology Fellowship Program,   [facility] [place]   [facility] [employer] [facility]  Office Phone Number: [phone]

## 2024-10-23 ENCOUNTER — LAB (OUTPATIENT)
Dept: LAB | Facility: LAB | Age: 89
End: 2024-10-23
Payer: MEDICARE

## 2024-10-23 ENCOUNTER — OFFICE VISIT (OUTPATIENT)
Dept: CARDIOLOGY | Facility: CLINIC | Age: 89
End: 2024-10-23
Payer: MEDICARE

## 2024-10-23 VITALS
HEART RATE: 69 BPM | BODY MASS INDEX: 25.52 KG/M2 | SYSTOLIC BLOOD PRESSURE: 157 MMHG | HEIGHT: 60 IN | TEMPERATURE: 97.6 F | WEIGHT: 130 LBS | DIASTOLIC BLOOD PRESSURE: 81 MMHG | RESPIRATION RATE: 16 BRPM | OXYGEN SATURATION: 98 %

## 2024-10-23 DIAGNOSIS — I25.5 CARDIOMYOPATHY, ISCHEMIC: ICD-10-CM

## 2024-10-23 DIAGNOSIS — I48.0 PAROXYSMAL A-FIB (MULTI): Primary | ICD-10-CM

## 2024-10-23 DIAGNOSIS — I25.10 CORONARY ARTERY DISEASE INVOLVING NATIVE CORONARY ARTERY OF NATIVE HEART WITHOUT ANGINA PECTORIS: ICD-10-CM

## 2024-10-23 DIAGNOSIS — I50.22 CHF (CONGESTIVE HEART FAILURE), NYHA CLASS II, CHRONIC, SYSTOLIC: ICD-10-CM

## 2024-10-23 DIAGNOSIS — I48.91 ATRIAL FIBRILLATION, UNSPECIFIED TYPE (MULTI): ICD-10-CM

## 2024-10-23 DIAGNOSIS — Z91.81 AT HIGH RISK FOR FALLS: ICD-10-CM

## 2024-10-23 DIAGNOSIS — I48.0 PAROXYSMAL A-FIB (MULTI): ICD-10-CM

## 2024-10-23 DIAGNOSIS — I10 HYPERTENSION, UNSPECIFIED TYPE: ICD-10-CM

## 2024-10-23 LAB
ANION GAP SERPL CALC-SCNC: 12 MMOL/L (ref 10–20)
BUN SERPL-MCNC: 11 MG/DL (ref 6–23)
CALCIUM SERPL-MCNC: 9.6 MG/DL (ref 8.6–10.3)
CHLORIDE SERPL-SCNC: 102 MMOL/L (ref 98–107)
CO2 SERPL-SCNC: 31 MMOL/L (ref 21–32)
CREAT SERPL-MCNC: 0.76 MG/DL (ref 0.5–1.05)
EGFRCR SERPLBLD CKD-EPI 2021: 73 ML/MIN/1.73M*2
ERYTHROCYTE [DISTWIDTH] IN BLOOD BY AUTOMATED COUNT: 14.2 % (ref 11.5–14.5)
GLUCOSE SERPL-MCNC: 86 MG/DL (ref 74–99)
HCT VFR BLD AUTO: 37.7 % (ref 36–46)
HGB BLD-MCNC: 11.4 G/DL (ref 12–16)
MCH RBC QN AUTO: 27.3 PG (ref 26–34)
MCHC RBC AUTO-ENTMCNC: 30.2 G/DL (ref 32–36)
MCV RBC AUTO: 90 FL (ref 80–100)
NRBC BLD-RTO: 0 /100 WBCS (ref 0–0)
PLATELET # BLD AUTO: 335 X10*3/UL (ref 150–450)
POTASSIUM SERPL-SCNC: 4.7 MMOL/L (ref 3.5–5.3)
RBC # BLD AUTO: 4.18 X10*6/UL (ref 4–5.2)
SODIUM SERPL-SCNC: 140 MMOL/L (ref 136–145)
WBC # BLD AUTO: 4.2 X10*3/UL (ref 4.4–11.3)

## 2024-10-23 PROCEDURE — 80048 BASIC METABOLIC PNL TOTAL CA: CPT

## 2024-10-23 PROCEDURE — 3077F SYST BP >= 140 MM HG: CPT | Performed by: INTERNAL MEDICINE

## 2024-10-23 PROCEDURE — 3079F DIAST BP 80-89 MM HG: CPT | Performed by: INTERNAL MEDICINE

## 2024-10-23 PROCEDURE — 1036F TOBACCO NON-USER: CPT | Performed by: INTERNAL MEDICINE

## 2024-10-23 PROCEDURE — 99204 OFFICE O/P NEW MOD 45 MIN: CPT | Performed by: INTERNAL MEDICINE

## 2024-10-23 PROCEDURE — 1159F MED LIST DOCD IN RCRD: CPT | Performed by: INTERNAL MEDICINE

## 2024-10-23 PROCEDURE — 99214 OFFICE O/P EST MOD 30 MIN: CPT | Performed by: INTERNAL MEDICINE

## 2024-10-23 PROCEDURE — 36415 COLL VENOUS BLD VENIPUNCTURE: CPT

## 2024-10-23 PROCEDURE — 85027 COMPLETE CBC AUTOMATED: CPT

## 2024-10-25 ENCOUNTER — TELEPHONE (OUTPATIENT)
Dept: CARDIOLOGY | Facility: CLINIC | Age: 89
End: 2024-10-25
Payer: MEDICARE

## 2024-10-25 NOTE — TELEPHONE ENCOUNTER
Called and was unable to get a hold of the patient. Left voicemail for patient to call the office back regarding message.

## 2024-10-25 NOTE — TELEPHONE ENCOUNTER
----- Message from Taylor Spicer sent at 10/25/2024  2:04 PM EDT -----  Please let her know that her labs showed her electrolytes and kidney function are stable, and her blood count has increased from prior labs.  Continue current medications and follow with Dr. Corona in office as scheduled.    Thanks

## 2024-10-25 NOTE — TELEPHONE ENCOUNTER
Patient's daughter Amada called office and was notified regarding results and orders.  She verbalized understanding.  Katharine Nguyen, CMA

## 2024-11-07 PROBLEM — I48.91 ATRIAL FIBRILLATION, UNSPECIFIED TYPE (MULTI): Status: ACTIVE | Noted: 2024-11-07

## 2024-11-07 PROBLEM — Z95.818 PRESENCE OF WATCHMAN LEFT ATRIAL APPENDAGE CLOSURE DEVICE: Status: ACTIVE | Noted: 2024-11-07

## 2024-11-07 NOTE — DISCHARGE INSTRUCTIONS
Watchman Discharge Instructions  Anticoagulation Plan:  You are being discharged on Aspirin and Plavix for 6 months (Plavix will be stopped after 6 months and you will remain on 81mg Aspirin for life).  You will have a 4 month CTA to assess the effectiveness of the Watchman Device.     General Instructions:  DO NOT drink any alcoholic drinks or take any non-prescriptive medications that contain alcohol for the first 24 hours.   DO NOT make any important decisions for the first 24 hours.  Activity:  You are advised to go directly home from the hospital.   DO NOT lift anything heavier than 10 pounds for one week, this allows for proper healing of the groin.   No excessive exercise or treadmill use for one week. You may walk and do stairs, slowly.   No sexual activities for 24 hours after you arrive home.    Wound Care:  If slight bleeding should occur at site, lie down and have someone apply firm pressure just above the puncture site for 5 minutes.  If it continues or is profuse, call 911. Always notify your doctor if bleeding occurs.   Keep site clean and dry. Let air dry or you may use a simple bandaid.   Gently cleanse the puncture site in your groin with soap and water only.   You may experience some tenderness, bruising or minimal inflammation.  If you have any concerns, you may contact the Cath Lab or if any of these symptoms become excessive, contact your cardiologist or go to the emergency room.   No tub baths, soaking, or swimming for one week.   May shower the next day after your procedure.    Diet:  You may resume your normal diet. However it is better to start with liquids such as juices then soup and crackers, and gradually work up to solid foods.    Other Instructions:  If you develop difficulty breathing, rash, hives, severe nausea, vomiting, light-headedness or any signs of infection, immediately contact your doctor and go to the nearest emergency room.   You must take your aspirin, clopidogrel  (Plavix), prasugrel (Effient), or ticagrelor (Brilinta) every day without missing a single dose.  If you are getting low on these medications, contact your physician immediately for a refill.  - CALL 911 IF YOU HAVE ANY OF THE SIGNS AND SYMPTOMS OF HEART FAILURE: 1. Chest pain 2. Significant Shortness of breath 3. Fainting.     Call Provider If (Home-going Patients):  Breathing faster than normal.   Breathing harder than normal or having retractions.   Fever of 100.4 F (38 C) or higher.   Chills.   Drinking less than normal.   Urinating less than normal, over 1 day.   Acting very sleepy and difficult to awaken.   Vomiting (throwing up) and not able to eat or drink for 12 hours.   3 or more loose, watery bowel movements in 24 hours (diarrhea).   Any new concerning symptoms.    Please call the STRUCTURAL HEART TEAM LINE if you have any questions or concerns - [phone]

## 2024-11-08 ENCOUNTER — HOSPITAL ENCOUNTER (OUTPATIENT)
Dept: CARDIOLOGY | Facility: HOSPITAL | Age: 89
Discharge: HOME | DRG: 274 | End: 2024-11-08
Payer: MEDICARE

## 2024-11-08 ENCOUNTER — HOSPITAL ENCOUNTER (OUTPATIENT)
Facility: HOSPITAL | Age: 89
Setting detail: SURGERY ADMIT
Discharge: HOME | End: 2024-11-08
Attending: INTERNAL MEDICINE | Admitting: INTERNAL MEDICINE
Payer: MEDICARE

## 2024-11-08 ENCOUNTER — APPOINTMENT (OUTPATIENT)
Dept: CARDIOLOGY | Facility: HOSPITAL | Age: 89
End: 2024-11-08
Payer: MEDICARE

## 2024-11-08 ENCOUNTER — HOSPITAL ENCOUNTER (OUTPATIENT)
Dept: RADIOLOGY | Facility: HOSPITAL | Age: 89
Setting detail: SURGERY ADMIT
Discharge: HOME | End: 2024-11-08
Payer: MEDICARE

## 2024-11-08 DIAGNOSIS — I48.91 ATRIAL FIBRILLATION, UNSPECIFIED TYPE (MULTI): Primary | ICD-10-CM

## 2024-11-08 DIAGNOSIS — I48.91 ATRIAL FIBRILLATION, UNSPECIFIED TYPE (MULTI): ICD-10-CM

## 2024-11-08 DIAGNOSIS — Z01.810 ENCOUNTER FOR PREPROCEDURAL CARDIOVASCULAR EXAMINATION: ICD-10-CM

## 2024-11-08 DIAGNOSIS — I48.0 PAROXYSMAL ATRIAL FIBRILLATION (MULTI): ICD-10-CM

## 2024-11-08 DIAGNOSIS — Z95.818 PRESENCE OF WATCHMAN LEFT ATRIAL APPENDAGE CLOSURE DEVICE: ICD-10-CM

## 2024-11-08 DIAGNOSIS — I48.11 LONGSTANDING PERSISTENT ATRIAL FIBRILLATION (MULTI): ICD-10-CM

## 2024-11-08 LAB
ACT BLD: 387 SEC (ref 83–199)
EJECTION FRACTION: 53 %
EJECTION FRACTION: 53 %

## 2024-11-08 PROCEDURE — C1889 IMPLANT/INSERT DEVICE, NOC: HCPCS | Performed by: INTERNAL MEDICINE

## 2024-11-08 PROCEDURE — C1759 CATH, INTRA ECHOCARDIOGRAPHY: HCPCS | Performed by: INTERNAL MEDICINE

## 2024-11-08 PROCEDURE — 93662 INTRACARDIAC ECG (ICE): CPT | Performed by: INTERNAL MEDICINE

## 2024-11-08 PROCEDURE — 76937 US GUIDE VASCULAR ACCESS: CPT | Performed by: INTERNAL MEDICINE

## 2024-11-08 PROCEDURE — 99153 MOD SED SAME PHYS/QHP EA: CPT | Performed by: INTERNAL MEDICINE

## 2024-11-08 PROCEDURE — 2500000004 HC RX 250 GENERAL PHARMACY W/ HCPCS (ALT 636 FOR OP/ED): Performed by: INTERNAL MEDICINE

## 2024-11-08 PROCEDURE — 2550000001 HC RX 255 CONTRASTS: Performed by: INTERNAL MEDICINE

## 2024-11-08 PROCEDURE — 93308 TTE F-UP OR LMTD: CPT

## 2024-11-08 PROCEDURE — 85347 COAGULATION TIME ACTIVATED: CPT

## 2024-11-08 PROCEDURE — 2500000001 HC RX 250 WO HCPCS SELF ADMINISTERED DRUGS (ALT 637 FOR MEDICARE OP): Performed by: NURSE PRACTITIONER

## 2024-11-08 PROCEDURE — 2720000007 HC OR 272 NO HCPCS: Performed by: INTERNAL MEDICINE

## 2024-11-08 PROCEDURE — 99152 MOD SED SAME PHYS/QHP 5/>YRS: CPT | Performed by: INTERNAL MEDICINE

## 2024-11-08 PROCEDURE — 2500000004 HC RX 250 GENERAL PHARMACY W/ HCPCS (ALT 636 FOR OP/ED): Performed by: NURSE PRACTITIONER

## 2024-11-08 PROCEDURE — 93308 TTE F-UP OR LMTD: CPT | Performed by: INTERNAL MEDICINE

## 2024-11-08 PROCEDURE — 93010 ELECTROCARDIOGRAM REPORT: CPT | Performed by: INTERNAL MEDICINE

## 2024-11-08 PROCEDURE — 85347 COAGULATION TIME ACTIVATED: CPT | Performed by: INTERNAL MEDICINE

## 2024-11-08 PROCEDURE — 33340 PERQ CLSR TCAT L ATR APNDGE: CPT | Performed by: INTERNAL MEDICINE

## 2024-11-08 PROCEDURE — C1894 INTRO/SHEATH, NON-LASER: HCPCS | Performed by: INTERNAL MEDICINE

## 2024-11-08 PROCEDURE — 02L73DK OCCLUSION OF LEFT ATRIAL APPENDAGE WITH INTRALUMINAL DEVICE, PERCUTANEOUS APPROACH: ICD-10-PCS | Performed by: INTERNAL MEDICINE

## 2024-11-08 PROCEDURE — 2500000001 HC RX 250 WO HCPCS SELF ADMINISTERED DRUGS (ALT 637 FOR MEDICARE OP): Performed by: INTERNAL MEDICINE

## 2024-11-08 PROCEDURE — 2780000003 HC OR 278 NO HCPCS: Performed by: INTERNAL MEDICINE

## 2024-11-08 PROCEDURE — C1760 CLOSURE DEV, VASC: HCPCS | Performed by: INTERNAL MEDICINE

## 2024-11-08 PROCEDURE — 93005 ELECTROCARDIOGRAM TRACING: CPT

## 2024-11-08 PROCEDURE — 75572 CT HRT W/3D IMAGE: CPT

## 2024-11-08 PROCEDURE — C1893 INTRO/SHEATH, FIXED,NON-PEEL: HCPCS | Performed by: INTERNAL MEDICINE

## 2024-11-08 PROCEDURE — 7100000009 HC PHASE TWO TIME - INITIAL BASE CHARGE: Performed by: INTERNAL MEDICINE

## 2024-11-08 PROCEDURE — 1200000002 HC GENERAL ROOM WITH TELEMETRY DAILY

## 2024-11-08 PROCEDURE — G0269 OCCLUSIVE DEVICE IN VEIN ART: HCPCS | Performed by: INTERNAL MEDICINE

## 2024-11-08 PROCEDURE — 7100000010 HC PHASE TWO TIME - EACH INCREMENTAL 1 MINUTE: Performed by: INTERNAL MEDICINE

## 2024-11-08 DEVICE — LEFT ATRIAL APPENDAGE CLOSURE DEVICE WITH DELIVERY SYSTEM
Type: IMPLANTABLE DEVICE | Site: HEART | Status: FUNCTIONAL
Brand: WATCHMAN FLX™ PRO

## 2024-11-08 RX ORDER — NAPROXEN SODIUM 220 MG/1
81 TABLET, FILM COATED ORAL DAILY
Status: DISCONTINUED | OUTPATIENT
Start: 2024-11-08 | End: 2024-11-08 | Stop reason: HOSPADM

## 2024-11-08 RX ORDER — LIDOCAINE HYDROCHLORIDE 10 MG/ML
INJECTION, SOLUTION EPIDURAL; INFILTRATION; INTRACAUDAL; PERINEURAL AS NEEDED
Status: DISCONTINUED | OUTPATIENT
Start: 2024-11-08 | End: 2024-11-08 | Stop reason: HOSPADM

## 2024-11-08 RX ORDER — CLOPIDOGREL BISULFATE 75 MG/1
75 TABLET ORAL DAILY
Qty: 90 TABLET | Refills: 1 | Status: SHIPPED | OUTPATIENT
Start: 2024-11-08 | End: 2025-05-07

## 2024-11-08 RX ORDER — CEFAZOLIN SODIUM 2 G/50ML
2 SOLUTION INTRAVENOUS ONCE
Status: COMPLETED | OUTPATIENT
Start: 2024-11-08 | End: 2024-11-08

## 2024-11-08 RX ORDER — ONDANSETRON 4 MG/1
4 TABLET, FILM COATED ORAL EVERY 8 HOURS PRN
Status: DISCONTINUED | OUTPATIENT
Start: 2024-11-08 | End: 2024-11-08 | Stop reason: HOSPADM

## 2024-11-08 RX ORDER — ACETAMINOPHEN 325 MG/1
650 TABLET ORAL EVERY 6 HOURS PRN
Status: DISCONTINUED | OUTPATIENT
Start: 2024-11-08 | End: 2024-11-08 | Stop reason: HOSPADM

## 2024-11-08 RX ORDER — ACETAMINOPHEN 650 MG/1
650 SUPPOSITORY RECTAL EVERY 6 HOURS PRN
Status: DISCONTINUED | OUTPATIENT
Start: 2024-11-08 | End: 2024-11-08 | Stop reason: HOSPADM

## 2024-11-08 RX ORDER — CLOPIDOGREL BISULFATE 75 MG/1
75 TABLET ORAL DAILY
Status: DISCONTINUED | OUTPATIENT
Start: 2024-11-08 | End: 2024-11-08 | Stop reason: HOSPADM

## 2024-11-08 RX ORDER — CLOPIDOGREL BISULFATE 300 MG/1
TABLET, FILM COATED ORAL AS NEEDED
Status: DISCONTINUED | OUTPATIENT
Start: 2024-11-08 | End: 2024-11-08 | Stop reason: HOSPADM

## 2024-11-08 RX ORDER — ONDANSETRON HYDROCHLORIDE 2 MG/ML
4 INJECTION, SOLUTION INTRAVENOUS EVERY 8 HOURS PRN
Status: DISCONTINUED | OUTPATIENT
Start: 2024-11-08 | End: 2024-11-08 | Stop reason: HOSPADM

## 2024-11-08 RX ORDER — FENTANYL CITRATE 50 UG/ML
INJECTION, SOLUTION INTRAMUSCULAR; INTRAVENOUS AS NEEDED
Status: DISCONTINUED | OUTPATIENT
Start: 2024-11-08 | End: 2024-11-08 | Stop reason: HOSPADM

## 2024-11-08 RX ORDER — ACETAMINOPHEN 160 MG/5ML
650 SOLUTION ORAL EVERY 6 HOURS PRN
Status: DISCONTINUED | OUTPATIENT
Start: 2024-11-08 | End: 2024-11-08 | Stop reason: HOSPADM

## 2024-11-08 RX ORDER — NAPROXEN SODIUM 220 MG/1
324 TABLET, FILM COATED ORAL ONCE
Status: COMPLETED | OUTPATIENT
Start: 2024-11-08 | End: 2024-11-08

## 2024-11-08 RX ORDER — PROTAMINE SULFATE 10 MG/ML
INJECTION, SOLUTION INTRAVENOUS CONTINUOUS PRN
Status: COMPLETED | OUTPATIENT
Start: 2024-11-08 | End: 2024-11-08

## 2024-11-08 RX ORDER — SENNOSIDES 8.6 MG/1
2 TABLET ORAL 2 TIMES DAILY
Status: DISCONTINUED | OUTPATIENT
Start: 2024-11-08 | End: 2024-11-08 | Stop reason: HOSPADM

## 2024-11-08 RX ORDER — HEPARIN SODIUM 1000 [USP'U]/ML
INJECTION, SOLUTION INTRAVENOUS; SUBCUTANEOUS AS NEEDED
Status: DISCONTINUED | OUTPATIENT
Start: 2024-11-08 | End: 2024-11-08 | Stop reason: HOSPADM

## 2024-11-08 RX ORDER — ACETAMINOPHEN 325 MG/1
650 TABLET ORAL EVERY 6 HOURS PRN
COMMUNITY
Start: 2024-11-08

## 2024-11-08 RX ORDER — MIDAZOLAM HYDROCHLORIDE 1 MG/ML
INJECTION, SOLUTION INTRAMUSCULAR; INTRAVENOUS AS NEEDED
Status: DISCONTINUED | OUTPATIENT
Start: 2024-11-08 | End: 2024-11-08 | Stop reason: HOSPADM

## 2024-11-08 RX ORDER — LIDOCAINE HYDROCHLORIDE AND EPINEPHRINE 20; 10 MG/ML; UG/ML
5 INJECTION, SOLUTION INFILTRATION; PERINEURAL ONCE
Status: DISCONTINUED | OUTPATIENT
Start: 2024-11-08 | End: 2024-11-08 | Stop reason: HOSPADM

## 2024-11-08 ASSESSMENT — COLUMBIA-SUICIDE SEVERITY RATING SCALE - C-SSRS
6. HAVE YOU EVER DONE ANYTHING, STARTED TO DO ANYTHING, OR PREPARED TO DO ANYTHING TO END YOUR LIFE?: NO
2. HAVE YOU ACTUALLY HAD ANY THOUGHTS OF KILLING YOURSELF?: NO
1. IN THE PAST MONTH, HAVE YOU WISHED YOU WERE DEAD OR WISHED YOU COULD GO TO SLEEP AND NOT WAKE UP?: NO

## 2024-11-08 NOTE — POST-PROCEDURE NOTE
S/p ÁNGEL closure    Access: Dual right femoral vein access s/p proglide and vascade closure devices.     Device: After confirmation of the device position on fluoroscopy and ICE, anchor with a tug test, compression and seal a 27 mm Watchman was successfully deployed without any immediate complications.     No effusion on ICE was present pre and post watchman deployment.     Recommendations:   ASA and Plavix for 6 months followed by ASA for life   CT in 4 months  Access site monitoring.   TTE today  Likely discharge home today once recovery and monitoring period is complete.

## 2024-11-08 NOTE — PROGRESS NOTES
Pharmacy Medication History Review    Meryl Hester is a 89 y.o. female admitted for Atrial fibrillation (Multi). Pharmacy reviewed the patient's kcqqa-ne-yqevkcsry medications and allergies for accuracy.    Medications ADDED:  N/A  Medications CHANGED:  Acetaminophen 325 mg: take 2 tablets by mouth every 6 hours as needed for pain  Flonase nasal spray: administer 1 spray in each nostril once daily as needed for rhinitis  Medications REMOVED:   N/A     The list below reflects the updated PTA list.   Prior to Admission Medications   Prescriptions Last Dose Informant   DULoxetine (Cymbalta) 30 mg DR capsule 11/7/2024 Self   Sig: Take 1 capsule (30 mg) by mouth once daily.   LACTOBACILLUS ACIDOPHILUS ORAL 11/7/2024 Self   Sig: Take 1 tablet by mouth once daily.   acetaminophen (Tylenol) 325 mg tablet  Self   Sig: Take 2 tablets (650 mg) by mouth every 6 hours if needed for mild pain (1 - 3).   aspirin 81 mg EC tablet 11/7/2024 Evening Self   Sig: Take 1 tablet (81 mg) by mouth once daily.   bisoprolol (Zebeta) 5 mg tablet 11/7/2024 Self   Sig: Take 1.5 tablets (7.5 mg) by mouth once daily.   calcium carbonate-vitamin D3 500 mg-5 mcg (200 unit) tablet 11/7/2024 Self   Sig: Take 1 tablet by mouth once daily.   carboxymethylcellulose (THERATears) 0.25 % ophthalmic solution 11/7/2024 Self   Sig: Administer 1 drop into both eyes 3 times a day as needed for dry eyes.   docusate sodium (Colace) 100 mg capsule 11/7/2024 Self   Sig: Take 1 capsule (100 mg) by mouth 2 times a day.   fluticasone (Flonase) 50 mcg/actuation nasal spray  Self   Sig: Administer 1 spray into each nostril once daily as needed for rhinitis.   furosemide (Lasix) 20 mg tablet 11/7/2024 Self   Sig: Take 1 tablet (20 mg) by mouth once daily.   gabapentin (Neurontin) 300 mg capsule 11/7/2024 Self   Sig: Take 1 capsule (300 mg) by mouth once daily at bedtime.   krill oil 500 mg capsule 11/7/2024 Self   Sig: Take 1 capsule (500 mg) by mouth 2 times a day.    levothyroxine (Synthroid, Levoxyl) 88 mcg tablet 11/8/2024 Morning Self   Sig: Take 1 tablet (88 mcg) by mouth once daily.   losartan (Cozaar) 25 mg tablet 11/7/2024 Self   Sig: Take 1 tablet (25 mg) by mouth 2 times a day.   magnesium oxide 500 mg magnesium tablet 11/7/2024 Self   Sig: Take 1 tablet (500 mg) by mouth once daily.   multivitamin with minerals iron-free (Centrum Silver) 11/7/2024 Self   Sig: Take 1 tablet by mouth once daily.   mv-min-FA-vit K-lutein-zeaxant (PreserVision AREDS 2 Plus MV) 200 mcg-15 mcg- 5 mg-1 mg capsule 11/7/2024 Self   Sig: Take 1 capsule by mouth 2 times a day.   omeprazole (PriLOSEC) 40 mg DR capsule 11/8/2024 Self   Sig: Take 1 capsule (40 mg) by mouth once daily.   potassium chloride CR 20 mEq ER tablet 11/7/2024 Self   Sig: Take 1 tablet (20 mEq) by mouth once daily.   rosuvastatin (Crestor) 20 mg tablet 11/7/2024 Self   Sig: Take 1 tablet (20 mg) by mouth once daily at bedtime.      Facility-Administered Medications: None       The list below reflects the updated allergy list. Please review each documented allergy for additional clarification and justification.  Allergies  Reviewed by RICHY Nolasco-ROSALINO on 11/8/2024        Severity Reactions Comments    Sulfa (sulfonamide Antibiotics) High Rash Skin peeling     Lisinopril Medium Cough             M2B service not offered prior to surgery, please reassess prior to patient discharge if Meds to Beds is desired.     Sources  [facility]  Pharmacy dispense history  Patient interview Good historian  Chart Review   Cardiology progress note 10/23/24    Additional Comments  N/A    [place] PharmD  Transitions of Care Pharmacist   Meds Ambulatory and Retail Services  Please reach out via Secure Chat for questions, or if no response call [facility] or [facility]

## 2024-11-08 NOTE — Clinical Note
Rf wire crossed septum with jennifer device. Versacross device removed. Double curve inserted over rf wire. Advanced to LA

## 2024-11-08 NOTE — Clinical Note
Ice catheter removed 8fr long. J wire inserted. Hemostasis obtained with vascade.. 8 fr long and j wire removed

## 2024-11-09 NOTE — DISCHARGE SUMMARY
89 y.o. years old female  who came for elective left atrial appendage closure.  The patient underwent successful procedure using 27mm Watchman FLX Pro device.  No complications were observed during the procedure.    The patient will be monitored for a few hours for the groin site, then will be ambulated and an echocardiogram will be performed.  If all of those steps are reassuring, patient will be discharged home later today.    The patient will continue dual antiplatelet therapy for 6 months, antibiotic prophylaxis for 6 months. He will be discharged with follow-up CT and clinic visit instructions.    CTA at 4 months is required to reassess device positioning and rule out any device leak or device related thrombus.

## 2024-11-13 LAB
ATRIAL RATE: 84 BPM
P AXIS: 32 DEGREES
P OFFSET: 219 MS
P ONSET: 147 MS
PR INTERVAL: 156 MS
Q ONSET: 225 MS
QRS COUNT: 13 BEATS
QRS DURATION: 132 MS
QT INTERVAL: 410 MS
QTC CALCULATION(BAZETT): 484 MS
QTC FREDERICIA: 458 MS
R AXIS: -34 DEGREES
T AXIS: -12 DEGREES
T OFFSET: 430 MS
VENTRICULAR RATE: 84 BPM

## 2024-12-03 ENCOUNTER — APPOINTMENT (OUTPATIENT)
Dept: CARDIOLOGY | Facility: CLINIC | Age: 89
End: 2024-12-03
Payer: MEDICARE

## 2024-12-03 VITALS
HEART RATE: 69 BPM | DIASTOLIC BLOOD PRESSURE: 64 MMHG | BODY MASS INDEX: 27.5 KG/M2 | SYSTOLIC BLOOD PRESSURE: 140 MMHG | WEIGHT: 136.4 LBS | HEIGHT: 59 IN

## 2024-12-03 DIAGNOSIS — I48.0 PAROXYSMAL ATRIAL FIBRILLATION (MULTI): ICD-10-CM

## 2024-12-03 DIAGNOSIS — I10 PRIMARY HYPERTENSION: Primary | ICD-10-CM

## 2024-12-03 PROCEDURE — 1111F DSCHRG MED/CURRENT MED MERGE: CPT | Performed by: INTERNAL MEDICINE

## 2024-12-03 PROCEDURE — 3078F DIAST BP <80 MM HG: CPT | Performed by: INTERNAL MEDICINE

## 2024-12-03 PROCEDURE — 1159F MED LIST DOCD IN RCRD: CPT | Performed by: INTERNAL MEDICINE

## 2024-12-03 PROCEDURE — 3077F SYST BP >= 140 MM HG: CPT | Performed by: INTERNAL MEDICINE

## 2024-12-03 PROCEDURE — G2211 COMPLEX E/M VISIT ADD ON: HCPCS | Performed by: INTERNAL MEDICINE

## 2024-12-03 PROCEDURE — 99214 OFFICE O/P EST MOD 30 MIN: CPT | Performed by: INTERNAL MEDICINE

## 2024-12-03 RX ORDER — BISOPROLOL FUMARATE 10 MG/1
10 TABLET, FILM COATED ORAL DAILY
Qty: 90 TABLET | Refills: 3 | Status: SHIPPED | OUTPATIENT
Start: 2024-12-03 | End: 2025-12-03

## 2024-12-03 NOTE — PATIENT INSTRUCTIONS
1 month follow up appointment  (cancel 12/11 appointment)  Start taking bisoprolol 10mg once daily. If blood pressure less than 105 or heart rate less than 50 go back to bisoprolol 7.5mg and notify Dr. Corona    May do swim therapy       DID YOU KNOW  We have a pharmacy here in the [facility].  They can fill all prescriptions, not just cardiac medications.  Prescriptions from other pharmacies can easily be transferred to the  pharmacy by the  pharmacist on site.   pharmacies offer FREE HOME DELIVERY on medications to anywhere in [place]. They can sync your medications. Typically prescriptions can be ready in 10 - 15 minutes. If pharmacy is unable to fill your  prescription or if cost is more than your paying now the Pharmacist can easily transfer back to your Pharmacy of choice. Pharmacy phone # [phone].     Please bring all medicines, vitamins, and herbal supplements with you in original bottles to every appointment  Prescriptions will not be filled unless you are compliant with your follow up appointments or have a follow up appointment scheduled as per instruction of your physician. Refills should be requested at the time of your visit.

## 2024-12-11 ENCOUNTER — APPOINTMENT (OUTPATIENT)
Dept: CARDIOLOGY | Facility: CLINIC | Age: 89
End: 2024-12-11
Payer: MEDICARE

## 2024-12-16 ENCOUNTER — TELEPHONE (OUTPATIENT)
Dept: CARDIOLOGY | Facility: CLINIC | Age: 89
End: 2024-12-16
Payer: MEDICARE

## 2024-12-16 NOTE — TELEPHONE ENCOUNTER
The patient's daughter called into the office and stated that during a bowel movement this morning the patient noticed some bleeding. Amada stated that it has not happened since this morning and no more bleeding has occurred. Patient is currently on Aspirin and Plavix. They would like to know if these medications need to be help. Please advise. Tasked to RICHY Post-CNP  in absence of Dr. Wilmer Corona MD, FACC.

## 2024-12-16 NOTE — TELEPHONE ENCOUNTER
Called to patient, spoke to daughter Amada and advised of message. Agreeable with plan and verbalized good understanding.

## 2025-01-10 NOTE — PROGRESS NOTES
Pt mariel not transmitting- Call to pt Amada cruz- No answer, left       [facility] Transitions Assessment    Patient's Address:   [place] [address]  **  If this is not the address patient will receive services - alert team and address in EMR**       Patient Contacts:  Extended Emergency Contact Information  Primary Emergency Contact: Melody Claros  Home Phone: [phone]  Relation: Child  Secondary Emergency Contact: Amada Lemus  Mobile Phone: [phone]  Relation: Daughter   needed? No                                Patient's Preferred Phone: [phone]  Patient's E-mail: [email or web address]                                       Pt to transfer to [facility] ED via personal vehicle. Pt A&O, VSS, no signs of distress. Pt ambulated independently from [facility] ED.

## 2025-01-23 ENCOUNTER — APPOINTMENT (OUTPATIENT)
Dept: CARDIOLOGY | Facility: CLINIC | Age: OVER 89
End: 2025-01-23
Payer: MEDICARE

## 2025-01-23 VITALS
DIASTOLIC BLOOD PRESSURE: 72 MMHG | HEART RATE: 79 BPM | WEIGHT: 136.8 LBS | HEIGHT: 59 IN | SYSTOLIC BLOOD PRESSURE: 138 MMHG | BODY MASS INDEX: 27.58 KG/M2

## 2025-01-23 DIAGNOSIS — I48.0 PAROXYSMAL A-FIB (MULTI): ICD-10-CM

## 2025-01-23 DIAGNOSIS — I10 PRIMARY HYPERTENSION: ICD-10-CM

## 2025-01-23 DIAGNOSIS — Z79.899 MEDICATION DOSE CHANGED: ICD-10-CM

## 2025-01-23 PROCEDURE — 3078F DIAST BP <80 MM HG: CPT | Performed by: INTERNAL MEDICINE

## 2025-01-23 PROCEDURE — 3075F SYST BP GE 130 - 139MM HG: CPT | Performed by: INTERNAL MEDICINE

## 2025-01-23 PROCEDURE — G2211 COMPLEX E/M VISIT ADD ON: HCPCS | Performed by: INTERNAL MEDICINE

## 2025-01-23 PROCEDURE — 99214 OFFICE O/P EST MOD 30 MIN: CPT | Performed by: INTERNAL MEDICINE

## 2025-01-23 PROCEDURE — 1159F MED LIST DOCD IN RCRD: CPT | Performed by: INTERNAL MEDICINE

## 2025-01-23 RX ORDER — LOSARTAN POTASSIUM 25 MG/1
75 TABLET ORAL DAILY
Qty: 270 TABLET | Refills: 3 | Status: SHIPPED | OUTPATIENT
Start: 2025-01-23 | End: 2026-01-23

## 2025-01-23 NOTE — PROGRESS NOTES
Patient:  Meryl Hester  YOB: 1931  MRN: [id number]       Impression/Plan:     Diagnoses and all orders for this visit:  Primary hypertension  -    Suboptimal blood pressure control particularly in a patient with tendency to atrial fibrillation increase losartan to 75 a day  -     Basic Metabolic Panel; Future    Paroxysmal A-fib (Multi)  -    No clinical recurrence to date.  Has Watchman hence not on anticoagulation  -     Follow Up In Cardiology; Future  Medication dose changed  -     losartan (Cozaar) 25 mg tablet; Take 3 tablets (75 mg) by mouth once daily.  -     Basic Metabolic Panel; Future  -     Follow Up In Cardiology; Future      Chief Complaint/Active Symptoms:       Meryl Hester is a 89 y.o. female who presents with hypertension, paroxysmal atrial fibrillation, CVA February 2024 secondary to atrial fibrillation, severe and diffuse diverticulosis with recurrent GI bleeding, coronary artery disease with diffuse 50 to 60% stenosis at coronary angiography August 2024.  She had been admitted to [facility] in late July with recurrent GI bleeding rectal bleeding.  During that hospital stay had multiple runs of rapid atrial fibrillation treated transiently with intravenous amiodarone but was not kept on that agent.  Had to undergo 2 colonoscopies was discharged the day after the second colonoscopy.  Was admitted to [facility] there was syncopal episode felt related to volume depletion.  She was hydrated and improved in symptoms but troponins were elevated and echo suggested right ventricular systolic pressure over 70 mmHg.  In light of these findings she underwent coronary angiography as delineated below that did show 50 to 60% diffuse disease. There was one 90% lesion in a small ramus.  EF 40% with RVSP found to be just 43 mmHg.      Holter monitor September 2024 1-2% atrial fibrillation.  Some lasting as long as an hour.  Overall burden likely higher as computer interpretation had  mislabeled some SVT as A-fib.    10/23/2024 normal BMP. Hemoglobin 11.4 significant improvement from previously      11/8/2024 watchman implantation.  No complications.    She was asymptomatic at office visit 12/3/2024 but blood pressure was excessive and bisoprolol was increased to 10 mg for better control.  No symptomatic recurrence of her atrial fibrillation at that time.    She denies angina, dyspnea, palpitation, edema, lightheadedness or syncope.  She has had no symptoms of claudication or neurologic deterioration.  There have been no hospitalizations or emergency room visits since last office visit.    She continues to feel well.  She has had no recurrent GI bleeding despite severe GI bleeding last year from diverticuli.  She does not believe she has had significant episodes of atrial fibrillation.  She is eating and drinking well.  No specific complaints               Review of Systems: Unremarkable except as noted above    Meds     Current Outpatient Medications   Medication Instructions    acetaminophen (TYLENOL) 650 mg, oral, Every 6 hours PRN    aspirin 81 mg, oral, Daily    bisoprolol (ZEBETA) 10 mg, oral, Daily    calcium carbonate-vitamin D3 500 mg-5 mcg (200 unit) tablet 1 tablet, Daily    carboxymethylcellulose (THERATears) 0.25 % ophthalmic solution 1 drop, 3 times daily PRN    clopidogrel (PLAVIX) 75 mg, oral, Daily    docusate sodium (COLACE) 100 mg, 2 times daily    DULoxetine (CYMBALTA) 30 mg, Daily RT    fluticasone (Flonase) 50 mcg/actuation nasal spray 1 spray, Daily PRN    furosemide (LASIX) 20 mg, Daily RT    gabapentin (NEURONTIN) 300 mg, Nightly    krill oil 500 mg, 2 times daily    LACTOBACILLUS ACIDOPHILUS ORAL 1 tablet, Daily    levothyroxine (SYNTHROID, LEVOXYL) 88 mcg, Daily RT    losartan (COZAAR) 25 mg, oral, 2 times daily    magnesium oxide 500 mg, Daily    multivitamin with minerals iron-free (Centrum Silver) 1 tablet, Daily    mv-min-FA-vit K-lutein-zeaxant (PreserVision AREDS  "2 Plus MV) 200 mcg-15 mcg- 5 mg-1 mg capsule 1 capsule, 2 times daily    omeprazole (PRILOSEC) 40 mg, Daily RT    potassium chloride CR 20 mEq ER tablet 20 mEq, Daily RT    rosuvastatin (CRESTOR) 20 mg, Nightly        Allergies     Allergies   Allergen Reactions    Sulfa (Sulfonamide Antibiotics) Rash     Skin peeling     Lisinopril Cough         Annotated Problems     Specialty Problems          Cardiology Problems    Acute systolic CHF (congestive heart failure), NYHA class 2    Cardiomyopathy, ischemic    Coronary artery disease involving native coronary artery of native heart without angina pectoris    Paroxysmal A-fib (Multi)    Hypertension    Atrial fibrillation, unspecified type (Multi)    Presence of Watchman left atrial appendage closure device    NSTEMI (non-ST elevated myocardial infarction) (Multi)    Atrial fibrillation (Multi)        Problem List     Patient Active Problem List    Diagnosis Date Noted    Presence of Watchman left atrial appendage closure device 11/07/2024    Atrial fibrillation, unspecified type (Multi) 11/07/2024    Hypertension 10/10/2024    Acute systolic CHF (congestive heart failure), NYHA class 2 08/28/2024    Cardiomyopathy, ischemic 08/28/2024    Coronary artery disease involving native coronary artery of native heart without angina pectoris 08/28/2024    Paroxysmal A-fib (Multi) 08/28/2024    Syncope, unspecified syncope type 08/08/2024    Syncope and collapse 08/07/2024    Radiculopathy of lumbar region 01/03/2024    Joint stiffness of spine 01/03/2024    Gait difficulty 01/03/2024    Atrial fibrillation (Multi) 10/23/2024    NSTEMI (non-ST elevated myocardial infarction) (Multi) 08/07/2024    Pulmonary hypertension (Multi) 08/07/2024       Objective:     Vitals:    01/23/25 1250   BP: 138/72   BP Location: Left arm   Patient Position: Sitting   Pulse: 79   Weight: 62.1 kg (136 lb 12.8 oz)   Height: 1.499 m (4' 11\")      Wt Readings from Last 4 Encounters:   01/23/25 62.1 kg " (136 lb 12.8 oz)   12/03/24 61.9 kg (136 lb 6.4 oz)   10/23/24 59 kg (130 lb)   10/10/24 62.5 kg (137 lb 12.8 oz)           LAB:     Lab Results   Component Value Date    WBC 4.2 (L) 10/23/2024    HGB 11.4 (L) 10/23/2024    HCT 37.7 10/23/2024     10/23/2024    ALT 9 08/08/2024    AST 14 08/08/2024     10/23/2024    K 4.7 10/23/2024     10/23/2024    CREATININE 0.76 10/23/2024    BUN 11 10/23/2024    CO2 31 10/23/2024    INR 1.1 08/07/2024    HGBA1C 5.2 09/03/2024       Diagnostic Studies:     Cardiac Catheterization Procedure    Result Date: 11/14/2024   [facility], Cath Lab, [address] [place] [address] Cardiovascular Catheterization Report Patient Name:      ANNA BOWEN SAMANTHA         Performing Physician:  [id number] Joyce MD Study Date:        11/8/2024            Verifying Physician:   Hernandez Joyce MD MRN/PID:           [id number]             Cardiologist/Co-Scrub: Accession#:        [id number]         Ordering Provider:     [id number] JOYCE Date of Birth/Age: 4/16/1931 / 89 years Cardiologist: Gender:            F                    Fellow:                [id number] Reji Geller MD Encounter#:        [id number]           Surgeon:  Study: Left Atrial Appendage Closure  Indications:  Left Atrial Appendage Closure (LAAC):  Sterile prep and appropriate procedure timeout were performed. Using ultrasound guidance and micropuncture technique dual access was obtained in the right common femoral vein. Two 8F sheaths were placed using a modified Seldinger technique. The patient was administered IV Heparin and ACT was confirmed to be therapeutic. An AcuNav ICE probe was then advanced through one of the sheaths and under ICE  guidance, transseptal puncture was performed with a versacross (access solutions), accessing the left atrium. The transseptal tract was then dilated with a Watchman FXD Double Curve access sheath and the ICE probe was advanced through the dilated tract into the left atrium. Next, a 6 Amharic angled pigtail was advanced through the delivery sheath into the left atrial appendage and angiography was performed via the pigtail. Sizing of the device was confirmed by ICE and angiography, we then advanced a 27 mm Watchman FLX Pro Closure Device and confirmed placement both by ICE and angiography. A 'tug test' was performed and confirmed stability. No color Doppler flow around the device was appreciated. Having satisfied position, anchoring, size and seal criteria, the device was successfully deployed. All equipment was then removed and hemostasis was facilitated by Vascade and Proglide Closure devices The patient was enrolled in a research study and data was included in the LAAO registry.  Hemo Personnel: +----------------+---------+ Name            Duty      +----------------+---------+ Liam Joyce MD 1 +----------------+---------+  Hemodynamic Pressures:  +----+--------------------+----------+---------+-----------+-----------+ Site     Date Time      Phase NameMean mmHgA-Wave mmHgV-Wave mmHg +----+--------------------+----------+---------+-----------+-----------+   LA11/8/2024 2:36:24 PM      Rest       21         21         21 +----+--------------------+----------+---------+-----------+-----------+  Cardiac Cath Post Procedure Notes: Post Procedure Diagnosis: S/p successful LAAO with 27mm Watchman FLX Pro. Blood Loss:               Estimated blood loss during the procedure was 10 mls. Specimens Removed:        Number of specimen(s) removed: none. ____________________________________________________________________________________ CONCLUSIONS:  1. S/p successful LAAO with 27mm Watchman FLX Pro.  ICD 10 Codes: Paroxysmal atrial fibrillation-I48.0  CPT Codes: Perc left atrial appendage closure (LAAC)-54768  [id number] Liam Joyce MD Performing Physician Electronically signed by [id number] Liam Joyce MD on 11/14/2024 at 10:14:25 AM  ** Final **     ECG 12 lead    Result Date: 11/13/2024  Normal sinus rhythm Possible Left atrial enlargement Left axis deviation Right bundle branch block Abnormal ECG When compared with ECG of 07-AUG-2024 13:34, Premature atrial complexes are no longer Present ST no longer depressed in Anterior leads T wave inversion no longer evident in Anterior leads Confirmed by Kimani Glover ([id number]) on 11/13/2024 5:55:42 PM    Transthoracic Echo (TTE) Limited    Result Date: 11/8/2024   [facility], [address] [place] [address]                Tel [phone] and Fax [phone] TRANSTHORACIC ECHOCARDIOGRAM REPORT  Patient Name:       ANNA Moran Physician:    [id number] Iker Burks MD Study Date:         11/8/2024           Ordering Provider:    [id number] WILLIS MRN/PID:            [id number]            Fellow: Accession#:         [id number]        Nurse: Date of Birth/Age:  4/16/1931 / 89      Sonographer:          Tena Vee RDCS                     years Gender assigned at  F                   Additional Staff: Birth: Height:             149.86 cm           Admit Date:           11/8/2024 Weight:             60.78 kg            Admission Status:     Inpatient -                                                               Routine BSA / BMI:          1.56 m2 / 27.06     Encounter#:           [id number]                     kg/m2 Blood Pressure:     146/68 mmHg         Department Location:  [facility]                                                               Cath Lab Study Type:    TRANSTHORACIC ECHO (TTE) LIMITED  Diagnosis/ICD: Presence of other cardiac implants and grafts-Z95.818 Indication:    Post Watchman CPT Code:      Echo Limited-42907  Study Detail: The following Echo studies were performed: 2D. Technically               challenging study due to patient lying in supine position. Unable               to obtain suprasternal notch view.  PHYSICIAN INTERPRETATION: Left Ventricle: The left ventricular systolic function is low normal, with a visually estimated ejection fraction of 50-55%. Left venticular wall motion is abnormal. The left ventricular cavity size is normal. Left ventricular diastolic filling was not assessed. There is inferolateral hypokinesia. Left Atrium: The left atrium is enlarged. Right Ventricle: The right ventricle is upper limits of normal in size. There is reduced right ventricular systolic function. Right Atrium: The right atrium is normal in size. Aortic Valve: The aortic valve is trileaflet. There is mild to moderate aortic valve cusp calcification. Aortic valve regurgitation was not assessed. Mitral Valve: The mitral valve is mildly thickened. Mitral valve regurgitation was not assessed. Tricuspid Valve: The tricuspid valve is structurally normal. Tricuspid regurgitation was not assessed. Pulmonic Valve: The pulmonic valve was not assessed. Pulmonic valve regurgitation was not assessed. Pericardium: Trivial pericardial effusion. Aorta: The aortic root is normal. Systemic Veins: The inferior vena cava appears normal in size. In comparison to the previous echocardiogram(s): Compared with study dated 11/8/2024, no significant change.  CONCLUSIONS:  1. The left ventricular systolic function is low normal, with a visually estimated ejection fraction of 50-55%.  2. Abnormal left venticular wall motion.  3. There is reduced right ventricular systolic function.  4. The left atrium is enlarged.  5. Trivial pericardial effusion. RECOMMENDATIONS: Utilizing an FDA cleared automated machine learning  algorithm (EchoGo Heart Failure by [employer]), the analysis of the apical 4-chamber echocardiogram suggests the presence of heart failure with preserved ejection fraction (HFpEF)*. Clinical correlation looking for additional heart failure signs and symptoms is recommended, as a definite diagnosis of heart failure cannot be made by imaging alone. *Per ACC/AHA/HFSA universal diagnosis of heart failure, HFpEF is defined as 1) signs and symptoms leading to clinical diagnosis of heart failure, 2) an ejection fraction of at least 50%, and 3) evidence of elevated intra-cardiac filling pressures by echocardiography, BNP elevation, or catheterization.  QUANTITATIVE DATA SUMMARY:  LV SYSTOLIC FUNCTION BY 2D PLANIMETRY (MOD):                      Normal Ranges: EF-Visual:      53 % LV EF Reported: 53 %  [id number] Iker Burks MD Electronically signed on 11/8/2024 at 4:44:52 PM  ** Final **     Transthoracic Echo (TTE) Limited    Result Date: 11/8/2024   [facility], [address] [place] [address]                Tel [phone] and Fax [phone] TRANSTHORACIC ECHOCARDIOGRAM REPORT  Patient Name:       ANNA Moran Physician:    [id number] Iker Burks MD Study Date:         11/8/2024           Ordering Provider:    [id number] RENEE WILLIS MRN/PID:            [id number]            Fellow:               [id number] Mechelle Fonseca MD Accession#:         [id number]        Nurse: Date of Birth/Age:  4/16/1931 / 89      Sonographer:          Tena mota                                     CARLOS Gender assigned at  F                   Additional Staff: Birth: Height:             149.86 cm           Admit Date: Weight:             60.78 kg            Admission Status:     Inpatient -                                                                Routine BSA / BMI:          1.56 m2 / 27.06     Encounter#:           [id number]                     kg/m2 Blood Pressure:     177/87 mmHg         Department Location:  [facility]                                                               Cath Lab Study Type:    TRANSTHORACIC ECHO (TTE) LIMITED Diagnosis/ICD: Encounter for preprocedural cardiovascular examination-Z01.810 Indication:    Pre LAAO CPT Code:      Echo Limited-06072  Study Detail: The following Echo studies were performed: 2D.  PHYSICIAN INTERPRETATION: Left Ventricle: Left ventricular ejection fraction is low normal, by visual estimate at 50-55%. Left venticular wall motion is abnormal. The left ventricular cavity size is normal. Left ventricular diastolic filling was not assessed. There is inferolateral hypokinesia. Left Atrium: The left atrium is enlarged. Right Ventricle: The right ventricle is upper limits of normal in size. There is reduced right ventricular systolic function. Right Atrium: The right atrium was not assessed. Aortic Valve: The aortic valve is trileaflet. There is mild aortic valve cusp calcification. Aortic valve regurgitation was not assessed. Mitral Valve: The mitral valve is normal in structure. Mitral valve regurgitation was not assessed. Tricuspid Valve: The tricuspid valve is structurally normal. Tricuspid regurgitation was not assessed. Pulmonic Valve: The pulmonic valve is structurally normal. Pulmonic valve regurgitation was not assessed. Pericardium: Trivial pericardial effusion. Aorta: The aortic root was not assessed. In comparison to the previous echocardiogram(s): Compared with study dated 8/8/2024, no significant change.  CONCLUSIONS:  1. Poorly visualized anatomical structures due to suboptimal image quality.  2. Left ventricular ejection fraction is low normal, by visual estimate at 50-55%.  3. Abnormal left venticular wall motion.  4. There is reduced right  ventricular systolic function.  5. The left atrium is enlarged. RECOMMENDATIONS: Utilizing an FDA cleared automated machine learning algorithm (EchoGo Heart Failure by [employer]), the analysis of the apical 4-chamber echocardiogram suggests the presence of heart failure with preserved ejection fraction (HFpEF)*. Clinical correlation looking for additional heart failure signs and symptoms is recommended, as a definite diagnosis of heart failure cannot be made by imaging alone. *Per ACC/AHA/HFSA universal diagnosis of heart failure, HFpEF is defined as 1) signs and symptoms leading to clinical diagnosis of heart failure, 2) an ejection fraction of at least 50%, and 3) evidence of elevated intra-cardiac filling pressures by echocardiography, BNP elevation, or catheterization.  QUANTITATIVE DATA SUMMARY:  LV SYSTOLIC FUNCTION BY 2D PLANIMETRY (MOD):                      Normal Ranges: EF-Visual:      53 % LV EF Reported: 53 %  [id number] Iker Burks MD Electronically signed on 11/8/2024 at 4:39:06 PM  ** Final **     CT watchman full contrast    Result Date: 11/8/2024  Interpreted By:  Mervin Ng, STUDY: CT WATCHMAN FULL CONTRAST;  11/8/2024 9:37 am   INDICATION: Signs/Symptoms:A-fib, Pre-Watchman, ÁNGEL Sizing, R/O ÁNGEL Thrombus.   ,I48.91 Unspecified atrial fibrillation (Multi)   COMPARISON: CT dated 03/04/2011 and 08/07/2024   ACCESSION NUMBER(S): [id number]   ORDERING CLINICIAN: JAYE PRITCHETT   TECHNIQUE: Using multi detector CT technology,axial imaging with prospective gating was performed of the chest following the intravenous administration of 70 ML Omnipaque 350.   For optimization of anatomic evaluation, multiplanar reconstruction, maximum intensity projections, and advanced 3-D off-line postprocessing were performed on a dedicated stand-alone workstation under the direct supervision of the interpreting physician.   FINDINGS: POTENTIAL STUDY LIMITATIONS:  None   CORONARY ARTERIES:   CORONARY ANATOMY: There is normal  origin of the coronary arteries. Right dominant system. Moderate coronary artery calcifications are seen. Please note,the study is not optimized for evaluation of coronary arteries.   CARDIAC CHAMBERS: The cardiac chambers demonstrate normal atrioventricular and ventriculoarterial concordance, and systemic and pulmonary venous return.   LEFT ATRIUM: Dilated (35.9-cm2). No evidence of thrombus in the left atrial appendage or left atrium. The left atrial appendage orifice is oval in shape measuring 2.5 x 2 cm with an area of 3.8 cm2. Left atrial appendage depth is 3.5 cm.   RIGHT ATRIUM: Normal size (19.6-cm2)   VENTRICLES: The left and right ventricles appear normal in appearance, although accurate size measurements are not possible on systolic phase imaging.   INTERATRIAL SEPTUM: Intact.   INTERVENTRICULAR SEPTUM: Intact.   AORTIC VALVE: The aortic valve is trileaflet in morphology. No valvular thickening or calcifications.   MITRAL VALVE: No valvular thickening/calcification.   THORACIC AORTA: The thoracic aorta normal in course and caliber.There is moderate atherosclerosis present, including calcified and noncalcified plaques.There is no evidence for acute aortic pathology, such as dissection, intramural hematoma, or contained rupture. The aortic arch is not included on this examination.   PERICARDIUM: There is no pericardial effusion seen.   CHEST: The trachea and central airways are patent. No endobronchial lesion is seen. Partially visualized emphysema throughout the lungs. There is a 1.4 cm solid nodule along the minor fissure within the inferior portion of the right upper lobe with focal area possible cavitary change. Additional nodule in the right middle lobe measuring 0.4 cm on series 5, image 40. Areas of interlobular septal thickening in the lower lungs. Mosaic attenuation throughout the visualized lungs. Partially visualized solid nodular consolidation within the right lower lobe on series 5, image 16,  the visualized portion of which measures up to 2.1 cm.   No evidence of lymphadenopathy within included mediastinum. Visualized esophagus appears within normal limits as seen. Main pulmonary artery is dilated measuring 3.5 cm.   UPPER ABDOMEN: The visualized subdiaphragmatic structures demonstrate no remarkable findings.     CHEST WALL AND OSSEOUS STRUCTURES: Visualized chest wall is within normal limits. No acute osseous pathology.There are no suspicious osseous lesions within included chest.       1. No evidence of thrombus in the left atrial appendage or left atrium. The left atrial appendage orifice is oval in shape measuring 2.5 x 2 cm with an area of 3.8 cm2. Left atrial appendage depth is 3.5 cm. 2. Moderate coronary artery calcifications are seen. Please note,the study is not optimized for evaluation of coronary arteries. 3. Dilated left atrium. 4. Dilated main pulmonary artery which can be seen with pulmonary hypertension. 5. Multiple pulmonary nodules and nodular consolidations in the right lung as described on a background of emphysema. The nodule in the inferior portion of the right upper lobe measures 1.4 cm has mildly spiculated margins which raises concern for malignancy. Further evaluation with PET-CT and/or tissue sampling is recommended. Alternatively, follow-up CT in 3 months could be obtained. 6. Findings of pulmonary interstitial edema and mosaic attenuation which can be seen with small airways or small vessels disease.   MACRO: Critical Finding:  See findings. Notification was initiated on 11/8/2024 at 9:49 am by  Mervin Ng.  (**-OCF-**) Instructions:   Signed by: Mervin Ng 11/8/2024 9:50 AM Dictation workstation:   [id number]        Radiology:     No orders to display       Physical Exam     General Appearance: alert and oriented to person, place and time, in no acute distress  Cardiovascular: normal rate, regular rhythm, normal S1 and S2, no murmurs, rubs, clicks, or gallops,  no  JVD  Pulmonary/Chest: clear to auscultation bilaterally- no wheezes, rales or rhonchi, normal air movement, no respiratory distress  Abdomen: soft, non-tender, non-distended, normal bowel sounds, no masses   Extremities: no cyanosis, clubbing or edema  Skin: warm and dry, no rash or erythema  Eyes: EOMI  Neck: supple and non-tender without mass, no thyromegaly   Neurological: alert, oriented, normal speech, no focal findings or movement disorder noted  Vascular: Pulses 2+              Scribe Attestation  By signing my name below, I, Tiffanie Heart LPN , Scribe   attest that this documentation has been prepared under the direction and in the presence of Wilmer Corona MD.

## 2025-01-23 NOTE — PATIENT INSTRUCTIONS
Follow up 3 months    Change Losartan to 25 mg - take 3 tablets (75 mg) daily  Non-fasting labs  to be done approximately 2 weeks after increase of Losartan. You no longer get lab requisitions. The order is in the computer and you can go to any  lab to have done.       DID YOU KNOW  We have a pharmacy here in the [facility].  They can fill all prescriptions, not just cardiac medications.  Prescriptions from other pharmacies can easily be transferred to the  pharmacy by the  pharmacist on site.   pharmacies offer FREE HOME DELIVERY on medications to anywhere in [place]. They can sync your medications. Typically prescriptions can be ready in 10 - 15 minutes. If pharmacy is unable to fill your  prescription or if cost is more than your paying now the Pharmacist can easily transfer back to your Pharmacy of choice. Pharmacy phone # [phone].     Please bring all medicines, vitamins, and herbal supplements with you in original bottles to every appointment!!!!    Prescriptions will not be filled unless you are compliant with your follow up appointments or have a follow up appointment scheduled as per instruction of your physician. Refills should be requested at the time of your visit.

## 2025-02-21 ENCOUNTER — TELEPHONE (OUTPATIENT)
Dept: CARDIOLOGY | Facility: CLINIC | Age: OVER 89
End: 2025-02-21
Payer: MEDICARE

## 2025-02-21 LAB
ANION GAP SERPL CALCULATED.4IONS-SCNC: 10 MMOL/L (CALC) (ref 7–17)
BUN SERPL-MCNC: 11 MG/DL (ref 7–25)
BUN/CREAT SERPL: NORMAL (CALC) (ref 6–22)
CALCIUM SERPL-MCNC: 9.4 MG/DL (ref 8.6–10.4)
CHLORIDE SERPL-SCNC: 102 MMOL/L (ref 98–110)
CO2 SERPL-SCNC: 26 MMOL/L (ref 20–32)
CREAT SERPL-MCNC: 0.76 MG/DL (ref 0.6–0.95)
EGFRCR SERPLBLD CKD-EPI 2021: 73 ML/MIN/1.73M2
GLUCOSE SERPL-MCNC: 74 MG/DL (ref 65–139)
POTASSIUM SERPL-SCNC: 4.5 MMOL/L (ref 3.5–5.3)
SODIUM SERPL-SCNC: 138 MMOL/L (ref 135–146)

## 2025-02-21 NOTE — TELEPHONE ENCOUNTER
----- Message from Wilmer Corona sent at 2/21/2025 11:08 AM EST -----   tell her kidney function and electrolytes continue to be normal no change in medication needed at this time  ----- Message -----  From: Lilian imgix Results In  Sent: 2/21/2025   7:07 AM EST  To: Wilmer Corona MD

## 2025-02-21 NOTE — TELEPHONE ENCOUNTER
Called and was unable to get a hold of the patient. Voicemail was not verified. Left voicemail for patient to call the office back.

## 2025-03-07 ENCOUNTER — HOSPITAL ENCOUNTER (OUTPATIENT)
Dept: RADIOLOGY | Facility: HOSPITAL | Age: OVER 89
Discharge: HOME | End: 2025-03-07
Payer: MEDICARE

## 2025-03-07 DIAGNOSIS — I48.91 ATRIAL FIBRILLATION, UNSPECIFIED TYPE (MULTI): ICD-10-CM

## 2025-03-07 PROCEDURE — 75572 CT HRT W/3D IMAGE: CPT

## 2025-03-07 PROCEDURE — 2550000001 HC RX 255 CONTRASTS: Performed by: INTERNAL MEDICINE

## 2025-03-07 RX ADMIN — IOHEXOL 70 ML: 350 INJECTION, SOLUTION INTRAVENOUS at 13:35

## 2025-03-11 ENCOUNTER — TELEPHONE (OUTPATIENT)
Dept: CARDIOLOGY | Facility: HOSPITAL | Age: OVER 89
End: 2025-03-11
Payer: MEDICARE

## 2025-03-11 NOTE — TELEPHONE ENCOUNTER
Rn coordinator called for 4 month follow up CT results. No answer, left message to call back.. Per results, no peridevice leak or external thrombus noted. No further testing required. Patient will remain on Plavix and Aspirin until 6 months post watchman device implant. Last dose of plavix will be on 5/8/2025

## 2025-03-14 ENCOUNTER — APPOINTMENT (OUTPATIENT)
Dept: CARDIOLOGY | Facility: HOSPITAL | Age: OVER 89
DRG: 280 | End: 2025-03-14
Payer: MEDICARE

## 2025-03-14 ENCOUNTER — APPOINTMENT (OUTPATIENT)
Dept: RADIOLOGY | Facility: HOSPITAL | Age: OVER 89
DRG: 280 | End: 2025-03-14
Payer: MEDICARE

## 2025-03-14 ENCOUNTER — HOSPITAL ENCOUNTER (INPATIENT)
Facility: HOSPITAL | Age: OVER 89
DRG: 280 | End: 2025-03-14
Attending: STUDENT IN AN ORGANIZED HEALTH CARE EDUCATION/TRAINING PROGRAM | Admitting: STUDENT IN AN ORGANIZED HEALTH CARE EDUCATION/TRAINING PROGRAM
Payer: MEDICARE

## 2025-03-14 ENCOUNTER — HOSPITAL ENCOUNTER (EMERGENCY)
Dept: CARDIOLOGY | Facility: HOSPITAL | Age: OVER 89
Discharge: HOME | DRG: 280 | End: 2025-03-14
Payer: MEDICARE

## 2025-03-14 DIAGNOSIS — I50.33 ACUTE ON CHRONIC DIASTOLIC (CONGESTIVE) HEART FAILURE: ICD-10-CM

## 2025-03-14 DIAGNOSIS — R55 SYNCOPE AND COLLAPSE: ICD-10-CM

## 2025-03-14 DIAGNOSIS — I50.40 UNSPECIFIED COMBINED SYSTOLIC (CONGESTIVE) AND DIASTOLIC (CONGESTIVE) HEART FAILURE: ICD-10-CM

## 2025-03-14 DIAGNOSIS — J18.9 PNEUMONIA DUE TO INFECTIOUS ORGANISM, UNSPECIFIED LATERALITY, UNSPECIFIED PART OF LUNG: ICD-10-CM

## 2025-03-14 DIAGNOSIS — I48.0 PAROXYSMAL A-FIB (MULTI): ICD-10-CM

## 2025-03-14 DIAGNOSIS — I48.91 ATRIAL FIBRILLATION, UNSPECIFIED TYPE (MULTI): ICD-10-CM

## 2025-03-14 DIAGNOSIS — I50.33 ACUTE ON CHRONIC DIASTOLIC HEART FAILURE: Primary | ICD-10-CM

## 2025-03-14 DIAGNOSIS — R06.02 SHORTNESS OF BREATH: ICD-10-CM

## 2025-03-14 DIAGNOSIS — I48.0 PAROXYSMAL ATRIAL FIBRILLATION WITH RVR (MULTI): ICD-10-CM

## 2025-03-14 DIAGNOSIS — I50.9 ACUTE CONGESTIVE HEART FAILURE, UNSPECIFIED HEART FAILURE TYPE: ICD-10-CM

## 2025-03-14 DIAGNOSIS — I48.11 LONGSTANDING PERSISTENT ATRIAL FIBRILLATION (MULTI): ICD-10-CM

## 2025-03-14 PROBLEM — I21.A1 TYPE 2 MI (MYOCARDIAL INFARCTION) (MULTI): Status: ACTIVE | Noted: 2025-03-14

## 2025-03-14 PROBLEM — E03.9 ACQUIRED HYPOTHYROIDISM: Status: ACTIVE | Noted: 2025-03-14

## 2025-03-14 PROBLEM — J96.01 ACUTE HYPOXIC RESPIRATORY FAILURE: Status: ACTIVE | Noted: 2025-03-14

## 2025-03-14 LAB
ALBUMIN SERPL BCP-MCNC: 4 G/DL (ref 3.4–5)
ALP SERPL-CCNC: 124 U/L (ref 33–136)
ALT SERPL W P-5'-P-CCNC: 42 U/L (ref 7–45)
ANION GAP SERPL CALC-SCNC: 14 MMOL/L (ref 10–20)
AST SERPL W P-5'-P-CCNC: 36 U/L (ref 9–39)
BASOPHILS # BLD AUTO: 0.07 X10*3/UL (ref 0–0.1)
BASOPHILS NFR BLD AUTO: 0.8 %
BILIRUB SERPL-MCNC: 0.8 MG/DL (ref 0–1.2)
BNP SERPL-MCNC: 1561 PG/ML (ref 0–99)
BUN SERPL-MCNC: 14 MG/DL (ref 6–23)
CALCIUM SERPL-MCNC: 9.2 MG/DL (ref 8.6–10.3)
CARDIAC TROPONIN I PNL SERPL HS: 136 NG/L (ref 0–13)
CARDIAC TROPONIN I PNL SERPL HS: 140 NG/L (ref 0–13)
CHLORIDE SERPL-SCNC: 99 MMOL/L (ref 98–107)
CO2 SERPL-SCNC: 23 MMOL/L (ref 21–32)
CREAT SERPL-MCNC: 0.82 MG/DL (ref 0.5–1.05)
EGFRCR SERPLBLD CKD-EPI 2021: 67 ML/MIN/1.73M*2
EOSINOPHIL # BLD AUTO: 0.18 X10*3/UL (ref 0–0.4)
EOSINOPHIL NFR BLD AUTO: 2.1 %
ERYTHROCYTE [DISTWIDTH] IN BLOOD BY AUTOMATED COUNT: 13.7 % (ref 11.5–14.5)
FLUAV RNA RESP QL NAA+PROBE: NOT DETECTED
FLUBV RNA RESP QL NAA+PROBE: NOT DETECTED
GLUCOSE SERPL-MCNC: 113 MG/DL (ref 74–99)
HCT VFR BLD AUTO: 34.2 % (ref 36–46)
HGB BLD-MCNC: 11.6 G/DL (ref 12–16)
IMM GRANULOCYTES # BLD AUTO: 0.02 X10*3/UL (ref 0–0.5)
IMM GRANULOCYTES NFR BLD AUTO: 0.2 % (ref 0–0.9)
LIPASE SERPL-CCNC: 8 U/L (ref 9–82)
LYMPHOCYTES # BLD AUTO: 1.9 X10*3/UL (ref 0.8–3)
LYMPHOCYTES NFR BLD AUTO: 21.9 %
MAGNESIUM SERPL-MCNC: 1.97 MG/DL (ref 1.6–2.4)
MCH RBC QN AUTO: 30.2 PG (ref 26–34)
MCHC RBC AUTO-ENTMCNC: 33.9 G/DL (ref 32–36)
MCV RBC AUTO: 89 FL (ref 80–100)
MONOCYTES # BLD AUTO: 0.83 X10*3/UL (ref 0.05–0.8)
MONOCYTES NFR BLD AUTO: 9.6 %
NEUTROPHILS # BLD AUTO: 5.68 X10*3/UL (ref 1.6–5.5)
NEUTROPHILS NFR BLD AUTO: 65.4 %
NRBC BLD-RTO: 0 /100 WBCS (ref 0–0)
PLATELET # BLD AUTO: 345 X10*3/UL (ref 150–450)
POTASSIUM SERPL-SCNC: 4.2 MMOL/L (ref 3.5–5.3)
PROT SERPL-MCNC: 6.7 G/DL (ref 6.4–8.2)
RBC # BLD AUTO: 3.84 X10*6/UL (ref 4–5.2)
RSV RNA RESP QL NAA+PROBE: NOT DETECTED
SARS-COV-2 RNA RESP QL NAA+PROBE: NOT DETECTED
SODIUM SERPL-SCNC: 132 MMOL/L (ref 136–145)
TSH SERPL-ACNC: 1.7 MIU/L (ref 0.44–3.98)
WBC # BLD AUTO: 8.7 X10*3/UL (ref 4.4–11.3)

## 2025-03-14 PROCEDURE — 84075 ASSAY ALKALINE PHOSPHATASE: CPT | Performed by: STUDENT IN AN ORGANIZED HEALTH CARE EDUCATION/TRAINING PROGRAM

## 2025-03-14 PROCEDURE — 93005 ELECTROCARDIOGRAM TRACING: CPT

## 2025-03-14 PROCEDURE — 83690 ASSAY OF LIPASE: CPT | Performed by: STUDENT IN AN ORGANIZED HEALTH CARE EDUCATION/TRAINING PROGRAM

## 2025-03-14 PROCEDURE — 71045 X-RAY EXAM CHEST 1 VIEW: CPT

## 2025-03-14 PROCEDURE — 36415 COLL VENOUS BLD VENIPUNCTURE: CPT | Performed by: STUDENT IN AN ORGANIZED HEALTH CARE EDUCATION/TRAINING PROGRAM

## 2025-03-14 PROCEDURE — 85025 COMPLETE CBC W/AUTO DIFF WBC: CPT | Performed by: STUDENT IN AN ORGANIZED HEALTH CARE EDUCATION/TRAINING PROGRAM

## 2025-03-14 PROCEDURE — 2500000004 HC RX 250 GENERAL PHARMACY W/ HCPCS (ALT 636 FOR OP/ED): Performed by: STUDENT IN AN ORGANIZED HEALTH CARE EDUCATION/TRAINING PROGRAM

## 2025-03-14 PROCEDURE — 84484 ASSAY OF TROPONIN QUANT: CPT | Performed by: STUDENT IN AN ORGANIZED HEALTH CARE EDUCATION/TRAINING PROGRAM

## 2025-03-14 PROCEDURE — 96375 TX/PRO/DX INJ NEW DRUG ADDON: CPT

## 2025-03-14 PROCEDURE — 2500000002 HC RX 250 W HCPCS SELF ADMINISTERED DRUGS (ALT 637 FOR MEDICARE OP, ALT 636 FOR OP/ED): Performed by: STUDENT IN AN ORGANIZED HEALTH CARE EDUCATION/TRAINING PROGRAM

## 2025-03-14 PROCEDURE — 2500000001 HC RX 250 WO HCPCS SELF ADMINISTERED DRUGS (ALT 637 FOR MEDICARE OP): Performed by: STUDENT IN AN ORGANIZED HEALTH CARE EDUCATION/TRAINING PROGRAM

## 2025-03-14 PROCEDURE — 83880 ASSAY OF NATRIURETIC PEPTIDE: CPT | Performed by: STUDENT IN AN ORGANIZED HEALTH CARE EDUCATION/TRAINING PROGRAM

## 2025-03-14 PROCEDURE — 96365 THER/PROPH/DIAG IV INF INIT: CPT

## 2025-03-14 PROCEDURE — 83735 ASSAY OF MAGNESIUM: CPT | Performed by: STUDENT IN AN ORGANIZED HEALTH CARE EDUCATION/TRAINING PROGRAM

## 2025-03-14 PROCEDURE — 99223 1ST HOSP IP/OBS HIGH 75: CPT | Performed by: STUDENT IN AN ORGANIZED HEALTH CARE EDUCATION/TRAINING PROGRAM

## 2025-03-14 PROCEDURE — 99285 EMERGENCY DEPT VISIT HI MDM: CPT | Mod: 25 | Performed by: STUDENT IN AN ORGANIZED HEALTH CARE EDUCATION/TRAINING PROGRAM

## 2025-03-14 PROCEDURE — 2060000001 HC INTERMEDIATE ICU ROOM DAILY

## 2025-03-14 PROCEDURE — 87637 SARSCOV2&INF A&B&RSV AMP PRB: CPT | Performed by: STUDENT IN AN ORGANIZED HEALTH CARE EDUCATION/TRAINING PROGRAM

## 2025-03-14 PROCEDURE — 84443 ASSAY THYROID STIM HORMONE: CPT | Performed by: STUDENT IN AN ORGANIZED HEALTH CARE EDUCATION/TRAINING PROGRAM

## 2025-03-14 RX ORDER — ENOXAPARIN SODIUM 100 MG/ML
40 INJECTION SUBCUTANEOUS EVERY 24 HOURS
Status: DISCONTINUED | OUTPATIENT
Start: 2025-03-14 | End: 2025-03-19 | Stop reason: HOSPADM

## 2025-03-14 RX ORDER — ROSUVASTATIN CALCIUM 20 MG/1
20 TABLET, COATED ORAL NIGHTLY
Status: DISCONTINUED | OUTPATIENT
Start: 2025-03-14 | End: 2025-03-19 | Stop reason: HOSPADM

## 2025-03-14 RX ORDER — MAGNESIUM SULFATE HEPTAHYDRATE 40 MG/ML
2 INJECTION, SOLUTION INTRAVENOUS ONCE
Status: COMPLETED | OUTPATIENT
Start: 2025-03-14 | End: 2025-03-15

## 2025-03-14 RX ORDER — ACETAMINOPHEN 325 MG/1
650 TABLET ORAL EVERY 6 HOURS PRN
Status: DISCONTINUED | OUTPATIENT
Start: 2025-03-14 | End: 2025-03-19 | Stop reason: HOSPADM

## 2025-03-14 RX ORDER — PANTOPRAZOLE SODIUM 40 MG/1
40 TABLET, DELAYED RELEASE ORAL
Status: DISCONTINUED | OUTPATIENT
Start: 2025-03-15 | End: 2025-03-19 | Stop reason: HOSPADM

## 2025-03-14 RX ORDER — BISOPROLOL FUMARATE 5 MG/1
10 TABLET, FILM COATED ORAL DAILY
Status: DISCONTINUED | OUTPATIENT
Start: 2025-03-15 | End: 2025-03-19 | Stop reason: HOSPADM

## 2025-03-14 RX ORDER — FUROSEMIDE 10 MG/ML
40 INJECTION INTRAMUSCULAR; INTRAVENOUS ONCE
Status: COMPLETED | OUTPATIENT
Start: 2025-03-14 | End: 2025-03-14

## 2025-03-14 RX ORDER — LEVOTHYROXINE SODIUM 88 UG/1
88 TABLET ORAL DAILY
Status: DISCONTINUED | OUTPATIENT
Start: 2025-03-15 | End: 2025-03-19 | Stop reason: HOSPADM

## 2025-03-14 RX ORDER — DOCUSATE SODIUM 100 MG/1
100 CAPSULE, LIQUID FILLED ORAL 2 TIMES DAILY
Status: DISCONTINUED | OUTPATIENT
Start: 2025-03-14 | End: 2025-03-19 | Stop reason: HOSPADM

## 2025-03-14 RX ORDER — CEFTRIAXONE 1 G/50ML
1 INJECTION, SOLUTION INTRAVENOUS ONCE
Status: DISCONTINUED | OUTPATIENT
Start: 2025-03-14 | End: 2025-03-14

## 2025-03-14 RX ORDER — NAPROXEN SODIUM 220 MG/1
81 TABLET, FILM COATED ORAL DAILY
Status: DISCONTINUED | OUTPATIENT
Start: 2025-03-15 | End: 2025-03-19 | Stop reason: HOSPADM

## 2025-03-14 RX ORDER — FUROSEMIDE 10 MG/ML
40 INJECTION INTRAMUSCULAR; INTRAVENOUS EVERY 8 HOURS
Status: DISCONTINUED | OUTPATIENT
Start: 2025-03-15 | End: 2025-03-15

## 2025-03-14 RX ORDER — GABAPENTIN 300 MG/1
300 CAPSULE ORAL NIGHTLY
Status: DISCONTINUED | OUTPATIENT
Start: 2025-03-14 | End: 2025-03-19 | Stop reason: HOSPADM

## 2025-03-14 RX ORDER — POLYETHYLENE GLYCOL 3350 17 G/17G
17 POWDER, FOR SOLUTION ORAL DAILY
Status: DISCONTINUED | OUTPATIENT
Start: 2025-03-15 | End: 2025-03-19 | Stop reason: HOSPADM

## 2025-03-14 RX ORDER — LANOLIN ALCOHOL/MO/W.PET/CERES
400 CREAM (GRAM) TOPICAL DAILY
Status: DISCONTINUED | OUTPATIENT
Start: 2025-03-15 | End: 2025-03-19 | Stop reason: HOSPADM

## 2025-03-14 RX ORDER — POTASSIUM CHLORIDE 20 MEQ/1
20 TABLET, EXTENDED RELEASE ORAL DAILY
Status: DISCONTINUED | OUTPATIENT
Start: 2025-03-15 | End: 2025-03-15

## 2025-03-14 RX ORDER — CLOPIDOGREL BISULFATE 75 MG/1
75 TABLET ORAL DAILY
Status: DISCONTINUED | OUTPATIENT
Start: 2025-03-15 | End: 2025-03-19 | Stop reason: HOSPADM

## 2025-03-14 RX ORDER — NAPROXEN SODIUM 220 MG/1
243 TABLET, FILM COATED ORAL ONCE
Status: COMPLETED | OUTPATIENT
Start: 2025-03-14 | End: 2025-03-14

## 2025-03-14 RX ADMIN — ASPIRIN 243 MG: 81 TABLET, CHEWABLE ORAL at 21:00

## 2025-03-14 RX ADMIN — DOCUSATE SODIUM 100 MG: 100 CAPSULE, LIQUID FILLED ORAL at 22:46

## 2025-03-14 RX ADMIN — GABAPENTIN 300 MG: 300 CAPSULE ORAL at 22:46

## 2025-03-14 RX ADMIN — DOXYCYCLINE 100 MG: 100 INJECTION, POWDER, LYOPHILIZED, FOR SOLUTION INTRAVENOUS at 19:26

## 2025-03-14 RX ADMIN — AMIODARONE HYDROCHLORIDE 1 MG/MIN: 1.8 INJECTION, SOLUTION INTRAVENOUS at 22:45

## 2025-03-14 RX ADMIN — ENOXAPARIN SODIUM 40 MG: 40 INJECTION SUBCUTANEOUS at 22:46

## 2025-03-14 RX ADMIN — FUROSEMIDE 40 MG: 10 INJECTION, SOLUTION INTRAVENOUS at 18:52

## 2025-03-14 RX ADMIN — ROSUVASTATIN CALCIUM 20 MG: 20 TABLET, FILM COATED ORAL at 22:46

## 2025-03-14 ASSESSMENT — PAIN - FUNCTIONAL ASSESSMENT: PAIN_FUNCTIONAL_ASSESSMENT: 0-10

## 2025-03-14 ASSESSMENT — PAIN SCALES - GENERAL
PAINLEVEL_OUTOF10: 0 - NO PAIN
PAINLEVEL_OUTOF10: 0 - NO PAIN
PAINLEVEL_OUTOF10: 6

## 2025-03-14 ASSESSMENT — LIFESTYLE VARIABLES
EVER HAD A DRINK FIRST THING IN THE MORNING TO STEADY YOUR NERVES TO GET RID OF A HANGOVER: NO
HAVE PEOPLE ANNOYED YOU BY CRITICIZING YOUR DRINKING: NO
HAVE YOU EVER FELT YOU SHOULD CUT DOWN ON YOUR DRINKING: NO
TOTAL SCORE: 0
EVER FELT BAD OR GUILTY ABOUT YOUR DRINKING: NO

## 2025-03-14 ASSESSMENT — COLUMBIA-SUICIDE SEVERITY RATING SCALE - C-SSRS
6. HAVE YOU EVER DONE ANYTHING, STARTED TO DO ANYTHING, OR PREPARED TO DO ANYTHING TO END YOUR LIFE?: NO
1. IN THE PAST MONTH, HAVE YOU WISHED YOU WERE DEAD OR WISHED YOU COULD GO TO SLEEP AND NOT WAKE UP?: NO
2. HAVE YOU ACTUALLY HAD ANY THOUGHTS OF KILLING YOURSELF?: NO

## 2025-03-14 ASSESSMENT — PAIN DESCRIPTION - LOCATION: LOCATION: GENERALIZED

## 2025-03-14 ASSESSMENT — PAIN DESCRIPTION - PROGRESSION: CLINICAL_PROGRESSION: NOT CHANGED

## 2025-03-14 ASSESSMENT — PAIN DESCRIPTION - PAIN TYPE: TYPE: ACUTE PAIN

## 2025-03-14 NOTE — ED PROVIDER NOTES
HPI   Chief Complaint   Patient presents with    Shortness of Breath     Pt states had a cold last week, not feeling better.  Told her to come get checked out. Pt having runny nose, congestion, fatigue. Was tested twice for covid and flu.       Patient is a 89-year-old female with history of A-fib status post watchman currently on Plavix, CHF, pulmonary hypertension, hyperlipidemia, CVA without surgical effect who presents for shortness of breath.  Patient has been having shortness breath throughout the past week.  States she has not been urinating as much, no changes to her Lasix dose and she has been taking as prescribed.  States she is a dry cough.  Endorses runny nose, nausea, 1 episode of vomiting about a week ago, no blood in it.  Denies chest pain, fevers, chills, diarrhea, abdominal pain though does endorse abdominal distention.  Last bowel movement was today.  No leg edema.  Denies tobacco or alcohol, history of MI, DVT or PE, cardiac stents.              Patient History   Past Medical History:   Diagnosis Date    Diverticulitis of intestine, part unspecified, without perforation or abscess without bleeding     Diverticulitis    Personal history of other diseases of the circulatory system     History of hypertension    Personal history of other endocrine, nutritional and metabolic disease     History of hypercholesterolemia    Personal history of other endocrine, nutritional and metabolic disease     History of thyroid disease    Vitreomacular adhesion, left eye 12/04/2017    Vitreomacular traction syndrome of left eye    Vitreous degeneration, right eye 12/04/2017    Posterior vitreous detachment of right eye     Past Surgical History:   Procedure Laterality Date    CARDIAC CATHETERIZATION N/A 8/9/2024    Procedure: Left And Right Heart Cath, With LV;  Surgeon: Stephon Squires MD;  Location: [facility];  Service: Cardiovascular;  Laterality: N/A;    CARDIAC CATHETERIZATION N/A  11/8/2024    Procedure: LAAO (Left Atrial Appendage Occlusion);  Surgeon: Liam Joyce MD;  Location: [place] [address] [facility];  Service: Cardiovascular;  Laterality: N/A;  same day CT 1000    CATARACT EXTRACTION  11/29/2016    Cataract Surgery    OTHER SURGICAL HISTORY  11/29/2016    Iridotomy By YAG Laser    OTHER SURGICAL HISTORY  11/29/2016    Discission Of Secondary Membranous Cataract By Laser     No family history on file.  Social History     Tobacco Use    Smoking status: Former     Types: Cigarettes    Smokeless tobacco: Never   Substance Use Topics    Alcohol use: Never    Drug use: Never       Physical Exam   ED Triage Vitals [03/14/25 1607]   Temperature Heart Rate Respirations BP   36.4 °C (97.5 °F) (!) 113 (!) 24 (!) 145/99      Pulse Ox Temp Source Heart Rate Source Patient Position   95 % Temporal Monitor --      BP Location FiO2 (%)     -- --       Physical Exam  Constitutional:       General: She is not in acute distress.  HENT:      Head: Normocephalic.   Eyes:      Extraocular Movements: Extraocular movements intact.      Conjunctiva/sclera: Conjunctivae normal.      Pupils: Pupils are equal, round, and reactive to light.   Cardiovascular:      Rate and Rhythm: Regular rhythm. Tachycardia present.      Pulses: Normal pulses.      Heart sounds: Normal heart sounds.   Pulmonary:      Effort: Pulmonary effort is normal.      Breath sounds: Normal breath sounds.      Comments: B lines on bilateral lung fields on bedside US.  Abdominal:      General: There is no distension.      Palpations: Abdomen is soft. There is no mass.      Tenderness: There is no abdominal tenderness. There is no guarding.   Musculoskeletal:         General: No deformity.      Cervical back: Normal range of motion and neck supple.      Right lower leg: No edema.      Left lower leg: No edema.   Skin:     General: Skin is warm and dry.      Findings: No lesion or rash.   Neurological:      General: No focal deficit  present.      Mental Status: She is alert and oriented to person, place, and time. Mental status is at baseline.      Cranial Nerves: No cranial nerve deficit.      Sensory: No sensory deficit.      Motor: No weakness.   Psychiatric:         Mood and Affect: Mood normal.           ED Course & MDM   Diagnoses as of 03/15/25 0852   Shortness of breath   Acute congestive heart failure, unspecified heart failure type   Pneumonia due to infectious organism, unspecified laterality, unspecified part of lung   Acute on chronic diastolic heart failure                 No data recorded     [place] Coma Scale Score: 15 (03/14/25 1626 : Whitley Moss, HAYLIE)                           Medical Decision Making  EKG on my interpretation: Rate 124, rhythm irregular, axis normal, VA unavailable, , QTc 543, T waves: Inversion in aVF, V4 through V6, ST segments: No elevations or depressions, interpretation: Atrial flutter with variable AV block, PVCs, no STEMI, similar to EKG from August 7, 2024.    EKG on my interpretation: Rate 122, rhythm irregular, axis normal, VA unavailable, , QTc 521, T waves: Unremarkable, ST segments: No elevations or depressions, interpretation: Atrial flutter with variable AV block, PVCs, no STEMI    Patient is a 89-year-old female with above-stated past medical history who presents for shortness of breath.  Patient's EKGs are nonischemic, though do show atrial flutter versus atrial fibrillation, troponins are elevated and not significantly increasing, improved versus her previous value.  She has no chest pain, she was given additional aspirin to reach 324 mg.  No indication for heparin given patient's Watchman procedure.  CMP is grossly unremarkable.  BNP is elevated at 1561, IV Lasix were given.  I suspect this may be causing the patient's atrial fibrillation and contribute to her shortness of breath.  CBC shows no leukocytosis, patient's anemia is near baseline.  Influenza, COVID, RSV are  negative.  Chest x-ray showed possible edema versus pneumonia.  Patient was started on ceftriaxone and doxycycline.  Low suspicion for pulmonary embolism, dissection, Boerhaave's, cardiac tamponade.  Patient is clinically stable appearing.  I discussed her case with the medicine service who agreed with diuresis.  If patient's rate maintains a high level, amiodarone may be started by them.  She is currently in the low 100s.  Patient was admitted to the medicine service.    Disclaimer: This note was dictated using speech recognition software. Minor errors in transcription may be present. Please call if questions.     Donnie Gonzales MD  [facility]  Contact on [employer]        Problems Addressed:  Acute congestive heart failure, unspecified heart failure type: acute illness or injury  Acute on chronic diastolic heart failure: acute illness or injury  Pneumonia due to infectious organism, unspecified laterality, unspecified part of lung: acute illness or injury  Shortness of breath: acute illness or injury    Amount and/or Complexity of Data Reviewed  Labs: ordered.  Radiology: ordered and independent interpretation performed.  ECG/medicine tests: ordered and independent interpretation performed.        Procedure  Procedures     Donnie Gonzales MD  03/15/25 0857

## 2025-03-14 NOTE — H&P
Medical Group History and Physical      ASSESSMENT & PLAN:     #.  Acute hypoxic respiratory failure 2/2 acute on chronic diastolic heart failure  #.  Paroxysmal atrial fibrillation with RVR  #.  S/p Watchman (11/2024)  #.  CAD  #.  Type 2 NSTEMI  P/w SOB, BNP markedly elevated, CXR reviewed and appears more consistent with volume overload as opposed to multifocal pneumonia as do her presenting symptoms.  RVR likely precipitated by volume overload.  Elevated troponin likely type II MI due to RVR and decompensated heart failure, low suspicion for ACS given lack of chest pain or obvious ischemic changes on EKG.  -IV Lasix 40mg every 8 hours, strict intake and output, daily weights  -Repeat echocardiogram  -Cardiology consult  -Will start amiodarone drip given persistent RVR > 130  -Goal Mg > 2, K > 4, check TSH  -EP consult  -Telemetry  -Could consider switching from bisoprolol to metoprolol depending on echocardiogram results  -Continuing DAPT until 6 months s/p Watchman  -Trend troponin  -Continuing additional home medications    #.  Hypothyroidism  -Continuing home medications  -Checking TSH as above    VTE PPX: Lovenox/SCDs      Jarrod Bull MD    --Of note, this documentation is completed using the Dragon Dictation system (voice recognition software). There may be spelling and/or grammatical errors that were not corrected prior to final submission.--    HISTORY OF PRESENT ILLNESS:   Chief Complaint: shortness of breath    Meryl Hester is a 89 y.o. female with a history of paroxysmal atrial fibrillation s/p Watchman, chronic diastolic heart failure, CAD, hypothyroidism, essential hypertension presenting with shortness of breath.  Patient states her symptoms been worsening for the last week.  She endorses orthopnea and swelling in her feet.  She had some nasal congestion but denies productive cough, fever, chills.  She states that her home medications including Lasix have not been changed and she has not  missed any doses.  Denies any significant dietary changes.  Denies any chest pain or palpitations.       ER Course: Tachycardic with EKG showing atrial fibrillation with RVR, hypoxic requiring nasal cannula, otherwise vitals stable.  Labs notable for markedly elevated BNP and elevated troponin.  Also noted to have mild hyponatremia and anemia.  CXR showed prominent interstitial lung markings consistent with edema versus multifocal pneumonia.  Patient was given antibiotics, lasix, and aspirin in the ER.    ROS  10 point review of systems negative except per HPI     PAST HISTORIES:     Past Medical History  She has a past medical history of Diverticulitis of intestine, part unspecified, without perforation or abscess without bleeding, Personal history of other diseases of the circulatory system, Personal history of other endocrine, nutritional and metabolic disease, Personal history of other endocrine, nutritional and metabolic disease, Vitreomacular adhesion, left eye (12/04/2017), and Vitreous degeneration, right eye (12/04/2017).    Surgical History  She has a past surgical history that includes Other surgical history (11/29/2016); Cataract extraction (11/29/2016); Other surgical history (11/29/2016); Cardiac catheterization (N/A, 8/9/2024); and Cardiac catheterization (N/A, 11/8/2024).     Social History  She reports that she has quit smoking. Her smoking use included cigarettes. She has never used smokeless tobacco. She reports that she does not drink alcohol and does not use drugs.    Family History  No family history on file.    Allergies:  Sulfa (sulfonamide antibiotics) and Lisinopril      OBJECTIVE:      Last Recorded Vitals  BP (!) 145/99   Pulse (!) 109   Temp 36.4 °C (97.5 °F) (Temporal)   Resp (!) 24   Wt 61.2 kg (135 lb)   SpO2 95%     Last I/O:  No intake/output data recorded.    Physical Exam   Gen: NAD, appears stated age  HEENT: EOM, MMM  CV: Tachycardic with irregular rhythm, no murmurs rubs  or gallops  Resp: Bibasilar crackles noted, normal effort  Abdomen: soft, NT,+BS  LE: Mild pedal edema bilaterally, no deformity  Neuro: A&Ox4, moving all extremities    LABS AND IMAGING:       Relevant Results  Labs Reviewed   CBC WITH AUTO DIFFERENTIAL - Abnormal       Result Value    WBC 8.7      nRBC 0.0      RBC 3.84 (*)     Hemoglobin 11.6 (*)     Hematocrit 34.2 (*)     MCV 89      MCH 30.2      MCHC 33.9      RDW 13.7      Platelets 345      Neutrophils % 65.4      Immature Granulocytes %, Automated 0.2      Lymphocytes % 21.9      Monocytes % 9.6      Eosinophils % 2.1      Basophils % 0.8      Neutrophils Absolute 5.68 (*)     Immature Granulocytes Absolute, Automated 0.02      Lymphocytes Absolute 1.90      Monocytes Absolute 0.83 (*)     Eosinophils Absolute 0.18      Basophils Absolute 0.07     COMPREHENSIVE METABOLIC PANEL - Abnormal    Glucose 113 (*)     Sodium 132 (*)     Potassium 4.2      Chloride 99      Bicarbonate 23      Anion Gap 14      Urea Nitrogen 14      Creatinine 0.82      eGFR 67      Calcium 9.2      Albumin 4.0      Alkaline Phosphatase 124      Total Protein 6.7      AST 36      Bilirubin, Total 0.8      ALT 42     LIPASE - Abnormal    Lipase 8 (*)     Narrative:     Venipuncture immediately after or during the administration of Metamizole may lead to falsely low results. Testing should be performed immediately prior to Metamizole dosing.   SERIAL TROPONIN-INITIAL - Abnormal    Troponin I, High Sensitivity 136 (*)     Narrative:     Less than 99th percentile of normal range cutoff-  Female and children under 18 years old <14 ng/L; Male <21 ng/L: Negative  Repeat testing should be performed if clinically indicated.     Female and children under 18 years old 14-50 ng/L; Male 21-50 ng/L:  Consistent with possible cardiac damage and possible increased clinical   risk. Serial measurements may help to assess extent of myocardial damage.     >50 ng/L: Consistent with cardiac damage,  increased clinical risk and  myocardial infarction. Serial measurements may help assess extent of   myocardial damage.      NOTE: Children less than 1 year old may have higher baseline troponin   levels and results should be interpreted in conjunction with the overall   clinical context.     NOTE: Troponin I testing is performed using a different   testing methodology at [facility] than at other   [facility]. Direct result comparisons should only   be made within the same method.   SERIAL TROPONIN, 1 HOUR - Abnormal    Troponin I, High Sensitivity 140 (*)     Narrative:     Less than 99th percentile of normal range cutoff-  Female and children under 18 years old <14 ng/L; Male <21 ng/L: Negative  Repeat testing should be performed if clinically indicated.     Female and children under 18 years old 14-50 ng/L; Male 21-50 ng/L:  Consistent with possible cardiac damage and possible increased clinical   risk. Serial measurements may help to assess extent of myocardial damage.     >50 ng/L: Consistent with cardiac damage, increased clinical risk and  myocardial infarction. Serial measurements may help assess extent of   myocardial damage.      NOTE: Children less than 1 year old may have higher baseline troponin   levels and results should be interpreted in conjunction with the overall   clinical context.     NOTE: Troponin I testing is performed using a different   testing methodology at [facility] than at other   [facility]. Direct result comparisons should only   be made within the same method.   B-TYPE NATRIURETIC PEPTIDE - Abnormal    BNP 1,561 (*)     Narrative:        <100 pg/mL - Heart failure unlikely  100-299 pg/mL - Intermediate probability of acute heart                  failure exacerbation. Correlate with clinical                  context and patient history.    >=300 pg/mL - Heart Failure likely. Correlate with clinical                  context and patient  history.    BNP testing is performed using different testing methodology at [facility] than at [facility]. Direct result comparisons should only be made within the same method.      SARS-COV-2 AND INFLUENZA A/B PCR - Normal    Flu A Result Not Detected      Flu B Result Not Detected      Coronavirus 2019, PCR Not Detected      Narrative:     This assay is an FDA-cleared, in vitro diagnostic nucleic acid amplification test for the qualitative detection and differentiation of SARS CoV-2/ Influenza A/B from nasopharyngeal specimens collected from individuals with signs and symptoms of respiratory tract infections, and has been validated for use at [facility]. Negative results do not preclude COVID-19/ Influenza A/B infections and should not be used as the sole basis for diagnosis, treatment, or other management decisions. Testing for SARS CoV-2 is recommended only for patients who meet current clinical and/or epidemiological criteria defined by federal, state, or local public health directives.   RSV PCR - Normal    RSV PCR Not Detected      Narrative:     This assay is an FDA-cleared, in vitro diagnostic nucleic acid amplification test for the detection of RSV from nasopharyngeal specimens, and has been validated for use at [facility]. Negative results do not preclude RSV infections, and should not be used as the sole basis for diagnosis, treatment, or other management decisions. If Influenza A/B and RSV PCR results are negative, testing for Parainfluenza virus, Adenovirus and Metapneumovirus is routinely performed for pediatric oncology and intensive care inpatients at [facility], and is available on other patients by placing an add-on request.       MAGNESIUM - Normal    Magnesium 1.97     TROPONIN SERIES- (INITIAL, 1 HR)    Narrative:     The following orders were created for panel order Troponin I Series, High Sensitivity (0, 1 HR).  Procedure                                Abnormality         Status                     ---------                               -----------         ------                     Troponin I, High Sensiti...[[id number]]  Abnormal            Final result               Troponin, High Sensitivi...[[id number]]  Abnormal            Final result                 Please view results for these tests on the individual orders.     XR chest 1 view   Final Result   Prominent interstitial lung markings, for which underlying edema is   not excluded. Atypical multifocal pneumonia can appear similarly and   should be considered in the appropriate clinical context.        Signed by: Tatum Bergeron 3/14/2025 4:42 PM   Dictation workstation:   [id number]

## 2025-03-15 ENCOUNTER — APPOINTMENT (OUTPATIENT)
Dept: CARDIOLOGY | Facility: HOSPITAL | Age: OVER 89
DRG: 280 | End: 2025-03-15
Payer: MEDICARE

## 2025-03-15 LAB
ANION GAP SERPL CALC-SCNC: 14 MMOL/L (ref 10–20)
ATRIAL RATE: 153 BPM
ATRIAL RATE: 293 BPM
ATRIAL RATE: 315 BPM
ATRIAL RATE: 326 BPM
BUN SERPL-MCNC: 13 MG/DL (ref 6–23)
CALCIUM SERPL-MCNC: 9.2 MG/DL (ref 8.6–10.3)
CARDIAC TROPONIN I PNL SERPL HS: 138 NG/L (ref 0–13)
CHLORIDE SERPL-SCNC: 98 MMOL/L (ref 98–107)
CO2 SERPL-SCNC: 28 MMOL/L (ref 21–32)
CREAT SERPL-MCNC: 0.81 MG/DL (ref 0.5–1.05)
EGFRCR SERPLBLD CKD-EPI 2021: 68 ML/MIN/1.73M*2
ERYTHROCYTE [DISTWIDTH] IN BLOOD BY AUTOMATED COUNT: 13.9 % (ref 11.5–14.5)
GLUCOSE SERPL-MCNC: 104 MG/DL (ref 74–99)
HCT VFR BLD AUTO: 34.1 % (ref 36–46)
HGB BLD-MCNC: 11.5 G/DL (ref 12–16)
MAGNESIUM SERPL-MCNC: 2.34 MG/DL (ref 1.6–2.4)
MCH RBC QN AUTO: 29.8 PG (ref 26–34)
MCHC RBC AUTO-ENTMCNC: 33.7 G/DL (ref 32–36)
MCV RBC AUTO: 88 FL (ref 80–100)
NRBC BLD-RTO: 0 /100 WBCS (ref 0–0)
PLATELET # BLD AUTO: 316 X10*3/UL (ref 150–450)
POTASSIUM SERPL-SCNC: 3 MMOL/L (ref 3.5–5.3)
Q ONSET: 186 MS
Q ONSET: 219 MS
Q ONSET: 225 MS
Q ONSET: 226 MS
QRS COUNT: 18 BEATS
QRS COUNT: 21 BEATS
QRS COUNT: 21 BEATS
QRS COUNT: 22 BEATS
QRS DURATION: 126 MS
QRS DURATION: 128 MS
QRS DURATION: 132 MS
QRS DURATION: 150 MS
QT INTERVAL: 310 MS
QT INTERVAL: 366 MS
QT INTERVAL: 378 MS
QT INTERVAL: 434 MS
QTC CALCULATION(BAZETT): 459 MS
QTC CALCULATION(BAZETT): 521 MS
QTC CALCULATION(BAZETT): 543 MS
QTC CALCULATION(BAZETT): 579 MS
QTC FREDERICIA: 403 MS
QTC FREDERICIA: 463 MS
QTC FREDERICIA: 481 MS
QTC FREDERICIA: 526 MS
R AXIS: -23 DEGREES
R AXIS: -27 DEGREES
R AXIS: -32 DEGREES
R AXIS: -65 DEGREES
RBC # BLD AUTO: 3.86 X10*6/UL (ref 4–5.2)
SODIUM SERPL-SCNC: 137 MMOL/L (ref 136–145)
T AXIS: -11 DEGREES
T AXIS: -18 DEGREES
T AXIS: -31 DEGREES
T AXIS: -39 DEGREES
T OFFSET: 381 MS
T OFFSET: 403 MS
T OFFSET: 408 MS
T OFFSET: 408 MS
VENTRICULAR RATE: 107 BPM
VENTRICULAR RATE: 122 BPM
VENTRICULAR RATE: 124 BPM
VENTRICULAR RATE: 132 BPM
WBC # BLD AUTO: 8.8 X10*3/UL (ref 4.4–11.3)

## 2025-03-15 PROCEDURE — 99232 SBSQ HOSP IP/OBS MODERATE 35: CPT | Performed by: STUDENT IN AN ORGANIZED HEALTH CARE EDUCATION/TRAINING PROGRAM

## 2025-03-15 PROCEDURE — 2060000001 HC INTERMEDIATE ICU ROOM DAILY

## 2025-03-15 PROCEDURE — 2500000002 HC RX 250 W HCPCS SELF ADMINISTERED DRUGS (ALT 637 FOR MEDICARE OP, ALT 636 FOR OP/ED): Performed by: STUDENT IN AN ORGANIZED HEALTH CARE EDUCATION/TRAINING PROGRAM

## 2025-03-15 PROCEDURE — 99223 1ST HOSP IP/OBS HIGH 75: CPT | Performed by: INTERNAL MEDICINE

## 2025-03-15 PROCEDURE — 93306 TTE W/DOPPLER COMPLETE: CPT | Performed by: INTERNAL MEDICINE

## 2025-03-15 PROCEDURE — 93306 TTE W/DOPPLER COMPLETE: CPT

## 2025-03-15 PROCEDURE — 83735 ASSAY OF MAGNESIUM: CPT | Performed by: STUDENT IN AN ORGANIZED HEALTH CARE EDUCATION/TRAINING PROGRAM

## 2025-03-15 PROCEDURE — 85027 COMPLETE CBC AUTOMATED: CPT | Performed by: STUDENT IN AN ORGANIZED HEALTH CARE EDUCATION/TRAINING PROGRAM

## 2025-03-15 PROCEDURE — 93005 ELECTROCARDIOGRAM TRACING: CPT

## 2025-03-15 PROCEDURE — 36415 COLL VENOUS BLD VENIPUNCTURE: CPT | Performed by: STUDENT IN AN ORGANIZED HEALTH CARE EDUCATION/TRAINING PROGRAM

## 2025-03-15 PROCEDURE — 2500000004 HC RX 250 GENERAL PHARMACY W/ HCPCS (ALT 636 FOR OP/ED): Performed by: STUDENT IN AN ORGANIZED HEALTH CARE EDUCATION/TRAINING PROGRAM

## 2025-03-15 PROCEDURE — 2500000001 HC RX 250 WO HCPCS SELF ADMINISTERED DRUGS (ALT 637 FOR MEDICARE OP): Performed by: STUDENT IN AN ORGANIZED HEALTH CARE EDUCATION/TRAINING PROGRAM

## 2025-03-15 PROCEDURE — 84484 ASSAY OF TROPONIN QUANT: CPT | Performed by: STUDENT IN AN ORGANIZED HEALTH CARE EDUCATION/TRAINING PROGRAM

## 2025-03-15 PROCEDURE — 80048 BASIC METABOLIC PNL TOTAL CA: CPT | Performed by: STUDENT IN AN ORGANIZED HEALTH CARE EDUCATION/TRAINING PROGRAM

## 2025-03-15 PROCEDURE — 2500000002 HC RX 250 W HCPCS SELF ADMINISTERED DRUGS (ALT 637 FOR MEDICARE OP, ALT 636 FOR OP/ED): Performed by: INTERNAL MEDICINE

## 2025-03-15 PROCEDURE — 2500000005 HC RX 250 GENERAL PHARMACY W/O HCPCS: Performed by: STUDENT IN AN ORGANIZED HEALTH CARE EDUCATION/TRAINING PROGRAM

## 2025-03-15 RX ORDER — L. ACIDOPHILUS/L.BULGARICUS 1MM CELL
1 TABLET ORAL DAILY
Status: DISCONTINUED | OUTPATIENT
Start: 2025-03-15 | End: 2025-03-19 | Stop reason: HOSPADM

## 2025-03-15 RX ORDER — PSYLLIUM HUSK 0.4 G
1 CAPSULE ORAL DAILY
Status: DISCONTINUED | OUTPATIENT
Start: 2025-03-15 | End: 2025-03-19 | Stop reason: HOSPADM

## 2025-03-15 RX ORDER — DULOXETIN HYDROCHLORIDE 30 MG/1
30 CAPSULE, DELAYED RELEASE ORAL DAILY
Status: DISCONTINUED | OUTPATIENT
Start: 2025-03-15 | End: 2025-03-19 | Stop reason: HOSPADM

## 2025-03-15 RX ORDER — AMIODARONE HYDROCHLORIDE 200 MG/1
200 TABLET ORAL 2 TIMES DAILY
Status: DISCONTINUED | OUTPATIENT
Start: 2025-03-15 | End: 2025-03-18

## 2025-03-15 RX ORDER — FLUTICASONE PROPIONATE 50 MCG
1 SPRAY, SUSPENSION (ML) NASAL DAILY PRN
Status: DISCONTINUED | OUTPATIENT
Start: 2025-03-15 | End: 2025-03-19 | Stop reason: HOSPADM

## 2025-03-15 RX ORDER — POTASSIUM CHLORIDE 20 MEQ/1
40 TABLET, EXTENDED RELEASE ORAL 2 TIMES DAILY
Status: DISCONTINUED | OUTPATIENT
Start: 2025-03-15 | End: 2025-03-16

## 2025-03-15 RX ORDER — FUROSEMIDE 10 MG/ML
40 INJECTION INTRAMUSCULAR; INTRAVENOUS DAILY
Status: DISCONTINUED | OUTPATIENT
Start: 2025-03-16 | End: 2025-03-16

## 2025-03-15 RX ADMIN — DULOXETINE 30 MG: 30 CAPSULE, DELAYED RELEASE ORAL at 08:24

## 2025-03-15 RX ADMIN — AMIODARONE HYDROCHLORIDE 0.5 MG/MIN: 1.8 INJECTION, SOLUTION INTRAVENOUS at 08:29

## 2025-03-15 RX ADMIN — MAGNESIUM SULFATE HEPTAHYDRATE 2 G: 40 INJECTION, SOLUTION INTRAVENOUS at 00:02

## 2025-03-15 RX ADMIN — AMIODARONE HYDROCHLORIDE 0.5 MG/MIN: 1.8 INJECTION, SOLUTION INTRAVENOUS at 04:46

## 2025-03-15 RX ADMIN — Medication 1 TABLET: at 08:24

## 2025-03-15 RX ADMIN — LOSARTAN POTASSIUM 75 MG: 50 TABLET, FILM COATED ORAL at 08:24

## 2025-03-15 RX ADMIN — MAGNESIUM OXIDE 400 MG (241.3 MG MAGNESIUM) TABLET 400 MG: TABLET at 08:24

## 2025-03-15 RX ADMIN — AMIODARONE HYDROCHLORIDE 200 MG: 200 TABLET ORAL at 13:39

## 2025-03-15 RX ADMIN — AMIODARONE HYDROCHLORIDE 200 MG: 200 TABLET ORAL at 21:19

## 2025-03-15 RX ADMIN — GABAPENTIN 300 MG: 300 CAPSULE ORAL at 21:19

## 2025-03-15 RX ADMIN — FUROSEMIDE 40 MG: 10 INJECTION, SOLUTION INTRAVENOUS at 11:12

## 2025-03-15 RX ADMIN — POTASSIUM CHLORIDE 40 MEQ: 1500 TABLET, EXTENDED RELEASE ORAL at 21:19

## 2025-03-15 RX ADMIN — DOCUSATE SODIUM 100 MG: 100 CAPSULE, LIQUID FILLED ORAL at 08:24

## 2025-03-15 RX ADMIN — BISOPROLOL FUMARATE 10 MG: 5 TABLET ORAL at 08:23

## 2025-03-15 RX ADMIN — ENOXAPARIN SODIUM 40 MG: 40 INJECTION SUBCUTANEOUS at 21:20

## 2025-03-15 RX ADMIN — ROSUVASTATIN CALCIUM 20 MG: 20 TABLET, FILM COATED ORAL at 21:19

## 2025-03-15 RX ADMIN — DOCUSATE SODIUM 100 MG: 100 CAPSULE, LIQUID FILLED ORAL at 21:19

## 2025-03-15 RX ADMIN — Medication 3 L/MIN: at 00:08

## 2025-03-15 RX ADMIN — PANTOPRAZOLE SODIUM 40 MG: 40 TABLET, DELAYED RELEASE ORAL at 06:00

## 2025-03-15 RX ADMIN — POTASSIUM CHLORIDE 20 MEQ: 1500 TABLET, EXTENDED RELEASE ORAL at 08:24

## 2025-03-15 RX ADMIN — ASPIRIN 81 MG: 81 TABLET, CHEWABLE ORAL at 08:24

## 2025-03-15 RX ADMIN — AMIODARONE HYDROCHLORIDE 1 MG/MIN: 1.8 INJECTION, SOLUTION INTRAVENOUS at 01:59

## 2025-03-15 RX ADMIN — CLOPIDOGREL 75 MG: 75 TABLET ORAL at 08:24

## 2025-03-15 RX ADMIN — CALCIUM CARBONATE-VITAMIN D TAB 500 MG-200 UNIT 1 TABLET: 500-200 TAB at 08:24

## 2025-03-15 RX ADMIN — POLYETHYLENE GLYCOL 3350 17 G: 17 POWDER, FOR SOLUTION ORAL at 08:24

## 2025-03-15 RX ADMIN — LEVOTHYROXINE SODIUM 88 MCG: 0.09 TABLET ORAL at 05:59

## 2025-03-15 RX ADMIN — FUROSEMIDE 40 MG: 10 INJECTION, SOLUTION INTRAVENOUS at 03:07

## 2025-03-15 RX ADMIN — POTASSIUM CHLORIDE 40 MEQ: 1500 TABLET, EXTENDED RELEASE ORAL at 11:12

## 2025-03-15 SDOH — HEALTH STABILITY: MENTAL HEALTH: HOW MANY DRINKS CONTAINING ALCOHOL DO YOU HAVE ON A TYPICAL DAY WHEN YOU ARE DRINKING?: PATIENT DOES NOT DRINK

## 2025-03-15 SDOH — SOCIAL STABILITY: SOCIAL INSECURITY: ARE THERE ANY APPARENT SIGNS OF INJURIES/BEHAVIORS THAT COULD BE RELATED TO ABUSE/NEGLECT?: NO

## 2025-03-15 SDOH — HEALTH STABILITY: MENTAL HEALTH: HOW OFTEN DO YOU HAVE SIX OR MORE DRINKS ON ONE OCCASION?: NEVER

## 2025-03-15 SDOH — ECONOMIC STABILITY: HOUSING INSECURITY: IN THE LAST 12 MONTHS, WAS THERE A TIME WHEN YOU WERE NOT ABLE TO PAY THE MORTGAGE OR RENT ON TIME?: NO

## 2025-03-15 SDOH — SOCIAL STABILITY: SOCIAL INSECURITY: WITHIN THE LAST YEAR, HAVE YOU BEEN HUMILIATED OR EMOTIONALLY ABUSED IN OTHER WAYS BY YOUR PARTNER OR EX-PARTNER?: NO

## 2025-03-15 SDOH — ECONOMIC STABILITY: FOOD INSECURITY: WITHIN THE PAST 12 MONTHS, YOU WORRIED THAT YOUR FOOD WOULD RUN OUT BEFORE YOU GOT THE MONEY TO BUY MORE.: NEVER TRUE

## 2025-03-15 SDOH — SOCIAL STABILITY: SOCIAL INSECURITY: HAVE YOU HAD ANY THOUGHTS OF HARMING ANYONE ELSE?: NO

## 2025-03-15 SDOH — ECONOMIC STABILITY: TRANSPORTATION INSECURITY: IN THE PAST 12 MONTHS, HAS LACK OF TRANSPORTATION KEPT YOU FROM MEDICAL APPOINTMENTS OR FROM GETTING MEDICATIONS?: NO

## 2025-03-15 SDOH — SOCIAL STABILITY: SOCIAL INSECURITY
WITHIN THE LAST YEAR, HAVE YOU BEEN RAPED OR FORCED TO HAVE ANY KIND OF SEXUAL ACTIVITY BY YOUR PARTNER OR EX-PARTNER?: NO

## 2025-03-15 SDOH — ECONOMIC STABILITY: FOOD INSECURITY: WITHIN THE PAST 12 MONTHS, THE FOOD YOU BOUGHT JUST DIDN'T LAST AND YOU DIDN'T HAVE MONEY TO GET MORE.: NEVER TRUE

## 2025-03-15 SDOH — ECONOMIC STABILITY: HOUSING INSECURITY: IN THE PAST 12 MONTHS, HOW MANY TIMES HAVE YOU MOVED WHERE YOU WERE LIVING?: 0

## 2025-03-15 SDOH — SOCIAL STABILITY: SOCIAL INSECURITY: ARE YOU OR HAVE YOU BEEN THREATENED OR ABUSED PHYSICALLY, EMOTIONALLY, OR SEXUALLY BY ANYONE?: NO

## 2025-03-15 SDOH — SOCIAL STABILITY: SOCIAL INSECURITY: WITHIN THE LAST YEAR, HAVE YOU BEEN AFRAID OF YOUR PARTNER OR EX-PARTNER?: NO

## 2025-03-15 SDOH — ECONOMIC STABILITY: HOUSING INSECURITY: AT ANY TIME IN THE PAST 12 MONTHS, WERE YOU HOMELESS OR LIVING IN A SHELTER (INCLUDING NOW)?: NO

## 2025-03-15 SDOH — SOCIAL STABILITY: SOCIAL INSECURITY: DOES ANYONE TRY TO KEEP YOU FROM HAVING/CONTACTING OTHER FRIENDS OR DOING THINGS OUTSIDE YOUR HOME?: NO

## 2025-03-15 SDOH — SOCIAL STABILITY: SOCIAL INSECURITY: HAS ANYONE EVER THREATENED TO HURT YOUR FAMILY OR YOUR PETS?: NO

## 2025-03-15 SDOH — ECONOMIC STABILITY: INCOME INSECURITY: IN THE PAST 12 MONTHS HAS THE ELECTRIC, GAS, OIL, OR WATER COMPANY THREATENED TO SHUT OFF SERVICES IN YOUR HOME?: NO

## 2025-03-15 SDOH — SOCIAL STABILITY: SOCIAL INSECURITY: DO YOU FEEL ANYONE HAS EXPLOITED OR TAKEN ADVANTAGE OF YOU FINANCIALLY OR OF YOUR PERSONAL PROPERTY?: NO

## 2025-03-15 SDOH — HEALTH STABILITY: MENTAL HEALTH: HOW OFTEN DO YOU HAVE A DRINK CONTAINING ALCOHOL?: NEVER

## 2025-03-15 SDOH — SOCIAL STABILITY: SOCIAL INSECURITY: WERE YOU ABLE TO COMPLETE ALL THE BEHAVIORAL HEALTH SCREENINGS?: YES

## 2025-03-15 SDOH — SOCIAL STABILITY: SOCIAL INSECURITY: HAVE YOU HAD THOUGHTS OF HARMING ANYONE ELSE?: NO

## 2025-03-15 SDOH — SOCIAL STABILITY: SOCIAL INSECURITY: ABUSE: ADULT

## 2025-03-15 SDOH — SOCIAL STABILITY: SOCIAL INSECURITY: DO YOU FEEL UNSAFE GOING BACK TO THE PLACE WHERE YOU ARE LIVING?: NO

## 2025-03-15 SDOH — ECONOMIC STABILITY: FOOD INSECURITY: HOW HARD IS IT FOR YOU TO PAY FOR THE VERY BASICS LIKE FOOD, HOUSING, MEDICAL CARE, AND HEATING?: NOT HARD AT ALL

## 2025-03-15 ASSESSMENT — PATIENT HEALTH QUESTIONNAIRE - PHQ9
SUM OF ALL RESPONSES TO PHQ9 QUESTIONS 1 & 2: 0
1. LITTLE INTEREST OR PLEASURE IN DOING THINGS: NOT AT ALL
2. FEELING DOWN, DEPRESSED OR HOPELESS: NOT AT ALL

## 2025-03-15 ASSESSMENT — ACTIVITIES OF DAILY LIVING (ADL)
GROOMING: INDEPENDENT
HEARING - LEFT EAR: FUNCTIONAL
BATHING: INDEPENDENT
TOILETING: INDEPENDENT
JUDGMENT_ADEQUATE_SAFELY_COMPLETE_DAILY_ACTIVITIES: YES
DRESSING YOURSELF: INDEPENDENT
HEARING - RIGHT EAR: FUNCTIONAL
LACK_OF_TRANSPORTATION: NO
WALKS IN HOME: INDEPENDENT
PATIENT'S MEMORY ADEQUATE TO SAFELY COMPLETE DAILY ACTIVITIES?: YES
FEEDING YOURSELF: INDEPENDENT
ADEQUATE_TO_COMPLETE_ADL: YES

## 2025-03-15 ASSESSMENT — COGNITIVE AND FUNCTIONAL STATUS - GENERAL
DAILY ACTIVITIY SCORE: 24
PATIENT BASELINE BEDBOUND: NO
MOBILITY SCORE: 24

## 2025-03-15 ASSESSMENT — LIFESTYLE VARIABLES
AUDIT-C TOTAL SCORE: 0
SKIP TO QUESTIONS 9-10: 1

## 2025-03-15 ASSESSMENT — PAIN SCALES - GENERAL: PAINLEVEL_OUTOF10: 0 - NO PAIN

## 2025-03-15 NOTE — H&P (VIEW-ONLY)
Inpatient consult to Cardiology  Consult performed by: Moe Lane MD  Consult ordered by: Jarrod Bull MD  Reason for consult: Atrial fibrillation management  Assessment/Recommendations: Patient was seen, chart reviewed.    From conversation with patient and family member regarding plan to follow for management of atrial fibrillation.  Most likely patient had persistent atrial fibrillation that triggered diastolic heart failure.  Patient was placed on amiodarone therapy by the hospitalist team.  Continue with amiodarone for now.  We will switch IV amiodarone to oral amiodarone once current amiodarone back is empty  Continue telemetry  EKG daily  Possibility of the KARINA guided cardioversion early next week discussed with patient and family members.  Risk factor modification and lifestyle modification discussed with patient. Diet , exercise and hydration discussed with patient.    Please excuse any errors in grammar or translation related to this dictation.  Voice recognition software was utilized to prepare this document.'          History Of Present Illness:    Meryl Hester is a 89 y.o. female presenting with shortness of breath.    Patient has a past medical history of paroxysmal atrial flutter status post Watchman device implantation, chronic diastolic heart failure, CAD, hypothyroidism, hypertension, paroxysmal atrial fibrillation, CVA February 2024 secondary to atrial fibrillation, severe and diffuse diverticulosis with recurrent GI bleeding, coronary artery disease with diffuse 50 to 60% stenosis at coronary angiography August 2024.  She had been admitted to [facility] in late July with recurrent GI bleeding rectal bleeding.  During that hospital stay had multiple runs of rapid atrial fibrillation treated transiently with intravenous amiodarone but was not kept on that agent.  Had to undergo 2 colonoscopies was discharged the day after the second colonoscopy.  Was admitted to [facility]  there was syncopal episode felt related to volume depletion.  She was hydrated and improved in symptoms but troponins were elevated and echo suggested right ventricular systolic pressure over 70 mmHg.  In light of these findings she underwent coronary angiography as delineated below that did show 50 to 60% diffuse disease. There was one 90% lesion in a small ramus.  EF 40% with RVSP found to be just 43 mmHg.      Holter monitor September 2024 1-2% atrial fibrillation.  Some lasting as long as an hour.  Overall burden likely higher as computer interpretation had mislabeled some SVT as A-fib.     10/23/2024 normal BMP. Hemoglobin 11.4 significant improvement from previously      11/8/2024 watchman implantation.  No complications    Patient presented to emergency room complaining of shortness of breath for the last week.  She has orthopnea and edema in the lower extremities.  Patient states that she recalls having an episode of cold-flu symptoms 3 weeks ago.  She does not notice palpitations.    .     ER Course: Tachycardic with EKG showing atrial fibrillation with RVR, hypoxic requiring nasal cannula, otherwise vitals stable.  Labs notable for markedly elevated BNP and elevated troponin.  Also noted to have mild hyponatremia and anemia.  CXR showed prominent interstitial lung markings consistent with edema versus multifocal pneumonia.  Patient was given antibiotics, lasix, and aspirin in the ER.    Laboratory data shows sodium 137 potassium of 3.0 BUN 13 creatinine 0.81 ALT 36 AST 42 magnesium 2.3 for first troponin 136, subsequent troponin 140, subsequent troponin 138  TSH 1.7.  Hemoglobin 11.5 hematocrit 34.1 WBC 8.8 patient was placed on amiodarone therapy IV by hospitalist    Echocardiogram November 2024    CONCLUSIONS:   1. Poorly visualized anatomical structures due to suboptimal image quality.   2. Left ventricular ejection fraction is low normal, by visual estimate at 50-55%.   3. Abnormal left venticular wall  motion.   4. There is reduced right ventricular systolic function.   5. The left atrium is enlarged.    Holter monitor September 2024  Impression  1.  Patient with symptoms correlating to PACs but very infrequent symptoms  2.  Predominantly sinus rhythm with average heart rate 74 bpm normal heart rate variability  3.  1 to 2% burden atrial fibrillation.  Computer reading had identified 50 episodes of SVT but on review of tracings most consistent with atrial fibrillation longest atrial fibrillation was 1 hour with an average rate of 99.  4.  2 episodes of probable ventricular tachycardia with wide-complex minimally irregular rates 5-10 beats.  Cannot exclude A-fib with aberrancy  5.  No evidence of heart block    Cardiac catheterization August 2024  CONCLUSIONS:   1. The entire Left Main: <10% stenosis.   2. Proximal LAD Lesion: The percent stenosis is 60%.   3. Mid LAD Lesion: The percent stenosis is 50%.   4. Prox Ramus CX Lesion: The percent stenosis is 90%,small in size.   5. Proximal and mid RCA Lesion: The percent stenosis is 40%.   6. Distal RCA Lesion: The percent stenosis is 50%.   7. Right atrial mean pressure 4 mmHg.   8. Right ventricular pressure 39/7 mmHg.   9. Pulmonary artery pressure 43/16 mmHg, mean pressure 27 mmHg.  10. Pulmonary capillary wedge mean pressure 10 mmHg.  11. Cardiac output 6.3 L/min by Daniel method.  12. Cardiac index 3.9 L/min/mï¿½ by Daniel method.  13. The Left Ventricular Ejection Fraction is 40%.    Currently she is feeling well.  Denies any symptoms like chest pain.  No worsening shortness of breath.  Telemetry shows atrial fibrillation, rates controlled        Last Recorded Vitals:  Vitals:    03/15/25 0450 03/15/25 0600 03/15/25 0700 03/15/25 1000   BP: 114/80 112/69 (!) 130/96    BP Location:   Left arm    Patient Position:   Sitting    Pulse:   89    Resp:   18    Temp:   37.3 °C (99.1 °F)    TempSrc:   Temporal    SpO2:   98% 96%   Weight:       Height:           Last  Labs:  CBC - 3/15/2025:  5:35 AM  8.8 11.5 316    34.1      CMP - 3/15/2025:  5:35 AM  9.2 6.7 36 --- 0.8   _ 4.0 42 124      PTT - 8/7/2024: 12:23 PM  1.1   12.7 27     Troponin I, High Sensitivity   Date/Time Value Ref Range Status   03/15/2025 05:35  (HH) 0 - 13 ng/L Final     Comment:     Previous result verified on 3/14/2025 1729 on specimen/case [id number] called with component TRPHS for procedure Troponin I, High Sensitivity, Initial with value 136 ng/L.   03/14/2025 06:40  (HH) 0 - 13 ng/L Final     Comment:     Previous result verified on 3/14/2025 1729 on specimen/case [id number] called with component TRPHS for procedure Troponin I, High Sensitivity, Initial with value 136 ng/L.   03/14/2025 04:28  (HH) 0 - 13 ng/L Final     BNP   Date/Time Value Ref Range Status   03/14/2025 04:28 PM 1,561 (H) 0 - 99 pg/mL Final   08/07/2024 12:23  (H) 0 - 99 pg/mL Final     Hemoglobin A1C   Date/Time Value Ref Range Status   09/03/2024 04:12 PM 5.2 4.3 - 5.6 % Final     Comment:     American Diabetes Association guidelines indicate that patients with HgbA1c in the range 5.7-6.4% are at increased risk for development of diabetes, and intervention by lifestyle modification may be beneficial. HgbA1c greater or equal to 6.5% is considered diagnostic of diabetes.   02/17/2024 03:52 AM 5.6 4.3 - 5.6 % Final     Comment:     American Diabetes Association guidelines indicate that patients with HgbA1c in the range 5.7-6.4% are at increased risk for development of diabetes, and intervention by lifestyle modification may be beneficial. HgbA1c greater or equal to 6.5% is considered diagnostic of diabetes.      Last I/O:  I/O last 3 completed shifts:  In: 889.5 (14.5 mL/kg) [P.O.:550; I.V.:339.5 (5.5 mL/kg)]  Out: 1200 (19.5 mL/kg) [Urine:1200 (0.5 mL/kg/hr)]  Weight: 61.4 kg     Past Cardiology Tests (Last 3 Years):  EKG:  ECG 12 lead 03/14/2025      ECG 12 lead 03/14/2025      ECG 12 lead  11/08/2024      ECG 12 Lead 08/28/2024      ECG 12 lead 08/07/2024      ECG 12 lead 08/07/2024    Echo:  Transthoracic Echo (TTE) Limited 11/08/2024      Transthoracic Echo (TTE) Limited 11/08/2024      Transthoracic Echo (TTE) Complete 08/08/2024    Ejection Fractions:  EF   Date/Time Value Ref Range Status   11/08/2024 03:53 PM 53 %    11/08/2024 01:25 PM 53 %    08/08/2024 02:35 PM 48 %      Cath:  Cardiac Catheterization Procedure 11/08/2024      Cardiac Catheterization Procedure 08/09/2024    Stress Test:  No results found for this or any previous visit from the past 1095 days.    Cardiac Imaging:  No results found for this or any previous visit from the past 1095 days.      Past Medical History:  She has a past medical history of Diverticulitis of intestine, part unspecified, without perforation or abscess without bleeding, Personal history of other diseases of the circulatory system, Personal history of other endocrine, nutritional and metabolic disease, Personal history of other endocrine, nutritional and metabolic disease, Vitreomacular adhesion, left eye (12/04/2017), and Vitreous degeneration, right eye (12/04/2017).    Past Surgical History:  She has a past surgical history that includes Other surgical history (11/29/2016); Cataract extraction (11/29/2016); Other surgical history (11/29/2016); Cardiac catheterization (N/A, 8/9/2024); and Cardiac catheterization (N/A, 11/8/2024).      Social History:  She reports that she has quit smoking. Her smoking use included cigarettes. She has never used smokeless tobacco. She reports that she does not drink alcohol and does not use drugs.    Family History:  No family history on file.     Allergies:  Sulfa (sulfonamide antibiotics) and Lisinopril    Inpatient Medications:  Scheduled medications   Medication Dose Route Frequency    aspirin  81 mg oral Daily    bisoprolol  10 mg oral Daily    calcium carbonate-vitamin D3  1 tablet oral Daily    clopidogrel  75 mg  oral Daily    docusate sodium  100 mg oral BID    DULoxetine  30 mg oral Daily    enoxaparin  40 mg subcutaneous q24h    furosemide  40 mg intravenous q8h    gabapentin  300 mg oral Nightly    lactobacillus acidophilus  1 tablet oral Daily    levothyroxine  88 mcg oral Daily    losartan  75 mg oral Daily    magnesium oxide  400 mg oral Daily    oxygen   inhalation Continuous - Inhalation    pantoprazole  40 mg oral Daily before breakfast    perflutren lipid microspheres  0.5-10 mL of dilution intravenous Once in imaging    perflutren protein A microsphere  0.5 mL intravenous Once in imaging    polyethylene glycol  17 g oral Daily    potassium chloride CR  40 mEq oral BID    rosuvastatin  20 mg oral Nightly    sulfur hexafluoride microsphr  2 mL intravenous Once in imaging     PRN medications   Medication    acetaminophen    fluticasone    lubricating eye drops     Continuous Medications   Medication Dose Last Rate    amiodarone  0.5 mg/min 0.5 mg/min (03/15/25 0829)     Outpatient Medications:  Current Outpatient Medications   Medication Instructions    acetaminophen (TYLENOL) 650 mg, oral, Every 6 hours PRN    aspirin 81 mg, oral, Daily    bisoprolol (ZEBETA) 10 mg, oral, Daily    calcium carbonate-vitamin D3 500 mg-5 mcg (200 unit) tablet 1 tablet, Daily    carboxymethylcellulose (THERATears) 0.25 % ophthalmic solution 1 drop, 3 times daily PRN    clopidogrel (PLAVIX) 75 mg, oral, Daily    docusate sodium (COLACE) 100 mg, 2 times daily    DULoxetine (CYMBALTA) 30 mg, Daily RT    fluticasone (Flonase) 50 mcg/actuation nasal spray 1 spray, Daily PRN    furosemide (LASIX) 20 mg, Daily RT    gabapentin (NEURONTIN) 300 mg, Nightly    krill oil 500 mg, 2 times daily    LACTOBACILLUS ACIDOPHILUS ORAL 1 tablet, Daily    levothyroxine (SYNTHROID, LEVOXYL) 88 mcg, Daily RT    losartan (COZAAR) 75 mg, oral, Daily    magnesium oxide 500 mg, Daily    multivitamin with minerals iron-free (Centrum Silver) 1 tablet, Daily     mv-min-FA-vit K-lutein-zeaxant (PreserVision AREDS 2 Plus MV) 200 mcg-15 mcg- 5 mg-1 mg capsule 1 capsule, 2 times daily    omeprazole (PRILOSEC) 40 mg, Daily RT    potassium chloride CR 20 mEq ER tablet 20 mEq, Daily RT    rosuvastatin (CRESTOR) 20 mg, Nightly       Physical Exam:  Gen: NAD, appears stated age  HEENT: EOM, MMM  CV: Tachycardic with irregular rhythm, no murmurs rubs or gallops  Resp: Bibasilar crackles noted, normal effort  Abdomen: soft, NT,+BS  LE: Mild pedal edema bilaterally, no deformity  Neuro: A&Ox4, moving all extremities     Assessment/Plan   Clinical impression    1.  Shortness of breath  2.  Chronic diastolic heart failure  3.  Paroxysmal atrial fibrillation with episodes of rapid nuclear response during this admission  4.  History of GI bleed status post Watchman device implantation November 2024  5.  History of coronary artery disease  6.  Abnormal cardiac enzymes in the setting of diastolic heart failure  7.  Hypothyroidism      Peripheral IV 03/14/25 20 G Left;Proximal Forearm (Active)   Site Assessment Clean;Dry;Intact 03/15/25 0048   Dressing Status Clean;Dry;Occlusive 03/15/25 0048   Number of days: 1       Peripheral IV 03/15/25 22 G Distal;Left;Upper;Anterior Arm (Active)   Site Assessment Clean;Dry;Intact 03/15/25 0049   Dressing Status Clean;Dry;Occlusive 03/15/25 0049   Number of days: 0       Code Status:  DNR and No Intubation    I spent 60 minutes in the professional and overall care of this patient.        Moe Lane MD

## 2025-03-15 NOTE — PROGRESS NOTES
Meryl Hester is a 89 y.o. female on day 1 of admission presenting with Acute on chronic diastolic heart failure.      Subjective   Hemodynamically stable, no acute distress, no complaint, no shortness of breath or chest pain, on oxygen via nasal cannula, feels better    Objective     Last Recorded Vitals  BP (!) 130/96 (BP Location: Left arm, Patient Position: Sitting)   Pulse 89   Temp 37.3 °C (99.1 °F) (Temporal)   Resp 18   Wt 61.4 kg (135 lb 5.8 oz)   SpO2 96% Comment: room air  Intake/Output last 3 Shifts:    Intake/Output Summary (Last 24 hours) at 3/15/2025 1051  Last data filed at 3/15/2025 1000  Gross per 24 hour   Intake 889.49 ml   Output 1900 ml   Net -1010.51 ml       Admission Weight  Weight: 61.2 kg (135 lb) (03/14/25 1607)    Daily Weight  03/14/25 : 61.4 kg (135 lb 5.8 oz)    Image Results  ECG 12 lead  Atrial flutter with variable AV block with premature ventricular or aberrantly conducted complexes  Right bundle branch block  Minimal voltage criteria for LVH, may be normal variant  Septal infarct , age undetermined  Abnormal ECG  When compared with ECG of 14-MAR-2025 16:04, (unconfirmed)  Atrial flutter has replaced Electronic ventricular pacemaker    See ED provider note for full interpretation and clinical correlation  Confirmed by Leandra Woodward ([id number]) on 3/15/2025 10:11:02 AM  ECG 12 lead  Atrial flutter with variable AV block with occasional ventricular-paced complexes  Right bundle branch block  Moderate voltage criteria for LVH, may be normal variant  Possible Lateral infarct , age undetermined  Abnormal ECG  When compared with ECG of 08-NOV-2024 12:09,  Electronic ventricular pacemaker has replaced Sinus rhythm    See ED provider note for full interpretation and clinical correlation  Confirmed by Leandra Woodward ([id number]) on 3/15/2025 10:09:09 AM      Physical Exam    General: Well-developed frail elderly female, in no acute distress, on oxygen via nasal cannula  HEENT: AT,  NC, no JVD, no lymphadenopathy, neck supple  Lungs: Clear, no wheezing, no crackles  Cardiac: Irregular heart rate, no murmur, no gallop  Abdomen: Soft, nontender, no distention, positive bowel sound  Extremities: No deformity, no edema, pulses intact, ROM limited due to frailty  Neurological: Alert awake oriented x3, sensation intact, clear speech          Assessment & Plan  Acute on chronic diastolic heart failure    Coronary artery disease involving native coronary artery of native heart without angina pectoris    Paroxysmal atrial fibrillation with RVR (Multi)    Presence of Watchman left atrial appendage closure device    Type 2 MI (myocardial infarction) (Multi)    Acquired hypothyroidism    Acute hypoxic respiratory failure (Multi)        Meryl Hester is a 89 y.o. female with a history of paroxysmal atrial fibrillation s/p Watchman, chronic diastolic heart failure, CAD, hypothyroidism, essential hypertension who was admitted for the mgmt of following issues:    #.  Acute hypoxic respiratory failure 2/2 acute on chronic diastolic heart failure with elevated BNP and trope   #.  Paroxysmal atrial fibrillation with RVR  #.  S/p Watchman (11/2024)  #.  CAD  #.  Type 2 NSTEMI    -IV Lasix 40mg every 8 hours, strict intake and output, daily weights  -pending echocardiogram  -pending Cardiology/EP recs   -on amiodarone drip given persistent RVR > 130  -Continuing DAPT until 6 months s/p Watchman  -troponin trending down   -Continuing additional home medications     #.  Hypothyroidism  -cw home medications  -TSH normal      VTE PPX: Lovenox/SCDs  Disposition: TBD     Parker Vivas MD

## 2025-03-15 NOTE — CONSULTS
Cardiology Consult Note  Patient: Meryl Hester  Unit/Bed: 809/809-A  YOB: 1931  MRN: [id number]  Acct: [id number]   Admitting Diagnosis: Shortness of breath [R06.02]  Acute on chronic diastolic heart failure [I50.33]  Acute on chronic diastolic (congestive) heart failure [I50.33]  Pneumonia due to infectious organism, unspecified laterality, unspecified part of lung [J18.9]  Acute congestive heart failure, unspecified heart failure type [I50.9]  Date:  3/14/2025  Hospital Day: 1  Attending: Parker Vivas MD    Rounding Cardiologist:  Wilmer Corona MD,    Consultant: Dr. Wilmer Corona     Primary Cardiologist: Dr. Wilmer Corona       Complaint:  Chief Complaint   Patient presents with    Shortness of Breath     Pt states had a cold last week, not feeling better.  Told her to come get checked out. Pt having runny nose, congestion, fatigue. Was tested twice for covid and flu.        History of Present Illness:   89-year-old woman with mild to moderate coronary artery disease with coronary angiography August 2024 showing less than 60% stenosis of the LAD and RCA.  A small ramus with 90%.  LV function normal.  She has a history of previously low-grade atrial fibrillation with Holter monitoring in September showing 1 to 2% atrial fibrillation.  However.  Scattered expiratory.  No acute respiratory distress in early 2024 had admission to [facility] primarily for GI bleeding but developed very rapid atrial fibrillation treated transiently with amiodarone.  Since that time burden of A-fib less than 2%.  She was asymptomatic and doing fairly well when I had seen her in the office 1/23/2025    History of present emergency department with shortness of breath after upper respiratory infection.  That she been feeling poorly for a couple weeks but the breathing became worse over the last week or so.  She had 1 episode of vomiting a week ago but no other GI complaints she had no chest pain fever or  chills.  Heart rate 113 atrial fibrillation blood pressure 145/99.  Admitted with pneumonia acute diastolic CHF and atrial fibrillation.  Somewhat difficult to control and IV amiodarone was instituted.    Currently feels better than when she came in still a bit fatigued.  Heart rate on monitoring has improved now in the 90s.  She has had no chest tightness or pressure only shortness of breath on her presentation.  Her symptoms began a couple of weeks ago with URI type symptoms but over the last few days she became more short of breath.        Cardiac History  Hypertension  Paroxysmal atrial fibrillation   November 2024 watchman implantation  Pulmonary hypertension  Coronary artery disease August 2024 coronary angiography/right heart catheterization: LM-less than 10%, LAD-50-60%, CX nondominant.  Proximal ramus 90% very small vessel.  RCA 40-50%. pulmonary artery pressure 43 over 16 mmHg        Allergies:  Allergies   Allergen Reactions    Sulfa (Sulfonamide Antibiotics) Rash     Skin peeling     Lisinopril Cough        PMHx:  Past Medical History:   Diagnosis Date    Diverticulitis of intestine, part unspecified, without perforation or abscess without bleeding     Diverticulitis    Personal history of other diseases of the circulatory system     History of hypertension    Personal history of other endocrine, nutritional and metabolic disease     History of hypercholesterolemia    Personal history of other endocrine, nutritional and metabolic disease     History of thyroid disease    Vitreomacular adhesion, left eye 12/04/2017    Vitreomacular traction syndrome of left eye    Vitreous degeneration, right eye 12/04/2017    Posterior vitreous detachment of right eye       PSHx:  Past Surgical History:   Procedure Laterality Date    CARDIAC CATHETERIZATION N/A 8/9/2024    Procedure: Left And Right Heart Cath, With LV;  Surgeon: Stephon Squires MD;  Location: [facility];  Service: Cardiovascular;   Laterality: N/A;    CARDIAC CATHETERIZATION N/A 11/8/2024    Procedure: LAAO (Left Atrial Appendage Occlusion);  Surgeon: Liam Joyce MD;  Location: [place] [address] [facility];  Service: Cardiovascular;  Laterality: N/A;  same day CT 1000    CATARACT EXTRACTION  11/29/2016    Cataract Surgery    OTHER SURGICAL HISTORY  11/29/2016    Iridotomy By YAG Laser    OTHER SURGICAL HISTORY  11/29/2016    Discission Of Secondary Membranous Cataract By Laser       Social Hx:  Social History     Socioeconomic History    Marital status:    Tobacco Use    Smoking status: Former     Types: Cigarettes    Smokeless tobacco: Never   Substance and Sexual Activity    Alcohol use: Never    Drug use: Never     Social Drivers of Health     Financial Resource Strain: Low Risk  (3/15/2025)    Overall Financial Resource Strain (CARDIA)     Difficulty of Paying Living Expenses: Not hard at all   Food Insecurity: No Food Insecurity (3/15/2025)    Hunger Vital Sign     Worried About Running Out of Food in the Last Year: Never true     Ran Out of Food in the Last Year: Never true   Transportation Needs: No Transportation Needs (3/15/2025)    PRAPARE - Transportation     Lack of Transportation (Medical): No     Lack of Transportation (Non-Medical): No   Physical Activity: Inactive (8/14/2024)    Exercise Vital Sign     Days of Exercise per Week: 0 days     Minutes of Exercise per Session: 0 min   Stress: No Stress Concern Present (8/14/2024)    [employer] [employer] - Occupational Stress Questionnaire     Feeling of Stress : Not at all   Social Connections: Moderately Isolated (8/14/2024)    Social Connection and Isolation Panel [NHANES]     Frequency of Communication with Friends and Family: More than three times a week     Frequency of Social Gatherings with Friends and Family: More than three times a week     Attends Yazdanism Services: 1 to 4 times per year     Active Member of Clubs or Organizations: No      Attends Club or Organization Meetings: Never     Marital Status:    Intimate Partner Violence: Not At Risk (3/15/2025)    Humiliation, Afraid, Rape, and Kick questionnaire     Fear of Current or Ex-Partner: No     Emotionally Abused: No     Physically Abused: No     Sexually Abused: No   Housing Stability: Low Risk  (3/15/2025)    Housing Stability Vital Sign     Unable to Pay for Housing in the Last Year: No     Number of Times Moved in the Last Year: 0     Homeless in the Last Year: No       Family Hx:  No family history on file.    Review of Systems:   Review of Systems   All other systems reviewed and are negative.      Vitals:    03/15/25 0450 03/15/25 0600 03/15/25 0700 03/15/25 1000   BP: 114/80 112/69 (!) 130/96    BP Location:   Left arm    Patient Position:   Sitting    Pulse:   89    Resp:   18    Temp:   37.3 °C (99.1 °F)    TempSrc:   Temporal    SpO2:   98% 96%   Weight:       Height:           Intake/Output Summary (Last 24 hours) at 3/15/2025 1130  Last data filed at 3/15/2025 1000  Gross per 24 hour   Intake 889.49 ml   Output 1900 ml   Net -1010.51 ml      Wt Readings from Last 4 Encounters:   03/14/25 61.4 kg (135 lb 5.8 oz)   01/23/25 62.1 kg (136 lb 12.8 oz)   12/03/24 61.9 kg (136 lb 6.4 oz)   10/23/24 59 kg (130 lb)      Physical Examination:       Physical Exam  Constitutional:       Comments: Appears somewhat frail thin not diaphoretic   HENT:      Mouth/Throat:      Mouth: Mucous membranes are moist.   Eyes:      Pupils: Pupils are equal, round, and reactive to light.   Cardiovascular:      Rate and Rhythm: Normal rate. Rhythm irregular.      Pulses: Normal pulses.      Heart sounds: No murmur heard.  Pulmonary:      Effort: Pulmonary effort is normal.   Abdominal:      Palpations: Abdomen is soft.   Musculoskeletal:      Cervical back: Normal range of motion.      Right lower leg: No edema.      Left lower leg: No edema.   Neurological:      General: No focal deficit present.       Mental Status: She is alert.   Psychiatric:         Mood and Affect: Mood normal.         LABS:  CBC:   Results from last 7 days   Lab Units 03/15/25  0535 03/14/25  1628   WBC AUTO x10*3/uL 8.8 8.7   RBC AUTO x10*6/uL 3.86* 3.84*   HEMOGLOBIN g/dL 11.5* 11.6*   HEMATOCRIT % 34.1* 34.2*   MCV fL 88 89   MCH pg 29.8 30.2   MCHC g/dL 33.7 33.9   RDW % 13.9 13.7   PLATELETS AUTO x10*3/uL 316 345     CMP:    Results from last 7 days   Lab Units 03/15/25  0535 03/14/25  1628   SODIUM mmol/L 137 132*   POTASSIUM mmol/L 3.0* 4.2   CHLORIDE mmol/L 98 99   CO2 mmol/L 28 23   BUN mg/dL 13 14   CREATININE mg/dL 0.81 0.82   GLUCOSE mg/dL 104* 113*   PROTEIN TOTAL g/dL  --  6.7   CALCIUM mg/dL 9.2 9.2   BILIRUBIN TOTAL mg/dL  --  0.8   ALK PHOS U/L  --  124   AST U/L  --  36   ALT U/L  --  42     BMP:    Results from last 7 days   Lab Units 03/15/25  0535 03/14/25  1628   SODIUM mmol/L 137 132*   POTASSIUM mmol/L 3.0* 4.2   CHLORIDE mmol/L 98 99   CO2 mmol/L 28 23   BUN mg/dL 13 14   CREATININE mg/dL 0.81 0.82   CALCIUM mg/dL 9.2 9.2   GLUCOSE mg/dL 104* 113*     Magnesium:  Results from last 7 days   Lab Units 03/15/25  0535 03/14/25  1628   MAGNESIUM mg/dL 2.34 1.97     Troponin:    Results from last 7 days   Lab Units 03/15/25  0535 03/14/25  1840 03/14/25  1628   TROPHS ng/L 138* 140* 136*     BNP:   Results from last 7 days   Lab Units 03/14/25  1628   BNP pg/mL 1,561*     Lipid Panel:         Current Medications:  aspirin, 81 mg, oral, Daily  bisoprolol, 10 mg, oral, Daily  calcium carbonate-vitamin D3, 1 tablet, oral, Daily  clopidogrel, 75 mg, oral, Daily  docusate sodium, 100 mg, oral, BID  DULoxetine, 30 mg, oral, Daily  enoxaparin, 40 mg, subcutaneous, q24h  furosemide, 40 mg, intravenous, q8h  gabapentin, 300 mg, oral, Nightly  lactobacillus acidophilus, 1 tablet, oral, Daily  levothyroxine, 88 mcg, oral, Daily  losartan, 75 mg, oral, Daily  magnesium oxide, 400 mg, oral, Daily  oxygen, , inhalation, Continuous -  Inhalation  pantoprazole, 40 mg, oral, Daily before breakfast  perflutren lipid microspheres, 0.5-10 mL of dilution, intravenous, Once in imaging  perflutren protein A microsphere, 0.5 mL, intravenous, Once in imaging  polyethylene glycol, 17 g, oral, Daily  potassium chloride CR, 40 mEq, oral, BID  rosuvastatin, 20 mg, oral, Nightly  sulfur hexafluoride microsphr, 2 mL, intravenous, Once in imaging       amiodarone, 0.5 mg/min, Last Rate: 0.5 mg/min (03/15/25 0899)       Current Outpatient Medications   Medication Instructions    acetaminophen (TYLENOL) 650 mg, oral, Every 6 hours PRN    aspirin 81 mg, oral, Daily    bisoprolol (ZEBETA) 10 mg, oral, Daily    calcium carbonate-vitamin D3 500 mg-5 mcg (200 unit) tablet 1 tablet, Daily    carboxymethylcellulose (THERATears) 0.25 % ophthalmic solution 1 drop, 3 times daily PRN    clopidogrel (PLAVIX) 75 mg, oral, Daily    docusate sodium (COLACE) 100 mg, 2 times daily    DULoxetine (CYMBALTA) 30 mg, Daily RT    fluticasone (Flonase) 50 mcg/actuation nasal spray 1 spray, Daily PRN    furosemide (LASIX) 20 mg, Daily RT    gabapentin (NEURONTIN) 300 mg, Nightly    krill oil 500 mg, 2 times daily    LACTOBACILLUS ACIDOPHILUS ORAL 1 tablet, Daily    levothyroxine (SYNTHROID, LEVOXYL) 88 mcg, Daily RT    losartan (COZAAR) 75 mg, oral, Daily    magnesium oxide 500 mg, Daily    multivitamin with minerals iron-free (Centrum Silver) 1 tablet, Daily    mv-min-FA-vit K-lutein-zeaxant (PreserVision AREDS 2 Plus MV) 200 mcg-15 mcg- 5 mg-1 mg capsule 1 capsule, 2 times daily    omeprazole (PRILOSEC) 40 mg, Daily RT    potassium chloride CR 20 mEq ER tablet 20 mEq, Daily RT    rosuvastatin (CRESTOR) 20 mg, Nightly        ECG 12 lead    Result Date: 3/15/2025  Atrial flutter with variable AV block with premature ventricular or aberrantly conducted complexes Right bundle branch block Minimal voltage criteria for LVH, may be normal variant Septal infarct , age undetermined Abnormal ECG  When compared with ECG of 14-MAR-2025 16:04, (unconfirmed) Atrial flutter has replaced Electronic ventricular pacemaker See ED provider note for full interpretation and clinical correlation Confirmed by Leandra Woodward ([id number]) on 3/15/2025 10:11:02 AM    ECG 12 lead    Result Date: 3/15/2025  Atrial flutter with variable AV block with occasional ventricular-paced complexes Right bundle branch block Moderate voltage criteria for LVH, may be normal variant Possible Lateral infarct , age undetermined Abnormal ECG When compared with ECG of 08-NOV-2024 12:09, Electronic ventricular pacemaker has replaced Sinus rhythm See ED provider note for full interpretation and clinical correlation Confirmed by Leandra Woodward ([id number]) on 3/15/2025 10:09:09 AM    XR chest 1 view    Result Date: 3/14/2025  Interpreted By:  Tatum Bergeron, STUDY: XR CHEST 1 VIEW;  3/14/2025 4:24 pm   INDICATION: Signs/Symptoms:Chest Pain.   COMPARISON: 08/07/2024   ACCESSION NUMBER(S): [id number]   ORDERING CLINICIAN: YOLANDA GRANT   FINDINGS: AP radiograph of the chest was provided.       CARDIOMEDIASTINAL SILHOUETTE: Persistently enlarged cardiomediastinal silhouette. Atherosclerotic calcifications of the aortic arch.   LUNGS: Prominent interstitial lung markings are noted. No pneumothorax.   ABDOMEN: No remarkable upper abdominal findings.   BONES: No acute osseous changes.       Prominent interstitial lung markings, for which underlying edema is not excluded. Atypical multifocal pneumonia can appear similarly and should be considered in the appropriate clinical context.   Signed by: Tatum Bergeron 3/14/2025 4:42 PM Dictation workstation:   [id number]       No results found for this or any previous visit from the past 1095 days.                 Encounter Date: 03/14/25   ECG 12 lead   Result Value    Ventricular Rate 122    Atrial Rate 315    QRS Duration 126    QT Interval 366    QTC Calculation(Bazett) 521    R Axis -23    T Axis -31     QRS Count 21    Q Onset 225    T Offset 408    QTC Fredericia 463    Narrative    Atrial flutter with variable AV block with premature ventricular or aberrantly conducted complexes  Right bundle branch block  Minimal voltage criteria for LVH, may be normal variant  Septal infarct , age undetermined  Abnormal ECG  When compared with ECG of 14-MAR-2025 16:04, (unconfirmed)  Atrial flutter has replaced Electronic ventricular pacemaker    See ED provider note for full interpretation and clinical correlation  Confirmed by Leandra Woodward ([id number]) on 3/15/2025 10:11:02 AM        Tele monitoring: Atrial fibrillation rate currently controlled in the 90s on IV amiodarone        Assessment:    Patient Active Problem List   Diagnosis    Radiculopathy of lumbar region    Joint stiffness of spine    Gait difficulty    Syncope and collapse    Syncope, unspecified syncope type    NSTEMI (non-ST elevated myocardial infarction) (Multi)    Pulmonary hypertension (Multi)    Acute systolic CHF (congestive heart failure), NYHA class 2    Cardiomyopathy, ischemic    Coronary artery disease involving native coronary artery of native heart without angina pectoris    Paroxysmal atrial fibrillation with RVR (Multi)    Hypertension    Atrial fibrillation (Multi)    Presence of Watchman left atrial appendage closure device    Atrial fibrillation, unspecified type (Multi)    Acute on chronic diastolic heart failure    Type 2 MI (myocardial infarction) (Multi)    Acquired hypothyroidism    Acute hypoxic respiratory failure (Multi)          Impression/plan:  Acute on chronic diastolic CHF: This is related to atrial fibrillation with rapid rate with reduction of rate I suspect symptoms will improve on exam she is clinically euvolemic  Atrial fibrillation rapid ventricular response: Interestingly about a year and a half ago she had admitted to the [facility] with severe anemia and rapid atrial fibrillation treated with amiodarone for about  48 hours and she spontaneously converted.  At this time I think it very reasonable to continue on IV amiodarone as also recommended by Dr. Lane.  Plan would be to continue amiodarone over the weekend and plan on direct-current cardioversion Monday should A-fib persist.  She does have a watchman but it would be prudent to assure no thrombi prior to direct-current cardioversion.  This discussed with she and her son.  Does not need 3 times daily high-dose Lasix at 89 years old with heart rate controlled her CHF should markedly improve will reduce her diuretic to just once a day for now probably discontinue tomorrow              Electronically signed by Wilmer Corona MD on 3/15/2025 at 11:30 AM

## 2025-03-15 NOTE — CONSULTS
Inpatient consult to Cardiology  Consult performed by: Moe Lane MD  Consult ordered by: Jarrod Bull MD  Reason for consult: Atrial fibrillation management  Assessment/Recommendations: Patient was seen, chart reviewed.    From conversation with patient and family member regarding plan to follow for management of atrial fibrillation.  Most likely patient had persistent atrial fibrillation that triggered diastolic heart failure.  Patient was placed on amiodarone therapy by the hospitalist team.  Continue with amiodarone for now.  We will switch IV amiodarone to oral amiodarone once current amiodarone back is empty  Continue telemetry  EKG daily  Possibility of the KARINA guided cardioversion early next week discussed with patient and family members.  Risk factor modification and lifestyle modification discussed with patient. Diet , exercise and hydration discussed with patient.    Please excuse any errors in grammar or translation related to this dictation.  Voice recognition software was utilized to prepare this document.'          History Of Present Illness:    Meryl Hester is a 89 y.o. female presenting with shortness of breath.    Patient has a past medical history of paroxysmal atrial flutter status post Watchman device implantation, chronic diastolic heart failure, CAD, hypothyroidism, hypertension, paroxysmal atrial fibrillation, CVA February 2024 secondary to atrial fibrillation, severe and diffuse diverticulosis with recurrent GI bleeding, coronary artery disease with diffuse 50 to 60% stenosis at coronary angiography August 2024.  She had been admitted to [facility] in late July with recurrent GI bleeding rectal bleeding.  During that hospital stay had multiple runs of rapid atrial fibrillation treated transiently with intravenous amiodarone but was not kept on that agent.  Had to undergo 2 colonoscopies was discharged the day after the second colonoscopy.  Was admitted to [facility]  there was syncopal episode felt related to volume depletion.  She was hydrated and improved in symptoms but troponins were elevated and echo suggested right ventricular systolic pressure over 70 mmHg.  In light of these findings she underwent coronary angiography as delineated below that did show 50 to 60% diffuse disease. There was one 90% lesion in a small ramus.  EF 40% with RVSP found to be just 43 mmHg.      Holter monitor September 2024 1-2% atrial fibrillation.  Some lasting as long as an hour.  Overall burden likely higher as computer interpretation had mislabeled some SVT as A-fib.     10/23/2024 normal BMP. Hemoglobin 11.4 significant improvement from previously      11/8/2024 watchman implantation.  No complications    Patient presented to emergency room complaining of shortness of breath for the last week.  She has orthopnea and edema in the lower extremities.  Patient states that she recalls having an episode of cold-flu symptoms 3 weeks ago.  She does not notice palpitations.    .     ER Course: Tachycardic with EKG showing atrial fibrillation with RVR, hypoxic requiring nasal cannula, otherwise vitals stable.  Labs notable for markedly elevated BNP and elevated troponin.  Also noted to have mild hyponatremia and anemia.  CXR showed prominent interstitial lung markings consistent with edema versus multifocal pneumonia.  Patient was given antibiotics, lasix, and aspirin in the ER.    Laboratory data shows sodium 137 potassium of 3.0 BUN 13 creatinine 0.81 ALT 36 AST 42 magnesium 2.3 for first troponin 136, subsequent troponin 140, subsequent troponin 138  TSH 1.7.  Hemoglobin 11.5 hematocrit 34.1 WBC 8.8 patient was placed on amiodarone therapy IV by hospitalist    Echocardiogram November 2024    CONCLUSIONS:   1. Poorly visualized anatomical structures due to suboptimal image quality.   2. Left ventricular ejection fraction is low normal, by visual estimate at 50-55%.   3. Abnormal left venticular wall  motion.   4. There is reduced right ventricular systolic function.   5. The left atrium is enlarged.    Holter monitor September 2024  Impression  1.  Patient with symptoms correlating to PACs but very infrequent symptoms  2.  Predominantly sinus rhythm with average heart rate 74 bpm normal heart rate variability  3.  1 to 2% burden atrial fibrillation.  Computer reading had identified 50 episodes of SVT but on review of tracings most consistent with atrial fibrillation longest atrial fibrillation was 1 hour with an average rate of 99.  4.  2 episodes of probable ventricular tachycardia with wide-complex minimally irregular rates 5-10 beats.  Cannot exclude A-fib with aberrancy  5.  No evidence of heart block    Cardiac catheterization August 2024  CONCLUSIONS:   1. The entire Left Main: <10% stenosis.   2. Proximal LAD Lesion: The percent stenosis is 60%.   3. Mid LAD Lesion: The percent stenosis is 50%.   4. Prox Ramus CX Lesion: The percent stenosis is 90%,small in size.   5. Proximal and mid RCA Lesion: The percent stenosis is 40%.   6. Distal RCA Lesion: The percent stenosis is 50%.   7. Right atrial mean pressure 4 mmHg.   8. Right ventricular pressure 39/7 mmHg.   9. Pulmonary artery pressure 43/16 mmHg, mean pressure 27 mmHg.  10. Pulmonary capillary wedge mean pressure 10 mmHg.  11. Cardiac output 6.3 L/min by Daniel method.  12. Cardiac index 3.9 L/min/mï¿½ by Daniel method.  13. The Left Ventricular Ejection Fraction is 40%.    Currently she is feeling well.  Denies any symptoms like chest pain.  No worsening shortness of breath.  Telemetry shows atrial fibrillation, rates controlled        Last Recorded Vitals:  Vitals:    03/15/25 0450 03/15/25 0600 03/15/25 0700 03/15/25 1000   BP: 114/80 112/69 (!) 130/96    BP Location:   Left arm    Patient Position:   Sitting    Pulse:   89    Resp:   18    Temp:   37.3 °C (99.1 °F)    TempSrc:   Temporal    SpO2:   98% 96%   Weight:       Height:           Last  Labs:  CBC - 3/15/2025:  5:35 AM  8.8 11.5 316    34.1      CMP - 3/15/2025:  5:35 AM  9.2 6.7 36 --- 0.8   _ 4.0 42 124      PTT - 8/7/2024: 12:23 PM  1.1   12.7 27     Troponin I, High Sensitivity   Date/Time Value Ref Range Status   03/15/2025 05:35  (HH) 0 - 13 ng/L Final     Comment:     Previous result verified on 3/14/2025 1729 on specimen/case [id number] called with component TRPHS for procedure Troponin I, High Sensitivity, Initial with value 136 ng/L.   03/14/2025 06:40  (HH) 0 - 13 ng/L Final     Comment:     Previous result verified on 3/14/2025 1729 on specimen/case [id number] called with component TRPHS for procedure Troponin I, High Sensitivity, Initial with value 136 ng/L.   03/14/2025 04:28  (HH) 0 - 13 ng/L Final     BNP   Date/Time Value Ref Range Status   03/14/2025 04:28 PM 1,561 (H) 0 - 99 pg/mL Final   08/07/2024 12:23  (H) 0 - 99 pg/mL Final     Hemoglobin A1C   Date/Time Value Ref Range Status   09/03/2024 04:12 PM 5.2 4.3 - 5.6 % Final     Comment:     American Diabetes Association guidelines indicate that patients with HgbA1c in the range 5.7-6.4% are at increased risk for development of diabetes, and intervention by lifestyle modification may be beneficial. HgbA1c greater or equal to 6.5% is considered diagnostic of diabetes.   02/17/2024 03:52 AM 5.6 4.3 - 5.6 % Final     Comment:     American Diabetes Association guidelines indicate that patients with HgbA1c in the range 5.7-6.4% are at increased risk for development of diabetes, and intervention by lifestyle modification may be beneficial. HgbA1c greater or equal to 6.5% is considered diagnostic of diabetes.      Last I/O:  I/O last 3 completed shifts:  In: 889.5 (14.5 mL/kg) [P.O.:550; I.V.:339.5 (5.5 mL/kg)]  Out: 1200 (19.5 mL/kg) [Urine:1200 (0.5 mL/kg/hr)]  Weight: 61.4 kg     Past Cardiology Tests (Last 3 Years):  EKG:  ECG 12 lead 03/14/2025      ECG 12 lead 03/14/2025      ECG 12 lead  11/08/2024      ECG 12 Lead 08/28/2024      ECG 12 lead 08/07/2024      ECG 12 lead 08/07/2024    Echo:  Transthoracic Echo (TTE) Limited 11/08/2024      Transthoracic Echo (TTE) Limited 11/08/2024      Transthoracic Echo (TTE) Complete 08/08/2024    Ejection Fractions:  EF   Date/Time Value Ref Range Status   11/08/2024 03:53 PM 53 %    11/08/2024 01:25 PM 53 %    08/08/2024 02:35 PM 48 %      Cath:  Cardiac Catheterization Procedure 11/08/2024      Cardiac Catheterization Procedure 08/09/2024    Stress Test:  No results found for this or any previous visit from the past 1095 days.    Cardiac Imaging:  No results found for this or any previous visit from the past 1095 days.      Past Medical History:  She has a past medical history of Diverticulitis of intestine, part unspecified, without perforation or abscess without bleeding, Personal history of other diseases of the circulatory system, Personal history of other endocrine, nutritional and metabolic disease, Personal history of other endocrine, nutritional and metabolic disease, Vitreomacular adhesion, left eye (12/04/2017), and Vitreous degeneration, right eye (12/04/2017).    Past Surgical History:  She has a past surgical history that includes Other surgical history (11/29/2016); Cataract extraction (11/29/2016); Other surgical history (11/29/2016); Cardiac catheterization (N/A, 8/9/2024); and Cardiac catheterization (N/A, 11/8/2024).      Social History:  She reports that she has quit smoking. Her smoking use included cigarettes. She has never used smokeless tobacco. She reports that she does not drink alcohol and does not use drugs.    Family History:  No family history on file.     Allergies:  Sulfa (sulfonamide antibiotics) and Lisinopril    Inpatient Medications:  Scheduled medications   Medication Dose Route Frequency    aspirin  81 mg oral Daily    bisoprolol  10 mg oral Daily    calcium carbonate-vitamin D3  1 tablet oral Daily    clopidogrel  75 mg  oral Daily    docusate sodium  100 mg oral BID    DULoxetine  30 mg oral Daily    enoxaparin  40 mg subcutaneous q24h    furosemide  40 mg intravenous q8h    gabapentin  300 mg oral Nightly    lactobacillus acidophilus  1 tablet oral Daily    levothyroxine  88 mcg oral Daily    losartan  75 mg oral Daily    magnesium oxide  400 mg oral Daily    oxygen   inhalation Continuous - Inhalation    pantoprazole  40 mg oral Daily before breakfast    perflutren lipid microspheres  0.5-10 mL of dilution intravenous Once in imaging    perflutren protein A microsphere  0.5 mL intravenous Once in imaging    polyethylene glycol  17 g oral Daily    potassium chloride CR  40 mEq oral BID    rosuvastatin  20 mg oral Nightly    sulfur hexafluoride microsphr  2 mL intravenous Once in imaging     PRN medications   Medication    acetaminophen    fluticasone    lubricating eye drops     Continuous Medications   Medication Dose Last Rate    amiodarone  0.5 mg/min 0.5 mg/min (03/15/25 0829)     Outpatient Medications:  Current Outpatient Medications   Medication Instructions    acetaminophen (TYLENOL) 650 mg, oral, Every 6 hours PRN    aspirin 81 mg, oral, Daily    bisoprolol (ZEBETA) 10 mg, oral, Daily    calcium carbonate-vitamin D3 500 mg-5 mcg (200 unit) tablet 1 tablet, Daily    carboxymethylcellulose (THERATears) 0.25 % ophthalmic solution 1 drop, 3 times daily PRN    clopidogrel (PLAVIX) 75 mg, oral, Daily    docusate sodium (COLACE) 100 mg, 2 times daily    DULoxetine (CYMBALTA) 30 mg, Daily RT    fluticasone (Flonase) 50 mcg/actuation nasal spray 1 spray, Daily PRN    furosemide (LASIX) 20 mg, Daily RT    gabapentin (NEURONTIN) 300 mg, Nightly    krill oil 500 mg, 2 times daily    LACTOBACILLUS ACIDOPHILUS ORAL 1 tablet, Daily    levothyroxine (SYNTHROID, LEVOXYL) 88 mcg, Daily RT    losartan (COZAAR) 75 mg, oral, Daily    magnesium oxide 500 mg, Daily    multivitamin with minerals iron-free (Centrum Silver) 1 tablet, Daily     mv-min-FA-vit K-lutein-zeaxant (PreserVision AREDS 2 Plus MV) 200 mcg-15 mcg- 5 mg-1 mg capsule 1 capsule, 2 times daily    omeprazole (PRILOSEC) 40 mg, Daily RT    potassium chloride CR 20 mEq ER tablet 20 mEq, Daily RT    rosuvastatin (CRESTOR) 20 mg, Nightly       Physical Exam:  Gen: NAD, appears stated age  HEENT: EOM, MMM  CV: Tachycardic with irregular rhythm, no murmurs rubs or gallops  Resp: Bibasilar crackles noted, normal effort  Abdomen: soft, NT,+BS  LE: Mild pedal edema bilaterally, no deformity  Neuro: A&Ox4, moving all extremities     Assessment/Plan   Clinical impression    1.  Shortness of breath  2.  Chronic diastolic heart failure  3.  Paroxysmal atrial fibrillation with episodes of rapid nuclear response during this admission  4.  History of GI bleed status post Watchman device implantation November 2024  5.  History of coronary artery disease  6.  Abnormal cardiac enzymes in the setting of diastolic heart failure  7.  Hypothyroidism      Peripheral IV 03/14/25 20 G Left;Proximal Forearm (Active)   Site Assessment Clean;Dry;Intact 03/15/25 0048   Dressing Status Clean;Dry;Occlusive 03/15/25 0048   Number of days: 1       Peripheral IV 03/15/25 22 G Distal;Left;Upper;Anterior Arm (Active)   Site Assessment Clean;Dry;Intact 03/15/25 0049   Dressing Status Clean;Dry;Occlusive 03/15/25 0049   Number of days: 0       Code Status:  DNR and No Intubation    I spent 60 minutes in the professional and overall care of this patient.        Moe Lane MD

## 2025-03-16 ENCOUNTER — APPOINTMENT (OUTPATIENT)
Dept: CARDIOLOGY | Facility: HOSPITAL | Age: OVER 89
DRG: 280 | End: 2025-03-16
Payer: MEDICARE

## 2025-03-16 VITALS
RESPIRATION RATE: 16 BRPM | DIASTOLIC BLOOD PRESSURE: 73 MMHG | WEIGHT: 135.36 LBS | TEMPERATURE: 98.4 F | OXYGEN SATURATION: 94 % | BODY MASS INDEX: 27.29 KG/M2 | SYSTOLIC BLOOD PRESSURE: 136 MMHG | HEIGHT: 59 IN | HEART RATE: 95 BPM

## 2025-03-16 LAB
ANION GAP SERPL CALC-SCNC: 15 MMOL/L (ref 10–20)
AORTIC VALVE MEAN GRADIENT: 9 MMHG
AORTIC VALVE PEAK VELOCITY: 2.04 M/S
ATRIAL RATE: 278 BPM
AV PEAK GRADIENT: 17 MMHG
AVA (PEAK VEL): 1.09 CM2
AVA (VTI): 0.95 CM2
BUN SERPL-MCNC: 19 MG/DL (ref 6–23)
CALCIUM SERPL-MCNC: 9.2 MG/DL (ref 8.6–10.3)
CHLORIDE SERPL-SCNC: 97 MMOL/L (ref 98–107)
CO2 SERPL-SCNC: 27 MMOL/L (ref 21–32)
CREAT SERPL-MCNC: 1.02 MG/DL (ref 0.5–1.05)
EGFRCR SERPLBLD CKD-EPI 2021: 51 ML/MIN/1.73M*2
EJECTION FRACTION APICAL 4 CHAMBER: 42.3
EJECTION FRACTION: 40 %
ERYTHROCYTE [DISTWIDTH] IN BLOOD BY AUTOMATED COUNT: 13.9 % (ref 11.5–14.5)
GLUCOSE SERPL-MCNC: 145 MG/DL (ref 74–99)
HCT VFR BLD AUTO: 36.2 % (ref 36–46)
HGB BLD-MCNC: 11.9 G/DL (ref 12–16)
LEFT ATRIUM VOLUME AREA LENGTH INDEX BSA: 59.9 ML/M2
LEFT VENTRICLE INTERNAL DIMENSION DIASTOLE: 4.31 CM (ref 3.5–6)
LEFT VENTRICULAR OUTFLOW TRACT DIAMETER: 2 CM
LV EJECTION FRACTION BIPLANE: 44 %
MAGNESIUM SERPL-MCNC: 2.11 MG/DL (ref 1.6–2.4)
MCH RBC QN AUTO: 29.6 PG (ref 26–34)
MCHC RBC AUTO-ENTMCNC: 32.9 G/DL (ref 32–36)
MCV RBC AUTO: 90 FL (ref 80–100)
NRBC BLD-RTO: 0 /100 WBCS (ref 0–0)
PLATELET # BLD AUTO: 394 X10*3/UL (ref 150–450)
POTASSIUM SERPL-SCNC: 4.6 MMOL/L (ref 3.5–5.3)
Q ONSET: 189 MS
QRS COUNT: 18 BEATS
QRS DURATION: 146 MS
QT INTERVAL: 400 MS
QTC CALCULATION(BAZETT): 544 MS
QTC FREDERICIA: 491 MS
R AXIS: -30 DEGREES
RBC # BLD AUTO: 4.02 X10*6/UL (ref 4–5.2)
RIGHT VENTRICLE FREE WALL PEAK S': 11.9 CM/S
RIGHT VENTRICLE PEAK SYSTOLIC PRESSURE: 46.3 MMHG
SODIUM SERPL-SCNC: 134 MMOL/L (ref 136–145)
T AXIS: -10 DEGREES
T OFFSET: 389 MS
TRICUSPID ANNULAR PLANE SYSTOLIC EXCURSION: 1.2 CM
VENTRICULAR RATE: 111 BPM
WBC # BLD AUTO: 10.8 X10*3/UL (ref 4.4–11.3)

## 2025-03-16 PROCEDURE — 83735 ASSAY OF MAGNESIUM: CPT | Performed by: STUDENT IN AN ORGANIZED HEALTH CARE EDUCATION/TRAINING PROGRAM

## 2025-03-16 PROCEDURE — 36415 COLL VENOUS BLD VENIPUNCTURE: CPT | Performed by: STUDENT IN AN ORGANIZED HEALTH CARE EDUCATION/TRAINING PROGRAM

## 2025-03-16 PROCEDURE — 5A2204Z RESTORATION OF CARDIAC RHYTHM, SINGLE: ICD-10-PCS | Performed by: INTERNAL MEDICINE

## 2025-03-16 PROCEDURE — 80048 BASIC METABOLIC PNL TOTAL CA: CPT | Performed by: STUDENT IN AN ORGANIZED HEALTH CARE EDUCATION/TRAINING PROGRAM

## 2025-03-16 PROCEDURE — 85027 COMPLETE CBC AUTOMATED: CPT | Performed by: STUDENT IN AN ORGANIZED HEALTH CARE EDUCATION/TRAINING PROGRAM

## 2025-03-16 PROCEDURE — 2500000002 HC RX 250 W HCPCS SELF ADMINISTERED DRUGS (ALT 637 FOR MEDICARE OP, ALT 636 FOR OP/ED): Performed by: STUDENT IN AN ORGANIZED HEALTH CARE EDUCATION/TRAINING PROGRAM

## 2025-03-16 PROCEDURE — 93005 ELECTROCARDIOGRAM TRACING: CPT

## 2025-03-16 PROCEDURE — 99233 SBSQ HOSP IP/OBS HIGH 50: CPT | Performed by: INTERNAL MEDICINE

## 2025-03-16 PROCEDURE — 99232 SBSQ HOSP IP/OBS MODERATE 35: CPT | Performed by: STUDENT IN AN ORGANIZED HEALTH CARE EDUCATION/TRAINING PROGRAM

## 2025-03-16 PROCEDURE — 2500000001 HC RX 250 WO HCPCS SELF ADMINISTERED DRUGS (ALT 637 FOR MEDICARE OP): Performed by: STUDENT IN AN ORGANIZED HEALTH CARE EDUCATION/TRAINING PROGRAM

## 2025-03-16 PROCEDURE — 2500000004 HC RX 250 GENERAL PHARMACY W/ HCPCS (ALT 636 FOR OP/ED): Performed by: STUDENT IN AN ORGANIZED HEALTH CARE EDUCATION/TRAINING PROGRAM

## 2025-03-16 PROCEDURE — 2500000004 HC RX 250 GENERAL PHARMACY W/ HCPCS (ALT 636 FOR OP/ED): Performed by: INTERNAL MEDICINE

## 2025-03-16 PROCEDURE — 2500000002 HC RX 250 W HCPCS SELF ADMINISTERED DRUGS (ALT 637 FOR MEDICARE OP, ALT 636 FOR OP/ED): Performed by: INTERNAL MEDICINE

## 2025-03-16 PROCEDURE — 2060000001 HC INTERMEDIATE ICU ROOM DAILY

## 2025-03-16 RX ORDER — METOPROLOL TARTRATE 1 MG/ML
2.5 INJECTION, SOLUTION INTRAVENOUS EVERY 6 HOURS PRN
Status: DISCONTINUED | OUTPATIENT
Start: 2025-03-16 | End: 2025-03-19 | Stop reason: HOSPADM

## 2025-03-16 RX ORDER — FUROSEMIDE 40 MG/1
40 TABLET ORAL DAILY
Status: DISCONTINUED | OUTPATIENT
Start: 2025-03-16 | End: 2025-03-16

## 2025-03-16 RX ADMIN — LEVOTHYROXINE SODIUM 88 MCG: 0.09 TABLET ORAL at 06:16

## 2025-03-16 RX ADMIN — DOCUSATE SODIUM 100 MG: 100 CAPSULE, LIQUID FILLED ORAL at 20:21

## 2025-03-16 RX ADMIN — DOCUSATE SODIUM 100 MG: 100 CAPSULE, LIQUID FILLED ORAL at 08:33

## 2025-03-16 RX ADMIN — Medication 1 TABLET: at 08:32

## 2025-03-16 RX ADMIN — CLOPIDOGREL 75 MG: 75 TABLET ORAL at 08:33

## 2025-03-16 RX ADMIN — DULOXETINE 30 MG: 30 CAPSULE, DELAYED RELEASE ORAL at 08:33

## 2025-03-16 RX ADMIN — FUROSEMIDE 40 MG: 10 INJECTION, SOLUTION INTRAVENOUS at 08:33

## 2025-03-16 RX ADMIN — GABAPENTIN 300 MG: 300 CAPSULE ORAL at 20:21

## 2025-03-16 RX ADMIN — AMIODARONE HYDROCHLORIDE 200 MG: 200 TABLET ORAL at 20:21

## 2025-03-16 RX ADMIN — ROSUVASTATIN CALCIUM 20 MG: 20 TABLET, FILM COATED ORAL at 20:21

## 2025-03-16 RX ADMIN — POLYETHYLENE GLYCOL 3350 17 G: 17 POWDER, FOR SOLUTION ORAL at 08:33

## 2025-03-16 RX ADMIN — CALCIUM CARBONATE-VITAMIN D TAB 500 MG-200 UNIT 1 TABLET: 500-200 TAB at 08:32

## 2025-03-16 RX ADMIN — METOPROLOL TARTRATE 2.5 MG: 5 INJECTION INTRAVENOUS at 23:37

## 2025-03-16 RX ADMIN — ASPIRIN 81 MG: 81 TABLET, CHEWABLE ORAL at 08:32

## 2025-03-16 RX ADMIN — ENOXAPARIN SODIUM 40 MG: 40 INJECTION SUBCUTANEOUS at 22:38

## 2025-03-16 RX ADMIN — AMIODARONE HYDROCHLORIDE 200 MG: 200 TABLET ORAL at 08:33

## 2025-03-16 RX ADMIN — PANTOPRAZOLE SODIUM 40 MG: 40 TABLET, DELAYED RELEASE ORAL at 06:16

## 2025-03-16 RX ADMIN — LOSARTAN POTASSIUM 75 MG: 50 TABLET, FILM COATED ORAL at 08:32

## 2025-03-16 RX ADMIN — BISOPROLOL FUMARATE 10 MG: 5 TABLET ORAL at 08:33

## 2025-03-16 ASSESSMENT — PAIN SCALES - GENERAL: PAINLEVEL_OUTOF10: 0 - NO PAIN

## 2025-03-16 NOTE — PROGRESS NOTES
[facility] Progress Note               Rounding Cardiologist:  Moe Lane MD, MD   Primary Cardiologist: Dr. Moe Lane    Date:  3/16/2025  Patient:  Meryl Hester  YOB: 1931  MRN:  [id number]   Admit Date:  3/14/2025      SUBJECTIVE    Meryl Hester was seen and examined today at bedside.  Feeling better  Telemetry shows atrial fibrillation rates between  bpm    EKG performed today shows atrial fibrillation rate of 111 bpm left axis deviation right bundle branch block QRS duration 146 ms QT corrected 594 ms  Sodium 134 potassium 4.6 BUN 19 creatinine 1.02 calcium 9.2 WBC 10.8 hemoglobin 11.9  VITALS     Vitals:    03/15/25 2118 03/16/25 0007 03/16/25 0401 03/16/25 0743   BP: 104/72 115/83 144/81 124/71   BP Location:       Patient Position:       Pulse:   96 82   Resp:       Temp:  36.8 °C (98.2 °F) 36.8 °C (98.2 °F) 36.2 °C (97.2 °F)   TempSrc:       SpO2:   95% 93%   Weight:       Height:         No intake or output data in the 24 hours ending 03/16/25 1111    [unfilled]    PHYSICAL EXAM   Constitutional:       Comments: Appears somewhat frail thin not diaphoretic   HENT:      Mouth/Throat:      Mouth: Mucous membranes are moist.   Eyes:      Pupils: Pupils are equal, round, and reactive to light.   Cardiovascular:      Rate and Rhythm: Normal rate. Rhythm irregular.      Pulses: Normal pulses.      Heart sounds: No murmur heard.  Pulmonary:      Effort: Pulmonary effort is normal.   Abdominal:      Palpations: Abdomen is soft.   Musculoskeletal:      Cervical back: Normal range of motion.      Right lower leg: No edema.      Left lower leg: No edema.   Neurological:      General: No focal deficit present.      Mental Status: She is alert.   Psychiatric:         Mood and Affect: Mood normal.          DIAGNOSTIC RESULTS   EKG:     Telemetry: Atrial fibrillation.  Rates between  bpm.      LAB DATA   BMP:  @LABRCNT(Na:3,K:3,CL:3,CO2:3,Bun:3,Creatinine:3,Glu:3,  "CA:3,LABGLOM)@    Cardiac Enzymes:  @LABRCNT(CKTOTAL:3,CKMB:3,CKMBINDEX:3,TROPONINI:3)@    CBC:   Lab Results   Component Value Date    WBC 10.8 03/16/2025    RBC 4.02 03/16/2025    HGB 11.9 (L) 03/16/2025    HCT 36.2 03/16/2025     03/16/2025       CMP:    Lab Results   Component Value Date     (L) 03/16/2025    K 4.6 03/16/2025    CL 97 (L) 03/16/2025    CO2 27 03/16/2025    BUN 19 03/16/2025    CREATININE 1.02 03/16/2025    GLUCOSE 145 (H) 03/16/2025    CALCIUM 9.2 03/16/2025       Hepatic Function Panel:    Lab Results   Component Value Date    ALKPHOS 124 03/14/2025    ALT 42 03/14/2025    AST 36 03/14/2025    PROT 6.7 03/14/2025    BILITOT 0.8 03/14/2025       Magnesium:    Lab Results   Component Value Date    MG 2.11 03/16/2025       PT/INR:  No results found for: \"PROTIME\", \"INR\"  @LABRCNT(inr:3)@     HgBA1c:  No components found for: \"LABA1C\"    Lipid Profile:  No results found for: \"CHLPL\", \"TRIG\", \"HDL\", \"LDLCALC\", \"LDLDIRECT\"    TSH:    Lab Results   Component Value Date    TSH 1.70 03/14/2025       ABG:  No results found for: \"PH\"    PRO-BNP: No results found for: \"PROBNP\"       RADIOLOGY     Transthoracic Echo (TTE) Complete   Final Result      XR chest 1 view   Final Result   Prominent interstitial lung markings, for which underlying edema is   not excluded. Atypical multifocal pneumonia can appear similarly and   should be considered in the appropriate clinical context.        Signed by: Tatum Bergeron 3/14/2025 4:42 PM   Dictation workstation:   [id number]      Transesophageal Echo (KARINA) w/ Possible Cardioversion    (Results Pending)       [unfilled]      CURRENT MEDICATIONS    amiodarone, 200 mg, oral, BID  aspirin, 81 mg, oral, Daily  bisoprolol, 10 mg, oral, Daily  calcium carbonate-vitamin D3, 1 tablet, oral, Daily  clopidogrel, 75 mg, oral, Daily  docusate sodium, 100 mg, oral, BID  DULoxetine, 30 mg, oral, Daily  enoxaparin, 40 mg, subcutaneous, q24h  gabapentin, 300 mg, oral, " Nightly  lactobacillus acidophilus, 1 tablet, oral, Daily  levothyroxine, 88 mcg, oral, Daily  losartan, 75 mg, oral, Daily  magnesium oxide, 400 mg, oral, Daily  oxygen, , inhalation, Continuous - Inhalation  pantoprazole, 40 mg, oral, Daily before breakfast  perflutren lipid microspheres, 0.5-10 mL of dilution, intravenous, Once in imaging  perflutren protein A microsphere, 0.5 mL, intravenous, Once in imaging  polyethylene glycol, 17 g, oral, Daily  rosuvastatin, 20 mg, oral, Nightly  sulfur hexafluoride microsphr, 2 mL, intravenous, Once in imaging             ASSESSMENT     Assessment & Plan  Acute on chronic diastolic heart failure    Coronary artery disease involving native coronary artery of native heart without angina pectoris    Paroxysmal atrial fibrillation with RVR (Multi)    Presence of Watchman left atrial appendage closure device    Type 2 MI (myocardial infarction) (Multi)    Acquired hypothyroidism    Acute hypoxic respiratory failure (Multi)        Patient Active Problem List   Diagnosis    Radiculopathy of lumbar region    Joint stiffness of spine    Gait difficulty    Syncope and collapse    Syncope, unspecified syncope type    NSTEMI (non-ST elevated myocardial infarction) (Multi)    Pulmonary hypertension (Multi)    Acute systolic CHF (congestive heart failure), NYHA class 2    Cardiomyopathy, ischemic    Coronary artery disease involving native coronary artery of native heart without angina pectoris    Paroxysmal atrial fibrillation with RVR (Multi)    Hypertension    Atrial fibrillation (Multi)    Presence of Watchman left atrial appendage closure device    Atrial fibrillation, unspecified type (Multi)    Acute on chronic diastolic heart failure    Type 2 MI (myocardial infarction) (Multi)    Acquired hypothyroidism    Acute hypoxic respiratory failure (Multi)     Clinical impression     1.  Shortness of breath  2.  Chronic diastolic heart failure  3.  Paroxysmal atrial fibrillation with  episodes of rapid nuclear response during this admission  4.  History of GI bleed status post Watchman device implantation November 2024  5.  History of coronary artery disease  6.  Abnormal cardiac enzymes in the setting of diastolic heart failure  7.  Hypothyroidism    PLAN     Continue with current medical therapy that includes amiodarone  Long conversation with patient and family members about plan to follow for management of atrial fibrillation.  Will attempt to do a KARINA guided cardioversion in a.m. tomorrow. Procedure, risk, benefits and possible complications were explained to patient.  All questions were answered.  Patient agrees with plan.  Continue with amiodarone therapy.    Telemetry    Continue with daily EKGs         Please do not hesitate to call with questions.  Electronically signed by Moe Lane MD, FACC on 3/16/2025 at 11:11 AM

## 2025-03-16 NOTE — PROGRESS NOTES
Cardiology Progress Note  Patient: Meryl Hester  Unit/Bed: 809/809-A  YOB: 1931  MRN: [id number]  Acct: [id number]   Admitting Diagnosis: Shortness of breath [R06.02]  Acute on chronic diastolic heart failure [I50.33]  Acute on chronic diastolic (congestive) heart failure [I50.33]  Pneumonia due to infectious organism, unspecified laterality, unspecified part of lung [J18.9]  Acute congestive heart failure, unspecified heart failure type [I50.9]  Date:  3/14/2025  Hospital Day: 2  Attending: Parker Vivas MD   Rounding Cardiologist:  Wilmer Corona MD,     Primary Cardiologist: Dr. Wilmer Corona       Complaint:  Chief Complaint   Patient presents with    Shortness of Breath     Pt states had a cold last week, not feeling better.  Told her to come get checked out. Pt having runny nose, congestion, fatigue. Was tested twice for covid and flu.        SUBJECTIVE     3/16/2025:  she is frustrated about still being in the hospital.    She has had no chest pain.  She said she feels fairly well.  She is lying supplying sleeping.  She says she is not out of breath respiratory rate perhaps slightly increased.  Heart rate currently while laying down just having woken up 80.  She has no dizziness.  No PND or orthopnea at this time.  Unaware of her atrial fibrillation.     3/15/2025 initial consult: Increased shortness of breath consistent with CHF.  New onset rapid atrial fibrillation.  Amiodarone instituted with plan of direct-current cardioversion this coming Monday after KARINA     echo 3/15/2540% ejection fraction moderate to severe aortic stenosis valve area approximately 1 cm² not optimal for EF or aortic stenosis determination given atrial fibrillation with rapid rates at the time.  Clearly EF decreased to comparison of the study of August however    Cardiac History  Hypertension  Paroxysmal atrial fibrillation   November 2024 watchman implantation  Pulmonary hypertension  Coronary artery  disease August 2024 coronary angiography/right heart catheterization: LM-less than 10%, LAD-50-60%, CX nondominant.  Proximal ramus 90% very small vessel.  RCA 40-50%. pulmonary artery pressure 43 over 16 mmHg  Moderate aortic stenosis      VITALS      Vitals:    03/15/25 2118 03/16/25 0007 03/16/25 0401 03/16/25 0743   BP: 104/72 115/83 144/81 124/71   BP Location:       Patient Position:       Pulse:   96 82   Resp:       Temp:  36.8 °C (98.2 °F) 36.8 °C (98.2 °F) 36.2 °C (97.2 °F)   TempSrc:       SpO2:   95% 93%   Weight:       Height:           Intake/Output Summary (Last 24 hours) at 3/16/2025 0855  Last data filed at 3/15/2025 1000  Gross per 24 hour   Intake --   Output 700 ml   Net -700 ml      Wt Readings from Last 4 Encounters:   03/14/25 61.4 kg (135 lb 5.8 oz)   01/23/25 62.1 kg (136 lb 12.8 oz)   12/03/24 61.9 kg (136 lb 6.4 oz)   10/23/24 59 kg (130 lb)        Allergies:  Allergies   Allergen Reactions    Sulfa (Sulfonamide Antibiotics) Rash     Skin peeling     Lisinopril Cough        PHYSICAL EXAM   Physical Exam  Constitutional:       Comments:   Frail.  Appears fatigued   Eyes:      Pupils: Pupils are equal, round, and reactive to light.   Cardiovascular:      Rate and Rhythm: Normal rate. Rhythm irregular.      Heart sounds: Murmur heard.      Comments:  murmur of aortic stenosis  Pulmonary:      Effort: Pulmonary effort is normal.      Comments:  diminished breath sounds  Abdominal:      Palpations: Abdomen is soft.   Musculoskeletal:      Cervical back: Normal range of motion.      Right lower leg: No edema.      Left lower leg: No edema.   Neurological:      General: No focal deficit present.      Mental Status: She is alert. Mental status is at baseline.           LABS   CBC:   Results from last 7 days   Lab Units 03/16/25  0510 03/15/25  0535 03/14/25  1628   WBC AUTO x10*3/uL 10.8 8.8 8.7   RBC AUTO x10*6/uL 4.02 3.86* 3.84*   HEMOGLOBIN g/dL 11.9* 11.5* 11.6*   HEMATOCRIT % 36.2 34.1*  34.2*   MCV fL 90 88 89   MCH pg 29.6 29.8 30.2   MCHC g/dL 32.9 33.7 33.9   RDW % 13.9 13.9 13.7   PLATELETS AUTO x10*3/uL 394 316 345     CMP:    Results from last 7 days   Lab Units 03/16/25  0510 03/15/25  0535 03/14/25  1628   SODIUM mmol/L 134* 137 132*   POTASSIUM mmol/L 4.6 3.0* 4.2   CHLORIDE mmol/L 97* 98 99   CO2 mmol/L 27 28 23   BUN mg/dL 19 13 14   CREATININE mg/dL 1.02 0.81 0.82   GLUCOSE mg/dL 145* 104* 113*   PROTEIN TOTAL g/dL  --   --  6.7   CALCIUM mg/dL 9.2 9.2 9.2   BILIRUBIN TOTAL mg/dL  --   --  0.8   ALK PHOS U/L  --   --  124   AST U/L  --   --  36   ALT U/L  --   --  42     BMP:    Results from last 7 days   Lab Units 03/16/25  0510 03/15/25  0535 03/14/25  1628   SODIUM mmol/L 134* 137 132*   POTASSIUM mmol/L 4.6 3.0* 4.2   CHLORIDE mmol/L 97* 98 99   CO2 mmol/L 27 28 23   BUN mg/dL 19 13 14   CREATININE mg/dL 1.02 0.81 0.82   CALCIUM mg/dL 9.2 9.2 9.2   GLUCOSE mg/dL 145* 104* 113*     Magnesium:  Results from last 7 days   Lab Units 03/16/25  0510 03/15/25  0535 03/14/25  1628   MAGNESIUM mg/dL 2.11 2.34 1.97     Troponin:    Results from last 7 days   Lab Units 03/15/25  0535 03/14/25  1840 03/14/25  1628   TROPHS ng/L 138* 140* 136*     BNP:   Results from last 7 days   Lab Units 03/14/25  1628   BNP pg/mL 1,561*     Lipid Panel:         amiodarone, 200 mg, oral, BID  aspirin, 81 mg, oral, Daily  bisoprolol, 10 mg, oral, Daily  calcium carbonate-vitamin D3, 1 tablet, oral, Daily  clopidogrel, 75 mg, oral, Daily  docusate sodium, 100 mg, oral, BID  DULoxetine, 30 mg, oral, Daily  enoxaparin, 40 mg, subcutaneous, q24h  furosemide, 40 mg, intravenous, Daily  gabapentin, 300 mg, oral, Nightly  lactobacillus acidophilus, 1 tablet, oral, Daily  levothyroxine, 88 mcg, oral, Daily  losartan, 75 mg, oral, Daily  magnesium oxide, 400 mg, oral, Daily  oxygen, , inhalation, Continuous - Inhalation  pantoprazole, 40 mg, oral, Daily before breakfast  perflutren lipid microspheres, 0.5-10 mL of  dilution, intravenous, Once in imaging  perflutren protein A microsphere, 0.5 mL, intravenous, Once in imaging  polyethylene glycol, 17 g, oral, Daily  potassium chloride CR, 40 mEq, oral, BID  rosuvastatin, 20 mg, oral, Nightly  sulfur hexafluoride microsphr, 2 mL, intravenous, Once in imaging           Current Outpatient Medications   Medication Instructions    acetaminophen (TYLENOL) 650 mg, oral, Every 6 hours PRN    aspirin 81 mg, oral, Daily    bisoprolol (ZEBETA) 10 mg, oral, Daily    calcium carbonate-vitamin D3 500 mg-5 mcg (200 unit) tablet 1 tablet, Daily    carboxymethylcellulose (THERATears) 0.25 % ophthalmic solution 1 drop, 3 times daily PRN    clopidogrel (PLAVIX) 75 mg, oral, Daily    docusate sodium (COLACE) 100 mg, 2 times daily    DULoxetine (CYMBALTA) 30 mg, Daily RT    fluticasone (Flonase) 50 mcg/actuation nasal spray 1 spray, Daily PRN    furosemide (LASIX) 20 mg, Daily RT    gabapentin (NEURONTIN) 300 mg, Nightly    krill oil 500 mg, 2 times daily    LACTOBACILLUS ACIDOPHILUS ORAL 1 tablet, Daily    levothyroxine (SYNTHROID, LEVOXYL) 88 mcg, Daily RT    losartan (COZAAR) 75 mg, oral, Daily    magnesium oxide 500 mg, Daily    multivitamin with minerals iron-free (Centrum Silver) 1 tablet, Daily    mv-min-FA-vit K-lutein-zeaxant (PreserVision AREDS 2 Plus MV) 200 mcg-15 mcg- 5 mg-1 mg capsule 1 capsule, 2 times daily    omeprazole (PRILOSEC) 40 mg, Daily RT    potassium chloride CR 20 mEq ER tablet 20 mEq, Daily RT    rosuvastatin (CRESTOR) 20 mg, Nightly        Transthoracic Echo (TTE) Complete    Result Date: 3/16/2025          [place] [id number]  Tel [phone] Fax [phone] TRANSTHORACIC ECHOCARDIOGRAM REPORT Patient Name:       ANNA Moran Physician:    [id number] Wilmer Corona MD, [facility] Study Date:         3/15/2025            Ordering Provider:    [id number] AMANDA ROSE                                                                 RADHA MRN/PID:            [id number]             Fellow: Accession#:         [id number]         Nurse: Date of Birth/Age:  4/16/1931 / 89 years Sonographer:          Mora Siddiqui RDCS Gender Assigned at  F                    Additional Staff: Birth: Height:             149.86 cm            Admit Date:           3/14/2025 Weight:             61.24 kg             Admission Status:     Inpatient -                                                                Routine BSA / BMI:          1.56 m2 / 27.27      Department Location:  [address] [place] [address]/ Blood Pressure: 134 /78 mmHg Study Type:    TRANSTHORACIC ECHO (TTE) COMPLETE Diagnosis/ICD: Unspecified combined systolic (congestive) and diastolic                (congestive) heart failure (CHF)-I50.40 Indication:    Congestive Heart Failure CPT Codes:     Echo Complete w Full Doppler-[id number] Patient History: Smoker:            Former. Pertinent History: A-Fib, CAD, Dyspnea, CHF, LE Edema, HTN, Hyperlipidemia and                    Watchman. Study Detail: The following Echo studies were performed: 2D, M-Mode, Doppler and               color flow. Technically challenging study due to body habitus.  PHYSICIAN INTERPRETATION: Left Ventricle: The left ventricular systolic function is moderately decreased, with a visually estimated ejection fraction of 40%. There is moderate concentric left ventricular hypertrophy. There is global hypokinesis of the left ventricle with minor regional variations. The left ventricular cavity size is upper limits of normal. There is mild increased septal and mildly increased posterior left ventricular wall thickness. Spectral Doppler shows an abnormal pattern of left ventricular diastolic filling. In comparison study of August 2024 EF has  decreased previously 45% [id number] comparison clearly there has been a deterioration. However atrial fibrillation is currently present prior study was in sinus rhythm heart rates frequently over 100 affecting calculation of ejection fraction. Left Atrium: The left atrial size is moderately dilated. Right Ventricle: The right ventricle is upper limits of normal in size. There is normal right ventricular global systolic function. RVSP 46 mmHg. Right Atrium: The right atrial size is normal. Aortic Valve: The aortic valve is trileaflet. The aortic valve dimensionless index is 0.30. There is trace aortic valve regurgitation. The peak instantaneous gradient of the aortic valve is 17 mmHg. The mean gradient of the aortic valve is 9 mmHg. Tricuspid aortic valve with diffuse thickening of leaflet cusps and moderate impairment of leaflet mobility. There is trace aortic insufficiency. There is moderate-severe aortic stenosis V-max 2 m/s peak/mean gradients 16 and 9 mmHg. Calculated valve area approximately 1 cmï¿½ visually appears perhaps slightly greater than that; was calculated at 1.2 cm in August. There may have been some progression of stenosis again calculations not as accurate in the setting of rapid atrial fibrillation. Mitral Valve: The mitral valve is mildly thickened. There is trace mitral valve regurgitation. Trace MR. Tricuspid Valve: The tricuspid valve is structurally normal. There is moderate tricuspid regurgitation. Pulmonic Valve: The pulmonic valve is structurally normal. There is no indication of pulmonic valve regurgitation. Pericardium: No pericardial effusion noted. Aorta: The aortic root is normal.  CONCLUSIONS:  1. The left ventricular systolic function is moderately decreased, with a visually estimated ejection fraction of 40%.  2. There is global hypokinesis of the left ventricle with minor regional variations.  3. Spectral Doppler shows an abnormal pattern of left ventricular diastolic filling.   4. In comparison study of August 2024 EF has decreased previously 45% [id number] comparison clearly there has been a deterioration. However atrial fibrillation is currently present prior study was in sinus rhythm heart rates frequently over 100 affecting calculation of ejection fraction.  5. There is normal right ventricular global systolic function.  6. RVSP 46 mmHg.  7. The left atrial size is moderately dilated.  8. Trace MR.  9. Moderate tricuspid regurgitation. 10. Tricuspid aortic valve with diffuse thickening of leaflet cusps and moderate impairment of leaflet mobility. There is trace aortic insufficiency. There is moderate-severe aortic stenosis V-max 2 m/s peak/mean gradients 16 and 9 mmHg. Calculated valve area approximately 1 cmï¿½ visually appears perhaps slightly greater than that; was calculated at 1.2 cm in August. There may have been some progression of stenosis again calculations not as accurate in the setting of rapid atrial fibrillation. QUANTITATIVE DATA SUMMARY:  2D MEASUREMENTS:             Normal Ranges: Ao Root d:       2.50 cm     (2.0-3.7cm) LAs:             4.10 cm     (2.7-4.0cm) IVSd:            1.20 cm     (0.6-1.1cm) LVPWd:           1.21 cm     (0.6-1.1cm) LVIDd:           4.31 cm     (3.9-5.9cm) LVIDs:           3.48 cm LV Mass Index:   119.5 g/m2 LVEDV Index:     56.12 ml/m2 LV % FS          19.3 %  LEFT ATRIUM:                  Normal Ranges: LA Vol A4C:        88.6 ml    (22+/-6mL/m2) LA Vol A2C:        94.5 ml LA Vol BP:         93.5 ml LA Vol Index A4C:  56.8ml/m2 LA Vol Index A2C:  60.6 ml/m2 LA Vol Index BP:   59.9 ml/m2 LA Area A4C:       27.4 cm2 LA Area A2C:       27.7 cm2 LA Major Axis A4C: 7.2 cm LA Major Axis A2C: 6.9 cm LA Volume Index:   56.9 ml/m2  RIGHT ATRIUM:                 Normal Ranges: RA Vol A4C:        49.3 ml    (8.3-19.5ml) RA Vol Index A4C:  31.6 ml/m2 RA Area A4C:       17.7 cm2 RA Major Axis A4C: 5.4 cm  LV SYSTOLIC FUNCTION:                       Normal Ranges: EF-A4C View:    42 % (>=55%) EF-A2C View:    42 % EF-Biplane:     44 % EF-Visual:      40 % LV EF Reported: 40 %  AORTIC VALVE:                      Normal Ranges: AoV Vmax:                2.04 m/s  (<=1.7m/s) AoV Peak P.6 mmHg (<20mmHg) AoV Mean P.0 mmHg  (1.7-11.5mmHg) LVOT Max Delbert:            0.70 m/s  (<=1.1m/s) AoV VTI:                 39.10 cm  (18-25cm) LVOT VTI:                11.80 cm LVOT Diameter:           2.00 cm   (1.8-2.4cm) AoV Area, VTI:           0.95 cm2  (2.5-5.5cm2) AoV Area,Vmax:           1.09 cm2  (2.5-4.5cm2) AoV Dimensionless Index: 0.30  AORTIC INSUFFICIENCY: AI Vmax:       3.47 m/s AI Half-time:  601 msec AI Decel Rate: 159.50 cm/s2  RIGHT VENTRICLE: RV Basal 4.20 cm RV Mid   4.45 cm RV Major 6.0 cm TAPSE:   11.8 mm RV s'    0.12 m/s  TRICUSPID VALVE/RVSP:          Normal Ranges: Peak TR Velocity:     3.29 m/s RV Syst Pressure:     46 mmHg  (< 30mmHg)  PULMONIC VALVE:          Normal Ranges: PV Accel Time:  79 msec  (>120ms) PV Max Delbert:     0.8 m/s  (0.6-0.9m/s) PV Max P.5 mmHg  [id number] Wilmer Corona MD, [facility] Electronically signed on 3/16/2025 at 8:36:34 AM  ** Final **     ECG 12 Lead    Result Date: 3/15/2025  Atrial flutter with variable AV block with premature ventricular or aberrantly conducted complexes Left axis deviation Right bundle branch block Minimal voltage criteria for LVH, may be normal variant Abnormal ECG When compared with ECG of 14-MAR-2025 22:24, (unconfirmed) Atrial flutter has replaced Atrial fibrillation Left anterior fascicular block is no longer Present Confirmed by Wilmer Corona ([id number]) on 3/15/2025 6:56:11 PM    ECG 12 lead    Result Date: 3/15/2025  Atrial flutter with variable AV block with premature ventricular or aberrantly conducted complexes Right bundle branch block Minimal voltage criteria for LVH, may be normal variant Septal infarct , age undetermined Abnormal ECG When compared with ECG of 14-MAR-2025  16:04, (unconfirmed) Atrial flutter has replaced Electronic ventricular pacemaker See ED provider note for full interpretation and clinical correlation Confirmed by Leandra Woodward ([id number]) on 3/15/2025 10:11:02 AM    ECG 12 lead    Result Date: 3/15/2025  Atrial flutter with variable AV block with occasional ventricular-paced complexes Right bundle branch block Moderate voltage criteria for LVH, may be normal variant Possible Lateral infarct , age undetermined Abnormal ECG When compared with ECG of 08-NOV-2024 12:09, Electronic ventricular pacemaker has replaced Sinus rhythm See ED provider note for full interpretation and clinical correlation Confirmed by Leandra Woodward ([id number]) on 3/15/2025 10:09:09 AM    XR chest 1 view    Result Date: 3/14/2025  Interpreted By:  Tatum Bergeron, STUDY: XR CHEST 1 VIEW;  3/14/2025 4:24 pm   INDICATION: Signs/Symptoms:Chest Pain.   COMPARISON: 08/07/2024   ACCESSION NUMBER(S): [id number]   ORDERING CLINICIAN: YOLANDA GRANT   FINDINGS: AP radiograph of the chest was provided.       CARDIOMEDIASTINAL SILHOUETTE: Persistently enlarged cardiomediastinal silhouette. Atherosclerotic calcifications of the aortic arch.   LUNGS: Prominent interstitial lung markings are noted. No pneumothorax.   ABDOMEN: No remarkable upper abdominal findings.   BONES: No acute osseous changes.       Prominent interstitial lung markings, for which underlying edema is not excluded. Atypical multifocal pneumonia can appear similarly and should be considered in the appropriate clinical context.   Signed by: Tatum Bergeron 3/14/2025 4:42 PM Dictation workstation:   [id number]       No results found for this or any previous visit from the past 1095 days.                 Encounter Date: 03/14/25   ECG 12 Lead   Result Value    Ventricular Rate 111    Atrial Rate 278    QRS Duration 146    QT Interval 400    QTC Calculation(Bazett) 544    R Axis -30    T Axis -10    QRS Count 18    Q Onset 189    T  Offset 389    QTC [place] 491    Narrative    Atrial flutter with variable AV block  Left axis deviation  Right bundle branch block  Abnormal ECG  When compared with ECG of 15-MAR-2025 12:32,  T wave inversion less evident in Anterior leads        Tele Monitoring: remains in atrial fibrillation while sleeping rate will go into the 80s however when active heart rate often over 100.  No bradycardia    Assessment     Patient Active Problem List   Diagnosis    Radiculopathy of lumbar region    Joint stiffness of spine    Gait difficulty    Syncope and collapse    Syncope, unspecified syncope type    NSTEMI (non-ST elevated myocardial infarction) (Multi)    Pulmonary hypertension (Multi)    Acute systolic CHF (congestive heart failure), NYHA class 2    Cardiomyopathy, ischemic    Coronary artery disease involving native coronary artery of native heart without angina pectoris    Paroxysmal atrial fibrillation with RVR (Multi)    Hypertension    Atrial fibrillation (Multi)    Presence of Watchman left atrial appendage closure device    Atrial fibrillation, unspecified type (Multi)    Acute on chronic diastolic heart failure    Type 2 MI (myocardial infarction) (Multi)    Acquired hypothyroidism    Acute hypoxic respiratory failure (Multi)           impression/Plan:  acute on chronic systolic CHF: Clinically improved continue daily Lasix for now.  She had been on 3 times a day which was the cause of the low potassium previously.  Once atrial fibrillation is better controlled she will continue to improve.  Heart rate is better than it was she is now on oral amiodarone she is on chronic beta-blocker will also add IV beta-blocker on an as needed basis until cardioversion tomorrow   aortic stenosis: Moderately severe.  This is one of the reasons A-fib is not   Well-tolerated.  She is asymptomatic when in sinus rhythm at the age of 89 would not pursue TAVR as she is also quite frail.  Furthermore   just recently asymptomatic  when in sinus rhythm.   systolic dysfunction: Previous overall EF 45 to 50% currently at 40% secondary to effects of atrial fibrillation anticipate improvement when A-fib controlled   we will plan for KARINA cardioversion tomorrow.  Discussed risk benefits of KARINA and cardioversion with her including pharyngeal injury and adverse effects of anesthesia.  She understands agrees to proceed.          Electronically signed by Wilmer Corona MD on 3/16/2025 at 8:55 AM

## 2025-03-16 NOTE — PROGRESS NOTES
Meryl Hester is a 89 y.o. female on day 2 of admission presenting with Acute on chronic diastolic heart failure.      Subjective   Patient is a stable, no acute distress, no complaint, lying down in her bed, on room air, no chest pain or shortness of breath    Objective     Last Recorded Vitals  /71   Pulse 82   Temp 36.2 °C (97.2 °F)   Resp 16   Wt 61.4 kg (135 lb 5.8 oz)   SpO2 93%   Intake/Output last 3 Shifts:  No intake or output data in the 24 hours ending 03/16/25 1015    Admission Weight  Weight: 61.2 kg (135 lb) (03/14/25 1607)    Daily Weight  03/14/25 : 61.4 kg (135 lb 5.8 oz)        Physical Exam      General: Well-developed frail elderly female, in no acute distress  HEENT: AT, NC, no JVD, no lymphadenopathy, neck supple  Lungs: Clear, no wheezing, no crackles  Cardiac: Irregular heart rate, no murmur, no gallop  Abdomen: Soft, nontender, no distention, positive bowel sound  Extremities: No deformity, no edema, pulses intact, ROM limited due to frailty  Neurological: Alert awake oriented x3, sensation intact, clear speech          Assessment & Plan  Acute on chronic diastolic heart failure    Coronary artery disease involving native coronary artery of native heart without angina pectoris    Paroxysmal atrial fibrillation with RVR (Multi)    Presence of Watchman left atrial appendage closure device    Type 2 MI (myocardial infarction) (Multi)    Acquired hypothyroidism    Acute hypoxic respiratory failure (Multi)        Meryl Hester is a 89 y.o. female with a history of paroxysmal atrial fibrillation s/p Watchman, chronic diastolic heart failure, CAD, hypothyroidism, essential hypertension who was admitted for the mgmt of following issues:     #.  Acute hypoxic respiratory failure 2/2 acute on chronic diastolic heart failure with elevated BNP and trope, resolved  #.  Paroxysmal atrial fibrillation with RVR  #.  S/p Watchman (11/2024)  #.  CAD  #.  Type 2 NSTEMI     -Cardiology/EP following,  stopped Lasix, currently on oral amiodarone and beta-blocker and IV beta-blocker as needed for rate control.  -Plan for KARINA cardioversion in a.m. n.p.o. after midnight  -Echo done and reviewed: LVEF 40%, global hypokinesis of LV, moderate TR, aortic stenosis  -Continuing DAPT until 6 months s/p Watchman  -troponin trending down   -Continuing additional home medications     #.  Hypothyroidism  -cw home medications  -TSH normal      VTE PPX: Lovenox/SCDs  Disposition: Home once hemodynamically stable  No need for a.m. labs        Parker Vivas MD

## 2025-03-17 ENCOUNTER — APPOINTMENT (OUTPATIENT)
Dept: CARDIOLOGY | Facility: HOSPITAL | Age: OVER 89
DRG: 280 | End: 2025-03-17
Payer: MEDICARE

## 2025-03-17 LAB
ANION GAP SERPL CALC-SCNC: 11 MMOL/L (ref 10–20)
ATRIAL RATE: 286 BPM
BODY SURFACE AREA: 1.6 M2
BUN SERPL-MCNC: 18 MG/DL (ref 6–23)
CALCIUM SERPL-MCNC: 9.1 MG/DL (ref 8.6–10.3)
CHLORIDE SERPL-SCNC: 100 MMOL/L (ref 98–107)
CO2 SERPL-SCNC: 30 MMOL/L (ref 21–32)
CREAT SERPL-MCNC: 1.01 MG/DL (ref 0.5–1.05)
EGFRCR SERPLBLD CKD-EPI 2021: 52 ML/MIN/1.73M*2
ERYTHROCYTE [DISTWIDTH] IN BLOOD BY AUTOMATED COUNT: 14 % (ref 11.5–14.5)
GLUCOSE SERPL-MCNC: 99 MG/DL (ref 74–99)
HCT VFR BLD AUTO: 34.2 % (ref 36–46)
HGB BLD-MCNC: 11.3 G/DL (ref 12–16)
MAGNESIUM SERPL-MCNC: 1.95 MG/DL (ref 1.6–2.4)
MCH RBC QN AUTO: 30.1 PG (ref 26–34)
MCHC RBC AUTO-ENTMCNC: 33 G/DL (ref 32–36)
MCV RBC AUTO: 91 FL (ref 80–100)
NRBC BLD-RTO: 0 /100 WBCS (ref 0–0)
PLATELET # BLD AUTO: 350 X10*3/UL (ref 150–450)
POTASSIUM SERPL-SCNC: 4.4 MMOL/L (ref 3.5–5.3)
Q ONSET: 221 MS
QRS COUNT: 17 BEATS
QRS DURATION: 138 MS
QT INTERVAL: 424 MS
QTC CALCULATION(BAZETT): 563 MS
QTC FREDERICIA: 512 MS
R AXIS: -36 DEGREES
RBC # BLD AUTO: 3.76 X10*6/UL (ref 4–5.2)
SODIUM SERPL-SCNC: 137 MMOL/L (ref 136–145)
T AXIS: -27 DEGREES
T OFFSET: 433 MS
VENTRICULAR RATE: 106 BPM
WBC # BLD AUTO: 8.6 X10*3/UL (ref 4.4–11.3)

## 2025-03-17 PROCEDURE — 99232 SBSQ HOSP IP/OBS MODERATE 35: CPT | Performed by: STUDENT IN AN ORGANIZED HEALTH CARE EDUCATION/TRAINING PROGRAM

## 2025-03-17 PROCEDURE — 99233 SBSQ HOSP IP/OBS HIGH 50: CPT | Performed by: INTERNAL MEDICINE

## 2025-03-17 PROCEDURE — 2500000001 HC RX 250 WO HCPCS SELF ADMINISTERED DRUGS (ALT 637 FOR MEDICARE OP): Performed by: STUDENT IN AN ORGANIZED HEALTH CARE EDUCATION/TRAINING PROGRAM

## 2025-03-17 PROCEDURE — 93010 ELECTROCARDIOGRAM REPORT: CPT | Performed by: INTERNAL MEDICINE

## 2025-03-17 PROCEDURE — 80048 BASIC METABOLIC PNL TOTAL CA: CPT | Performed by: STUDENT IN AN ORGANIZED HEALTH CARE EDUCATION/TRAINING PROGRAM

## 2025-03-17 PROCEDURE — 2500000005 HC RX 250 GENERAL PHARMACY W/O HCPCS: Performed by: STUDENT IN AN ORGANIZED HEALTH CARE EDUCATION/TRAINING PROGRAM

## 2025-03-17 PROCEDURE — 83735 ASSAY OF MAGNESIUM: CPT | Performed by: STUDENT IN AN ORGANIZED HEALTH CARE EDUCATION/TRAINING PROGRAM

## 2025-03-17 PROCEDURE — 7100000002 HC RECOVERY ROOM TIME - EACH INCREMENTAL 1 MINUTE

## 2025-03-17 PROCEDURE — 7100000001 HC RECOVERY ROOM TIME - INITIAL BASE CHARGE

## 2025-03-17 PROCEDURE — 99232 SBSQ HOSP IP/OBS MODERATE 35: CPT | Performed by: NURSE PRACTITIONER

## 2025-03-17 PROCEDURE — 2500000002 HC RX 250 W HCPCS SELF ADMINISTERED DRUGS (ALT 637 FOR MEDICARE OP, ALT 636 FOR OP/ED): Performed by: STUDENT IN AN ORGANIZED HEALTH CARE EDUCATION/TRAINING PROGRAM

## 2025-03-17 PROCEDURE — 7100000009 HC PHASE TWO TIME - INITIAL BASE CHARGE

## 2025-03-17 PROCEDURE — 7100000010 HC PHASE TWO TIME - EACH INCREMENTAL 1 MINUTE

## 2025-03-17 PROCEDURE — 2500000004 HC RX 250 GENERAL PHARMACY W/ HCPCS (ALT 636 FOR OP/ED): Performed by: INTERNAL MEDICINE

## 2025-03-17 PROCEDURE — 2060000001 HC INTERMEDIATE ICU ROOM DAILY

## 2025-03-17 PROCEDURE — 2500000005 HC RX 250 GENERAL PHARMACY W/O HCPCS: Performed by: INTERNAL MEDICINE

## 2025-03-17 PROCEDURE — 2500000002 HC RX 250 W HCPCS SELF ADMINISTERED DRUGS (ALT 637 FOR MEDICARE OP, ALT 636 FOR OP/ED): Performed by: INTERNAL MEDICINE

## 2025-03-17 PROCEDURE — 2500000004 HC RX 250 GENERAL PHARMACY W/ HCPCS (ALT 636 FOR OP/ED): Performed by: STUDENT IN AN ORGANIZED HEALTH CARE EDUCATION/TRAINING PROGRAM

## 2025-03-17 PROCEDURE — 85027 COMPLETE CBC AUTOMATED: CPT | Performed by: STUDENT IN AN ORGANIZED HEALTH CARE EDUCATION/TRAINING PROGRAM

## 2025-03-17 PROCEDURE — 36415 COLL VENOUS BLD VENIPUNCTURE: CPT | Performed by: STUDENT IN AN ORGANIZED HEALTH CARE EDUCATION/TRAINING PROGRAM

## 2025-03-17 PROCEDURE — 93320 DOPPLER ECHO COMPLETE: CPT

## 2025-03-17 PROCEDURE — 93005 ELECTROCARDIOGRAM TRACING: CPT

## 2025-03-17 RX ORDER — MIDAZOLAM HYDROCHLORIDE 1 MG/ML
INJECTION, SOLUTION INTRAMUSCULAR; INTRAVENOUS AS NEEDED
Status: DISCONTINUED | OUTPATIENT
Start: 2025-03-17 | End: 2025-03-17 | Stop reason: HOSPADM

## 2025-03-17 RX ORDER — NALOXONE HYDROCHLORIDE 0.4 MG/ML
INJECTION, SOLUTION INTRAMUSCULAR; INTRAVENOUS; SUBCUTANEOUS AS NEEDED
Status: DISCONTINUED | OUTPATIENT
Start: 2025-03-17 | End: 2025-03-17 | Stop reason: HOSPADM

## 2025-03-17 RX ORDER — FENTANYL CITRATE 50 UG/ML
INJECTION, SOLUTION INTRAMUSCULAR; INTRAVENOUS AS NEEDED
Status: DISCONTINUED | OUTPATIENT
Start: 2025-03-17 | End: 2025-03-17 | Stop reason: HOSPADM

## 2025-03-17 RX ORDER — LIDOCAINE HYDROCHLORIDE 20 MG/ML
JELLY TOPICAL AS NEEDED
Status: DISCONTINUED | OUTPATIENT
Start: 2025-03-17 | End: 2025-03-17 | Stop reason: HOSPADM

## 2025-03-17 RX ADMIN — AMIODARONE HYDROCHLORIDE 200 MG: 200 TABLET ORAL at 21:58

## 2025-03-17 RX ADMIN — ENOXAPARIN SODIUM 40 MG: 40 INJECTION SUBCUTANEOUS at 21:58

## 2025-03-17 RX ADMIN — FENTANYL CITRATE 25 MCG: 50 INJECTION, SOLUTION INTRAMUSCULAR; INTRAVENOUS at 14:50

## 2025-03-17 RX ADMIN — FENTANYL CITRATE 25 MCG: 50 INJECTION, SOLUTION INTRAMUSCULAR; INTRAVENOUS at 15:04

## 2025-03-17 RX ADMIN — FENTANYL CITRATE 25 MCG: 50 INJECTION, SOLUTION INTRAMUSCULAR; INTRAVENOUS at 15:19

## 2025-03-17 RX ADMIN — NALOXONE HYDROCHLORIDE 0.4 MG: 0.4 INJECTION, SOLUTION INTRAMUSCULAR; INTRAVENOUS; SUBCUTANEOUS at 15:28

## 2025-03-17 RX ADMIN — CLOPIDOGREL 75 MG: 75 TABLET ORAL at 08:52

## 2025-03-17 RX ADMIN — MAGNESIUM OXIDE 400 MG (241.3 MG MAGNESIUM) TABLET 400 MG: TABLET at 08:52

## 2025-03-17 RX ADMIN — PANTOPRAZOLE SODIUM 40 MG: 40 TABLET, DELAYED RELEASE ORAL at 05:56

## 2025-03-17 RX ADMIN — ASPIRIN 81 MG: 81 TABLET, CHEWABLE ORAL at 08:52

## 2025-03-17 RX ADMIN — Medication 1 TABLET: at 08:52

## 2025-03-17 RX ADMIN — CALCIUM CARBONATE-VITAMIN D TAB 500 MG-200 UNIT 1 TABLET: 500-200 TAB at 08:52

## 2025-03-17 RX ADMIN — Medication 2 L/MIN: at 20:00

## 2025-03-17 RX ADMIN — AMIODARONE HYDROCHLORIDE 200 MG: 200 TABLET ORAL at 08:52

## 2025-03-17 RX ADMIN — DULOXETINE 30 MG: 30 CAPSULE, DELAYED RELEASE ORAL at 08:51

## 2025-03-17 RX ADMIN — LEVOTHYROXINE SODIUM 88 MCG: 0.09 TABLET ORAL at 05:56

## 2025-03-17 RX ADMIN — MIDAZOLAM 1 MG: 1 INJECTION INTRAMUSCULAR; INTRAVENOUS at 15:21

## 2025-03-17 RX ADMIN — FENTANYL CITRATE 25 MCG: 50 INJECTION, SOLUTION INTRAMUSCULAR; INTRAVENOUS at 14:56

## 2025-03-17 RX ADMIN — GABAPENTIN 300 MG: 300 CAPSULE ORAL at 21:58

## 2025-03-17 RX ADMIN — BISOPROLOL FUMARATE 10 MG: 5 TABLET ORAL at 08:52

## 2025-03-17 RX ADMIN — MIDAZOLAM 2 MG: 1 INJECTION INTRAMUSCULAR; INTRAVENOUS at 14:50

## 2025-03-17 RX ADMIN — LIDOCAINE HYDROCHLORIDE 1 APPLICATION: 20 JELLY TOPICAL at 14:37

## 2025-03-17 RX ADMIN — ROSUVASTATIN CALCIUM 20 MG: 20 TABLET, FILM COATED ORAL at 21:58

## 2025-03-17 RX ADMIN — DOCUSATE SODIUM 100 MG: 100 CAPSULE, LIQUID FILLED ORAL at 21:58

## 2025-03-17 RX ADMIN — LOSARTAN POTASSIUM 75 MG: 50 TABLET, FILM COATED ORAL at 08:52

## 2025-03-17 RX ADMIN — BENZOCAINE, BUTAMBEN, AND TETRACAINE HYDROCHLORIDE 2 SPRAY: .028; .004; .004 AEROSOL, SPRAY TOPICAL at 14:37

## 2025-03-17 ASSESSMENT — PAIN - FUNCTIONAL ASSESSMENT: PAIN_FUNCTIONAL_ASSESSMENT: 0-10

## 2025-03-17 ASSESSMENT — PAIN SCALES - GENERAL: PAINLEVEL_OUTOF10: 0 - NO PAIN

## 2025-03-17 NOTE — PRE-SEDATION DOCUMENTATION
Sedation Plan    ASA 3     Mallampati class: II.    Risks, benefits, and alternatives discussed with patient.    KARINA to assure no thrombi   she has had a Watchman and thus not on full anticoagulation  Risks and benefits discussed in detail.  Reviewed the possibility of pharyngeal injury as well as adverse effects of anesthesia.  She understands and agrees to proceed

## 2025-03-17 NOTE — NURSING NOTE
"Report given to Carrie on 8s including returning ETA, pt's pre-procedure comment \"I choke on water a lot.\" Gag reflex checked with pt stating she \"felt it on my tongue\"  and \"a little in my throat\" Pt was not given anything to drink d/t c/o choking with water and instructed not to drink anything without the nurse present.  Pt in no distress a this time and offering no complaints.   "

## 2025-03-17 NOTE — PROGRESS NOTES
[facility] Progress Note               Rounding Cardiologist:  Moe Lane MD, MD   Primary Cardiologist: Dr. Moe Lane    Date:  3/17/2025  Patient:  Meryl Hester  YOB: 1931  MRN:  [id number]   Admit Date:  3/14/2025      SUBJECTIVE    Meryl Hester was seen and examined today at bedside. \    No new events  Still in atrial fibrillation rates partially controlled  Pending KARINA cardioversion in a.m. tomorrow  On amiodarone  EKG today shows atrial fibrillation-atrial flutter rate of 106 bpm  ms QT corrected 563 ms  Sodium 137 potassium 4.4 BUN 18 creatinine 1.01 magnesium 1.95 WBC 8.6 hemoglobin 11.3 hematocrit 32.2 platelet count 250    VITALS     Vitals:    03/17/25 0735 03/17/25 1110 03/17/25 1439 03/17/25 1440   BP: 138/86 114/75 (!) 144/92    BP Location: Right arm Right arm     Patient Position: Sitting Lying     Pulse: 95 94 87    Resp: 18 18 18    Temp: 36.2 °C (97.2 °F) 36.1 °C (97 °F)     TempSrc: Temporal Temporal     SpO2: 93% 97% 100% 100%   Weight:       Height:         No intake or output data in the 24 hours ending 03/17/25 1625    [unfilled]    PHYSICAL EXAM   Constitutional:       Comments: Appears somewhat frail thin not diaphoretic   HENT:      Mouth/Throat:      Mouth: Mucous membranes are moist.   Eyes:      Pupils: Pupils are equal, round, and reactive to light.   Cardiovascular:      Rate and Rhythm: Normal rate. Rhythm irregular.      Pulses: Normal pulses.      Heart sounds: No murmur heard.  Pulmonary:      Effort: Pulmonary effort is normal.   Abdominal:      Palpations: Abdomen is soft.   Musculoskeletal:      Cervical back: Normal range of motion.      Right lower leg: No edema.      Left lower leg: No edema.   Neurological:      General: No focal deficit present.      Mental Status: She is alert.   Psychiatric:         Mood and Affect: Mood normal.       DIAGNOSTIC RESULTS   EKG:     Telemetry: Atrial fibrillation.      LAB DATA  "  BMP:  @LABRCNT(Na:3,K:3,CL:3,CO2:3,Bun:3,Creatinine:3,Glu:3, CA:3,LABGLOM)@    Cardiac Enzymes:  @LABRCNT(CKTOTAL:3,CKMB:3,CKMBINDEX:3,TROPONINI:3)@    CBC:   Lab Results   Component Value Date    WBC 8.6 03/17/2025    RBC 3.76 (L) 03/17/2025    HGB 11.3 (L) 03/17/2025    HCT 34.2 (L) 03/17/2025     03/17/2025       CMP:    Lab Results   Component Value Date     03/17/2025    K 4.4 03/17/2025     03/17/2025    CO2 30 03/17/2025    BUN 18 03/17/2025    CREATININE 1.01 03/17/2025    GLUCOSE 99 03/17/2025    CALCIUM 9.1 03/17/2025       Hepatic Function Panel:    Lab Results   Component Value Date    ALKPHOS 124 03/14/2025    ALT 42 03/14/2025    AST 36 03/14/2025    PROT 6.7 03/14/2025    BILITOT 0.8 03/14/2025       Magnesium:    Lab Results   Component Value Date    MG 1.95 03/17/2025       PT/INR:  No results found for: \"PROTIME\", \"INR\"  @LABRCNT(inr:3)@     HgBA1c:  No components found for: \"LABA1C\"    Lipid Profile:  No results found for: \"CHLPL\", \"TRIG\", \"HDL\", \"LDLCALC\", \"LDLDIRECT\"    TSH:    Lab Results   Component Value Date    TSH 1.70 03/14/2025       ABG:  No results found for: \"PH\"    PRO-BNP: No results found for: \"PROBNP\"       RADIOLOGY     Transesophageal Echo (KARINA)         Transthoracic Echo (TTE) Complete   Final Result      XR chest 1 view   Final Result   Prominent interstitial lung markings, for which underlying edema is   not excluded. Atypical multifocal pneumonia can appear similarly and   should be considered in the appropriate clinical context.        Signed by: Tatum Bergeron 3/14/2025 4:42 PM   Dictation workstation:   [id number]      Transesophageal Echo (KARINA) w/ Possible Cardioversion    (Results Pending)   Cardioversion External    (Results Pending)   Transesophageal Echo (KARINA) w/ Possible Cardioversion    (Results Pending)       [unfilled]      CURRENT MEDICATIONS    amiodarone, 200 mg, oral, BID  aspirin, 81 mg, oral, Daily  bisoprolol, 10 mg, oral, " Daily  calcium carbonate-vitamin D3, 1 tablet, oral, Daily  clopidogrel, 75 mg, oral, Daily  docusate sodium, 100 mg, oral, BID  DULoxetine, 30 mg, oral, Daily  enoxaparin, 40 mg, subcutaneous, q24h  gabapentin, 300 mg, oral, Nightly  lactobacillus acidophilus, 1 tablet, oral, Daily  levothyroxine, 88 mcg, oral, Daily  losartan, 75 mg, oral, Daily  magnesium oxide, 400 mg, oral, Daily  oxygen, , inhalation, Continuous - Inhalation  pantoprazole, 40 mg, oral, Daily before breakfast  perflutren lipid microspheres, 0.5-10 mL of dilution, intravenous, Once in imaging  perflutren protein A microsphere, 0.5 mL, intravenous, Once in imaging  polyethylene glycol, 17 g, oral, Daily  rosuvastatin, 20 mg, oral, Nightly  sulfur hexafluoride microsphr, 2 mL, intravenous, Once in imaging             ASSESSMENT     Assessment & Plan  Acute on chronic diastolic heart failure    Coronary artery disease involving native coronary artery of native heart without angina pectoris    Paroxysmal atrial fibrillation with RVR (Multi)    Presence of Watchman left atrial appendage closure device    Type 2 MI (myocardial infarction) (Multi)    Acquired hypothyroidism    Acute hypoxic respiratory failure (Multi)        Patient Active Problem List   Diagnosis    Radiculopathy of lumbar region    Joint stiffness of spine    Gait difficulty    Syncope and collapse    Syncope, unspecified syncope type    NSTEMI (non-ST elevated myocardial infarction) (Multi)    Pulmonary hypertension (Multi)    Acute systolic CHF (congestive heart failure), NYHA class 2    Cardiomyopathy, ischemic    Coronary artery disease involving native coronary artery of native heart without angina pectoris    Paroxysmal atrial fibrillation with RVR (Multi)    Hypertension    Atrial fibrillation (Multi)    Presence of Watchman left atrial appendage closure device    Atrial fibrillation, unspecified type (Multi)    Acute on chronic diastolic heart failure    Type 2 MI (myocardial  infarction) (Multi)    Acquired hypothyroidism    Acute hypoxic respiratory failure (Multi)       Clinical impression     1.  Shortness of breath  2.  Chronic diastolic heart failure  3.  Paroxysmal atrial fibrillation with episodes of rapid nuclear response during this admission  4.  History of GI bleed status post Watchman device implantation November 2024  5.  History of coronary artery disease  6.  Abnormal cardiac enzymes in the setting of diastolic heart failure  7.  Hypothyroidism    PLAN     Continue with amiodarone  Continue rate control strategy for atrial fibrillation for now  Pending KARINA cardioversion in a.m. tomorrow.  Continue telemetry  EKG daily      Please do not hesitate to call with questions.  Electronically signed by Moe Lane MD, FACC on 3/17/2025 at 4:25 PM

## 2025-03-17 NOTE — PROGRESS NOTES
Cardiology Progress Note  Patient: Meryl Hester  Unit/Bed: 809/809-A  YOB: 1931  MRN: [id number]  Acct: [id number]   Admitting Diagnosis: Shortness of breath [R06.02]  Acute on chronic diastolic heart failure [I50.33]  Acute on chronic diastolic (congestive) heart failure [I50.33]  Pneumonia due to infectious organism, unspecified laterality, unspecified part of lung [J18.9]  Acute congestive heart failure, unspecified heart failure type [I50.9]  Date:  3/14/2025  Hospital Day: 3  Attending: Parker Vivas MD   Rounding Cardiologist:  Katharina Hendrickson, APRN-CNP,     Primary Cardiologist: Dr. Wilmer Corona       Complaint:  Chief Complaint   Patient presents with    Shortness of Breath     Pt states had a cold last week, not feeling better.  Told her to come get checked out. Pt having runny nose, congestion, fatigue. Was tested twice for covid and flu.        SUBJECTIVE    3/17/2025:  Patient sitting up at the bedside, denies chest pain, shortness of breath, lightheadedness or dizziness.  She states had a quiet night.  Review of telemetry reveals atrial fibrillation with heart rates 90s to 110's.  She is scheduled for KARINA and possible cardioversion today, reviewed procedures with patient who verbalized understanding of cardiology plan    3/16/2025:  she is frustrated about still being in the hospital.    She has had no chest pain.  She said she feels fairly well.  She is lying supplying sleeping.  She says she is not out of breath respiratory rate perhaps slightly increased.  Heart rate currently while laying down just having woken up 80.  She has no dizziness.  No PND or orthopnea at this time.  Unaware of her atrial fibrillation.     3/15/2025 initial consult: Increased shortness of breath consistent with CHF.  New onset rapid atrial fibrillation.  Amiodarone instituted with plan of direct-current cardioversion this coming Monday after KARINA     echo 3/15/2540% ejection fraction moderate to  severe aortic stenosis valve area approximately 1 cm² not optimal for EF or aortic stenosis determination given atrial fibrillation with rapid rates at the time.  Clearly EF decreased to comparison of the study of August however    Cardiac History  Hypertension  Paroxysmal atrial fibrillation   November eighth, 2024 watchman implantation, 27mm Watchman FLX Pro   Pulmonary hypertension  Coronary artery disease August 2024 coronary angiography/right heart catheterization: LM-less than 10%, LAD-50-60%, CX nondominant.  Proximal ramus 90% very small vessel.  RCA 40-50%. pulmonary artery pressure 43 over 16 mmHg  Moderate aortic stenosis      VITALS      Vitals:    03/16/25 2357 03/17/25 0431 03/17/25 0735 03/17/25 1110   BP:  147/80 138/86 114/75   BP Location:   Right arm    Patient Position:   Sitting    Pulse: 95 91 95 94   Resp:   18 18   Temp:  37.3 °C (99.1 °F) 36.2 °C (97.2 °F) 36.1 °C (97 °F)   TempSrc:   Temporal    SpO2:  96% 93% 97%   Weight:       Height:         No intake or output data in the 24 hours ending 03/17/25 1124     Wt Readings from Last 4 Encounters:   03/14/25 61.4 kg (135 lb 5.8 oz)   01/23/25 62.1 kg (136 lb 12.8 oz)   12/03/24 61.9 kg (136 lb 6.4 oz)   10/23/24 59 kg (130 lb)        Allergies:  Allergies   Allergen Reactions    Sulfa (Sulfonamide Antibiotics) Rash     Skin peeling     Lisinopril Cough        PHYSICAL EXAM   Physical Exam  Constitutional:       General: She is not in acute distress.     Comments: Frail-appearing elderly female who is awake, alert, pleasant and cooperative   Eyes:      Pupils: Pupils are equal, round, and reactive to light.   Cardiovascular:      Rate and Rhythm: Normal rate. Rhythm irregular.      Heart sounds: Murmur heard.      Comments:  murmur of aortic stenosis  Pulmonary:      Effort: Pulmonary effort is normal.      Breath sounds: Normal breath sounds.   Abdominal:      General: Bowel sounds are normal.      Palpations: Abdomen is soft.    Musculoskeletal:      Cervical back: Normal range of motion.      Right lower leg: No edema.      Left lower leg: No edema.   Skin:     General: Skin is warm and dry.   Neurological:      General: No focal deficit present.      Mental Status: She is alert and oriented to person, place, and time. Mental status is at baseline.   Psychiatric:         Mood and Affect: Mood normal.         Behavior: Behavior normal.           LABS   CBC:   Results from last 7 days   Lab Units 03/17/25  0600 03/16/25  0510 03/15/25  0535   WBC AUTO x10*3/uL 8.6 10.8 8.8   RBC AUTO x10*6/uL 3.76* 4.02 3.86*   HEMOGLOBIN g/dL 11.3* 11.9* 11.5*   HEMATOCRIT % 34.2* 36.2 34.1*   MCV fL 91 90 88   MCH pg 30.1 29.6 29.8   MCHC g/dL 33.0 32.9 33.7   RDW % 14.0 13.9 13.9   PLATELETS AUTO x10*3/uL 350 394 316     CMP:    Results from last 7 days   Lab Units 03/17/25  0600 03/16/25  0510 03/15/25  0535 03/14/25  1628   SODIUM mmol/L 137 134* 137 132*   POTASSIUM mmol/L 4.4 4.6 3.0* 4.2   CHLORIDE mmol/L 100 97* 98 99   CO2 mmol/L 30 27 28 23   BUN mg/dL 18 19 13 14   CREATININE mg/dL 1.01 1.02 0.81 0.82   GLUCOSE mg/dL 99 145* 104* 113*   PROTEIN TOTAL g/dL  --   --   --  6.7   CALCIUM mg/dL 9.1 9.2 9.2 9.2   BILIRUBIN TOTAL mg/dL  --   --   --  0.8   ALK PHOS U/L  --   --   --  124   AST U/L  --   --   --  36   ALT U/L  --   --   --  42     BMP:    Results from last 7 days   Lab Units 03/17/25  0600 03/16/25  0510 03/15/25  0535   SODIUM mmol/L 137 134* 137   POTASSIUM mmol/L 4.4 4.6 3.0*   CHLORIDE mmol/L 100 97* 98   CO2 mmol/L 30 27 28   BUN mg/dL 18 19 13   CREATININE mg/dL 1.01 1.02 0.81   CALCIUM mg/dL 9.1 9.2 9.2   GLUCOSE mg/dL 99 145* 104*     Magnesium:  Results from last 7 days   Lab Units 03/17/25  0600 03/16/25  0510 03/15/25  0535   MAGNESIUM mg/dL 1.95 2.11 2.34     Troponin:    Results from last 7 days   Lab Units 03/15/25  0535 03/14/25  1840 03/14/25  1628   TROPHS ng/L 138* 140* 136*     BNP:   Results from last 7 days   Lab  Units 03/14/25  1628   BNP pg/mL 1,561*     Lipid Panel:         amiodarone, 200 mg, oral, BID  aspirin, 81 mg, oral, Daily  bisoprolol, 10 mg, oral, Daily  calcium carbonate-vitamin D3, 1 tablet, oral, Daily  clopidogrel, 75 mg, oral, Daily  docusate sodium, 100 mg, oral, BID  DULoxetine, 30 mg, oral, Daily  enoxaparin, 40 mg, subcutaneous, q24h  gabapentin, 300 mg, oral, Nightly  lactobacillus acidophilus, 1 tablet, oral, Daily  levothyroxine, 88 mcg, oral, Daily  losartan, 75 mg, oral, Daily  magnesium oxide, 400 mg, oral, Daily  oxygen, , inhalation, Continuous - Inhalation  pantoprazole, 40 mg, oral, Daily before breakfast  perflutren lipid microspheres, 0.5-10 mL of dilution, intravenous, Once in imaging  perflutren protein A microsphere, 0.5 mL, intravenous, Once in imaging  polyethylene glycol, 17 g, oral, Daily  rosuvastatin, 20 mg, oral, Nightly  sulfur hexafluoride microsphr, 2 mL, intravenous, Once in imaging           Current Outpatient Medications   Medication Instructions    acetaminophen (TYLENOL) 650 mg, oral, Every 6 hours PRN    aspirin 81 mg, oral, Daily    bisoprolol (ZEBETA) 10 mg, oral, Daily    calcium carbonate-vitamin D3 500 mg-5 mcg (200 unit) tablet 1 tablet, Daily    carboxymethylcellulose (THERATears) 0.25 % ophthalmic solution 1 drop, 3 times daily PRN    clopidogrel (PLAVIX) 75 mg, oral, Daily    docusate sodium (COLACE) 100 mg, 2 times daily    DULoxetine (CYMBALTA) 30 mg, Daily RT    fluticasone (Flonase) 50 mcg/actuation nasal spray 1 spray, Daily PRN    furosemide (LASIX) 20 mg, Daily RT    gabapentin (NEURONTIN) 300 mg, Nightly    krill oil 500 mg, 2 times daily    LACTOBACILLUS ACIDOPHILUS ORAL 1 tablet, Daily    levothyroxine (SYNTHROID, LEVOXYL) 88 mcg, Daily RT    losartan (COZAAR) 75 mg, oral, Daily    magnesium oxide 500 mg, Daily    multivitamin with minerals iron-free (Centrum Silver) 1 tablet, Daily    mv-min-FA-vit K-lutein-zeaxant (PreserVision AREDS 2 Plus MV) 200  mcg-15 mcg- 5 mg-1 mg capsule 1 capsule, 2 times daily    omeprazole (PRILOSEC) 40 mg, Daily RT    potassium chloride CR 20 mEq ER tablet 20 mEq, Daily RT    rosuvastatin (CRESTOR) 20 mg, Nightly        Transthoracic Echo (TTE) Complete    Result Date: 3/16/2025          [place] [id number]  Tel [phone] Fax [phone] TRANSTHORACIC ECHOCARDIOGRAM REPORT Patient Name:       ANNA Moran Physician:    [id number] Wilmer Corona MD, [facility] Study Date:         3/15/2025            Ordering Provider:    [id number] AMANDA GARCIA MRN/PID:            [id number]             Fellow: Accession#:         [id number]         Nurse: Date of Birth/Age:  4/16/1931 / 89 years Sonographer:          Mora Siddiqui RDCS Gender Assigned at  F                    Additional Staff: Birth: Height:             149.86 cm            Admit Date:           3/14/2025 Weight:             61.24 kg             Admission Status:     Inpatient -                                                                Routine BSA / BMI:          1.56 m2 / 27.27      Department Location:  [address] [place] [address] Blood Pressure: 134 /78 mmHg Study Type:    TRANSTHORACIC ECHO (TTE) COMPLETE Diagnosis/ICD: Unspecified combined systolic (congestive) and diastolic                (congestive) heart failure (CHF)-I50.40 Indication:    Congestive Heart Failure CPT Codes:     Echo Complete w Full Doppler-[id number] Patient History: Smoker:            Former. Pertinent History: A-Fib, CAD, Dyspnea, CHF, LE Edema, HTN, Hyperlipidemia and                    Watchman. Study Detail: The following Echo studies were performed: 2D, M-Mode, Doppler and               color  flow. Technically challenging study due to body habitus.  PHYSICIAN INTERPRETATION: Left Ventricle: The left ventricular systolic function is moderately decreased, with a visually estimated ejection fraction of 40%. There is moderate concentric left ventricular hypertrophy. There is global hypokinesis of the left ventricle with minor regional variations. The left ventricular cavity size is upper limits of normal. There is mild increased septal and mildly increased posterior left ventricular wall thickness. Spectral Doppler shows an abnormal pattern of left ventricular diastolic filling. In comparison study of August 2024 EF has decreased previously 45% [id number] comparison clearly there has been a deterioration. However atrial fibrillation is currently present prior study was in sinus rhythm heart rates frequently over 100 affecting calculation of ejection fraction. Left Atrium: The left atrial size is moderately dilated. Right Ventricle: The right ventricle is upper limits of normal in size. There is normal right ventricular global systolic function. RVSP 46 mmHg. Right Atrium: The right atrial size is normal. Aortic Valve: The aortic valve is trileaflet. The aortic valve dimensionless index is 0.30. There is trace aortic valve regurgitation. The peak instantaneous gradient of the aortic valve is 17 mmHg. The mean gradient of the aortic valve is 9 mmHg. Tricuspid aortic valve with diffuse thickening of leaflet cusps and moderate impairment of leaflet mobility. There is trace aortic insufficiency. There is moderate-severe aortic stenosis V-max 2 m/s peak/mean gradients 16 and 9 mmHg. Calculated valve area approximately 1 cmï¿½ visually appears perhaps slightly greater than that; was calculated at 1.2 cm in August. There may have been some progression of stenosis again calculations not as accurate in the setting of rapid atrial fibrillation. Mitral Valve: The mitral valve is mildly thickened. There is trace mitral  valve regurgitation. Trace MR. Tricuspid Valve: The tricuspid valve is structurally normal. There is moderate tricuspid regurgitation. Pulmonic Valve: The pulmonic valve is structurally normal. There is no indication of pulmonic valve regurgitation. Pericardium: No pericardial effusion noted. Aorta: The aortic root is normal.  CONCLUSIONS:  1. The left ventricular systolic function is moderately decreased, with a visually estimated ejection fraction of 40%.  2. There is global hypokinesis of the left ventricle with minor regional variations.  3. Spectral Doppler shows an abnormal pattern of left ventricular diastolic filling.  4. In comparison study of August 2024 EF has decreased previously 45% [id number] comparison clearly there has been a deterioration. However atrial fibrillation is currently present prior study was in sinus rhythm heart rates frequently over 100 affecting calculation of ejection fraction.  5. There is normal right ventricular global systolic function.  6. RVSP 46 mmHg.  7. The left atrial size is moderately dilated.  8. Trace MR.  9. Moderate tricuspid regurgitation. 10. Tricuspid aortic valve with diffuse thickening of leaflet cusps and moderate impairment of leaflet mobility. There is trace aortic insufficiency. There is moderate-severe aortic stenosis V-max 2 m/s peak/mean gradients 16 and 9 mmHg. Calculated valve area approximately 1 cmï¿½ visually appears perhaps slightly greater than that; was calculated at 1.2 cm in August. There may have been some progression of stenosis again calculations not as accurate in the setting of rapid atrial fibrillation. QUANTITATIVE DATA SUMMARY:  2D MEASUREMENTS:             Normal Ranges: Ao Root d:       2.50 cm     (2.0-3.7cm) LAs:             4.10 cm     (2.7-4.0cm) IVSd:            1.20 cm     (0.6-1.1cm) LVPWd:           1.21 cm     (0.6-1.1cm) LVIDd:           4.31 cm     (3.9-5.9cm) LVIDs:           3.48 cm LV Mass Index:   119.5 g/m2 LVEDV  Index:     56.12 ml/m2 LV % FS          19.3 %  LEFT ATRIUM:                  Normal Ranges: LA Vol A4C:        88.6 ml    (22+/-6mL/m2) LA Vol A2C:        94.5 ml LA Vol BP:         93.5 ml LA Vol Index A4C:  56.8ml/m2 LA Vol Index A2C:  60.6 ml/m2 LA Vol Index BP:   59.9 ml/m2 LA Area A4C:       27.4 cm2 LA Area A2C:       27.7 cm2 LA Major Axis A4C: 7.2 cm LA Major Axis A2C: 6.9 cm LA Volume Index:   56.9 ml/m2  RIGHT ATRIUM:                 Normal Ranges: RA Vol A4C:        49.3 ml    (8.3-19.5ml) RA Vol Index A4C:  31.6 ml/m2 RA Area A4C:       17.7 cm2 RA Major Axis A4C: 5.4 cm  LV SYSTOLIC FUNCTION:                      Normal Ranges: EF-A4C View:    42 % (>=55%) EF-A2C View:    42 % EF-Biplane:     44 % EF-Visual:      40 % LV EF Reported: 40 %  AORTIC VALVE:                      Normal Ranges: AoV Vmax:                2.04 m/s  (<=1.7m/s) AoV Peak P.6 mmHg (<20mmHg) AoV Mean P.0 mmHg  (1.7-11.5mmHg) LVOT Max Delbert:            0.70 m/s  (<=1.1m/s) AoV VTI:                 39.10 cm  (18-25cm) LVOT VTI:                11.80 cm LVOT Diameter:           2.00 cm   (1.8-2.4cm) AoV Area, VTI:           0.95 cm2  (2.5-5.5cm2) AoV Area,Vmax:           1.09 cm2  (2.5-4.5cm2) AoV Dimensionless Index: 0.30  AORTIC INSUFFICIENCY: AI Vmax:       3.47 m/s AI Half-time:  601 msec AI Decel Rate: 159.50 cm/s2  RIGHT VENTRICLE: RV Basal 4.20 cm RV Mid   4.45 cm RV Major 6.0 cm TAPSE:   11.8 mm RV s'    0.12 m/s  TRICUSPID VALVE/RVSP:          Normal Ranges: Peak TR Velocity:     3.29 m/s RV Syst Pressure:     46 mmHg  (< 30mmHg)  PULMONIC VALVE:          Normal Ranges: PV Accel Time:  79 msec  (>120ms) PV Max Delbert:     0.8 m/s  (0.6-0.9m/s) PV Max P.5 mmHg  [id number] Wilmer Croona MD, FACC Electronically signed on 3/16/2025 at 8:36:34 AM  ** Final **     ECG 12 Lead    Result Date: 3/15/2025  Atrial flutter with variable AV block with premature ventricular or aberrantly conducted complexes Left  axis deviation Right bundle branch block Minimal voltage criteria for LVH, may be normal variant Abnormal ECG When compared with ECG of 14-MAR-2025 22:24, (unconfirmed) Atrial flutter has replaced Atrial fibrillation Left anterior fascicular block is no longer Present Confirmed by Wilmer Corona ([id number]) on 3/15/2025 6:56:11 PM    ECG 12 lead    Result Date: 3/15/2025  Atrial flutter with variable AV block with premature ventricular or aberrantly conducted complexes Right bundle branch block Minimal voltage criteria for LVH, may be normal variant Septal infarct , age undetermined Abnormal ECG When compared with ECG of 14-MAR-2025 16:04, (unconfirmed) Atrial flutter has replaced Electronic ventricular pacemaker See ED provider note for full interpretation and clinical correlation Confirmed by Leandra Woodward ([id number]) on 3/15/2025 10:11:02 AM    ECG 12 lead    Result Date: 3/15/2025  Atrial flutter with variable AV block with occasional ventricular-paced complexes Right bundle branch block Moderate voltage criteria for LVH, may be normal variant Possible Lateral infarct , age undetermined Abnormal ECG When compared with ECG of 08-NOV-2024 12:09, Electronic ventricular pacemaker has replaced Sinus rhythm See ED provider note for full interpretation and clinical correlation Confirmed by Leandra Woodward ([id number]) on 3/15/2025 10:09:09 AM    XR chest 1 view    Result Date: 3/14/2025  Interpreted By:  Tatum Bergeron, STUDY: XR CHEST 1 VIEW;  3/14/2025 4:24 pm   INDICATION: Signs/Symptoms:Chest Pain.   COMPARISON: 08/07/2024   ACCESSION NUMBER(S): [id number]   ORDERING CLINICIAN: YOLANDA GRANT   FINDINGS: AP radiograph of the chest was provided.       CARDIOMEDIASTINAL SILHOUETTE: Persistently enlarged cardiomediastinal silhouette. Atherosclerotic calcifications of the aortic arch.   LUNGS: Prominent interstitial lung markings are noted. No pneumothorax.   ABDOMEN: No remarkable upper abdominal findings.   BONES: No  acute osseous changes.       Prominent interstitial lung markings, for which underlying edema is not excluded. Atypical multifocal pneumonia can appear similarly and should be considered in the appropriate clinical context.   Signed by: Tatum Bergeron 3/14/2025 4:42 PM Dictation workstation:   [id number]       No results found for this or any previous visit from the past 1095 days.                 Encounter Date: 03/14/25   ECG 12 Lead   Result Value    Ventricular Rate 106    Atrial Rate 286    QRS Duration 138    QT Interval 424    QTC Calculation(Bazett) 563    R Axis -36    T Axis -27    QRS Count 17    Q Onset 221    T Offset 433    QTC Fredericia 512    Narrative    Atrial flutter with variable AV block  Left axis deviation  Right bundle branch block  Abnormal ECG  When compared with ECG of 16-MAR-2025 06:24,  No significant change was found        Tele Monitoring: remains in atrial fibrillation while sleeping rate will go into the 80s however when active heart rate often over 100.  No bradycardia    Assessment     Patient Active Problem List   Diagnosis    Radiculopathy of lumbar region    Joint stiffness of spine    Gait difficulty    Syncope and collapse    Syncope, unspecified syncope type    NSTEMI (non-ST elevated myocardial infarction) (Multi)    Pulmonary hypertension (Multi)    Acute systolic CHF (congestive heart failure), NYHA class 2    Cardiomyopathy, ischemic    Coronary artery disease involving native coronary artery of native heart without angina pectoris    Paroxysmal atrial fibrillation with RVR (Multi)    Hypertension    Atrial fibrillation (Multi)    Presence of Watchman left atrial appendage closure device    Atrial fibrillation, unspecified type (Multi)    Acute on chronic diastolic heart failure    Type 2 MI (myocardial infarction) (Multi)    Acquired hypothyroidism    Acute hypoxic respiratory failure (Multi)           impression/Plan:  acute on chronic systolic CHF: Clinically  improved continue daily Lasix for now.  She had been on 3 times a day which was the cause of the low potassium previously.  Once atrial fibrillation is better controlled she will continue to improve.  Heart rate is better than it was she is now on oral amiodarone she is on chronic beta-blocker will also add IV beta-blocker on an as needed basis until cardioversion tomorrow   aortic stenosis: Moderately severe.  This is one of the reasons A-fib is not   Well-tolerated.  She is asymptomatic when in sinus rhythm at the age of 89 would not pursue TAVR as she is also quite frail.  Furthermore   just recently asymptomatic when in sinus rhythm.   systolic dysfunction: Previous overall EF 45 to 50% currently at 40% secondary to effects of atrial fibrillation anticipate improvement when A-fib controlled   we will plan for KARINA cardioversion tomorrow.  Discussed risk benefits of KARINA and cardioversion with her including pharyngeal injury and adverse effects of anesthesia.  She understands agrees to proceed.        3/17/2025:   Patient appears compensated from heart failure standpoint.  She remains in atrial fibrillation and is n.p.o. for scheduled KARINA/cardioversion later today with Dr. Corona and Dr. Lane.  Reviewed procedures with patient and answer questions.  Continue current medications.  Follow-up with Dr. Corona.  Electronically signed by LEONOR Moffett on 3/17/2025 at 11:24 AM

## 2025-03-17 NOTE — CARE PLAN
The patient's goals for the shift include Rest    The clinical goals for the shift include Patient will remain hemodynamically stable through end of shift      Problem: Arrythmia/Dysrhythmia  Goal: Lab values return to normal range  Outcome: Progressing  Goal: No evidence of post procedure complications  Outcome: Progressing  Goal: Promote self management  Outcome: Progressing  Goal: Serial ECG will return to baseline  Outcome: Progressing  Goal: Verbalize understanding of procedures/devices  Outcome: Progressing  Goal: Vital signs return to baseline  Outcome: Progressing  Goal: Care and maintenance of device (specify)  Outcome: Progressing     Problem: Pain - Adult  Goal: Verbalizes/displays adequate comfort level or baseline comfort level  Outcome: Progressing     Problem: Safety - Adult  Goal: Free from fall injury  Outcome: Progressing     Problem: Discharge Planning  Goal: Discharge to home or other facility with appropriate resources  Outcome: Progressing     Problem: Chronic Conditions and Co-morbidities  Goal: Patient's chronic conditions and co-morbidity symptoms are monitored and maintained or improved  Outcome: Progressing     Problem: Nutrition  Goal: Nutrient intake appropriate for maintaining nutritional needs  Outcome: Progressing

## 2025-03-17 NOTE — NURSING NOTE
Transport here to take pt back to 809, transport was instructed not to let pt get OOB to walk to bed w/o nurse.

## 2025-03-17 NOTE — PRE-PROCEDURE ASSESSMENT
[facility]-NCDR CathPCI V5 Collection Form    Pre-Procedure Information  - Electrocardiac assessment method: telemetry monitor     - The assessment result was abnormal.      - No new antiarrhythmic therapy was received prior to evaluation within the cath lab.      - Abnormal assessment findings include: new onset atrial fibrillation    Indications and Presentation  - Indication(s) for cath lab visit: cardiac arrhythmia    - Ventricular support was not required.     KARINA to assure no thrombi within left atrium and that watchman is free of thrombi.  If so would then proceed with direct-current cardioversion

## 2025-03-17 NOTE — PROGRESS NOTES
Meryl Hester is a 89 y.o. female on day 3 of admission presenting with Acute on chronic diastolic heart failure.      Subjective   Currently stable, no acute distress, no complaint, lying down in her bed, on oxygen via nasal cannula, does not feel hungry, currently n.p.o. for KARINA today    Objective     Last Recorded Vitals  BP (!) 144/92   Pulse 87   Temp 36.1 °C (97 °F) (Temporal)   Resp 18   Wt 61.4 kg (135 lb 5.8 oz)   SpO2 100%   Intake/Output last 3 Shifts:  No intake or output data in the 24 hours ending 03/17/25 1446    Admission Weight  Weight: 61.2 kg (135 lb) (03/14/25 1607)    Daily Weight  03/14/25 : 61.4 kg (135 lb 5.8 oz)    Image Results  ECG 12 Lead  Atrial flutter with variable AV block  Left axis deviation  Right bundle branch block  Abnormal ECG  When compared with ECG of 16-MAR-2025 06:24,  No significant change was found  Confirmed by Moe Lane ([id number]) on 3/17/2025 2:39:07 PM      Physical Exam    General: Well-developed frail elderly female, in no acute distress  HEENT: AT, NC, no JVD, no lymphadenopathy, neck supple  Lungs: Clear, no wheezing, no crackles  Cardiac: Irregular heart rate, no murmur, no gallop  Abdomen: Soft, nontender, no distention, positive bowel sound  Extremities: No deformity, no edema, pulses intact, ROM limited due to frailty  Neurological: Alert awake oriented x3, sensation intact, clear speech          Assessment & Plan  Acute on chronic diastolic heart failure    Coronary artery disease involving native coronary artery of native heart without angina pectoris    Paroxysmal atrial fibrillation with RVR (Multi)    Presence of Watchman left atrial appendage closure device    Type 2 MI (myocardial infarction) (Multi)    Acquired hypothyroidism    Acute hypoxic respiratory failure (Multi)        Meryl Hester is a 89 y.o. female with a history of paroxysmal atrial fibrillation s/p Watchman, chronic diastolic heart failure, CAD, hypothyroidism, essential hypertension  who was admitted for the mgmt of following issues:     #.  Acute hypoxic respiratory failure 2/2 acute on chronic diastolic heart failure with elevated BNP and trope, resolved  #.  Paroxysmal atrial fibrillation with RVR  #.  S/p Watchman (11/2024)  #.  CAD  #.  Type 2 NSTEMI     -Cardiology/EP following, stopped Lasix, currently on oral amiodarone and beta-blocker and IV beta-blocker as needed for rate control.  -Currently n.p.o., plan for KARINA cardioversion today to rule out thrombosis  -Echo done and reviewed: LVEF 40%, global hypokinesis of LV, moderate TR, aortic stenosis  -Continue DAPT until 6 months s/p Watchman  -Continue home meds for other comorbidities     #.  Hypothyroidism  -cw home medications  -TSH normal      VTE PPX: Lovenox/SCDs  Disposition: Home once hemodynamically stable hopefully within the next 24 hours if hemodynamically stable  Granddaughter at bedside and all the questions were answered      Parker Vivas MD

## 2025-03-17 NOTE — SIGNIFICANT EVENT
Patient to the Cath Lab for KARINA.  Despite 3 mg of Versed and 125 mcg of fentanyl and an IV that was functioning still not quite adequate anesthesia  She was low level moderate sedation and several attempts to advance the probe were made.  She was unable to swallow efficiently.  There was no pressure put forward on the probe, it was placed in her pharynx but she was unable to tolerate swallowing it.  I think because of anesthesia not quite adequate because of the number of attempts that were unsuccessful it was elected to abort the procedure.    Plan will be for KARINA direct-current cardioversion tomorrow and may need anesthesia to help do the KARINA as well.    Although patient was awake and responsive I still elected to give Narcan 0.4 at the end of the procedure.  At no time did she have hypotension or hypoxia.    Wilmer Corona MD    3:45 PM  Patient awake alert oriented.  Discussed with patient as well as her family study was aborted secondary to multiple attempts that were unsuccessful and amount of sedation utilized.  Now scheduled tomorrow for KARINA cardioversion and may need anesthesia for KARINA as well.  Cussed with Dr. Yu who will do the KARINA tomorrow    Wilmer Corona MD

## 2025-03-18 ENCOUNTER — APPOINTMENT (OUTPATIENT)
Dept: CARDIOLOGY | Facility: HOSPITAL | Age: OVER 89
DRG: 280 | End: 2025-03-18
Payer: MEDICARE

## 2025-03-18 ENCOUNTER — HOSPITAL ENCOUNTER (INPATIENT)
Dept: CARDIOLOGY | Facility: HOSPITAL | Age: OVER 89
Discharge: HOME | DRG: 280 | End: 2025-03-18
Payer: MEDICARE

## 2025-03-18 ENCOUNTER — ANESTHESIA EVENT (OUTPATIENT)
Dept: CARDIOLOGY | Facility: HOSPITAL | Age: OVER 89
End: 2025-03-18
Payer: MEDICARE

## 2025-03-18 ENCOUNTER — ANESTHESIA (OUTPATIENT)
Dept: CARDIOLOGY | Facility: HOSPITAL | Age: OVER 89
End: 2025-03-18
Payer: MEDICARE

## 2025-03-18 VITALS
TEMPERATURE: 96.6 F | SYSTOLIC BLOOD PRESSURE: 106 MMHG | OXYGEN SATURATION: 94 % | HEART RATE: 58 BPM | DIASTOLIC BLOOD PRESSURE: 52 MMHG

## 2025-03-18 LAB
ATRIAL RATE: 285 BPM
ATRIAL RATE: 58 BPM
BODY SURFACE AREA: 1.63 M2
P AXIS: 24 DEGREES
P OFFSET: 183 MS
P ONSET: 118 MS
PR INTERVAL: 144 MS
Q ONSET: 189 MS
Q ONSET: 190 MS
QRS COUNT: 10 BEATS
QRS COUNT: 15 BEATS
QRS DURATION: 130 MS
QRS DURATION: 146 MS
QT INTERVAL: 418 MS
QT INTERVAL: 498 MS
QTC CALCULATION(BAZETT): 488 MS
QTC CALCULATION(BAZETT): 525 MS
QTC FREDERICIA: 486 MS
QTC FREDERICIA: 492 MS
R AXIS: -30 DEGREES
R AXIS: -31 DEGREES
T AXIS: -24 DEGREES
T AXIS: -35 DEGREES
T OFFSET: 398 MS
T OFFSET: 439 MS
VENTRICULAR RATE: 58 BPM
VENTRICULAR RATE: 95 BPM

## 2025-03-18 PROCEDURE — 99232 SBSQ HOSP IP/OBS MODERATE 35: CPT | Performed by: NURSE PRACTITIONER

## 2025-03-18 PROCEDURE — 93005 ELECTROCARDIOGRAM TRACING: CPT

## 2025-03-18 PROCEDURE — 2500000002 HC RX 250 W HCPCS SELF ADMINISTERED DRUGS (ALT 637 FOR MEDICARE OP, ALT 636 FOR OP/ED): Performed by: NURSE PRACTITIONER

## 2025-03-18 PROCEDURE — 2500000004 HC RX 250 GENERAL PHARMACY W/ HCPCS (ALT 636 FOR OP/ED)

## 2025-03-18 PROCEDURE — 93325 DOPPLER ECHO COLOR FLOW MAPG: CPT

## 2025-03-18 PROCEDURE — 2500000004 HC RX 250 GENERAL PHARMACY W/ HCPCS (ALT 636 FOR OP/ED): Performed by: INTERNAL MEDICINE

## 2025-03-18 PROCEDURE — 93312 ECHO TRANSESOPHAGEAL: CPT | Performed by: INTERNAL MEDICINE

## 2025-03-18 PROCEDURE — 7100000002 HC RECOVERY ROOM TIME - EACH INCREMENTAL 1 MINUTE

## 2025-03-18 PROCEDURE — 2060000001 HC INTERMEDIATE ICU ROOM DAILY

## 2025-03-18 PROCEDURE — 3700000001 HC GENERAL ANESTHESIA TIME - INITIAL BASE CHARGE

## 2025-03-18 PROCEDURE — 7100000001 HC RECOVERY ROOM TIME - INITIAL BASE CHARGE

## 2025-03-18 PROCEDURE — 93325 DOPPLER ECHO COLOR FLOW MAPG: CPT | Performed by: INTERNAL MEDICINE

## 2025-03-18 PROCEDURE — 99232 SBSQ HOSP IP/OBS MODERATE 35: CPT | Performed by: STUDENT IN AN ORGANIZED HEALTH CARE EDUCATION/TRAINING PROGRAM

## 2025-03-18 PROCEDURE — 2500000002 HC RX 250 W HCPCS SELF ADMINISTERED DRUGS (ALT 637 FOR MEDICARE OP, ALT 636 FOR OP/ED): Performed by: INTERNAL MEDICINE

## 2025-03-18 PROCEDURE — 92960 CARDIOVERSION ELECTRIC EXT: CPT | Performed by: INTERNAL MEDICINE

## 2025-03-18 PROCEDURE — 2500000002 HC RX 250 W HCPCS SELF ADMINISTERED DRUGS (ALT 637 FOR MEDICARE OP, ALT 636 FOR OP/ED): Performed by: STUDENT IN AN ORGANIZED HEALTH CARE EDUCATION/TRAINING PROGRAM

## 2025-03-18 PROCEDURE — 2500000005 HC RX 250 GENERAL PHARMACY W/O HCPCS: Performed by: INTERNAL MEDICINE

## 2025-03-18 PROCEDURE — B246ZZ4 ULTRASONOGRAPHY OF RIGHT AND LEFT HEART, TRANSESOPHAGEAL: ICD-10-PCS | Performed by: INTERNAL MEDICINE

## 2025-03-18 PROCEDURE — 3700000002 HC GENERAL ANESTHESIA TIME - EACH INCREMENTAL 1 MINUTE

## 2025-03-18 PROCEDURE — 99152 MOD SED SAME PHYS/QHP 5/>YRS: CPT

## 2025-03-18 PROCEDURE — 2500000001 HC RX 250 WO HCPCS SELF ADMINISTERED DRUGS (ALT 637 FOR MEDICARE OP): Performed by: STUDENT IN AN ORGANIZED HEALTH CARE EDUCATION/TRAINING PROGRAM

## 2025-03-18 PROCEDURE — 7100000009 HC PHASE TWO TIME - INITIAL BASE CHARGE

## 2025-03-18 PROCEDURE — 7100000010 HC PHASE TWO TIME - EACH INCREMENTAL 1 MINUTE

## 2025-03-18 PROCEDURE — 92960 CARDIOVERSION ELECTRIC EXT: CPT

## 2025-03-18 PROCEDURE — 2500000005 HC RX 250 GENERAL PHARMACY W/O HCPCS: Performed by: STUDENT IN AN ORGANIZED HEALTH CARE EDUCATION/TRAINING PROGRAM

## 2025-03-18 PROCEDURE — 2500000004 HC RX 250 GENERAL PHARMACY W/ HCPCS (ALT 636 FOR OP/ED): Performed by: STUDENT IN AN ORGANIZED HEALTH CARE EDUCATION/TRAINING PROGRAM

## 2025-03-18 PROCEDURE — 99152 MOD SED SAME PHYS/QHP 5/>YRS: CPT | Performed by: INTERNAL MEDICINE

## 2025-03-18 PROCEDURE — 93010 ELECTROCARDIOGRAM REPORT: CPT | Performed by: INTERNAL MEDICINE

## 2025-03-18 RX ORDER — LIDOCAINE HYDROCHLORIDE 20 MG/ML
JELLY TOPICAL AS NEEDED
Status: DISCONTINUED | OUTPATIENT
Start: 2025-03-18 | End: 2025-03-18 | Stop reason: HOSPADM

## 2025-03-18 RX ORDER — FENTANYL CITRATE 50 UG/ML
INJECTION, SOLUTION INTRAMUSCULAR; INTRAVENOUS AS NEEDED
Status: DISCONTINUED | OUTPATIENT
Start: 2025-03-18 | End: 2025-03-18 | Stop reason: HOSPADM

## 2025-03-18 RX ORDER — AMIODARONE HYDROCHLORIDE 200 MG/1
200 TABLET ORAL DAILY
Status: DISCONTINUED | OUTPATIENT
Start: 2025-03-20 | End: 2025-03-19 | Stop reason: HOSPADM

## 2025-03-18 RX ORDER — PROPOFOL 10 MG/ML
INJECTION, EMULSION INTRAVENOUS AS NEEDED
Status: DISCONTINUED | OUTPATIENT
Start: 2025-03-18 | End: 2025-03-18

## 2025-03-18 RX ORDER — MIDAZOLAM HYDROCHLORIDE 1 MG/ML
INJECTION, SOLUTION INTRAMUSCULAR; INTRAVENOUS AS NEEDED
Status: DISCONTINUED | OUTPATIENT
Start: 2025-03-18 | End: 2025-03-18 | Stop reason: HOSPADM

## 2025-03-18 RX ORDER — AMIODARONE HYDROCHLORIDE 200 MG/1
200 TABLET ORAL 2 TIMES DAILY
Status: DISCONTINUED | OUTPATIENT
Start: 2025-03-18 | End: 2025-03-19 | Stop reason: HOSPADM

## 2025-03-18 RX ADMIN — PROPOFOL 30 MG: 10 INJECTION, EMULSION INTRAVENOUS at 10:26

## 2025-03-18 RX ADMIN — Medication 2 L/MIN: at 21:08

## 2025-03-18 RX ADMIN — DOCUSATE SODIUM 100 MG: 100 CAPSULE, LIQUID FILLED ORAL at 08:37

## 2025-03-18 RX ADMIN — DOCUSATE SODIUM 100 MG: 100 CAPSULE, LIQUID FILLED ORAL at 21:12

## 2025-03-18 RX ADMIN — ASPIRIN 81 MG: 81 TABLET, CHEWABLE ORAL at 08:35

## 2025-03-18 RX ADMIN — MIDAZOLAM 0.5 MG: 1 INJECTION INTRAMUSCULAR; INTRAVENOUS at 09:36

## 2025-03-18 RX ADMIN — PANTOPRAZOLE SODIUM 40 MG: 40 TABLET, DELAYED RELEASE ORAL at 05:54

## 2025-03-18 RX ADMIN — ENOXAPARIN SODIUM 40 MG: 40 INJECTION SUBCUTANEOUS at 22:04

## 2025-03-18 RX ADMIN — LIDOCAINE HYDROCHLORIDE 1 APPLICATION: 20 JELLY TOPICAL at 09:27

## 2025-03-18 RX ADMIN — LEVOTHYROXINE SODIUM 88 MCG: 0.09 TABLET ORAL at 05:54

## 2025-03-18 RX ADMIN — GABAPENTIN 300 MG: 300 CAPSULE ORAL at 21:12

## 2025-03-18 RX ADMIN — BISOPROLOL FUMARATE 10 MG: 5 TABLET ORAL at 08:41

## 2025-03-18 RX ADMIN — CALCIUM CARBONATE-VITAMIN D TAB 500 MG-200 UNIT 1 TABLET: 500-200 TAB at 08:36

## 2025-03-18 RX ADMIN — MIDAZOLAM 2 MG: 1 INJECTION INTRAMUSCULAR; INTRAVENOUS at 09:32

## 2025-03-18 RX ADMIN — MAGNESIUM OXIDE 400 MG (241.3 MG MAGNESIUM) TABLET 400 MG: TABLET at 08:36

## 2025-03-18 RX ADMIN — Medication 1 TABLET: at 08:36

## 2025-03-18 RX ADMIN — ROSUVASTATIN CALCIUM 20 MG: 20 TABLET, FILM COATED ORAL at 21:12

## 2025-03-18 RX ADMIN — DULOXETINE 30 MG: 30 CAPSULE, DELAYED RELEASE ORAL at 08:36

## 2025-03-18 RX ADMIN — Medication 3 L/MIN: at 09:16

## 2025-03-18 RX ADMIN — AMIODARONE HYDROCHLORIDE 200 MG: 200 TABLET ORAL at 21:12

## 2025-03-18 RX ADMIN — AMIODARONE HYDROCHLORIDE 200 MG: 200 TABLET ORAL at 08:35

## 2025-03-18 RX ADMIN — BENZOCAINE, BUTAMBEN, AND TETRACAINE HYDROCHLORIDE 2 SPRAY: .028; .004; .004 AEROSOL, SPRAY TOPICAL at 09:26

## 2025-03-18 RX ADMIN — LOSARTAN POTASSIUM 75 MG: 50 TABLET, FILM COATED ORAL at 08:35

## 2025-03-18 RX ADMIN — FENTANYL CITRATE 50 MCG: 50 INJECTION, SOLUTION INTRAMUSCULAR; INTRAVENOUS at 09:32

## 2025-03-18 RX ADMIN — CLOPIDOGREL 75 MG: 75 TABLET ORAL at 08:35

## 2025-03-18 ASSESSMENT — COGNITIVE AND FUNCTIONAL STATUS - GENERAL
HELP NEEDED FOR BATHING: A LITTLE
TOILETING: A LITTLE
DRESSING REGULAR LOWER BODY CLOTHING: A LITTLE
DRESSING REGULAR LOWER BODY CLOTHING: A LITTLE
DAILY ACTIVITIY SCORE: 21
HELP NEEDED FOR BATHING: A LITTLE
MOBILITY SCORE: 24
DAILY ACTIVITIY SCORE: 21
TOILETING: A LITTLE

## 2025-03-18 ASSESSMENT — ACTIVITIES OF DAILY LIVING (ADL): LACK_OF_TRANSPORTATION: NO

## 2025-03-18 ASSESSMENT — PAIN SCALES - GENERAL
PAINLEVEL_OUTOF10: 0 - NO PAIN

## 2025-03-18 ASSESSMENT — PAIN - FUNCTIONAL ASSESSMENT
PAIN_FUNCTIONAL_ASSESSMENT: 0-10
PAIN_FUNCTIONAL_ASSESSMENT: 0-10

## 2025-03-18 NOTE — PROGRESS NOTES
Meryl Hester is a 89 y.o. female on day 4 of admission presenting with Acute on chronic diastolic heart failure.      Subjective   HDS, in NAD, no complaint, again NPO for KARINA/cardioversion today     Objective     Last Recorded Vitals  /51   Pulse 65   Temp 37 °C (98.6 °F) (Temporal)   Resp 18   Wt 63.8 kg (140 lb 10.5 oz)   SpO2 95%   Intake/Output last 3 Shifts:    Intake/Output Summary (Last 24 hours) at 3/18/2025 1242  Last data filed at 3/18/2025 0948  Gross per 24 hour   Intake --   Output 0 ml   Net 0 ml       Admission Weight  Weight: 61.2 kg (135 lb) (03/14/25 1607)    Daily Weight  03/18/25 : 63.8 kg (140 lb 10.5 oz)    Image Results  Cardioversion External  Table formatting from the original result was not included.  Procedure Details:     Procedure Details:    Cardioversion  Summary:  ·   External electric cardioversion of atrial fibrillation to normal sinus   rhythm was achieved using 200 J synchronous biphasic.     Recommendations:  1. A 12 lead ECG should be performed prior to discharge from the hospital.     2. The patient should continue with the present medications.   Discharge:   1. The patient recovered uneventfully from the effects of conscious   sedation. The patient left the EP laboratory hemodynamically stable and   without neurological deficits.   Follow up:   1. The patient will return to telemetry for observation, , following bed   rest and subsequent ambulation, provided the recovery parameters are   appropriate. The patient should call the electrophysiologist immediately   if symptoms recur, or for any problems. The patient has been instructed   accordingly.   ________________________________________  Procedures:  Cardioversion.   ________________________________________  Patient history:  Please refer to the detailed history and physical on the patient's medical   chart.  History of recurrence of atrial fibrillation.  Patient is a   scheduled for  electrocardioversion    ________________________________________  Procedure narrative:  The risks, benefits, and alternatives to the procedure and sedation were   explained to the patient, and informed consent was obtained. The patient   was in the fasting state. A grounding pad was placed. Self-adhesive   anterior-posterior defibrillation pads were applied. A ZOLL defibrillator   was used for monitoring and the defibrillator waveform was set to   biphasic. The patient was set up for continuous monitoring of surface 12   lead ECG and pulse oximetry. Blood pressure was monitored with automatic   cuff measurements. The procedure was performed under IV conscious sedation   performed by anesthesiology service.   1. External electric cardioversion of atrial fibrillation to normal sinus   rhythm was achieved using 200 J synchronous biphasic.   ________________________________________  Complications:  The patient tolerated the procedure without any complications or incident.     ________________________________________  Prepared and signed by    Tolerance: good   Complications: None   ECG 12 lead  Sinus bradycardia  Left axis deviation  Right bundle branch block  Minimal voltage criteria for LVH, may be normal variant  Abnormal ECG  When compared with ECG of 18-MAR-2025 06:34, (unconfirmed)  Sinus rhythm has replaced Atrial flutter  Vent. rate has decreased BY  37 BPM  ECG 12 Lead  Atrial flutter with variable AV block  Left axis deviation  Right bundle branch block  Minimal voltage criteria for LVH, may be normal variant  Abnormal ECG  When compared with ECG of 17-MAR-2025 06:43,  No significant change was found      Physical Exam    General: Well-developed frail elderly female, in no acute distress  HEENT: AT, NC, no JVD, no lymphadenopathy, neck supple  Lungs: Clear, no wheezing, no crackles  Cardiac: Irregular heart rate, no murmur, no gallop  Abdomen: Soft, nontender, no distention, positive bowel sound  Extremities:  No deformity, no edema, pulses intact, ROM limited due to frailty  Neurological: Alert awake oriented x3, sensation intact, clear speech            Assessment & Plan  Acute on chronic diastolic heart failure    Coronary artery disease involving native coronary artery of native heart without angina pectoris    Paroxysmal atrial fibrillation with RVR (Multi)    Presence of Watchman left atrial appendage closure device    Type 2 MI (myocardial infarction) (Multi)    Acquired hypothyroidism    Acute hypoxic respiratory failure (Multi)        Meryl Hester is a 89 y.o. female with a history of paroxysmal atrial fibrillation s/p Watchman, chronic diastolic heart failure, CAD, hypothyroidism, essential hypertension who was admitted for the mgmt of following issues:     #.  Acute hypoxic respiratory failure 2/2 acute on chronic diastolic heart failure with elevated BNP and trope, resolved  #.  Paroxysmal atrial fibrillation with RVR  #.  S/p Watchman (11/2024)  #.  CAD  #.  Type 2 NSTEMI     -Cardiology/EP following, stopped Lasix, currently on oral amiodarone and beta-blocker and IV beta-blocker as needed for rate control.  -Currently n.p.o., plan for KARINA cardioversion today to rule out thrombosis, Unable to pass probe yesterday during KARINA.   -Echo done and reviewed: LVEF 40%, global hypokinesis of LV, moderate TR, aortic stenosis  -Continue DAPT until 6 months s/p Watchman  -Continue home meds for other comorbidities     #.  Hypothyroidism  -cw home medications  -TSH normal      VTE PPX: Lovenox/SCDs  Disposition: Home once hemodynamically stable hopefully within the next 24 hours if hemodynamically stable             Parker Vivas MD

## 2025-03-18 NOTE — ANESTHESIA POSTPROCEDURE EVALUATION
Patient: Meryl Hester    Procedure Summary       Date: 03/18/25 Room / Location: [facility]    Anesthesia Start: 1024 Anesthesia Stop:     Procedure: CARDIOVERSION EXTERNAL Diagnosis: Atrial fibrillation, unspecified type (Multi)    Scheduled Providers: Isabela Bryan MD; Moe Lane MD Responsible Provider: Isabela Bryan MD    Anesthesia Type: MAC ASA Status: 3            Anesthesia Type: MAC    Vitals Value Taken Time   BP 98/50 03/18/25 1030   Temp - 03/18/25 1030   Pulse 63 03/18/25 1030   Resp 20 03/18/25 1030   SpO2 95 03/18/25 1030       Anesthesia Post Evaluation    Patient location during evaluation: PACU  Patient participation: complete - patient participated  Level of consciousness: awake  Pain management: adequate  Multimodal analgesia pain management approach  Airway patency: patent  Cardiovascular status: acceptable  Respiratory status: acceptable and nasal cannula  Hydration status: acceptable  Postoperative Nausea and Vomiting: none        No notable events documented.

## 2025-03-18 NOTE — PROGRESS NOTES
Cardiology Progress Note  Patient: Meryl Hester  Unit/Bed: 809/809-A  YOB: 1931  MRN: [id number]  Acct: [id number]   Admitting Diagnosis: Shortness of breath [R06.02]  Acute on chronic diastolic heart failure [I50.33]  Acute on chronic diastolic (congestive) heart failure [I50.33]  Pneumonia due to infectious organism, unspecified laterality, unspecified part of lung [J18.9]  Acute congestive heart failure, unspecified heart failure type [I50.9]  Date:  3/14/2025  Hospital Day: 4  Attending: Parker Vivas MD   Rounding Cardiologist/SYLVIA:  Omero Joseph, APRN-CNP,     Primary Cardiologist: Dr. Wilmer Corona       Complaint:  Chief Complaint   Patient presents with    Shortness of Breath     Pt states had a cold last week, not feeling better.  Told her to come get checked out. Pt having runny nose, congestion, fatigue. Was tested twice for covid and flu.        SUBJECTIVE    3/18/25  Sitting in bed.  Family at bedside.  Remains in somewhat controlled atrial fibrillation.  Had KARINA yesterday however not able to pass probe.  Currently remains n.p.o. for KARINA/cardioversion today.    3/17/2025:  Patient sitting up at the bedside, denies chest pain, shortness of breath, lightheadedness or dizziness.  She states had a quiet night.  Review of telemetry reveals atrial fibrillation with heart rates 90s to 110's.  She is scheduled for KARINA and possible cardioversion today, reviewed procedures with patient who verbalized understanding of cardiology plan    3/16/2025:  she is frustrated about still being in the hospital.    She has had no chest pain.  She said she feels fairly well.  She is lying supplying sleeping.  She says she is not out of breath respiratory rate perhaps slightly increased.  Heart rate currently while laying down just having woken up 80.  She has no dizziness.  No PND or orthopnea at this time.  Unaware of her atrial fibrillation.     3/15/2025 initial consult: Increased shortness of  breath consistent with CHF.  New onset rapid atrial fibrillation.  Amiodarone instituted with plan of direct-current cardioversion this coming Monday after KARINA     echo 3/15/2540% ejection fraction moderate to severe aortic stenosis valve area approximately 1 cm² not optimal for EF or aortic stenosis determination given atrial fibrillation with rapid rates at the time.  Clearly EF decreased to comparison of the study of August however    Cardiac History  Hypertension  Paroxysmal atrial fibrillation   November eighth, 2024 watchman implantation, 27mm Watchman FLX Pro   Pulmonary hypertension  Coronary artery disease August 2024 coronary angiography/right heart catheterization: LM-less than 10%, LAD-50-60%, CX nondominant.  Proximal ramus 90% very small vessel.  RCA 40-50%. pulmonary artery pressure 43 over 16 mmHg  Moderate aortic stenosis      VITALS      Vitals:    03/17/25 2338 03/18/25 0336 03/18/25 0612 03/18/25 0741   BP: 143/65 116/79  112/74   BP Location: Right arm Right arm  Left arm   Patient Position: Lying Lying  Lying   Pulse: 107 103  91   Resp: 18 18 18   Temp: 35.8 °C (96.4 °F) 36.3 °C (97.3 °F)  37 °C (98.6 °F)   TempSrc: Temporal Temporal  Temporal   SpO2: 95% 96%  92%   Weight:   63.8 kg (140 lb 10.5 oz)    Height:         No intake or output data in the 24 hours ending 03/18/25 0837     Wt Readings from Last 4 Encounters:   03/18/25 63.8 kg (140 lb 10.5 oz)   01/23/25 62.1 kg (136 lb 12.8 oz)   12/03/24 61.9 kg (136 lb 6.4 oz)   10/23/24 59 kg (130 lb)        Allergies:  Allergies   Allergen Reactions    Sulfa (Sulfonamide Antibiotics) Rash     Skin peeling     Lisinopril Cough        PHYSICAL EXAM   Physical Exam  Constitutional:       General: She is not in acute distress.     Comments: Frail-appearing elderly female who is awake, alert, pleasant and cooperative   Eyes:      Pupils: Pupils are equal, round, and reactive to light.   Cardiovascular:      Rate and Rhythm: Normal rate. Rhythm  irregular.      Heart sounds: Murmur heard.      Comments:  murmur of aortic stenosis  Pulmonary:      Effort: Pulmonary effort is normal.      Breath sounds: Normal breath sounds.   Abdominal:      General: Bowel sounds are normal.      Palpations: Abdomen is soft.   Musculoskeletal:      Cervical back: Normal range of motion.      Right lower leg: No edema.      Left lower leg: No edema.   Skin:     General: Skin is warm and dry.   Neurological:      General: No focal deficit present.      Mental Status: She is alert and oriented to person, place, and time. Mental status is at baseline.   Psychiatric:         Mood and Affect: Mood normal.         Behavior: Behavior normal.           LABS   CBC:   Results from last 7 days   Lab Units 03/17/25  0600 03/16/25  0510 03/15/25  0535   WBC AUTO x10*3/uL 8.6 10.8 8.8   RBC AUTO x10*6/uL 3.76* 4.02 3.86*   HEMOGLOBIN g/dL 11.3* 11.9* 11.5*   HEMATOCRIT % 34.2* 36.2 34.1*   MCV fL 91 90 88   MCH pg 30.1 29.6 29.8   MCHC g/dL 33.0 32.9 33.7   RDW % 14.0 13.9 13.9   PLATELETS AUTO x10*3/uL 350 394 316     CMP:    Results from last 7 days   Lab Units 03/17/25  0600 03/16/25  0510 03/15/25  0535 03/14/25  1628   SODIUM mmol/L 137 134* 137 132*   POTASSIUM mmol/L 4.4 4.6 3.0* 4.2   CHLORIDE mmol/L 100 97* 98 99   CO2 mmol/L 30 27 28 23   BUN mg/dL 18 19 13 14   CREATININE mg/dL 1.01 1.02 0.81 0.82   GLUCOSE mg/dL 99 145* 104* 113*   PROTEIN TOTAL g/dL  --   --   --  6.7   CALCIUM mg/dL 9.1 9.2 9.2 9.2   BILIRUBIN TOTAL mg/dL  --   --   --  0.8   ALK PHOS U/L  --   --   --  124   AST U/L  --   --   --  36   ALT U/L  --   --   --  42     BMP:    Results from last 7 days   Lab Units 03/17/25  0600 03/16/25  0510 03/15/25  0535   SODIUM mmol/L 137 134* 137   POTASSIUM mmol/L 4.4 4.6 3.0*   CHLORIDE mmol/L 100 97* 98   CO2 mmol/L 30 27 28   BUN mg/dL 18 19 13   CREATININE mg/dL 1.01 1.02 0.81   CALCIUM mg/dL 9.1 9.2 9.2   GLUCOSE mg/dL 99 145* 104*     Magnesium:  Results from last 7  days   Lab Units 03/17/25  0600 03/16/25  0510 03/15/25  0535   MAGNESIUM mg/dL 1.95 2.11 2.34     Troponin:    Results from last 7 days   Lab Units 03/15/25  0535 03/14/25  1840 03/14/25  1628   TROPHS ng/L 138* 140* 136*     BNP:   Results from last 7 days   Lab Units 03/14/25  1628   BNP pg/mL 1,561*     Lipid Panel:         amiodarone, 200 mg, oral, BID  aspirin, 81 mg, oral, Daily  bisoprolol, 10 mg, oral, Daily  calcium carbonate-vitamin D3, 1 tablet, oral, Daily  clopidogrel, 75 mg, oral, Daily  docusate sodium, 100 mg, oral, BID  DULoxetine, 30 mg, oral, Daily  enoxaparin, 40 mg, subcutaneous, q24h  gabapentin, 300 mg, oral, Nightly  lactobacillus acidophilus, 1 tablet, oral, Daily  levothyroxine, 88 mcg, oral, Daily  losartan, 75 mg, oral, Daily  magnesium oxide, 400 mg, oral, Daily  oxygen, , inhalation, Continuous - Inhalation  pantoprazole, 40 mg, oral, Daily before breakfast  perflutren lipid microspheres, 0.5-10 mL of dilution, intravenous, Once in imaging  perflutren protein A microsphere, 0.5 mL, intravenous, Once in imaging  polyethylene glycol, 17 g, oral, Daily  rosuvastatin, 20 mg, oral, Nightly  sulfur hexafluoride microsphr, 2 mL, intravenous, Once in imaging           Current Outpatient Medications   Medication Instructions    acetaminophen (TYLENOL) 650 mg, oral, Every 6 hours PRN    aspirin 81 mg, oral, Daily    bisoprolol (ZEBETA) 10 mg, oral, Daily    calcium carbonate-vitamin D3 500 mg-5 mcg (200 unit) tablet 1 tablet, Daily    carboxymethylcellulose (THERATears) 0.25 % ophthalmic solution 1 drop, 3 times daily PRN    clopidogrel (PLAVIX) 75 mg, oral, Daily    docusate sodium (COLACE) 100 mg, 2 times daily    DULoxetine (CYMBALTA) 30 mg, Daily RT    fluticasone (Flonase) 50 mcg/actuation nasal spray 1 spray, Daily PRN    furosemide (LASIX) 20 mg, Daily RT    gabapentin (NEURONTIN) 300 mg, Nightly    krill oil 500 mg, 2 times daily    LACTOBACILLUS ACIDOPHILUS ORAL 1 tablet, Daily     levothyroxine (SYNTHROID, LEVOXYL) 88 mcg, Daily RT    losartan (COZAAR) 75 mg, oral, Daily    magnesium oxide 500 mg, Daily    multivitamin with minerals iron-free (Centrum Silver) 1 tablet, Daily    mv-min-FA-vit K-lutein-zeaxant (PreserVision AREDS 2 Plus MV) 200 mcg-15 mcg- 5 mg-1 mg capsule 1 capsule, 2 times daily    omeprazole (PRILOSEC) 40 mg, Daily RT    potassium chloride CR 20 mEq ER tablet 20 mEq, Daily RT    rosuvastatin (CRESTOR) 20 mg, Nightly        Transthoracic Echo (TTE) Complete    Result Date: 3/16/2025          [place] [id number]  Tel [phone] Fax [phone] TRANSTHORACIC ECHOCARDIOGRAM REPORT Patient Name:       ANNA Moran Physician:    [id number] Wilmer Corona MD, [facility] Study Date:         3/15/2025            Ordering Provider:    [id number] AMANDA GARCIA MRN/PID:            [id number]             Fellow: Accession#:         [id number]         Nurse: Date of Birth/Age:  4/16/1931 / 89 years Sonographer:          Mroa Siddiqui RDCS Gender Assigned at  F                    Additional Staff: Birth: Height:             149.86 cm            Admit Date:           3/14/2025 Weight:             61.24 kg             Admission Status:     Inpatient -                                                                Routine BSA / BMI:          1.56 m2 / 27.27      Department Location:  [address] [place] [address]/m2 Blood Pressure: 134 /78 mmHg Study Type:    TRANSTHORACIC ECHO (TTE) COMPLETE Diagnosis/ICD: Unspecified combined systolic (congestive) and diastolic                (congestive) heart failure (CHF)-I50.40 Indication:    Congestive Heart Failure CPT Codes:     Echo Complete w Full  [id number] Patient History: Smoker:            Former. Pertinent History: A-Fib, CAD, Dyspnea, CHF, LE Edema, HTN, Hyperlipidemia and                    Watchman. Study Detail: The following Echo studies were performed: 2D, M-Mode, Doppler and               color flow. Technically challenging study due to body habitus.  PHYSICIAN INTERPRETATION: Left Ventricle: The left ventricular systolic function is moderately decreased, with a visually estimated ejection fraction of 40%. There is moderate concentric left ventricular hypertrophy. There is global hypokinesis of the left ventricle with minor regional variations. The left ventricular cavity size is upper limits of normal. There is mild increased septal and mildly increased posterior left ventricular wall thickness. Spectral Doppler shows an abnormal pattern of left ventricular diastolic filling. In comparison study of August 2024 EF has decreased previously 45% [id number] comparison clearly there has been a deterioration. However atrial fibrillation is currently present prior study was in sinus rhythm heart rates frequently over 100 affecting calculation of ejection fraction. Left Atrium: The left atrial size is moderately dilated. Right Ventricle: The right ventricle is upper limits of normal in size. There is normal right ventricular global systolic function. RVSP 46 mmHg. Right Atrium: The right atrial size is normal. Aortic Valve: The aortic valve is trileaflet. The aortic valve dimensionless index is 0.30. There is trace aortic valve regurgitation. The peak instantaneous gradient of the aortic valve is 17 mmHg. The mean gradient of the aortic valve is 9 mmHg. Tricuspid aortic valve with diffuse thickening of leaflet cusps and moderate impairment of leaflet mobility. There is trace aortic insufficiency. There is moderate-severe aortic stenosis V-max 2 m/s peak/mean gradients 16 and 9 mmHg. Calculated valve area approximately 1 cmï¿½ visually appears perhaps  slightly greater than that; was calculated at 1.2 cm in August. There may have been some progression of stenosis again calculations not as accurate in the setting of rapid atrial fibrillation. Mitral Valve: The mitral valve is mildly thickened. There is trace mitral valve regurgitation. Trace MR. Tricuspid Valve: The tricuspid valve is structurally normal. There is moderate tricuspid regurgitation. Pulmonic Valve: The pulmonic valve is structurally normal. There is no indication of pulmonic valve regurgitation. Pericardium: No pericardial effusion noted. Aorta: The aortic root is normal.  CONCLUSIONS:  1. The left ventricular systolic function is moderately decreased, with a visually estimated ejection fraction of 40%.  2. There is global hypokinesis of the left ventricle with minor regional variations.  3. Spectral Doppler shows an abnormal pattern of left ventricular diastolic filling.  4. In comparison study of August 2024 EF has decreased previously 45% [id number] comparison clearly there has been a deterioration. However atrial fibrillation is currently present prior study was in sinus rhythm heart rates frequently over 100 affecting calculation of ejection fraction.  5. There is normal right ventricular global systolic function.  6. RVSP 46 mmHg.  7. The left atrial size is moderately dilated.  8. Trace MR.  9. Moderate tricuspid regurgitation. 10. Tricuspid aortic valve with diffuse thickening of leaflet cusps and moderate impairment of leaflet mobility. There is trace aortic insufficiency. There is moderate-severe aortic stenosis V-max 2 m/s peak/mean gradients 16 and 9 mmHg. Calculated valve area approximately 1 cmï¿½ visually appears perhaps slightly greater than that; was calculated at 1.2 cm in August. There may have been some progression of stenosis again calculations not as accurate in the setting of rapid atrial fibrillation. QUANTITATIVE DATA SUMMARY:  2D MEASUREMENTS:             Normal Ranges: Ao  Root d:       2.50 cm     (2.0-3.7cm) LAs:             4.10 cm     (2.7-4.0cm) IVSd:            1.20 cm     (0.6-1.1cm) LVPWd:           1.21 cm     (0.6-1.1cm) LVIDd:           4.31 cm     (3.9-5.9cm) LVIDs:           3.48 cm LV Mass Index:   119.5 g/m2 LVEDV Index:     56.12 ml/m2 LV % FS          19.3 %  LEFT ATRIUM:                  Normal Ranges: LA Vol A4C:        88.6 ml    (22+/-6mL/m2) LA Vol A2C:        94.5 ml LA Vol BP:         93.5 ml LA Vol Index A4C:  56.8ml/m2 LA Vol Index A2C:  60.6 ml/m2 LA Vol Index BP:   59.9 ml/m2 LA Area A4C:       27.4 cm2 LA Area A2C:       27.7 cm2 LA Major Axis A4C: 7.2 cm LA Major Axis A2C: 6.9 cm LA Volume Index:   56.9 ml/m2  RIGHT ATRIUM:                 Normal Ranges: RA Vol A4C:        49.3 ml    (8.3-19.5ml) RA Vol Index A4C:  31.6 ml/m2 RA Area A4C:       17.7 cm2 RA Major Axis A4C: 5.4 cm  LV SYSTOLIC FUNCTION:                      Normal Ranges: EF-A4C View:    42 % (>=55%) EF-A2C View:    42 % EF-Biplane:     44 % EF-Visual:      40 % LV EF Reported: 40 %  AORTIC VALVE:                      Normal Ranges: AoV Vmax:                2.04 m/s  (<=1.7m/s) AoV Peak P.6 mmHg (<20mmHg) AoV Mean P.0 mmHg  (1.7-11.5mmHg) LVOT Max Delbert:            0.70 m/s  (<=1.1m/s) AoV VTI:                 39.10 cm  (18-25cm) LVOT VTI:                11.80 cm LVOT Diameter:           2.00 cm   (1.8-2.4cm) AoV Area, VTI:           0.95 cm2  (2.5-5.5cm2) AoV Area,Vmax:           1.09 cm2  (2.5-4.5cm2) AoV Dimensionless Index: 0.30  AORTIC INSUFFICIENCY: AI Vmax:       3.47 m/s AI Half-time:  601 msec AI Decel Rate: 159.50 cm/s2  RIGHT VENTRICLE: RV Basal 4.20 cm RV Mid   4.45 cm RV Major 6.0 cm TAPSE:   11.8 mm RV s'    0.12 m/s  TRICUSPID VALVE/RVSP:          Normal Ranges: Peak TR Velocity:     3.29 m/s RV Syst Pressure:     46 mmHg  (< 30mmHg)  PULMONIC VALVE:          Normal Ranges: PV Accel Time:  79 msec  (>120ms) PV Max Delbert:     0.8 m/s  (0.6-0.9m/s)  PV Max P.5 mmHg  [id number] Wilmer Corona MD, [facility] Electronically signed on 3/16/2025 at 8:36:34 AM  ** Final **     ECG 12 Lead    Result Date: 3/15/2025  Atrial flutter with variable AV block with premature ventricular or aberrantly conducted complexes Left axis deviation Right bundle branch block Minimal voltage criteria for LVH, may be normal variant Abnormal ECG When compared with ECG of 14-MAR-2025 22:24, (unconfirmed) Atrial flutter has replaced Atrial fibrillation Left anterior fascicular block is no longer Present Confirmed by Wilmer Corona ([id number]) on 3/15/2025 6:56:11 PM    ECG 12 lead    Result Date: 3/15/2025  Atrial flutter with variable AV block with premature ventricular or aberrantly conducted complexes Right bundle branch block Minimal voltage criteria for LVH, may be normal variant Septal infarct , age undetermined Abnormal ECG When compared with ECG of 14-MAR-2025 16:04, (unconfirmed) Atrial flutter has replaced Electronic ventricular pacemaker See ED provider note for full interpretation and clinical correlation Confirmed by Leandra Woodward ([id number]) on 3/15/2025 10:11:02 AM    ECG 12 lead    Result Date: 3/15/2025  Atrial flutter with variable AV block with occasional ventricular-paced complexes Right bundle branch block Moderate voltage criteria for LVH, may be normal variant Possible Lateral infarct , age undetermined Abnormal ECG When compared with ECG of 2024 12:09, Electronic ventricular pacemaker has replaced Sinus rhythm See ED provider note for full interpretation and clinical correlation Confirmed by Leandra Woodward ([id number]) on 3/15/2025 10:09:09 AM    XR chest 1 view    Result Date: 3/14/2025  Interpreted By:  Tatum Bergeron, STUDY: XR CHEST 1 VIEW;  3/14/2025 4:24 pm   INDICATION: Signs/Symptoms:Chest Pain.   COMPARISON: 2024   ACCESSION NUMBER(S): [id number]   ORDERING CLINICIAN: YOLANDA GRANT   FINDINGS: AP radiograph of the chest was provided.        CARDIOMEDIASTINAL SILHOUETTE: Persistently enlarged cardiomediastinal silhouette. Atherosclerotic calcifications of the aortic arch.   LUNGS: Prominent interstitial lung markings are noted. No pneumothorax.   ABDOMEN: No remarkable upper abdominal findings.   BONES: No acute osseous changes.       Prominent interstitial lung markings, for which underlying edema is not excluded. Atypical multifocal pneumonia can appear similarly and should be considered in the appropriate clinical context.   Signed by: Tatum Bergeron 3/14/2025 4:42 PM Dictation workstation:   [id number]       No results found for this or any previous visit from the past 1095 days.                 Encounter Date: 03/14/25   ECG 12 Lead   Result Value    Ventricular Rate 95    Atrial Rate 285    QRS Duration 146    QT Interval 418    QTC Calculation(Bazett) 525    R Axis -30    T Axis -24    QRS Count 15    Q Onset 189    T Offset 398    QTC Fredericia 486    Narrative    Atrial flutter with variable AV block  Left axis deviation  Right bundle branch block  Minimal voltage criteria for LVH, may be normal variant  Abnormal ECG  When compared with ECG of 17-MAR-2025 06:43,  No significant change was found        Tele Monitoring: remains in atrial fibrillation while sleeping rate will go into the 80s however when active heart rate often over 100.  No bradycardia    Assessment     Patient Active Problem List   Diagnosis    Radiculopathy of lumbar region    Joint stiffness of spine    Gait difficulty    Syncope and collapse    Syncope, unspecified syncope type    NSTEMI (non-ST elevated myocardial infarction) (Multi)    Pulmonary hypertension (Multi)    Acute systolic CHF (congestive heart failure), NYHA class 2    Cardiomyopathy, ischemic    Coronary artery disease involving native coronary artery of native heart without angina pectoris    Paroxysmal atrial fibrillation with RVR (Multi)    Hypertension    Atrial fibrillation (Multi)    Presence of  Watchman left atrial appendage closure device    Atrial fibrillation, unspecified type (Multi)    Acute on chronic diastolic heart failure    Type 2 MI (myocardial infarction) (Multi)    Acquired hypothyroidism    Acute hypoxic respiratory failure (Multi)           impression/Plan:  acute on chronic systolic CHF: Clinically improved continue daily Lasix for now.  She had been on 3 times a day which was the cause of the low potassium previously.  Once atrial fibrillation is better controlled she will continue to improve.  Heart rate is better than it was she is now on oral amiodarone she is on chronic beta-blocker will also add IV beta-blocker on an as needed basis until cardioversion today.  Moderately - severe aortic stenosis,  this is one of the reasons A-fib is not  Well-tolerated.  She is asymptomatic when in sinus rhythm at the age of 89 would not pursue TAVR as she is also quite frail.  Furthermore   just recently asymptomatic when in sinus rhythm.  Was unable to pass probe yesterday, plan for repeat KARINA with cardioversion today.  Dr. Corona discussed risk benefits of KARINA and cardioversion with her including pharyngeal injury and adverse effects of anesthesia.  She understands agrees to proceed.        3/17/2025:   Patient appears compensated from heart failure standpoint.  She remains in atrial fibrillation and is n.p.o. for scheduled KARINA/cardioversion later today with Dr. Corona and Dr. Lane.  Reviewed procedures with patient and answer questions.  Continue current medications.  Follow-up with Dr. Corona.  Electronically signed by LEONOR Gibbons on 3/18/2025 at 8:37 AM    3/18/25  Remains in semirate controlled atrial fibrillation.  Continue telemetry monitoring.  Keep n.p.o. with plans for KARINA/cardioversion today.  Unable to pass probe yesterday during KARINA.  Monitor electrolytes, keep potassium greater than 4 and magnesium greater than 2  Will benefit from restoration of normal sinus rhythm with  moderate to severe aortic valve stenosis.  Per Dr. Corona not a good candidate to pursue TAVR.  strict I's and O's and daily weight  Further recommendations to follow      Omero Joseph [facility]  Adult Gerontology Acute Care Nurse Practitioner  [facility] [place]   [facility]  [phone]

## 2025-03-18 NOTE — PROGRESS NOTES
03/18/25 1607   Discharge Planning   Living Arrangements Children  (resides with daughter Amada)   Support Systems Children;Family members   Assistance Needed uses Rollator  for ambulation assistance.  Independent with ADL's   Type of Residence Private residence   Number of Stairs to Enter Residence 3   Number of Stairs Within Residence 0   Who is requesting discharge planning? Provider   Home or Post Acute Services None   Expected Discharge Disposition Home   Does the patient need discharge transport arranged? No   Transportation Needs   In the past 12 months, has lack of transportation kept you from medical appointments or from getting medications? no   In the past 12 months, has lack of transportation kept you from meetings, work, or from getting things needed for daily living? No   Stroke Family Assessment   Stroke Family Assessment Needed No   Intensity of Service   Intensity of Service 0-30 min     Pt S/P KARINA / DCC  .   Met with patient at bedside. States she resides home with oldest Daughter Amada.  Pt is relatively independent,  uses Rollator for ambulation assistance.  Follows with [facility] PCP , preferred pharmacy is [facility].  Pt denies having any home going needs and plan is home at discharge.  Pt is agreeable to [facility] follow up if any needs should arise.

## 2025-03-18 NOTE — ANESTHESIA PREPROCEDURE EVALUATION
Patient: Meryl Hester    Procedure Information       Date/Time: 03/18/25 0815    Scheduled providers: Moe Lane MD    Procedure: CARDIOVERSION EXTERNAL    Location: [facility]            Relevant Problems   Cardiac   (+) Atrial fibrillation (Multi)   (+) Atrial fibrillation, unspecified type (Multi)   (+) Coronary artery disease involving native coronary artery of native heart without angina pectoris   (+) Hypertension   (+) NSTEMI (non-ST elevated myocardial infarction) (Multi)   (+) Paroxysmal atrial fibrillation with RVR (Multi)   (+) Type 2 MI (myocardial infarction) (Multi)      Pulmonary   (+) Pulmonary hypertension (Multi)      Neuro   (+) Radiculopathy of lumbar region      Endocrine   (+) Acquired hypothyroidism       Clinical information reviewed:                   NPO Detail:  No data recorded     Physical Exam    Airway  Mallampati: II     Cardiovascular   Rhythm: irregular     Dental - normal exam     Pulmonary    Abdominal            Anesthesia Plan    History of general anesthesia?: yes  History of complications of general anesthesia?: no    ASA 3     MAC     intravenous induction   Anesthetic plan and risks discussed with patient.    Plan discussed with CRNA and attending.

## 2025-03-18 NOTE — NURSING NOTE
Gag reflex not check on this pt d/t coughing from lidocaine gel that was given for her KARINA procedure.  She is able to cough it up in a tissue.  In no distress, offering no complaints.  Awaiting transport to return to .

## 2025-03-18 NOTE — POST-PROCEDURE NOTE
Physician Transition of Care Summary  Invasive Cardiovascular Lab    Procedure Date: 03/18/25     Pre Procedure Indications:   Persistent atrial fibrillation, precardioversion2    Post Procedure Diagnosis:   S/p Watchman device implantation that appears well-seated in the left atrial appendage.  There is no obvious thrombus or masses visualized, and there is no MATTY device leak.    Procedure(s):   Transesophageal echocardiogram    Procedure Findings:   Images technically limited secondary to the use of pediatrics probe.  Left ventricular systolic function appears severely reduced with EF of about 20 to 25%  There is severe left atrial enlargement with Watchman device located in the left atrial appendage with no obvious thrombus or masses visualized.  There is no MATTY device leak.  There is moderate spontaneous echo contrast in the left atrium.  At least at least moderately dilated right atrium.  Saline contrast study was negative, but there is a small PFO with a left to right shunt on color flow Doppler likely secondary to residual from post Watchman procedure.  Moderately calcified aortic valve that appears trileaflet with trace to mild aortic regurgitation.  Poorly visualized tricuspid valve with mild to moderate tricuspid regurgitation and moderate pulmonary hypertension.  The pulmonic valve is not well-visualized  There is moderate plaque in the descending thoracic aorta    Description of the Procedure:   Transesophageal echocardiogram    Complications:   None    Estimated Blood Loss:   None    Anesthesia: Conscious sedation     any Specimen(s) Removed: None      Electronically signed by: Phillip Yu MD, 3/18/2025

## 2025-03-18 NOTE — CARE PLAN
The patient's goals for the shift include Rest    The clinical goals for the shift include Patient will maintain heart rate between 60bpm-100bpm by end of shift    Over the shift, the patient did not make progress toward the following goals. Barriers to progression include none. Recommendations to address these barriers include none.

## 2025-03-18 NOTE — CARE PLAN
The patient's goals for the shift include Rest    The clinical goals for the shift include Patient will maintain a heart rate within normal range by end of shift

## 2025-03-19 ENCOUNTER — APPOINTMENT (OUTPATIENT)
Dept: CARDIOLOGY | Facility: HOSPITAL | Age: OVER 89
DRG: 280 | End: 2025-03-19
Payer: MEDICARE

## 2025-03-19 VITALS
DIASTOLIC BLOOD PRESSURE: 60 MMHG | WEIGHT: 140.65 LBS | BODY MASS INDEX: 28.36 KG/M2 | HEART RATE: 56 BPM | TEMPERATURE: 97.9 F | HEIGHT: 59 IN | OXYGEN SATURATION: 95 % | RESPIRATION RATE: 18 BRPM | SYSTOLIC BLOOD PRESSURE: 123 MMHG

## 2025-03-19 LAB
ATRIAL RATE: 58 BPM
P AXIS: 37 DEGREES
P OFFSET: 176 MS
P ONSET: 110 MS
PR INTERVAL: 150 MS
Q ONSET: 185 MS
QRS COUNT: 10 BEATS
QRS DURATION: 142 MS
QT INTERVAL: 508 MS
QTC CALCULATION(BAZETT): 498 MS
QTC FREDERICIA: 502 MS
R AXIS: -27 DEGREES
T AXIS: -26 DEGREES
T OFFSET: 439 MS
VENTRICULAR RATE: 58 BPM

## 2025-03-19 PROCEDURE — 2500000004 HC RX 250 GENERAL PHARMACY W/ HCPCS (ALT 636 FOR OP/ED): Performed by: STUDENT IN AN ORGANIZED HEALTH CARE EDUCATION/TRAINING PROGRAM

## 2025-03-19 PROCEDURE — 2500000002 HC RX 250 W HCPCS SELF ADMINISTERED DRUGS (ALT 637 FOR MEDICARE OP, ALT 636 FOR OP/ED): Performed by: NURSE PRACTITIONER

## 2025-03-19 PROCEDURE — 2500000001 HC RX 250 WO HCPCS SELF ADMINISTERED DRUGS (ALT 637 FOR MEDICARE OP): Performed by: STUDENT IN AN ORGANIZED HEALTH CARE EDUCATION/TRAINING PROGRAM

## 2025-03-19 PROCEDURE — 2500000002 HC RX 250 W HCPCS SELF ADMINISTERED DRUGS (ALT 637 FOR MEDICARE OP, ALT 636 FOR OP/ED): Performed by: STUDENT IN AN ORGANIZED HEALTH CARE EDUCATION/TRAINING PROGRAM

## 2025-03-19 PROCEDURE — 99233 SBSQ HOSP IP/OBS HIGH 50: CPT | Performed by: NURSE PRACTITIONER

## 2025-03-19 PROCEDURE — 93010 ELECTROCARDIOGRAM REPORT: CPT | Performed by: INTERNAL MEDICINE

## 2025-03-19 PROCEDURE — 99239 HOSP IP/OBS DSCHRG MGMT >30: CPT | Performed by: STUDENT IN AN ORGANIZED HEALTH CARE EDUCATION/TRAINING PROGRAM

## 2025-03-19 PROCEDURE — 93005 ELECTROCARDIOGRAM TRACING: CPT

## 2025-03-19 RX ORDER — AMIODARONE HYDROCHLORIDE 200 MG/1
200 TABLET ORAL 2 TIMES DAILY
Qty: 1 TABLET | Refills: 0 | Status: SHIPPED | OUTPATIENT
Start: 2025-03-19 | End: 2025-03-19

## 2025-03-19 RX ORDER — AMIODARONE HYDROCHLORIDE 200 MG/1
200 TABLET ORAL DAILY
Qty: 30 TABLET | Refills: 11 | Status: SHIPPED | OUTPATIENT
Start: 2025-03-20 | End: 2026-03-20

## 2025-03-19 RX ORDER — AMIODARONE HYDROCHLORIDE 200 MG/1
200 TABLET ORAL 2 TIMES DAILY
Qty: 2 TABLET | Refills: 0 | Status: SHIPPED | OUTPATIENT
Start: 2025-03-19 | End: 2025-03-20

## 2025-03-19 RX ADMIN — MAGNESIUM OXIDE 400 MG (241.3 MG MAGNESIUM) TABLET 400 MG: TABLET at 08:11

## 2025-03-19 RX ADMIN — PANTOPRAZOLE SODIUM 40 MG: 40 TABLET, DELAYED RELEASE ORAL at 06:00

## 2025-03-19 RX ADMIN — DULOXETINE 30 MG: 30 CAPSULE, DELAYED RELEASE ORAL at 08:11

## 2025-03-19 RX ADMIN — BISOPROLOL FUMARATE 10 MG: 5 TABLET ORAL at 08:14

## 2025-03-19 RX ADMIN — DOCUSATE SODIUM 100 MG: 100 CAPSULE, LIQUID FILLED ORAL at 08:11

## 2025-03-19 RX ADMIN — Medication 1 TABLET: at 08:12

## 2025-03-19 RX ADMIN — LOSARTAN POTASSIUM 75 MG: 50 TABLET, FILM COATED ORAL at 08:12

## 2025-03-19 RX ADMIN — ASPIRIN 81 MG: 81 TABLET, CHEWABLE ORAL at 08:12

## 2025-03-19 RX ADMIN — CALCIUM CARBONATE-VITAMIN D TAB 500 MG-200 UNIT 1 TABLET: 500-200 TAB at 08:11

## 2025-03-19 RX ADMIN — LEVOTHYROXINE SODIUM 88 MCG: 0.09 TABLET ORAL at 06:00

## 2025-03-19 RX ADMIN — AMIODARONE HYDROCHLORIDE 200 MG: 200 TABLET ORAL at 08:11

## 2025-03-19 RX ADMIN — CLOPIDOGREL 75 MG: 75 TABLET ORAL at 08:12

## 2025-03-19 ASSESSMENT — COGNITIVE AND FUNCTIONAL STATUS - GENERAL
MOBILITY SCORE: 23
TOILETING: A LITTLE
DAILY ACTIVITIY SCORE: 21
DRESSING REGULAR LOWER BODY CLOTHING: A LITTLE
CLIMB 3 TO 5 STEPS WITH RAILING: A LITTLE
HELP NEEDED FOR BATHING: A LITTLE

## 2025-03-19 ASSESSMENT — PAIN SCALES - GENERAL: PAINLEVEL_OUTOF10: 0 - NO PAIN

## 2025-03-19 NOTE — PROGRESS NOTES
Cardiology Progress Note  Patient: Meryl Hester  Unit/Bed: 809/809-A  YOB: 1931  MRN: [id number]  Acct: [id number]   Admitting Diagnosis: Shortness of breath [R06.02]  Acute on chronic diastolic heart failure [I50.33]  Acute on chronic diastolic (congestive) heart failure [I50.33]  Pneumonia due to infectious organism, unspecified laterality, unspecified part of lung [J18.9]  Acute congestive heart failure, unspecified heart failure type [I50.9]  Date:  3/14/2025  Hospital Day: 5  Attending: Parker Vivas MD   Rounding Cardiologist/SYLVIA:  Omero Joseph, APRN-CNP,     Primary Cardiologist: Dr. Wilmer Corona       Complaint:  Chief Complaint   Patient presents with    Shortness of Breath     Pt states had a cold last week, not feeling better.  Told her to come get checked out. Pt having runny nose, congestion, fatigue. Was tested twice for covid and flu.        SUBJECTIVE    3/19/25  Sitting up at bedside with no complaints.  Eating breakfast.  Remains in normal sinus rhythm after successful KARINA and cardioversion yesterday.    3/18/25  Sitting in bed.  Family at bedside.  Remains in somewhat controlled atrial fibrillation.  Had KARINA yesterday however not able to pass probe.  Currently remains n.p.o. for KARINA/cardioversion today.    3/17/2025:  Patient sitting up at the bedside, denies chest pain, shortness of breath, lightheadedness or dizziness.  She states had a quiet night.  Review of telemetry reveals atrial fibrillation with heart rates 90s to 110's.  She is scheduled for KARINA and possible cardioversion today, reviewed procedures with patient who verbalized understanding of cardiology plan    3/16/2025:  she is frustrated about still being in the hospital.    She has had no chest pain.  She said she feels fairly well.  She is lying supplying sleeping.  She says she is not out of breath respiratory rate perhaps slightly increased.  Heart rate currently while laying down just having woken  up 80.  She has no dizziness.  No PND or orthopnea at this time.  Unaware of her atrial fibrillation.     3/15/2025 initial consult: Increased shortness of breath consistent with CHF.  New onset rapid atrial fibrillation.  Amiodarone instituted with plan of direct-current cardioversion this coming Monday after KARINA     echo 3/15/2540% ejection fraction moderate to severe aortic stenosis valve area approximately 1 cm² not optimal for EF or aortic stenosis determination given atrial fibrillation with rapid rates at the time.  Clearly EF decreased to comparison of the study of August however    Cardiac History  Hypertension  Paroxysmal atrial fibrillation   November eighth, 2024 watchman implantation, 27mm Watchman FLX Pro   Pulmonary hypertension  Coronary artery disease August 2024 coronary angiography/right heart catheterization: LM-less than 10%, LAD-50-60%, CX nondominant.  Proximal ramus 90% very small vessel.  RCA 40-50%. pulmonary artery pressure 43 over 16 mmHg  Moderate aortic stenosis      VITALS      Vitals:    03/18/25 2110 03/19/25 0055 03/19/25 0402 03/19/25 0747   BP:  122/62 133/72 130/63   BP Location:  Left arm Right arm    Patient Position:  Lying Lying Sitting   Pulse:  60 60 63   Resp:  18 18 18   Temp:  36.1 °C (97 °F) 36.6 °C (97.9 °F) 36.1 °C (97 °F)   TempSrc:  Temporal Temporal    SpO2: 93% 94% 93% 93%   Weight:       Height:           Intake/Output Summary (Last 24 hours) at 3/19/2025 0843  Last data filed at 3/18/2025 0948  Gross per 24 hour   Intake --   Output 0 ml   Net 0 ml        Wt Readings from Last 4 Encounters:   03/18/25 63.8 kg (140 lb 10.5 oz)   01/23/25 62.1 kg (136 lb 12.8 oz)   12/03/24 61.9 kg (136 lb 6.4 oz)   10/23/24 59 kg (130 lb)        Allergies:  Allergies   Allergen Reactions    Sulfa (Sulfonamide Antibiotics) Rash     Skin peeling     Lisinopril Cough        PHYSICAL EXAM   Physical Exam  Constitutional:       General: She is not in acute distress.     Comments:  Frail-appearing elderly female who is awake, alert, pleasant and cooperative   Eyes:      Pupils: Pupils are equal, round, and reactive to light.   Cardiovascular:      Rate and Rhythm: Normal rate. Rhythm irregular.      Heart sounds: Murmur heard.      Comments:  murmur of aortic stenosis  Pulmonary:      Effort: Pulmonary effort is normal.      Breath sounds: Normal breath sounds.   Abdominal:      General: Bowel sounds are normal.      Palpations: Abdomen is soft.   Musculoskeletal:      Cervical back: Normal range of motion.      Right lower leg: No edema.      Left lower leg: No edema.   Skin:     General: Skin is warm and dry.   Neurological:      General: No focal deficit present.      Mental Status: She is alert and oriented to person, place, and time. Mental status is at baseline.   Psychiatric:         Mood and Affect: Mood normal.         Behavior: Behavior normal.           LABS   CBC:   Results from last 7 days   Lab Units 03/17/25  0600 03/16/25  0510 03/15/25  0535   WBC AUTO x10*3/uL 8.6 10.8 8.8   RBC AUTO x10*6/uL 3.76* 4.02 3.86*   HEMOGLOBIN g/dL 11.3* 11.9* 11.5*   HEMATOCRIT % 34.2* 36.2 34.1*   MCV fL 91 90 88   MCH pg 30.1 29.6 29.8   MCHC g/dL 33.0 32.9 33.7   RDW % 14.0 13.9 13.9   PLATELETS AUTO x10*3/uL 350 394 316     CMP:    Results from last 7 days   Lab Units 03/17/25  0600 03/16/25  0510 03/15/25  0535 03/14/25  1628   SODIUM mmol/L 137 134* 137 132*   POTASSIUM mmol/L 4.4 4.6 3.0* 4.2   CHLORIDE mmol/L 100 97* 98 99   CO2 mmol/L 30 27 28 23   BUN mg/dL 18 19 13 14   CREATININE mg/dL 1.01 1.02 0.81 0.82   GLUCOSE mg/dL 99 145* 104* 113*   PROTEIN TOTAL g/dL  --   --   --  6.7   CALCIUM mg/dL 9.1 9.2 9.2 9.2   BILIRUBIN TOTAL mg/dL  --   --   --  0.8   ALK PHOS U/L  --   --   --  124   AST U/L  --   --   --  36   ALT U/L  --   --   --  42     BMP:    Results from last 7 days   Lab Units 03/17/25  0600 03/16/25  0510 03/15/25  0535   SODIUM mmol/L 137 134* 137   POTASSIUM mmol/L 4.4  4.6 3.0*   CHLORIDE mmol/L 100 97* 98   CO2 mmol/L 30 27 28   BUN mg/dL 18 19 13   CREATININE mg/dL 1.01 1.02 0.81   CALCIUM mg/dL 9.1 9.2 9.2   GLUCOSE mg/dL 99 145* 104*     Magnesium:  Results from last 7 days   Lab Units 03/17/25  0600 03/16/25  0510 03/15/25  0535   MAGNESIUM mg/dL 1.95 2.11 2.34     Troponin:    Results from last 7 days   Lab Units 03/15/25  0535 03/14/25  1840 03/14/25  1628   TROPHS ng/L 138* 140* 136*     BNP:   Results from last 7 days   Lab Units 03/14/25  1628   BNP pg/mL 1,561*     Lipid Panel:         amiodarone, 200 mg, oral, BID  [START ON 3/20/2025] amiodarone, 200 mg, oral, Daily  aspirin, 81 mg, oral, Daily  bisoprolol, 10 mg, oral, Daily  calcium carbonate-vitamin D3, 1 tablet, oral, Daily  clopidogrel, 75 mg, oral, Daily  docusate sodium, 100 mg, oral, BID  DULoxetine, 30 mg, oral, Daily  enoxaparin, 40 mg, subcutaneous, q24h  gabapentin, 300 mg, oral, Nightly  lactobacillus acidophilus, 1 tablet, oral, Daily  levothyroxine, 88 mcg, oral, Daily  losartan, 75 mg, oral, Daily  magnesium oxide, 400 mg, oral, Daily  oxygen, , inhalation, Continuous - Inhalation  pantoprazole, 40 mg, oral, Daily before breakfast  perflutren lipid microspheres, 0.5-10 mL of dilution, intravenous, Once in imaging  perflutren protein A microsphere, 0.5 mL, intravenous, Once in imaging  polyethylene glycol, 17 g, oral, Daily  rosuvastatin, 20 mg, oral, Nightly  sulfur hexafluoride microsphr, 2 mL, intravenous, Once in imaging           Current Outpatient Medications   Medication Instructions    acetaminophen (TYLENOL) 650 mg, oral, Every 6 hours PRN    aspirin 81 mg, oral, Daily    bisoprolol (ZEBETA) 10 mg, oral, Daily    calcium carbonate-vitamin D3 500 mg-5 mcg (200 unit) tablet 1 tablet, Daily    carboxymethylcellulose (THERATears) 0.25 % ophthalmic solution 1 drop, 3 times daily PRN    clopidogrel (PLAVIX) 75 mg, oral, Daily    docusate sodium (COLACE) 100 mg, 2 times daily    DULoxetine (CYMBALTA)  30 mg, Daily RT    fluticasone (Flonase) 50 mcg/actuation nasal spray 1 spray, Daily PRN    furosemide (LASIX) 20 mg, Daily RT    gabapentin (NEURONTIN) 300 mg, Nightly    krill oil 500 mg, 2 times daily    LACTOBACILLUS ACIDOPHILUS ORAL 1 tablet, Daily    levothyroxine (SYNTHROID, LEVOXYL) 88 mcg, Daily RT    losartan (COZAAR) 75 mg, oral, Daily    magnesium oxide 500 mg, Daily    multivitamin with minerals iron-free (Centrum Silver) 1 tablet, Daily    mv-min-FA-vit K-lutein-zeaxant (PreserVision AREDS 2 Plus MV) 200 mcg-15 mcg- 5 mg-1 mg capsule 1 capsule, 2 times daily    omeprazole (PRILOSEC) 40 mg, Daily RT    potassium chloride CR 20 mEq ER tablet 20 mEq, Daily RT    rosuvastatin (CRESTOR) 20 mg, Nightly        Transthoracic Echo (TTE) Complete    Result Date: 3/16/2025          [place] [id number]  Tel [phone] Fax [phone] TRANSTHORACIC ECHOCARDIOGRAM REPORT Patient Name:       ANNA Moran Physician:    [id number] Wilmer Corona MD, [facility] Study Date:         3/15/2025            Ordering Provider:    [id number] AMANDA GARCIA MRN/PID:            [id number]             Fellow: Accession#:         [id number]         Nurse: Date of Birth/Age:  4/16/1931 / 89 years Sonographer:          Mora Siddiqui RDCS Gender Assigned at  F                    Additional Staff: Birth: Height:             149.86 cm            Admit Date:           3/14/2025 Weight:             61.24 kg             Admission Status:     Inpatient -                                                                Routine BSA / BMI:          1.56 m2 / 27.27      Department Location:  [address] [place] [address]                     kg/m2 Blood Pressure: 134 /78  mmHg Study Type:    TRANSTHORACIC ECHO (TTE) COMPLETE Diagnosis/ICD: Unspecified combined systolic (congestive) and diastolic                (congestive) heart failure (CHF)-I50.40 Indication:    Congestive Heart Failure CPT Codes:     Echo Complete w Full [id number] Patient History: Smoker:            Former. Pertinent History: A-Fib, CAD, Dyspnea, CHF, LE Edema, HTN, Hyperlipidemia and                    Watchman. Study Detail: The following Echo studies were performed: 2D, M-Mode, Doppler and               color flow. Technically challenging study due to body habitus.  PHYSICIAN INTERPRETATION: Left Ventricle: The left ventricular systolic function is moderately decreased, with a visually estimated ejection fraction of 40%. There is moderate concentric left ventricular hypertrophy. There is global hypokinesis of the left ventricle with minor regional variations. The left ventricular cavity size is upper limits of normal. There is mild increased septal and mildly increased posterior left ventricular wall thickness. Spectral Doppler shows an abnormal pattern of left ventricular diastolic filling. In comparison study of August 2024 EF has decreased previously 45% [id number]-[id number] comparison clearly there has been a deterioration. However atrial fibrillation is currently present prior study was in sinus rhythm heart rates frequently over 100 affecting calculation of ejection fraction. Left Atrium: The left atrial size is moderately dilated. Right Ventricle: The right ventricle is upper limits of normal in size. There is normal right ventricular global systolic function. RVSP 46 mmHg. Right Atrium: The right atrial size is normal. Aortic Valve: The aortic valve is trileaflet. The aortic valve dimensionless index is 0.30. There is trace aortic valve regurgitation. The peak instantaneous gradient of the aortic valve is 17 mmHg. The mean gradient of the aortic valve is 9 mmHg. Tricuspid aortic valve with diffuse  thickening of leaflet cusps and moderate impairment of leaflet mobility. There is trace aortic insufficiency. There is moderate-severe aortic stenosis V-max 2 m/s peak/mean gradients 16 and 9 mmHg. Calculated valve area approximately 1 cmï¿½ visually appears perhaps slightly greater than that; was calculated at 1.2 cm in August. There may have been some progression of stenosis again calculations not as accurate in the setting of rapid atrial fibrillation. Mitral Valve: The mitral valve is mildly thickened. There is trace mitral valve regurgitation. Trace MR. Tricuspid Valve: The tricuspid valve is structurally normal. There is moderate tricuspid regurgitation. Pulmonic Valve: The pulmonic valve is structurally normal. There is no indication of pulmonic valve regurgitation. Pericardium: No pericardial effusion noted. Aorta: The aortic root is normal.  CONCLUSIONS:  1. The left ventricular systolic function is moderately decreased, with a visually estimated ejection fraction of 40%.  2. There is global hypokinesis of the left ventricle with minor regional variations.  3. Spectral Doppler shows an abnormal pattern of left ventricular diastolic filling.  4. In comparison study of August 2024 EF has decreased previously 45% [id number] comparison clearly there has been a deterioration. However atrial fibrillation is currently present prior study was in sinus rhythm heart rates frequently over 100 affecting calculation of ejection fraction.  5. There is normal right ventricular global systolic function.  6. RVSP 46 mmHg.  7. The left atrial size is moderately dilated.  8. Trace MR.  9. Moderate tricuspid regurgitation. 10. Tricuspid aortic valve with diffuse thickening of leaflet cusps and moderate impairment of leaflet mobility. There is trace aortic insufficiency. There is moderate-severe aortic stenosis V-max 2 m/s peak/mean gradients 16 and 9 mmHg. Calculated valve area approximately 1 cmï¿½ visually appears perhaps  slightly greater than that; was calculated at 1.2 cm in August. There may have been some progression of stenosis again calculations not as accurate in the setting of rapid atrial fibrillation. QUANTITATIVE DATA SUMMARY:  2D MEASUREMENTS:             Normal Ranges: Ao Root d:       2.50 cm     (2.0-3.7cm) LAs:             4.10 cm     (2.7-4.0cm) IVSd:            1.20 cm     (0.6-1.1cm) LVPWd:           1.21 cm     (0.6-1.1cm) LVIDd:           4.31 cm     (3.9-5.9cm) LVIDs:           3.48 cm LV Mass Index:   119.5 g/m2 LVEDV Index:     56.12 ml/m2 LV % FS          19.3 %  LEFT ATRIUM:                  Normal Ranges: LA Vol A4C:        88.6 ml    (22+/-6mL/m2) LA Vol A2C:        94.5 ml LA Vol BP:         93.5 ml LA Vol Index A4C:  56.8ml/m2 LA Vol Index A2C:  60.6 ml/m2 LA Vol Index BP:   59.9 ml/m2 LA Area A4C:       27.4 cm2 LA Area A2C:       27.7 cm2 LA Major Axis A4C: 7.2 cm LA Major Axis A2C: 6.9 cm LA Volume Index:   56.9 ml/m2  RIGHT ATRIUM:                 Normal Ranges: RA Vol A4C:        49.3 ml    (8.3-19.5ml) RA Vol Index A4C:  31.6 ml/m2 RA Area A4C:       17.7 cm2 RA Major Axis A4C: 5.4 cm  LV SYSTOLIC FUNCTION:                      Normal Ranges: EF-A4C View:    42 % (>=55%) EF-A2C View:    42 % EF-Biplane:     44 % EF-Visual:      40 % LV EF Reported: 40 %  AORTIC VALVE:                      Normal Ranges: AoV Vmax:                2.04 m/s  (<=1.7m/s) AoV Peak P.6 mmHg (<20mmHg) AoV Mean P.0 mmHg  (1.7-11.5mmHg) LVOT Max Delbert:            0.70 m/s  (<=1.1m/s) AoV VTI:                 39.10 cm  (18-25cm) LVOT VTI:                11.80 cm LVOT Diameter:           2.00 cm   (1.8-2.4cm) AoV Area, VTI:           0.95 cm2  (2.5-5.5cm2) AoV Area,Vmax:           1.09 cm2  (2.5-4.5cm2) AoV Dimensionless Index: 0.30  AORTIC INSUFFICIENCY: AI Vmax:       3.47 m/s AI Half-time:  601 msec AI Decel Rate: 159.50 cm/s2  RIGHT VENTRICLE: RV Basal 4.20 cm RV Mid   4.45 cm RV Major 6.0 cm  TAPSE:   11.8 mm RV s'    0.12 m/s  TRICUSPID VALVE/RVSP:          Normal Ranges: Peak TR Velocity:     3.29 m/s RV Syst Pressure:     46 mmHg  (< 30mmHg)  PULMONIC VALVE:          Normal Ranges: PV Accel Time:  79 msec  (>120ms) PV Max Delbert:     0.8 m/s  (0.6-0.9m/s) PV Max P.5 mmHg  [id number] Wilmer Corona MD, [facility] Electronically signed on 3/16/2025 at 8:36:34 AM  ** Final **     ECG 12 Lead    Result Date: 3/15/2025  Atrial flutter with variable AV block with premature ventricular or aberrantly conducted complexes Left axis deviation Right bundle branch block Minimal voltage criteria for LVH, may be normal variant Abnormal ECG When compared with ECG of 14-MAR-2025 22:24, (unconfirmed) Atrial flutter has replaced Atrial fibrillation Left anterior fascicular block is no longer Present Confirmed by Wilmer Corona ([id number]) on 3/15/2025 6:56:11 PM    ECG 12 lead    Result Date: 3/15/2025  Atrial flutter with variable AV block with premature ventricular or aberrantly conducted complexes Right bundle branch block Minimal voltage criteria for LVH, may be normal variant Septal infarct , age undetermined Abnormal ECG When compared with ECG of 14-MAR-2025 16:04, (unconfirmed) Atrial flutter has replaced Electronic ventricular pacemaker See ED provider note for full interpretation and clinical correlation Confirmed by Leandra Woodward ([id number]) on 3/15/2025 10:11:02 AM    ECG 12 lead    Result Date: 3/15/2025  Atrial flutter with variable AV block with occasional ventricular-paced complexes Right bundle branch block Moderate voltage criteria for LVH, may be normal variant Possible Lateral infarct , age undetermined Abnormal ECG When compared with ECG of 2024 12:09, Electronic ventricular pacemaker has replaced Sinus rhythm See ED provider note for full interpretation and clinical correlation Confirmed by Leandra Woodward ([id number]) on 3/15/2025 10:09:09 AM    XR chest 1 view    Result Date: 3/14/2025  Interpreted By:   Tatum Bergeron, STUDY: XR CHEST 1 VIEW;  3/14/2025 4:24 pm   INDICATION: Signs/Symptoms:Chest Pain.   COMPARISON: 08/07/2024   ACCESSION NUMBER(S): [id number]   ORDERING CLINICIAN: YOLANDA GRANT   FINDINGS: AP radiograph of the chest was provided.       CARDIOMEDIASTINAL SILHOUETTE: Persistently enlarged cardiomediastinal silhouette. Atherosclerotic calcifications of the aortic arch.   LUNGS: Prominent interstitial lung markings are noted. No pneumothorax.   ABDOMEN: No remarkable upper abdominal findings.   BONES: No acute osseous changes.       Prominent interstitial lung markings, for which underlying edema is not excluded. Atypical multifocal pneumonia can appear similarly and should be considered in the appropriate clinical context.   Signed by: Tatum Bergeron 3/14/2025 4:42 PM Dictation workstation:   [id number]       No results found for this or any previous visit from the past 1095 days.                 Encounter Date: 03/14/25   ECG 12 Lead   Result Value    Ventricular Rate 58    Atrial Rate 58    AZ Interval 150    QRS Duration 142    QT Interval 508    QTC Calculation(Bazett) 498    P Axis 37    R Axis -27    T Axis -26    QRS Count 10    Q Onset 185    P Onset 110    P Offset 176    T Offset 439    QTC Fredericia 502    Narrative    Sinus bradycardia  Right bundle branch block  Minimal voltage criteria for LVH, may be normal variant  Abnormal ECG  When compared with ECG of 18-MAR-2025 10:59, (unconfirmed)  No significant change was found        Tele Monitoring: remains in atrial fibrillation while sleeping rate will go into the 80s however when active heart rate often over 100.  No bradycardia    Assessment     Patient Active Problem List   Diagnosis    Radiculopathy of lumbar region    Joint stiffness of spine    Gait difficulty    Syncope and collapse    Syncope, unspecified syncope type    NSTEMI (non-ST elevated myocardial infarction) (Multi)    Pulmonary hypertension (Multi)    Acute systolic  CHF (congestive heart failure), NYHA class 2    Cardiomyopathy, ischemic    Coronary artery disease involving native coronary artery of native heart without angina pectoris    Paroxysmal atrial fibrillation with RVR (Multi)    Hypertension    Atrial fibrillation (Multi)    Presence of Watchman left atrial appendage closure device    Atrial fibrillation, unspecified type (Multi)    Acute on chronic diastolic heart failure    Type 2 MI (myocardial infarction) (Multi)    Acquired hypothyroidism    Acute hypoxic respiratory failure (Multi)           impression/Plan:  acute on chronic systolic CHF: Clinically improved continue daily Lasix for now.  She had been on 3 times a day which was the cause of the low potassium previously.  Once atrial fibrillation is better controlled she will continue to improve.  Heart rate is better than it was she is now on oral amiodarone she is on chronic beta-blocker will also add IV beta-blocker on an as needed basis until cardioversion today.  Moderately - severe aortic stenosis,  this is one of the reasons A-fib is not  Well-tolerated.  She is asymptomatic when in sinus rhythm at the age of 89 would not pursue TAVR as she is also quite frail.  Furthermore   just recently asymptomatic when in sinus rhythm.  Was unable to pass probe yesterday, plan for repeat KARINA with cardioversion today.  Dr. Corona discussed risk benefits of KARINA and cardioversion with her including pharyngeal injury and adverse effects of anesthesia.  She understands agrees to proceed.        3/17/2025:   Patient appears compensated from heart failure standpoint.  She remains in atrial fibrillation and is n.p.o. for scheduled KARINA/cardioversion later today with Dr. Corona and Dr. Lane.  Reviewed procedures with patient and answer questions.  Continue current medications.  Follow-up with Dr. Corona.  Electronically signed by LEONOR Gibbons on 3/19/2025 at 8:43 AM    3/18/25  Remains in semirate controlled atrial  fibrillation.  Continue telemetry monitoring.  Keep n.p.o. with plans for KARINA/cardioversion today.  Unable to pass probe yesterday during KARINA.  Monitor electrolytes, keep potassium greater than 4 and magnesium greater than 2  Will benefit from restoration of normal sinus rhythm with moderate to severe aortic valve stenosis.  Per Dr. oCrona not a good candidate to pursue TAVR.  strict I's and O's and daily weight  Further recommendations to follow      Omero Joseph [facility]  Adult Gerontology Acute Care Nurse Practitioner  [facility]  [phone]    3/19/2025  Currently in normal sinus rhythm.  Successful KARINA and cardioversion yesterday.  Monitor electrolytes, keep potassium greater than 4 and magnesium greater than 2  Continue current cardiac medication regimen  strict I's and O's and daily weight  Message sent to schedulers to follow-up with Dr. Corona in the next 2 weeks  No further general cardiology recommendations, appreciate EP recommendations  Will sign off      Omero Joseph [facility]  Adult Gerontology Acute Care Nurse Practitioner  [facility]  [phone]

## 2025-03-19 NOTE — CARE PLAN
The patient's goals for the shift include Rest    The clinical goals for the shift include Patient will maintain a heart rate  thoroughout shift

## 2025-03-19 NOTE — DISCHARGE SUMMARY
Discharge Diagnosis  Acute on chronic diastolic heart failure   A-fib with RVR  Type II NSTEMI     Issues Requiring Follow-Up  Outpatient follow-up with cardiologist/EP office status post KARINA cardioversion  Outpatient follow-up with primary care as needed    Discharge Meds     Medication List      START taking these medications     * amiodarone 200 mg tablet; Commonly known as: Pacerone; Take 1 tablet   (200 mg) by mouth 2 times a day for 1 dose.   * amiodarone 200 mg tablet; Commonly known as: Pacerone; Take 1 tablet   (200 mg) by mouth once daily. Do not fill before March 20, 2025.; Start   taking on: March 20, 2025  * This list has 2 medication(s) that are the same as other medications   prescribed for you. Read the directions carefully, and ask your doctor or   other care provider to review them with you.     CONTINUE taking these medications     acetaminophen 325 mg tablet; Commonly known as: Tylenol; Take 2 tablets   (650 mg) by mouth every 6 hours if needed for mild pain (1 - 3) or   moderate pain (4 - 6).   aspirin 81 mg EC tablet; Take 1 tablet (81 mg) by mouth once daily.   bisoprolol 10 mg tablet; Commonly known as: Zebeta; Take 1 tablet (10   mg) by mouth once daily.   calcium carbonate-vitamin D3 500 mg-5 mcg (200 unit) tablet   carboxymethylcellulose 0.25 % ophthalmic solution; Commonly known as:   THERATears   Centrum Silver 0.4 mg-300 mcg- 250 mcg; Generic drug: multivitamin with   minerals iron-free   clopidogrel 75 mg tablet; Commonly known as: Plavix; Take 1 tablet (75   mg) by mouth once daily for 180 doses.   docusate sodium 100 mg capsule; Commonly known as: Colace   DULoxetine 30 mg DR capsule; Commonly known as: Cymbalta   fluticasone 50 mcg/actuation nasal spray; Commonly known as: Flonase   furosemide 20 mg tablet; Commonly known as: Lasix   gabapentin 300 mg capsule; Commonly known as: Neurontin   krill oil 500 mg capsule   LACTOBACILLUS ACIDOPHILUS ORAL   levothyroxine 88 mcg tablet;  Commonly known as: Synthroid, Levoxyl   losartan 25 mg tablet; Commonly known as: Cozaar; Take 3 tablets (75 mg)   by mouth once daily.   magnesium oxide 500 mg magnesium tablet   omeprazole 40 mg DR capsule; Commonly known as: PriLOSEC   potassium chloride CR 20 mEq ER tablet; Commonly known as: Klor-Con M20   PreserVision AREDS 2 Plus  mcg-15 mcg- 5 mg-1 mg capsule; Generic   drug: mv-min-FA-vit K-lutein-zeaxant   rosuvastatin 20 mg tablet; Commonly known as: Crestor       Test Results Pending At Discharge  Pending Labs       No current pending labs.            Hospital Course  Meryl Hester is a 89 y.o. female with a history of paroxysmal atrial fibrillation s/p Watchman, chronic diastolic heart failure, CAD, hypothyroidism, essential hypertension who was admitted for the mgmt of following issues:     #.  Acute hypoxic respiratory failure 2/2 acute on chronic diastolic heart failure with elevated BNP and trope, resolved  #.  Paroxysmal atrial fibrillation with RVR  #.  S/p Watchman (11/2024)  #.  CAD  #.  Type 2 NSTEMI     -Cardiology/EP was consulted, they adjusted the medications, stopped Lasix, currently o patient was placed on amiodarone and beta-blocker and IV beta-blocker as needed for rate control.  -Pt also underwent KARINA cardioversion to rule out thrombosis, currently on normal sinus rhythm.  Cardiology signed off and recommended outpatient follow-up in 2 weeks.  -Echo done and reviewed: LVEF 40%, global hypokinesis of LV, moderate TR, aortic stenosis  -Continued DAPT until 6 months s/p Watchman  -Continued home meds for other comorbidities     #.  Hypothyroidism  -Continued home medications  -TSH normal      VTE PPX: Was with Lovenox/SCDs      Currently patient is hemodynamically stable and ready for discharge home on amiodarone and adjusted dose of cardiac meds and rest of her home medications.  She will follow-up as outpatient with cardiology office Dr. Corona in 2 weeks for further recommendation  and medication adjustment.  She will also follow-up with Dr. Lane status post cardioversion and KARINA. She will follow-up with her primary care as needed.  Family at bedside and all the questions were answered.    Pertinent Physical Exam At Time of Discharge  Physical Exam    General: Well-developed frail elderly female, in no acute distress  HEENT: AT, NC, no JVD, no lymphadenopathy, neck supple  Lungs: Clear, no wheezing, no crackles  Cardiac: normal S1-S2, no murmur, no gallop  Abdomen: Soft, nontender, no distention, positive bowel sound  Extremities: No deformity, no edema, pulses intact, ROM limited due to frailty  Neurological: Alert awake oriented x3, sensation intact, clear speech    Outpatient Follow-Up  Future Appointments   Date Time Provider Department Center   4/1/2025 11:00 AM Wilmer Corona MD [id number] [place]   4/10/2025 12:45 PM Wilmer Corona MD [id number] [place]   4/30/2025 11:45 AM Moe Lane MD [id number] [place]       Time spent 35 minutes  Parker Vivas MD

## 2025-03-21 ENCOUNTER — PATIENT OUTREACH (OUTPATIENT)
Dept: CARDIOLOGY | Facility: CLINIC | Age: OVER 89
End: 2025-03-21
Payer: MEDICARE

## 2025-03-21 LAB
EJECTION FRACTION: 23 %
RIGHT VENTRICLE PEAK SYSTOLIC PRESSURE: 40.5 MMHG

## 2025-03-21 NOTE — PROGRESS NOTES
Discharge Facility:  [facility] 8 Cardiac Intensive Care  Discharge Diagnosis:  Acute on chronic diastolic heart failure , afib, Type II NSTEMI   Admission Date:  3/14/25   Discharge Date:   3/19/25     PCP Appointment Date: 4/1/25 Roxana Jhaveri MD   Specialist Appointment Date: 4/1/25- cardio   Hospital Encounter and Summary Linked: Yes    ED to Hosp-Admission (Discharged) with Parker Vivas MD; Donnie Gonzales MD (03/14/2025)       Unable to reach Patient x 2.  LVM requesting return call.

## 2025-03-24 LAB — BODY SURFACE AREA: 1.6 M2

## 2025-03-25 LAB
ATRIAL RATE: 285 BPM
ATRIAL RATE: 58 BPM
ATRIAL RATE: 58 BPM
P AXIS: 24 DEGREES
P AXIS: 37 DEGREES
P OFFSET: 176 MS
P OFFSET: 183 MS
P ONSET: 110 MS
P ONSET: 118 MS
PR INTERVAL: 144 MS
PR INTERVAL: 150 MS
Q ONSET: 185 MS
Q ONSET: 189 MS
Q ONSET: 190 MS
QRS COUNT: 10 BEATS
QRS COUNT: 10 BEATS
QRS COUNT: 15 BEATS
QRS DURATION: 130 MS
QRS DURATION: 142 MS
QRS DURATION: 146 MS
QT INTERVAL: 418 MS
QT INTERVAL: 498 MS
QT INTERVAL: 508 MS
QTC CALCULATION(BAZETT): 488 MS
QTC CALCULATION(BAZETT): 498 MS
QTC CALCULATION(BAZETT): 525 MS
QTC FREDERICIA: 486 MS
QTC FREDERICIA: 492 MS
QTC FREDERICIA: 502 MS
R AXIS: -27 DEGREES
R AXIS: -30 DEGREES
R AXIS: -31 DEGREES
T AXIS: -24 DEGREES
T AXIS: -26 DEGREES
T AXIS: -35 DEGREES
T OFFSET: 398 MS
T OFFSET: 439 MS
T OFFSET: 439 MS
VENTRICULAR RATE: 58 BPM
VENTRICULAR RATE: 58 BPM
VENTRICULAR RATE: 95 BPM

## 2025-03-26 NOTE — DOCUMENTATION CLARIFICATION NOTE
"    PATIENT:               ANNA SINGH  ACCT #:                  [id number]  MRN:                       [id number]  :                       1931  ADMIT DATE:       3/14/2025 4:08 PM  DISCH DATE:        3/19/2025 3:16 PM  RESPONDING PROVIDER #:        [id number]          PROVIDER RESPONSE TEXT:    Acute on Chronic Diastolic Congestive Heart Failure    CDI QUERY TEXT:    Clarification    Instruction:    Based on your assessment of the patient and the clinical information, please provide the requested documentation by clicking on the appropriate radio button and enter any additional information if prompted.    Question: Please further clarify the type and acuity of congestive heart failure    When answering this query, please exercise your independent professional judgment. The fact that a question is being asked, does not imply that any particular answer is desired or expected.    The patient's clinical indicators include:  Clinical Information:  * 3/14/25 H&P notes -\"Acute hypoxic respiratory failure 2/2 acute on chronic diastolic heart failure\"    Clinical Indicators:  * 3/14/25 BNP 1561-  * 3/15/25 transthoracic Echo \"CONCLUSIONS:  1. The left ventricular systolic function is moderately decreased, with a visually estimated ejection fraction of 40%.  2. There is global hypokinesis of the left ventricle with minor regional variations.  3. Spectral Doppler shows an abnormal pattern of left ventricular diastolic filling.  4. In comparison study of 2024 EF has decreased previously 45% [id number] comparison clearly there has been a deterioration\"  * 3/18/25 Transesophageal ECHO \"CONCLUSIONS:  1. The left ventricular systolic function is severely decreased, with a visually estimated ejection fraction of 20-25%.  2. There is global hypokinesis of the left ventricle with minor regional variations.  3. Left ventricular diastolic filling was indeterminate.  4. Left ventricular cavity size is mildly dilated.  5. There " "is severely reduced right ventricular systolic function.  * 3/17 Cardiology progress note \" echo 3/15/2540% ejection fraction moderate to severe aortic stenosis valve area approximately 1 cm? not optimal for EF or aortic stenosis determination given atrial fibrillation with rapid rates at the time.  Clearly EF decreased to comparison of the study of August however\"  * 3/19/25 Caridology consult note from ECHO report \"TRANSTHORACIC ECHO (TTE) COMPLETE Diagnosis/ICD: Unspecified combined systolic (congestive) and diastolic\"    Treatment:  * 3/14/25 and 3/15/25  IV Lasix 40 mg x 1dose each day    Risk Factors:  * HTN and history of CHF  Options provided:  -- Acute on Chronic Systolic Congestive Heart Failure  -- Acute on Chronic Diastolic Congestive Heart Failure  -- Acute on Chronic combined systolic/diastolic Congestive Heart Failure  -- Other - I will add my own diagnosis  -- Refer to Clinical Documentation Reviewer    Query created by: Kaye Thomson on 3/25/2025 3:03 PM      Electronically signed by:  ADRIAN LOCKHART-CNP 3/26/2025 7:27 AM          "

## 2025-04-01 ENCOUNTER — APPOINTMENT (OUTPATIENT)
Dept: CARDIOLOGY | Facility: CLINIC | Age: OVER 89
End: 2025-04-01
Payer: MEDICARE

## 2025-04-01 VITALS
SYSTOLIC BLOOD PRESSURE: 136 MMHG | HEART RATE: 64 BPM | DIASTOLIC BLOOD PRESSURE: 64 MMHG | BODY MASS INDEX: 28.26 KG/M2 | HEIGHT: 59 IN | WEIGHT: 140.2 LBS

## 2025-04-01 DIAGNOSIS — I42.8 OTHER CARDIOMYOPATHY: ICD-10-CM

## 2025-04-01 DIAGNOSIS — I10 PRIMARY HYPERTENSION: ICD-10-CM

## 2025-04-01 DIAGNOSIS — I48.11 LONGSTANDING PERSISTENT ATRIAL FIBRILLATION (MULTI): ICD-10-CM

## 2025-04-01 PROBLEM — I42.9 MYOCARDIOPATHY (MULTI): Status: ACTIVE | Noted: 2025-04-01

## 2025-04-01 PROCEDURE — 99214 OFFICE O/P EST MOD 30 MIN: CPT | Performed by: INTERNAL MEDICINE

## 2025-04-01 PROCEDURE — 3078F DIAST BP <80 MM HG: CPT | Performed by: INTERNAL MEDICINE

## 2025-04-01 PROCEDURE — 1111F DSCHRG MED/CURRENT MED MERGE: CPT | Performed by: INTERNAL MEDICINE

## 2025-04-01 PROCEDURE — G2211 COMPLEX E/M VISIT ADD ON: HCPCS | Performed by: INTERNAL MEDICINE

## 2025-04-01 PROCEDURE — 93000 ELECTROCARDIOGRAM COMPLETE: CPT | Performed by: INTERNAL MEDICINE

## 2025-04-01 PROCEDURE — 3075F SYST BP GE 130 - 139MM HG: CPT | Performed by: INTERNAL MEDICINE

## 2025-04-01 PROCEDURE — 1159F MED LIST DOCD IN RCRD: CPT | Performed by: INTERNAL MEDICINE

## 2025-04-01 RX ORDER — FUROSEMIDE 20 MG/1
20 TABLET ORAL
Qty: 90 TABLET | Refills: 3 | Status: SHIPPED | OUTPATIENT
Start: 2025-04-01 | End: 2026-04-01

## 2025-04-01 RX ORDER — BISOPROLOL FUMARATE 10 MG/1
10 TABLET, FILM COATED ORAL DAILY
Qty: 90 TABLET | Refills: 3 | Status: SHIPPED | OUTPATIENT
Start: 2025-04-01 | End: 2026-04-01

## 2025-04-01 RX ORDER — POTASSIUM CHLORIDE 20 MEQ/1
20 TABLET, EXTENDED RELEASE ORAL
Qty: 90 TABLET | Refills: 3 | Status: SHIPPED | OUTPATIENT
Start: 2025-04-01 | End: 2026-04-01

## 2025-04-01 RX ORDER — ROSUVASTATIN CALCIUM 20 MG/1
20 TABLET, COATED ORAL NIGHTLY
Qty: 90 TABLET | Refills: 3 | Status: SHIPPED | OUTPATIENT
Start: 2025-04-01

## 2025-04-01 RX ORDER — AMIODARONE HYDROCHLORIDE 200 MG/1
200 TABLET ORAL DAILY
Qty: 30 TABLET | Refills: 11 | Status: SHIPPED | OUTPATIENT
Start: 2025-04-01 | End: 2026-04-01

## 2025-04-01 NOTE — PROGRESS NOTES
Patient:  Meryl Hester  YOB: 1931  MRN: [id number]       Impression/Plan:     Diagnoses and all orders for this visit:  Longstanding persistent atrial fibrillation (Multi)  -     Remains in sinus rhythm after cardioversion and amiodarone  -     Continue aspirin Plavix for recent Watchman device  -     Has follow-up with Dr. Lane at the end of this month.  Will need CMP thyroid PFTs arranged.  -     amiodarone (Pacerone) 200 mg tablet; Take 1 tablet (200 mg) by mouth once daily.  Primary hypertension  -     Well-controlled at this time need to carefully monitor for bradycardia given the use of bisoprolol and amiodarone.  Thus far rate is in the 60s.  She will monitor heart rate at home.  May need to reduce dose she has follow-up with Dr. Lane in just 3 weeks.  -     bisoprolol (Zebeta) 10 mg tablet; Take 1 tablet (10 mg) by mouth once daily.  -     rosuvastatin (Crestor) 20 mg tablet; Take 1 tablet (20 mg) by mouth once daily at bedtime.  Other cardiomyopathy  -     Currently euvolemic.  Transesophageal echo interpreted as 25% which I do not believe to be the case I think it was more closer to 35 and likely tachycardia mediated as her heart rates were very difficult to control prior to cardioversion.  Will plan repeat echo just before I see her in 3 months  -     Transthoracic Echo (TTE) Limited; Future  -     furosemide (Lasix) 20 mg tablet; Take 1 tablet (20 mg) by mouth once daily.  -     potassium chloride CR 20 mEq ER tablet; Take 1 tablet (20 mEq) by mouth once daily.      Chief Complaint/Active Symptoms:      Chief Complaint   Patient presents with    Hospital Follow-up     Cardioversion       Meryl Hester is a 89 y.o. 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  She  had been admitted to [facility] in late July with recurrent GI bleeding rectal bleeding.  During that hospital stay had multiple runs of rapid atrial fibrillation treated transiently with intravenous amiodarone but was not kept on that agent.  Had to undergo 2 colonoscopies was discharged the day after the second colonoscopy.  Was admitted to [facility] there was syncopal episode felt related to volume depletion.  She was hydrated and improved in symptoms but troponins were elevated and echo suggested right ventricular systolic pressure over 70 mmHg.  In light of these findings she underwent coronary angiography as delineated below that did show 50 to 60% diffuse disease. There was one 90% lesion in a small ramus.  EF 40% with RVSP found to be just 43 mmHg.     11/8/2024 watchman implantation. No complications.      Holter monitor September 2024 1-2% atrial fibrillation.  Some lasting as long as an hour.  Overall burden likely higher as computer interpretation had mislabeled some SVT as A-fib..      I had seen her 1/23/2025 which time blood pressure control was suboptimal and her losartan was increased to 75.  She had no clinical recurrence of her atrial fibrillation.    She was admitted to [facility] 3/13/2025 shortness of breath and acute exacerbation of diastolic CHF secondary to atrial fibrillation rapid ventricular response.  She was begun on IV amiodarone.  KARINA required because of the recent watchman to assure no thrombi prior to cardioversion.  KARINA somewhat complex as normal size probe could not be advanced and she needed to have a pediatric probe.           3/18/2025 KARINA   1. The left ventricular systolic function is severely decreased, with a visually estimated ejection fraction of 20-25%.   2. There is global hypokinesis of the left ventricle with minor regional variations.   3. Left ventricular diastolic filling was indeterminate.   4. Left ventricular cavity size is mildly dilated.   5. There is  severely reduced right ventricular systolic function.   6. Mildly enlarged right ventricle.   7. The left atrial size is moderate to severely dilated.   8. S/p Watchman device that appears well-seated with no thrombus or peridevice vanessa.   9. The right atrial size is moderately dilated.  10. The mitral valve is moderately thickened.  11. Moderate mitral valve regurgitation.    Note: I reviewed KARINA and I do not believe ejection fraction is less than 35% probably around 35 to 40% it was done while in A-fib.    3/18/2025 successful direct-current cardioversion    She was discharged 3/19/2025 in sinus rhythm feeling considerably better.    EC2025 Normal sinus, QTc 497 left axis deviation right bundle branch block    She denies angina, dyspnea, palpitation, edema, lightheadedness or syncope.  She has had no symptoms of claudication or neurologic deterioration.  There have been no hospitalizations or emergency room visits since last office visit.    Although she still a little tired after discharge much less short of breath than she was previously.  She has gained more energy of late.  She is eating and drinking well.  No complications from either KARINA or cardioversion.  Tolerating amiodarone well.      Review of Systems: Unremarkable except as noted above    Meds     Current Outpatient Medications   Medication Instructions    acetaminophen (TYLENOL) 650 mg, oral, Every 6 hours PRN    amiodarone (PACERONE) 200 mg, oral, Daily    aspirin 81 mg, oral, Daily    bisoprolol (ZEBETA) 10 mg, oral, Daily    calcium carbonate-vitamin D3 500 mg-5 mcg (200 unit) tablet 1 tablet, Daily    carboxymethylcellulose (THERATears) 0.25 % ophthalmic solution 1 drop, 3 times daily PRN    clopidogrel (PLAVIX) 75 mg, oral, Daily    docusate sodium (COLACE) 100 mg, 2 times daily    DULoxetine (CYMBALTA) 30 mg, Daily RT    fluticasone (Flonase) 50 mcg/actuation nasal spray 1 spray, Daily PRN    furosemide (LASIX) 20 mg, oral, Daily RT     gabapentin (NEURONTIN) 300 mg, Nightly    krill oil 500 mg, 2 times daily    LACTOBACILLUS ACIDOPHILUS ORAL 1 tablet, Daily    levothyroxine (SYNTHROID, LEVOXYL) 88 mcg, Daily RT    losartan (COZAAR) 75 mg, oral, Daily    magnesium oxide 500 mg, Daily    multivitamin with minerals iron-free (Centrum Silver) 1 tablet, Daily    mv-min-FA-vit K-lutein-zeaxant (PreserVision AREDS 2 Plus MV) 200 mcg-15 mcg- 5 mg-1 mg capsule 1 capsule, 2 times daily    omeprazole (PRILOSEC) 40 mg, Daily RT    potassium chloride CR 20 mEq ER tablet 20 mEq, oral, Daily RT    rosuvastatin (CRESTOR) 20 mg, oral, Nightly        Allergies     Allergies   Allergen Reactions    Sulfa (Sulfonamide Antibiotics) Rash     Skin peeling     Lisinopril Cough         Annotated Problems     Specialty Problems          Cardiology Problems    Acute systolic CHF (congestive heart failure), NYHA class 2    Cardiomyopathy, ischemic    Coronary artery disease involving native coronary artery of native heart without angina pectoris    Paroxysmal atrial fibrillation with RVR (Multi)    Hypertension    Atrial fibrillation, unspecified type (Multi)    Presence of Watchman left atrial appendage closure device    Acute on chronic diastolic heart failure    Type 2 MI (myocardial infarction) (Multi)    Myocardiopathy (Multi)    NSTEMI (non-ST elevated myocardial infarction) (Multi)    Atrial fibrillation (Multi)        Problem List     Patient Active Problem List    Diagnosis Date Noted    Myocardiopathy (Multi) 04/01/2025    Acute on chronic diastolic heart failure 03/14/2025    Type 2 MI (myocardial infarction) (Multi) 03/14/2025    Acquired hypothyroidism 03/14/2025    Acute hypoxic respiratory failure 03/14/2025    Presence of Watchman left atrial appendage closure device 11/07/2024    Atrial fibrillation, unspecified type (Multi) 11/07/2024    Hypertension 10/10/2024    Acute systolic CHF (congestive heart failure), NYHA class 2 08/28/2024    Cardiomyopathy,  "ischemic 08/28/2024    Coronary artery disease involving native coronary artery of native heart without angina pectoris 08/28/2024    Paroxysmal atrial fibrillation with RVR (Multi) 08/28/2024    Syncope, unspecified syncope type 08/08/2024    Syncope and collapse 08/07/2024    Radiculopathy of lumbar region 01/03/2024    Joint stiffness of spine 01/03/2024    Gait difficulty 01/03/2024    Atrial fibrillation (Multi) 10/23/2024    NSTEMI (non-ST elevated myocardial infarction) (Multi) 08/07/2024    Pulmonary hypertension (Multi) 08/07/2024       Objective:     Vitals:    04/01/25 1106   BP: 136/64   BP Location: Left arm   Patient Position: Sitting   Pulse: 64   Weight: 63.6 kg (140 lb 3.2 oz)   Height: 1.499 m (4' 11\")      Wt Readings from Last 4 Encounters:   04/01/25 63.6 kg (140 lb 3.2 oz)   03/18/25 63.8 kg (140 lb 10.5 oz)   01/23/25 62.1 kg (136 lb 12.8 oz)   12/03/24 61.9 kg (136 lb 6.4 oz)           LAB:     Lab Results   Component Value Date    WBC 8.6 03/17/2025    HGB 11.3 (L) 03/17/2025    HCT 34.2 (L) 03/17/2025     03/17/2025    ALT 42 03/14/2025    AST 36 03/14/2025     03/17/2025    K 4.4 03/17/2025     03/17/2025    CREATININE 1.01 03/17/2025    BUN 18 03/17/2025    CO2 30 03/17/2025    TSH 1.70 03/14/2025    INR 1.1 08/07/2024    HGBA1C 5.2 09/03/2024         Physical Exam     General Appearance: alert and oriented to person, place and time, in no acute distress  Cardiovascular: normal rate, regular rhythm, normal S1 and S2, no murmurs, rubs, clicks, or gallops,  no JVD  Pulmonary/Chest: clear to auscultation bilaterally- no wheezes, rales or rhonchi, normal air movement, no respiratory distress  Abdomen: soft, non-tender, non-distended, normal bowel sounds, no masses   Extremities: no cyanosis, clubbing or edema  Skin: warm and dry, no rash or erythema  Eyes: EOMI  Neck: supple and non-tender without mass, no thyromegaly   Neurological: alert, oriented, normal speech, no focal " findings or movement disorder noted  Vascular:       Provider attestation-scribe documentation  Any medical record entries made by the scribe were at my discretion and personally dictated by me.  I have reviewed the chart and agree that the record accurately reflects my personal performance of the history, physical exam, discussion and plan.        Scribe Attestation  By signing my name below, I, Katharine Nguyen CMA   , Scribe   attest that this documentation has been prepared under the direction and in the presence of Wilmer Corona MD.

## 2025-04-01 NOTE — PATIENT INSTRUCTIONS
DO NOT STOP CLOPIDOGREL UNTIL 5/8/25 PER DR PRITCHETT'S INSTRUCTIONS        Please bring all medicines, vitamins, and herbal supplements with you in original bottles to every appointment! This is the best way to ensure your medication list in your chart is accurate.    Prescriptions will not be filled unless you are compliant with your follow up appointments or have a follow up appointment scheduled as per instruction of your physician. Refills should be requested at the time of your visit.    DID YOU KNOW  We have a pharmacy here in the [facility].  They can fill all prescriptions, not just cardiac medications.  Prescriptions from other pharmacies can easily be transferred to the  pharmacy by the  pharmacist on site.   pharmacies offer FREE HOME DELIVERY on medications to anywhere in [place]. They can sync your medications. Typically prescriptions can be ready in 10 - 15 minutes. If pharmacy is unable to fill your  prescription or if cost is more than your paying now the Pharmacist can easily transfer back to your Pharmacy of choice. Pharmacy phone # [phone].

## 2025-04-04 ENCOUNTER — PATIENT OUTREACH (OUTPATIENT)
Dept: CARDIOLOGY | Facility: CLINIC | Age: OVER 89
End: 2025-04-04
Payer: MEDICARE

## 2025-04-07 ENCOUNTER — TELEPHONE (OUTPATIENT)
Dept: CARDIOLOGY | Facility: CLINIC | Age: OVER 89
End: 2025-04-07
Payer: MEDICARE

## 2025-04-07 NOTE — TELEPHONE ENCOUNTER
On /7/25 I spoke with patient's daughter Amada.  She tells me that primary care physician recommended changing Cymbalta to Zoloft secondary to cardiac issues.  Indeed both can be associated with QT prolongation although Zoloft less than Cymbalta.  Both can be associated with palpitations and tachycardia but not A-fib per se.  Potential interactions are associated with amiodarone considered at level C and can be monitored.  ECG on April 1 shows that her QTc is less than 500 ms and that is in the setting of bundle branch block.  Hence at this point in time there does not appear to be any toxicity from her current dose of Cymbalta such that it would have to be changed.  Certainly if Zoloft would be better from a medical/psychiatric standpoint it would be tolerated well also.  Will continue to monitor QT carefully and for any symptoms of dysrhythmia.  There is no absolute cardiac need for change at this time.

## 2025-04-07 NOTE — TELEPHONE ENCOUNTER
Pt's daughter Amada calls office [phone] states that pt's PCP would like to change from pt from Cymbalta to Zofoft. PCP states that Cymbalta can affect pt's Heart rate. Amada would like to know Dr. Wilmer Corona MD, [facility] recommendations; states that pt is doing well on Cymbalta and does not want to change meds if there is not a need to do so. Please advise. Yamilex HARRIS

## 2025-04-10 ENCOUNTER — APPOINTMENT (OUTPATIENT)
Dept: CARDIOLOGY | Facility: CLINIC | Age: OVER 89
End: 2025-04-10
Payer: MEDICARE

## 2025-04-17 ENCOUNTER — HOSPITAL ENCOUNTER (EMERGENCY)
Dept: CARDIOLOGY | Facility: HOSPITAL | Age: OVER 89
Discharge: HOME | DRG: 242 | End: 2025-04-17
Payer: MEDICARE

## 2025-04-17 ENCOUNTER — APPOINTMENT (OUTPATIENT)
Dept: CARDIOLOGY | Facility: HOSPITAL | Age: OVER 89
DRG: 242 | End: 2025-04-17
Payer: MEDICARE

## 2025-04-17 ENCOUNTER — APPOINTMENT (OUTPATIENT)
Dept: RADIOLOGY | Facility: HOSPITAL | Age: OVER 89
DRG: 242 | End: 2025-04-17
Payer: MEDICARE

## 2025-04-17 ENCOUNTER — HOSPITAL ENCOUNTER (INPATIENT)
Facility: HOSPITAL | Age: OVER 89
LOS: 2 days | Discharge: HOME | DRG: 242 | End: 2025-04-19
Attending: STUDENT IN AN ORGANIZED HEALTH CARE EDUCATION/TRAINING PROGRAM | Admitting: STUDENT IN AN ORGANIZED HEALTH CARE EDUCATION/TRAINING PROGRAM
Payer: MEDICARE

## 2025-04-17 DIAGNOSIS — I50.33 ACUTE ON CHRONIC DIASTOLIC HEART FAILURE: ICD-10-CM

## 2025-04-17 DIAGNOSIS — I48.91 ATRIAL FIBRILLATION WITH RVR (MULTI): ICD-10-CM

## 2025-04-17 DIAGNOSIS — Z95.0 PACEMAKER: ICD-10-CM

## 2025-04-17 DIAGNOSIS — R06.02 SHORTNESS OF BREATH: Primary | ICD-10-CM

## 2025-04-17 LAB
ALBUMIN SERPL BCP-MCNC: 4.4 G/DL (ref 3.4–5)
ALP SERPL-CCNC: 153 U/L (ref 33–136)
ALT SERPL W P-5'-P-CCNC: 49 U/L (ref 7–45)
ANION GAP SERPL CALC-SCNC: 13 MMOL/L (ref 10–20)
APTT PPP: 29 SECONDS (ref 26–36)
AST SERPL W P-5'-P-CCNC: 54 U/L (ref 9–39)
ATRIAL RATE: 115 BPM
ATRIAL RATE: 65 BPM
BASOPHILS # BLD AUTO: 0.05 X10*3/UL (ref 0–0.1)
BASOPHILS NFR BLD AUTO: 0.7 %
BILIRUB SERPL-MCNC: 0.9 MG/DL (ref 0–1.2)
BNP SERPL-MCNC: 1434 PG/ML (ref 0–99)
BUN SERPL-MCNC: 20 MG/DL (ref 6–23)
CALCIUM SERPL-MCNC: 9.4 MG/DL (ref 8.6–10.3)
CARDIAC TROPONIN I PNL SERPL HS: 105 NG/L (ref 0–13)
CARDIAC TROPONIN I PNL SERPL HS: 106 NG/L (ref 0–13)
CARDIAC TROPONIN I PNL SERPL HS: 92 NG/L (ref 0–13)
CHLORIDE SERPL-SCNC: 101 MMOL/L (ref 98–107)
CO2 SERPL-SCNC: 25 MMOL/L (ref 21–32)
CREAT SERPL-MCNC: 0.88 MG/DL (ref 0.5–1.05)
EGFRCR SERPLBLD CKD-EPI 2021: 61 ML/MIN/1.73M*2
EOSINOPHIL # BLD AUTO: 0.03 X10*3/UL (ref 0–0.4)
EOSINOPHIL NFR BLD AUTO: 0.4 %
ERYTHROCYTE [DISTWIDTH] IN BLOOD BY AUTOMATED COUNT: 15.1 % (ref 11.5–14.5)
FLUAV RNA RESP QL NAA+PROBE: NOT DETECTED
FLUBV RNA RESP QL NAA+PROBE: NOT DETECTED
GLUCOSE SERPL-MCNC: 178 MG/DL (ref 74–99)
HCT VFR BLD AUTO: 37.8 % (ref 36–46)
HGB BLD-MCNC: 12.2 G/DL (ref 12–16)
HOLD SPECIMEN: NORMAL
HOLD SPECIMEN: NORMAL
IMM GRANULOCYTES # BLD AUTO: 0.03 X10*3/UL (ref 0–0.5)
IMM GRANULOCYTES NFR BLD AUTO: 0.4 % (ref 0–0.9)
INR PPP: 1.1 (ref 0.9–1.1)
LYMPHOCYTES # BLD AUTO: 1.03 X10*3/UL (ref 0.8–3)
LYMPHOCYTES NFR BLD AUTO: 13.6 %
MCH RBC QN AUTO: 29.6 PG (ref 26–34)
MCHC RBC AUTO-ENTMCNC: 32.3 G/DL (ref 32–36)
MCV RBC AUTO: 92 FL (ref 80–100)
MONOCYTES # BLD AUTO: 0.26 X10*3/UL (ref 0.05–0.8)
MONOCYTES NFR BLD AUTO: 3.4 %
NEUTROPHILS # BLD AUTO: 6.15 X10*3/UL (ref 1.6–5.5)
NEUTROPHILS NFR BLD AUTO: 81.5 %
NRBC BLD-RTO: 0 /100 WBCS (ref 0–0)
PLATELET # BLD AUTO: 368 X10*3/UL (ref 150–450)
POTASSIUM SERPL-SCNC: 4.5 MMOL/L (ref 3.5–5.3)
PROT SERPL-MCNC: 7.1 G/DL (ref 6.4–8.2)
PROTHROMBIN TIME: 12.1 SECONDS (ref 9.8–12.4)
Q ONSET: 224 MS
Q ONSET: 224 MS
QRS COUNT: 22 BEATS
QRS COUNT: 24 BEATS
QRS DURATION: 138 MS
QRS DURATION: 138 MS
QT INTERVAL: 348 MS
QT INTERVAL: 356 MS
QTC CALCULATION(BAZETT): 525 MS
QTC CALCULATION(BAZETT): 538 MS
QTC FREDERICIA: 462 MS
QTC FREDERICIA: 465 MS
R AXIS: -35 DEGREES
R AXIS: -37 DEGREES
RBC # BLD AUTO: 4.12 X10*6/UL (ref 4–5.2)
RSV RNA RESP QL NAA+PROBE: NOT DETECTED
SARS-COV-2 RNA RESP QL NAA+PROBE: NOT DETECTED
SODIUM SERPL-SCNC: 134 MMOL/L (ref 136–145)
T AXIS: -34 DEGREES
T AXIS: -40 DEGREES
T OFFSET: 398 MS
T OFFSET: 402 MS
TSH SERPL-ACNC: 4.07 MIU/L (ref 0.44–3.98)
VENTRICULAR RATE: 131 BPM
VENTRICULAR RATE: 144 BPM
WBC # BLD AUTO: 7.6 X10*3/UL (ref 4.4–11.3)

## 2025-04-17 PROCEDURE — 93005 ELECTROCARDIOGRAM TRACING: CPT

## 2025-04-17 PROCEDURE — 84443 ASSAY THYROID STIM HORMONE: CPT | Performed by: REGISTERED NURSE

## 2025-04-17 PROCEDURE — 2060000001 HC INTERMEDIATE ICU ROOM DAILY

## 2025-04-17 PROCEDURE — 84484 ASSAY OF TROPONIN QUANT: CPT

## 2025-04-17 PROCEDURE — 83880 ASSAY OF NATRIURETIC PEPTIDE: CPT

## 2025-04-17 PROCEDURE — 99285 EMERGENCY DEPT VISIT HI MDM: CPT | Mod: 25 | Performed by: STUDENT IN AN ORGANIZED HEALTH CARE EDUCATION/TRAINING PROGRAM

## 2025-04-17 PROCEDURE — 99222 1ST HOSP IP/OBS MODERATE 55: CPT | Performed by: REGISTERED NURSE

## 2025-04-17 PROCEDURE — 2500000001 HC RX 250 WO HCPCS SELF ADMINISTERED DRUGS (ALT 637 FOR MEDICARE OP): Performed by: NURSE PRACTITIONER

## 2025-04-17 PROCEDURE — 74177 CT ABD & PELVIS W/CONTRAST: CPT | Performed by: RADIOLOGY

## 2025-04-17 PROCEDURE — 2500000004 HC RX 250 GENERAL PHARMACY W/ HCPCS (ALT 636 FOR OP/ED): Mod: JZ | Performed by: NURSE PRACTITIONER

## 2025-04-17 PROCEDURE — 93010 ELECTROCARDIOGRAM REPORT: CPT | Performed by: INTERNAL MEDICINE

## 2025-04-17 PROCEDURE — 71045 X-RAY EXAM CHEST 1 VIEW: CPT

## 2025-04-17 PROCEDURE — 71045 X-RAY EXAM CHEST 1 VIEW: CPT | Performed by: RADIOLOGY

## 2025-04-17 PROCEDURE — 85025 COMPLETE CBC W/AUTO DIFF WBC: CPT

## 2025-04-17 PROCEDURE — 36415 COLL VENOUS BLD VENIPUNCTURE: CPT

## 2025-04-17 PROCEDURE — 84484 ASSAY OF TROPONIN QUANT: CPT | Performed by: REGISTERED NURSE

## 2025-04-17 PROCEDURE — 71275 CT ANGIOGRAPHY CHEST: CPT | Performed by: RADIOLOGY

## 2025-04-17 PROCEDURE — 2500000001 HC RX 250 WO HCPCS SELF ADMINISTERED DRUGS (ALT 637 FOR MEDICARE OP): Performed by: REGISTERED NURSE

## 2025-04-17 PROCEDURE — 2550000001 HC RX 255 CONTRASTS: Performed by: STUDENT IN AN ORGANIZED HEALTH CARE EDUCATION/TRAINING PROGRAM

## 2025-04-17 PROCEDURE — 99223 1ST HOSP IP/OBS HIGH 75: CPT | Performed by: INTERNAL MEDICINE

## 2025-04-17 PROCEDURE — 2500000004 HC RX 250 GENERAL PHARMACY W/ HCPCS (ALT 636 FOR OP/ED)

## 2025-04-17 PROCEDURE — 84075 ASSAY ALKALINE PHOSPHATASE: CPT

## 2025-04-17 PROCEDURE — 87637 SARSCOV2&INF A&B&RSV AMP PRB: CPT

## 2025-04-17 PROCEDURE — 2500000004 HC RX 250 GENERAL PHARMACY W/ HCPCS (ALT 636 FOR OP/ED): Mod: JZ | Performed by: REGISTERED NURSE

## 2025-04-17 PROCEDURE — 85610 PROTHROMBIN TIME: CPT

## 2025-04-17 PROCEDURE — 2500000002 HC RX 250 W HCPCS SELF ADMINISTERED DRUGS (ALT 637 FOR MEDICARE OP, ALT 636 FOR OP/ED)

## 2025-04-17 PROCEDURE — 2500000002 HC RX 250 W HCPCS SELF ADMINISTERED DRUGS (ALT 637 FOR MEDICARE OP, ALT 636 FOR OP/ED): Performed by: REGISTERED NURSE

## 2025-04-17 PROCEDURE — 71275 CT ANGIOGRAPHY CHEST: CPT

## 2025-04-17 PROCEDURE — 74177 CT ABD & PELVIS W/CONTRAST: CPT

## 2025-04-17 RX ORDER — CETIRIZINE HYDROCHLORIDE 10 MG/1
10 TABLET ORAL DAILY
COMMUNITY

## 2025-04-17 RX ORDER — ONDANSETRON HYDROCHLORIDE 2 MG/ML
4 INJECTION, SOLUTION INTRAVENOUS EVERY 8 HOURS PRN
Status: DISCONTINUED | OUTPATIENT
Start: 2025-04-17 | End: 2025-04-19 | Stop reason: HOSPADM

## 2025-04-17 RX ORDER — POLYETHYLENE GLYCOL 3350 17 G/17G
17 POWDER, FOR SOLUTION ORAL DAILY
Status: DISCONTINUED | OUTPATIENT
Start: 2025-04-17 | End: 2025-04-19 | Stop reason: HOSPADM

## 2025-04-17 RX ORDER — ACETAMINOPHEN 650 MG/1
650 SUPPOSITORY RECTAL EVERY 4 HOURS PRN
Status: DISCONTINUED | OUTPATIENT
Start: 2025-04-17 | End: 2025-04-19 | Stop reason: HOSPADM

## 2025-04-17 RX ORDER — ASPIRIN 81 MG/1
81 TABLET ORAL DAILY
Status: DISCONTINUED | OUTPATIENT
Start: 2025-04-18 | End: 2025-04-19 | Stop reason: HOSPADM

## 2025-04-17 RX ORDER — CLOPIDOGREL BISULFATE 75 MG/1
75 TABLET ORAL DAILY
Status: DISCONTINUED | OUTPATIENT
Start: 2025-04-18 | End: 2025-04-19 | Stop reason: HOSPADM

## 2025-04-17 RX ORDER — DOCUSATE SODIUM 100 MG/1
100 CAPSULE, LIQUID FILLED ORAL 2 TIMES DAILY
Status: DISCONTINUED | OUTPATIENT
Start: 2025-04-17 | End: 2025-04-19 | Stop reason: HOSPADM

## 2025-04-17 RX ORDER — LANOLIN ALCOHOL/MO/W.PET/CERES
400 CREAM (GRAM) TOPICAL DAILY
Status: DISCONTINUED | OUTPATIENT
Start: 2025-04-17 | End: 2025-04-19 | Stop reason: HOSPADM

## 2025-04-17 RX ORDER — AMIODARONE HYDROCHLORIDE 200 MG/1
200 TABLET ORAL DAILY
Status: DISCONTINUED | OUTPATIENT
Start: 2025-04-18 | End: 2025-04-18

## 2025-04-17 RX ORDER — ACETAMINOPHEN 160 MG/5ML
650 SOLUTION ORAL EVERY 4 HOURS PRN
Status: DISCONTINUED | OUTPATIENT
Start: 2025-04-17 | End: 2025-04-19 | Stop reason: HOSPADM

## 2025-04-17 RX ORDER — BACLOFEN 20 MG
500 TABLET ORAL DAILY
Status: DISCONTINUED | OUTPATIENT
Start: 2025-04-17 | End: 2025-04-17

## 2025-04-17 RX ORDER — MULTIVIT-MIN/IRON FUM/FOLIC AC 7.5 MG-4
1 TABLET ORAL DAILY
COMMUNITY

## 2025-04-17 RX ORDER — FUROSEMIDE 10 MG/ML
40 INJECTION INTRAMUSCULAR; INTRAVENOUS ONCE
Status: COMPLETED | OUTPATIENT
Start: 2025-04-17 | End: 2025-04-17

## 2025-04-17 RX ORDER — AMIODARONE HYDROCHLORIDE 200 MG/1
200 TABLET ORAL ONCE
Status: DISCONTINUED | OUTPATIENT
Start: 2025-04-17 | End: 2025-04-17

## 2025-04-17 RX ORDER — PANTOPRAZOLE SODIUM 40 MG/1
40 TABLET, DELAYED RELEASE ORAL
Status: DISCONTINUED | OUTPATIENT
Start: 2025-04-18 | End: 2025-04-19 | Stop reason: HOSPADM

## 2025-04-17 RX ORDER — OLOPATADINE HYDROCHLORIDE 1 MG/ML
1 SOLUTION OPHTHALMIC 2 TIMES DAILY
COMMUNITY

## 2025-04-17 RX ORDER — KETOTIFEN FUMARATE 0.35 MG/ML
1 SOLUTION/ DROPS OPHTHALMIC 2 TIMES DAILY
Status: DISCONTINUED | OUTPATIENT
Start: 2025-04-17 | End: 2025-04-19 | Stop reason: HOSPADM

## 2025-04-17 RX ORDER — GABAPENTIN 300 MG/1
300 CAPSULE ORAL NIGHTLY
Status: DISCONTINUED | OUTPATIENT
Start: 2025-04-17 | End: 2025-04-19 | Stop reason: HOSPADM

## 2025-04-17 RX ORDER — METOPROLOL TARTRATE 25 MG/1
25 TABLET, FILM COATED ORAL 2 TIMES DAILY
Status: DISCONTINUED | OUTPATIENT
Start: 2025-04-17 | End: 2025-04-18

## 2025-04-17 RX ORDER — HEPARIN SODIUM 5000 [USP'U]/ML
5000 INJECTION, SOLUTION INTRAVENOUS; SUBCUTANEOUS EVERY 8 HOURS SCHEDULED
Status: DISCONTINUED | OUTPATIENT
Start: 2025-04-17 | End: 2025-04-19 | Stop reason: HOSPADM

## 2025-04-17 RX ORDER — ONDANSETRON 4 MG/1
4 TABLET, FILM COATED ORAL EVERY 8 HOURS PRN
Status: DISCONTINUED | OUTPATIENT
Start: 2025-04-17 | End: 2025-04-19 | Stop reason: HOSPADM

## 2025-04-17 RX ORDER — AMIODARONE HYDROCHLORIDE 200 MG/1
200 TABLET ORAL ONCE
Status: COMPLETED | OUTPATIENT
Start: 2025-04-17 | End: 2025-04-17

## 2025-04-17 RX ORDER — DULOXETIN HYDROCHLORIDE 30 MG/1
30 CAPSULE, DELAYED RELEASE ORAL DAILY
Status: DISCONTINUED | OUTPATIENT
Start: 2025-04-17 | End: 2025-04-19 | Stop reason: HOSPADM

## 2025-04-17 RX ORDER — ACETAMINOPHEN 325 MG/1
650 TABLET ORAL EVERY 4 HOURS PRN
Status: DISCONTINUED | OUTPATIENT
Start: 2025-04-17 | End: 2025-04-19 | Stop reason: HOSPADM

## 2025-04-17 RX ORDER — ROSUVASTATIN CALCIUM 20 MG/1
20 TABLET, COATED ORAL NIGHTLY
Status: DISCONTINUED | OUTPATIENT
Start: 2025-04-17 | End: 2025-04-19 | Stop reason: HOSPADM

## 2025-04-17 RX ORDER — LEVOTHYROXINE SODIUM 88 UG/1
88 TABLET ORAL DAILY
Status: DISCONTINUED | OUTPATIENT
Start: 2025-04-18 | End: 2025-04-19 | Stop reason: HOSPADM

## 2025-04-17 RX ADMIN — HEPARIN SODIUM 5000 UNITS: 5000 INJECTION INTRAVENOUS; SUBCUTANEOUS at 21:00

## 2025-04-17 RX ADMIN — AMIODARONE HYDROCHLORIDE 200 MG: 200 TABLET ORAL at 13:13

## 2025-04-17 RX ADMIN — FUROSEMIDE 40 MG: 10 INJECTION, SOLUTION INTRAMUSCULAR; INTRAVENOUS at 14:51

## 2025-04-17 RX ADMIN — METOPROLOL TARTRATE 25 MG: 25 TABLET, FILM COATED ORAL at 20:59

## 2025-04-17 RX ADMIN — DOCUSATE SODIUM 100 MG: 100 CAPSULE, LIQUID FILLED ORAL at 20:59

## 2025-04-17 RX ADMIN — GABAPENTIN 300 MG: 300 CAPSULE ORAL at 20:59

## 2025-04-17 RX ADMIN — IOHEXOL 75 ML: 350 INJECTION, SOLUTION INTRAVENOUS at 10:56

## 2025-04-17 RX ADMIN — FUROSEMIDE 40 MG: 10 INJECTION, SOLUTION INTRAMUSCULAR; INTRAVENOUS at 20:59

## 2025-04-17 RX ADMIN — ROSUVASTATIN CALCIUM 20 MG: 20 TABLET, FILM COATED ORAL at 20:59

## 2025-04-17 RX ADMIN — MAGNESIUM OXIDE 400 MG (241.3 MG MAGNESIUM) TABLET 400 MG: TABLET at 17:35

## 2025-04-17 RX ADMIN — HEPARIN SODIUM 5000 UNITS: 5000 INJECTION INTRAVENOUS; SUBCUTANEOUS at 14:51

## 2025-04-17 RX ADMIN — DULOXETINE 30 MG: 30 CAPSULE, DELAYED RELEASE ORAL at 17:35

## 2025-04-17 SDOH — SOCIAL STABILITY: SOCIAL INSECURITY: ABUSE: ADULT

## 2025-04-17 SDOH — SOCIAL STABILITY: SOCIAL INSECURITY: WITHIN THE LAST YEAR, HAVE YOU BEEN AFRAID OF YOUR PARTNER OR EX-PARTNER?: NO

## 2025-04-17 SDOH — ECONOMIC STABILITY: FOOD INSECURITY: WITHIN THE PAST 12 MONTHS, THE FOOD YOU BOUGHT JUST DIDN'T LAST AND YOU DIDN'T HAVE MONEY TO GET MORE.: NEVER TRUE

## 2025-04-17 SDOH — ECONOMIC STABILITY: INCOME INSECURITY: IN THE PAST 12 MONTHS HAS THE ELECTRIC, GAS, OIL, OR WATER COMPANY THREATENED TO SHUT OFF SERVICES IN YOUR HOME?: NO

## 2025-04-17 SDOH — ECONOMIC STABILITY: HOUSING INSECURITY: AT ANY TIME IN THE PAST 12 MONTHS, WERE YOU HOMELESS OR LIVING IN A SHELTER (INCLUDING NOW)?: NO

## 2025-04-17 SDOH — SOCIAL STABILITY: SOCIAL INSECURITY: WITHIN THE LAST YEAR, HAVE YOU BEEN HUMILIATED OR EMOTIONALLY ABUSED IN OTHER WAYS BY YOUR PARTNER OR EX-PARTNER?: NO

## 2025-04-17 SDOH — SOCIAL STABILITY: SOCIAL INSECURITY: WERE YOU ABLE TO COMPLETE ALL THE BEHAVIORAL HEALTH SCREENINGS?: YES

## 2025-04-17 SDOH — ECONOMIC STABILITY: FOOD INSECURITY: WITHIN THE PAST 12 MONTHS, YOU WORRIED THAT YOUR FOOD WOULD RUN OUT BEFORE YOU GOT THE MONEY TO BUY MORE.: NEVER TRUE

## 2025-04-17 SDOH — ECONOMIC STABILITY: FOOD INSECURITY: HOW HARD IS IT FOR YOU TO PAY FOR THE VERY BASICS LIKE FOOD, HOUSING, MEDICAL CARE, AND HEATING?: NOT HARD AT ALL

## 2025-04-17 SDOH — SOCIAL STABILITY: SOCIAL INSECURITY: DOES ANYONE TRY TO KEEP YOU FROM HAVING/CONTACTING OTHER FRIENDS OR DOING THINGS OUTSIDE YOUR HOME?: NO

## 2025-04-17 SDOH — ECONOMIC STABILITY: HOUSING INSECURITY: IN THE LAST 12 MONTHS, WAS THERE A TIME WHEN YOU WERE NOT ABLE TO PAY THE MORTGAGE OR RENT ON TIME?: NO

## 2025-04-17 SDOH — SOCIAL STABILITY: SOCIAL INSECURITY: DO YOU FEEL ANYONE HAS EXPLOITED OR TAKEN ADVANTAGE OF YOU FINANCIALLY OR OF YOUR PERSONAL PROPERTY?: NO

## 2025-04-17 SDOH — SOCIAL STABILITY: SOCIAL INSECURITY: ARE YOU OR HAVE YOU BEEN THREATENED OR ABUSED PHYSICALLY, EMOTIONALLY, OR SEXUALLY BY ANYONE?: NO

## 2025-04-17 SDOH — ECONOMIC STABILITY: HOUSING INSECURITY: IN THE PAST 12 MONTHS, HOW MANY TIMES HAVE YOU MOVED WHERE YOU WERE LIVING?: 0

## 2025-04-17 SDOH — SOCIAL STABILITY: SOCIAL INSECURITY: HAVE YOU HAD ANY THOUGHTS OF HARMING ANYONE ELSE?: NO

## 2025-04-17 SDOH — ECONOMIC STABILITY: TRANSPORTATION INSECURITY: IN THE PAST 12 MONTHS, HAS LACK OF TRANSPORTATION KEPT YOU FROM MEDICAL APPOINTMENTS OR FROM GETTING MEDICATIONS?: NO

## 2025-04-17 SDOH — SOCIAL STABILITY: SOCIAL INSECURITY: HAVE YOU HAD THOUGHTS OF HARMING ANYONE ELSE?: NO

## 2025-04-17 SDOH — SOCIAL STABILITY: SOCIAL INSECURITY: ARE THERE ANY APPARENT SIGNS OF INJURIES/BEHAVIORS THAT COULD BE RELATED TO ABUSE/NEGLECT?: NO

## 2025-04-17 SDOH — SOCIAL STABILITY: SOCIAL INSECURITY: DO YOU FEEL UNSAFE GOING BACK TO THE PLACE WHERE YOU ARE LIVING?: NO

## 2025-04-17 SDOH — SOCIAL STABILITY: SOCIAL INSECURITY: HAS ANYONE EVER THREATENED TO HURT YOUR FAMILY OR YOUR PETS?: NO

## 2025-04-17 ASSESSMENT — ACTIVITIES OF DAILY LIVING (ADL)
HEARING - RIGHT EAR: FUNCTIONAL
BATHING: INDEPENDENT
DRESSING YOURSELF: INDEPENDENT
JUDGMENT_ADEQUATE_SAFELY_COMPLETE_DAILY_ACTIVITIES: YES
GROOMING: INDEPENDENT
FEEDING YOURSELF: INDEPENDENT
LACK_OF_TRANSPORTATION: NO
TOILETING: INDEPENDENT
ADEQUATE_TO_COMPLETE_ADL: YES
HEARING - LEFT EAR: FUNCTIONAL
PATIENT'S MEMORY ADEQUATE TO SAFELY COMPLETE DAILY ACTIVITIES?: YES
WALKS IN HOME: INDEPENDENT

## 2025-04-17 ASSESSMENT — LIFESTYLE VARIABLES
HOW OFTEN DO YOU HAVE A DRINK CONTAINING ALCOHOL: NEVER
SKIP TO QUESTIONS 9-10: 1
AUDIT-C TOTAL SCORE: 0
AUDIT-C TOTAL SCORE: 0
HOW MANY STANDARD DRINKS CONTAINING ALCOHOL DO YOU HAVE ON A TYPICAL DAY: PATIENT DOES NOT DRINK
HOW OFTEN DO YOU HAVE 6 OR MORE DRINKS ON ONE OCCASION: NEVER

## 2025-04-17 ASSESSMENT — COGNITIVE AND FUNCTIONAL STATUS - GENERAL
CLIMB 3 TO 5 STEPS WITH RAILING: A LITTLE
PATIENT BASELINE BEDBOUND: NO
MOBILITY SCORE: 23
DAILY ACTIVITIY SCORE: 24

## 2025-04-17 ASSESSMENT — PAIN SCALES - GENERAL: PAINLEVEL_OUTOF10: 0 - NO PAIN

## 2025-04-17 ASSESSMENT — PAIN - FUNCTIONAL ASSESSMENT: PAIN_FUNCTIONAL_ASSESSMENT: 0-10

## 2025-04-17 ASSESSMENT — COLUMBIA-SUICIDE SEVERITY RATING SCALE - C-SSRS
1. IN THE PAST MONTH, HAVE YOU WISHED YOU WERE DEAD OR WISHED YOU COULD GO TO SLEEP AND NOT WAKE UP?: NO
2. HAVE YOU ACTUALLY HAD ANY THOUGHTS OF KILLING YOURSELF?: NO
6. HAVE YOU EVER DONE ANYTHING, STARTED TO DO ANYTHING, OR PREPARED TO DO ANYTHING TO END YOUR LIFE?: NO

## 2025-04-17 NOTE — Clinical Note
Advanced.  Referred To Plastics For Closure Text (Leave Blank If You Do Not Want): After obtaining clear surgical margins the patient was sent to plastics for surgical repair.  The patient understands they will receive post-surgical care and follow-up from the referring physician's office.

## 2025-04-17 NOTE — ED PROVIDER NOTES
HPI   Chief Complaint   Patient presents with    Shortness of Breath     SOB, hx of chf, afib       History provided by: Patient    Limitations to history: None    CC: Shortness of breath    HPI: 89-year-old female with a history of A-fib, CHF, CAD, hypertension, hyperlipidemia presents the emergency department to be evaluated for shortness of breath.  Patient states that she started feeling short of breath yesterday.  She states that shortness of breath is primarily seen with exertion and with lying down.  She states she was not able to sleep very well because of the shortness of breath.  She does report a productive cough with an unknown colored sputum.  She reports runny nose and congestion as well.  Denies fever chills or bodyaches.  Denies chest pain.  Denies nausea vomiting, diarrhea or constipation but she does report abdominal bloating.  Denies urgency, frequency, dysuria.  Denies blood in the urine or stool.  Patient does have a history of CAD without stent placement or bypass.  She is not sure when her last stress test or catheterization was.  She does have a history of atrial fibrillation however she is not anticoagulated due to the fact that she has had a Watchman device placed.  Patient takes bisoprolol and amiodarone however she has not taken her medications this morning.  She has a history of CHF and takes 20 mg Lasix daily.  Denies pain or swelling in the extremities or weight gain.  Denies history of DVTs or PEs.  Denies recent plane flights, recent surgeries, history of cancer, the use of estrogen.  Denies history of asthma the right use of breathing treatments.  Denies all other systemic symptoms.    ROS: Negative unless mentioned in HPI    Medical Hx: Allergy to lisinopril and sulfa drugs.  Immunizations up-to-date.    Physical exam:    Constitutional: Patient is well-nourished and well-developed.  Sitting comfortably in the room and in no distress.  Oriented to person, place, time, and  situation.    HEENT: Head is normocephalic, atraumatic. Patient's airway is patent.  Tympanic membranes are clear bilaterally.  Nasal mucosa clear.  Mouth with normal mucosa.  Throat is not erythematous and there are no oropharyngeal exudates, uvula is midline.  No obvious facial deformities.    Eyes: Clear bilaterally.  Pupils are equal round and reactive to light and accommodation.  Extraocular movements intact.      Cardiac: Tachycardia, irregular rhythm.  Heart sounds S1, S2.  No murmurs, rubs, or gallops.  PMI nondisplaced.  No JVD.    Respiratory: 95% on room air.  Intermittently tachypneic, speaking in short sentences.  No use of abdominal accessory muscles.  Breath sounds are clear and equal bilaterally, no adventitious lung sounds.     Gastrointestinal: Abdomen is soft, nondistended, and nontender.  There are no obvious deformities.  No rebound tenderness or guarding.  Bowel sounds are normal active.    Genitourinary: No CVA or flank tenderness.    Musculoskeletal: No reproducible tenderness.  No obvious skin or bony deformities.  Patient has equal range of motion in all extremities and no strength deficiencies.  No muscle or joint tenderness. No back or neck tenderness.  Capillary refill less than 3 seconds.  Strong peripheral pulses.  No sensory deficits.    Neurological: Patient is alert and oriented.  No focal deficits.  5/5 strength in all extremities.  Cranial nerves II through XII intact. GCS15.     Skin: Skin is normal color for race and is warm, dry, and intact.  No evidence of trauma.  No lesions, rashes, bruising, jaundice, or masses.    Psych: Appropriate mood and affect.  No apparent risk to self or others.    Heme/lymph: No adenopathy, lymphadenopathy, or splenomegaly    Physical exam is otherwise negative unless stated above or in history of present illness.              Patient History   Medical History[1]  Surgical History[2]  Family History[3]  Social History[4]    Physical Exam   ED  Triage Vitals [04/17/25 0932]   Temperature Heart Rate Resp BP   36.3 °C (97.3 °F) (!) 136 -- 104/57      SpO2 Temp Source Heart Rate Source Patient Position   -- Temporal Monitor --      BP Location FiO2 (%)     -- --       Physical Exam      ED Course & MDM   Diagnoses as of 04/17/25 1230   Shortness of breath   Atrial fibrillation with RVR (Multi)     Patient updated on plan for lab testing, IV insertion, radiology imaging, and medications to be administered while in the ER (if indicated). Patient updated on expected wait times for testing and results. Patient provided my name and told to ask any staff member for questions or concerns if they should arise. Electronic medical record reviewed.     MDM    Patient presented to the emergency department with the chief complaint shortness of breath. Intermittently tachypneic, speaking in short sentences.  No use of abdominal accessory muscles.  Breath sounds are clear and equal bilaterally, no adventitious lung sounds.  Patient's heart rate is tachycardic and irregular.  No peripheral edema or erythema.  On arrival to the emergency department, vital signs were significant for tachycardia.    Will obtain basic blood work, EKG and troponin, chest x-ray for an acute evaluation of the patient's lungs, CTA due to the clear breath sounds, tachycardia, and shortness of breath to evaluate for PE, BNP, PCR swabs, and CT abdomen and pelvis given the patient's abdominal bloating especially since we are planning on scanning the patient's chest anyways.    EKG was performed at 949 and interpreted by me.  Patient appears to have a flutter with rapid ventricular response 144 beats minute.  Left axis deviation.  Right bundle branch block noted.  No obvious ST elevation or depression.  No prolonged QT.    T wave inversion lead III, aVF, V1 through V3 likely rate dependent.  Patient's inversions have been seen in her previous EKGs.    Patient swabs are negative.  Troponin is 106, likely  rate dependent.  BNP is 1400 which is slightly decreased compared to her most previous.  CBC reveals no leukocytosis or anemia.  CMP reveals alk phos of 153, AST 54, ALT of 49.  CTA reveals mild pleural effusions and pulmonary edema but no pneumonia or PE.  CT abdomen pelvis reveals several chronic and incidental findings including gastritis but no sign of obstruction.        Patient's heart rate has overall decreased and will fluctuate between 85 and 110. Patient to be started on her oral amiodarone.    Repeat EKG before the amiodarone was performed at 1129 interpreted by me.  A-fib .  No ST elevation or depression.    Normal axis.  No prolonged QT.      I spoke to the hospitalist nurse practitioner is agreeable to admit the patient to the stepdown unit for further evaluation.  Patient verbalized understanding and agreement with the treatment plan the patient remained hemodynamically stable in the ER.    This note was dictated using a speech recognition program.  While an attempt was made at proof-reading to minimize errors, minor errors in transcription may be present            No data recorded     Julius Coma Scale Score: 15 (04/17/25 0934 : Marian Martinez RN)                           Medical Decision Making      Procedure  Procedures         [1]   Past Medical History:  Diagnosis Date    Diverticulitis of intestine, part unspecified, without perforation or abscess without bleeding     Diverticulitis    Personal history of other diseases of the circulatory system     History of hypertension    Personal history of other endocrine, nutritional and metabolic disease     History of hypercholesterolemia    Personal history of other endocrine, nutritional and metabolic disease     History of thyroid disease    Vitreomacular adhesion, left eye 12/04/2017    Vitreomacular traction syndrome of left eye    Vitreous degeneration, right eye 12/04/2017    Posterior vitreous detachment of right eye   [2]   Past  Surgical History:  Procedure Laterality Date    CARDIAC CATHETERIZATION N/A 8/9/2024    Procedure: Left And Right Heart Cath, With LV;  Surgeon: Stephon Squires MD;  Location: [facility];  Service: Cardiovascular;  Laterality: N/A;    CARDIAC CATHETERIZATION N/A 11/8/2024    Procedure: LAAO (Left Atrial Appendage Occlusion);  Surgeon: Liam Joyce MD;  Location: [place] [address] [facility];  Service: Cardiovascular;  Laterality: N/A;  same day CT 1000    CATARACT EXTRACTION  11/29/2016    Cataract Surgery    OTHER SURGICAL HISTORY  11/29/2016    Iridotomy By YAG Laser    OTHER SURGICAL HISTORY  11/29/2016    Discission Of Secondary Membranous Cataract By Laser   [3] No family history on file.  [4]   Social History  Tobacco Use    Smoking status: Former     Types: Cigarettes    Smokeless tobacco: Never   Substance Use Topics    Alcohol use: Never    Drug use: Never        Oracio Matson PA-C  04/17/25 123

## 2025-04-17 NOTE — H&P
"History Of Present Illness  Meryl Hester is a 89 y.o. female past medical history of diastolic heart failure, atrial fibs, watchman's device, CAD and hypothyroidism presents with increased shortness of breath and orthopnea. She went out to eat with her family for her birthday and her dyspnea on exertion was more than she typically experiences. She then went home to lay down and could not lay flat. She stated that she was extremely short of breath and needed to set up. She did not take her morning meds this morning because she \"just didn't feel good.\" She came to the hospital accompanied by her daughter who reports that she has noticed that her heart rate has been \"jumping\" around when they check it at home.     She denies chest pain, sick contacts, current upper respiratory symptoms, cough, abdominal pain or fever or chills. She had a BM today. She states no melena, bloody stools, nausea or vomiting. She does complain of feel \"bloated.\"     No increased edema in lower extremities noted.     We will give her dose of amiodorarone this AM as she missed the dosing this morning. She was admitted for Afib w/ RVR. On arrival her HR was in the 140s, now it has decreased to low 100s. She did no take her PO amio this morning. ER will order PO amio and we will give her IV lasix 40mg. We will admit to Hospital Medicine Step down unit for further evaluation and treatment.     CT scan r/o PE - negative for PE , lymphadenopathy 22mm increased   Ascending thoracic aorta 3.3cm   Biasilar effusions and compressive atelectasis   Nodular lesion Right lung irregular appearing 2.8cm recommend PET scan to rule out neoplasm and metastatic disease     CT abdomen: gallbladder wall thickening     Review of systems: 10 system were reviewed and were negative except what was mentioned in history of present illness       Past Medical History  She has a past medical history of Diverticulitis of intestine, part unspecified, without perforation or " abscess without bleeding, Personal history of other diseases of the circulatory system, Personal history of other endocrine, nutritional and metabolic disease, Personal history of other endocrine, nutritional and metabolic disease, Vitreomacular adhesion, left eye (12/04/2017), and Vitreous degeneration, right eye (12/04/2017).    Surgical History  She has a past surgical history that includes Other surgical history (11/29/2016); Cataract extraction (11/29/2016); Other surgical history (11/29/2016); Cardiac catheterization (N/A, 8/9/2024); and Cardiac catheterization (N/A, 11/8/2024).     Social History  She reports that she has quit smoking. Her smoking use included cigarettes. She has never used smokeless tobacco. She reports that she does not drink alcohol and does not use drugs.    Family History  Family History[1]     Allergies  Sulfa (sulfonamide antibiotics) and Lisinopril       Physical Exam     Last Recorded Vitals  /90   Pulse (!) 110   Temp 36.3 °C (97.3 °F) (Temporal)   Resp 20   Wt 63.5 kg (140 lb)   SpO2 95%     Relevant Results  Scheduled medications  Scheduled Medications[2]  Continuous medications  Continuous Medications[3]  PRN medications  PRN Medications[4]    Results for orders placed or performed during the hospital encounter of 04/17/25 (from the past 24 hours)   ECG 12 lead   Result Value Ref Range    Ventricular Rate 144 BPM    Atrial Rate 65 BPM    QRS Duration 138 ms    QT Interval 348 ms    QTC Calculation(Bazett) 538 ms    R Axis -37 degrees    T Axis -40 degrees    QRS Count 24 beats    Q Onset 224 ms    T Offset 398 ms    QTC Fredericia 465 ms   CBC and Auto Differential   Result Value Ref Range    WBC 7.6 4.4 - 11.3 x10*3/uL    nRBC 0.0 0.0 - 0.0 /100 WBCs    RBC 4.12 4.00 - 5.20 x10*6/uL    Hemoglobin 12.2 12.0 - 16.0 g/dL    Hematocrit 37.8 36.0 - 46.0 %    MCV 92 80 - 100 fL    MCH 29.6 26.0 - 34.0 pg    MCHC 32.3 32.0 - 36.0 g/dL    RDW 15.1 (H) 11.5 - 14.5 %     Platelets 368 150 - 450 x10*3/uL    Neutrophils % 81.5 40.0 - 80.0 %    Immature Granulocytes %, Automated 0.4 0.0 - 0.9 %    Lymphocytes % 13.6 13.0 - 44.0 %    Monocytes % 3.4 2.0 - 10.0 %    Eosinophils % 0.4 0.0 - 6.0 %    Basophils % 0.7 0.0 - 2.0 %    Neutrophils Absolute 6.15 (H) 1.60 - 5.50 x10*3/uL    Immature Granulocytes Absolute, Automated 0.03 0.00 - 0.50 x10*3/uL    Lymphocytes Absolute 1.03 0.80 - 3.00 x10*3/uL    Monocytes Absolute 0.26 0.05 - 0.80 x10*3/uL    Eosinophils Absolute 0.03 0.00 - 0.40 x10*3/uL    Basophils Absolute 0.05 0.00 - 0.10 x10*3/uL   Comprehensive metabolic panel   Result Value Ref Range    Glucose 178 (H) 74 - 99 mg/dL    Sodium 134 (L) 136 - 145 mmol/L    Potassium 4.5 3.5 - 5.3 mmol/L    Chloride 101 98 - 107 mmol/L    Bicarbonate 25 21 - 32 mmol/L    Anion Gap 13 10 - 20 mmol/L    Urea Nitrogen 20 6 - 23 mg/dL    Creatinine 0.88 0.50 - 1.05 mg/dL    eGFR 61 >60 mL/min/1.73m*2    Calcium 9.4 8.6 - 10.3 mg/dL    Albumin 4.4 3.4 - 5.0 g/dL    Alkaline Phosphatase 153 (H) 33 - 136 U/L    Total Protein 7.1 6.4 - 8.2 g/dL    AST 54 (H) 9 - 39 U/L    Bilirubin, Total 0.9 0.0 - 1.2 mg/dL    ALT 49 (H) 7 - 45 U/L   B-Type Natriuretic Peptide   Result Value Ref Range    BNP 1,434 (H) 0 - 99 pg/mL   Sars-CoV-2 and Influenza A/B PCR   Result Value Ref Range    Flu A Result Not Detected Not Detected    Flu B Result Not Detected Not Detected    Coronavirus 2019, PCR Not Detected Not Detected   RSV PCR   Result Value Ref Range    RSV PCR Not Detected Not Detected   Troponin I, High Sensitivity, Initial   Result Value Ref Range    Troponin I, High Sensitivity 106 (HH) 0 - 13 ng/L   Coagulation Screen   Result Value Ref Range    Protime 12.1 9.8 - 12.4 seconds    INR 1.1 0.9 - 1.1    aPTT 29 26 - 36 seconds   Troponin, High Sensitivity, 1 Hour   Result Value Ref Range    Troponin I, High Sensitivity 92 (HH) 0 - 13 ng/L   ECG 12 lead   Result Value Ref Range    Ventricular Rate 131 BPM     "Atrial Rate 115 BPM    QRS Duration 138 ms    QT Interval 356 ms    QTC Calculation(Bazett) 525 ms    R Axis -35 degrees    T Axis -34 degrees    QRS Count 22 beats    Q Onset 224 ms    T Offset 402 ms    QTC Fredericia 462 ms        Assessment/Plan   Assessment & Plan      # Atrial Fib with RVR  # Watchman Device  # Acute on Chronic Diastolic HF exacerbation   # Dyspnea with exertion   # Elevated Troponin    Admit to hospital medicine  Consult Cardiology for further recommendations  ECG in AM and troponin   Consult EP   Telemetry   Continue DAPT until 6 months s/p Watchman    Trend trop  Keep mag > 2 and K> 4, check TSH   IV lasix 40mg x 1 dose , BNP 1434   Daily weight, strict intake and output   ECHO LVEF 40%   Consider Amio drip if HR stays over 130 \  PT/OT     # Hypothyroidism  Continue home medications   Tsh pending     # Probable Gastritis  Add PPI  Pt complains of \"bloating\"   Denies N/V   CT shows wall thickening  Gall bladder wall thickening possible fluid overload  Escalate if needed   Pt appears comfortable at this time    # Pulmonary  Nodules on CT scan   Need to follow up with PET scan outpatient  Nodular lesion Right lung irregular appearing 2.8cm recommend PET scan to rule out neoplasm and metastatic disease       DVTp: Hep subcutaneous  DNR DNI - confirmed w/ daughter and patient   Disposition: home TBD  Diet: Cardiac     Time spent  56  minutes obtaining labs, imaging, recommendations, interview, assessment, examination, medication review/ordering, and EMR review.    Plan of care was discussed extensively with patient and daughter . Patient verbalized understanding through teach back method. All questions and concerns addressed upon examination.     Of note, this documentation is completed using the Dragon Dictation system (voice recognition software). There may be spelling and/or grammatical errors that were not corrected prior to final submission.      Elda Jamil, APRN-CNP         [1] No " family history on file.  [2] furosemide, 40 mg, intravenous, Once  heparin (porcine), 5,000 Units, subcutaneous, q8h LIVAN  polyethylene glycol, 17 g, oral, Daily  [3]    [4] PRN medications: acetaminophen **OR** acetaminophen **OR** acetaminophen, acetaminophen **OR** acetaminophen **OR** acetaminophen, benzocaine-menthol, ondansetron **OR** ondansetron, oxygen

## 2025-04-17 NOTE — Clinical Note
The LEAD, ATTAIN PERFORMA, OTW, 88CM [facility] 4598 - [id number] device was inserted. The leads were placed into the connector and visually verified to be in correct position. Injury current obtained.

## 2025-04-17 NOTE — CONSULTS
Inpatient consult to Cardiology  Consult performed by: RICHY Moffett-CNP  Consult ordered by: RICHY Levy-CNP  Reason for consult: Atrial fibrillation  Assessment/Recommendations:   Plan recommendation    Patient was readmitted again for shortness of breath.    She was found to be in atrial fibrillation right ventricular response.  Recurrence of this arrhythmia  Patient is on high risk medication (amiodarone).    We will discussed with patient and family member regarding option of CRT device placement and AV node ablation to prevent this arrhythmia.  She has significant heart failure symptoms every time that she was into A-fib with RVR  Patient should be n.p.o. for tomorrow  .  She does have a Watchman device implanted.  EKG daily  Continue telemetry    Risk factor modification and lifestyle modification discussed with patient. Diet , exercise and hydration discussed with patient.    Please excuse any errors in grammar or translation related to this dictation.  Voice recognition software was utilized to prepare this document.        [facility] Cardiology Consult Note      Date:   4/17/2025  Patient name:  Meryl Hester  Date of admission:  4/17/2025  9:36 AM  MRN:   [id number]  YOB: 1931  Time of Consult:  3:13 PM    Consulting Cardiologist: RICHY Beltran, CNP  Primary Cardiologist:  Dr. Moe Lane  / Dr. Corona     Admission Diagnosis:     Shortness of breath      History of Present Illness:      Meryl Hester is a 89 y.o.  female patient who is being at the request of Dr. Gray for inpatient consultation of  atrial fibrillation . She was admitted on 4/17/2025.  Previous [facility] and [facility] records have been reviewed in detail.      Patient presented to [facility] emergency department on 4/17/2025 with chief complaint of shortness of breath.  She describes that she has been experiencing increased levels of shortness of breath  since Sunday.  She was able to go out to dinner for her birthday on [birth year] but had dyspnea on exertion walking to the restaurant.  She notes that she was restless overnight and unable to sleep because her breathing was difficult and she felt bloated.  She has no palpitations or chest pain.  She denies lower extremity edema or syncopal episodes.  No fevers or chills.  Feels that she has a lot of thick mucus in her throat from postnasal drip.     EKG in emergency room she is found to be in atrial fibrillation with rapid ventricular response 144 bpm, right bundle branch block, QT/QTc 348/538 ms.  Chest x-ray showed stable appearing examination and no acute process.  CT angio chest for pulmonary embolism shows pulmonary edema with small bilateral basal pleural effusions and scattered nodular lesions in particular with the right lung largest measuring 2.8 cm as seen on prior examination.  Laboratory studies BNP 1434, high-sensitivity troponin 106, 92, and 105.  TSH 4.07.  Negative PCR for influenza A, B, and coronavirus, and RSV.    Patient is admitted to medicine service.  She was given amiodarone 200 mg oral x 1 and 40 mg IV Lasix.  She states that at this time feels a little bit better but her breathing is not yet back to baseline.  She has noted to have recent admission in March 2025 for acute on chronic systolic/diastolic heart failure and recurrent atrial fibrillation and underwent KARINA guided cardioversion on 3/18/2025.  She follows with Dr. Corona general cardiology perspective who feels that patient would not be a good candidate for TAVR due to frailty.  Per Dr. Corona's review KARINA performed 3/18/2025 he feels that ejection fraction yesterday likely 35 to 40% as procedure was performed while patient was still in atrial fibrillation.    Past Medical History  Hypertension  Longstanding persistent atrial fibrillation managed with amiodarone and bisoprolol  Hypertension  November 2024 watchman implantation  Pulmonary  hypertension  Coronary artery disease August 2024 coronary angiography/right heart catheterization: LM-less than 10%, LAD-50-60%, CX nondominant.  Proximal ramus 90% very small vessel.  RCA 40-50%. pulmonary artery pressure 43 over 16 mmHg  Moderate to severe aortic stenosis  Chronic systolic/diastolic heart failure, [place] heart association class II  Ischemic cardiomyopathy  Status post cardioversion 3/18/2025  CVA February 2024  Severe/diffuse diverticulosis with recurrent GI bleeding  Hypothyroidism    Previous Cardiac testing   3/18/2025 transesophageal echocardiogram  CONCLUSIONS:   1. The left ventricular systolic function is severely decreased, with a visually estimated ejection fraction of 20-25%.   2. There is global hypokinesis of the left ventricle with minor regional variations.   3. Left ventricular diastolic filling was indeterminate.   4. Left ventricular cavity size is mildly dilated.   5. There is severely reduced right ventricular systolic function.   6. Mildly enlarged right ventricle.   7. The left atrial size is moderate to severely dilated.   8. S/p Watchman device that appears well-seated with no thrombus or peridevice vanessa.   9. The right atrial size is moderately dilated.  10. The mitral valve is moderately thickened.  11. Moderate mitral valve regurgitation.  12. Moderate tricuspid regurgitation.  13. Mild aortic valve regurgitation.  14. No left atrial thrombus.  15. Small patent foramen ovale.  16. There is plaque visualized in the descending aorta.    3/15/2025 transthoracic echocardiogram  CONCLUSIONS:   1. The left ventricular systolic function is moderately decreased, with a visually estimated ejection fraction of 40%.   2. There is global hypokinesis of the left ventricle with minor regional variations.   3. Spectral Doppler shows an abnormal pattern of left ventricular diastolic filling.   4. In comparison study of August 2024 EF has decreased previously 45% [id number] comparison  clearly there has been a deterioration. However atrial fibrillation is currently present prior study was in sinus rhythm heart rates frequently over 100 affecting calculation of ejection fraction.   5. There is normal right ventricular global systolic function.   6. RVSP 46 mmHg.   7. The left atrial size is moderately dilated.   8. Trace MR.   9. Moderate tricuspid regurgitation.  10. Tricuspid aortic valve with diffuse thickening of leaflet cusps and moderate impairment of leaflet mobility. There is trace aortic insufficiency. There is moderate-severe aortic stenosis V-max 2 m/s peak/mean gradients 16 and 9 mmHg. Calculated valve area approximately 1 cmï¿½ visually appears perhaps slightly greater than that; was calculated at 1.2 cm in August. There may have been some progression of stenosis again calculations not as accurate in the setting of rapid atrial fibrillation.     11/8/2024 left atrial appendage occlusion with 27 mm Watchman device     8/9/2024 right and left heart catheterization  CONCLUSIONS:   1. The entire Left Main: <10% stenosis.   2. Proximal LAD Lesion: The percent stenosis is 60%.   3. Mid LAD Lesion: The percent stenosis is 50%.   4. Prox Ramus CX Lesion: The percent stenosis is 90%,small in size.   5. Proximal and mid RCA Lesion: The percent stenosis is 40%.   6. Distal RCA Lesion: The percent stenosis is 50%.   7. Right atrial mean pressure 4 mmHg.   8. Right ventricular pressure 39/7 mmHg.   9. Pulmonary artery pressure 43/16 mmHg, mean pressure 27 mmHg.  10. Pulmonary capillary wedge mean pressure 10 mmHg.  11. Cardiac output 6.3 L/min by Daniel method.  12. Cardiac index 3.9 L/min/mï¿½ by Daniel method.  13. The Left Ventricular Ejection Fraction is 40%.     Echocardiogram November 2024  CONCLUSIONS:   1. Poorly visualized anatomical structures due to suboptimal image quality.   2. Left ventricular ejection fraction is low normal, by visual estimate at 50-55%.   3. Abnormal left venticular  wall motion.   4. There is reduced right ventricular systolic function.   5. The left atrium is enlarged.     Holter monitor September 2024  Impression  1.  Patient with symptoms correlating to PACs but very infrequent symptoms  2.  Predominantly sinus rhythm with average heart rate 74 bpm normal heart rate variability  3.  1 to 2% burden atrial fibrillation.  Computer reading had identified 50 episodes of SVT but on review of tracings most consistent with atrial fibrillation longest atrial fibrillation was 1 hour with an average rate of 99.  4.  2 episodes of probable ventricular tachycardia with wide-complex minimally irregular rates 5-10 beats.  Cannot exclude A-fib with aberrancy  5.  No evidence of heart block       Allergies:     Allergies[1]      Past Medical History:     Medical History[2]    Past Surgical History:     Surgical History[3]  Back surgery    Family History:     Family History[4]    Social History:     Social History[5]    CURRENT INPATIENT MEDICATIONS    Scheduled Medications[6]  Continuous Medications[7]  Current Outpatient Medications   Medication Instructions    acetaminophen (TYLENOL) 650 mg, oral, Every 6 hours PRN    amiodarone (PACERONE) 200 mg, oral, Daily    aspirin 81 mg, oral, Daily    bisoprolol (ZEBETA) 10 mg, oral, Daily    calcium carbonate-vitamin D3 500 mg-5 mcg (200 unit) tablet 1 tablet, Daily    carboxymethylcellulose (THERATears) 0.25 % ophthalmic solution 1 drop, 3 times daily PRN    cetirizine (ALLERGY RELIEF (CETIRIZINE)) 10 mg, Daily    clopidogrel (PLAVIX) 75 mg, oral, Daily    docusate sodium (COLACE) 100 mg, 2 times daily    DULoxetine (CYMBALTA) 30 mg, Daily RT    fluticasone (Flonase) 50 mcg/actuation nasal spray 1 spray, Daily PRN    furosemide (LASIX) 20 mg, oral, Daily RT    gabapentin (NEURONTIN) 300 mg, Nightly    krill oil 500 mg, 2 times daily    LACTOBACILLUS ACIDOPHILUS ORAL 1 tablet, Daily    levothyroxine (SYNTHROID, LEVOXYL) 88 mcg, Daily RT    losartan  (COZAAR) 75 mg, oral, Daily    magnesium oxide 500 mg, Daily    multivitamin with minerals tablet 1 tablet, Daily    mv-min-FA-vit K-lutein-zeaxant (PreserVision AREDS 2 Plus MV) 200 mcg-15 mcg- 5 mg-1 mg capsule 1 capsule, 2 times daily    olopatadine (Patanol) 0.1 % ophthalmic solution 1 drop, 2 times daily    omeprazole (PRILOSEC) 40 mg, Daily RT    potassium chloride CR 20 mEq ER tablet 20 mEq, oral, Daily RT    rosuvastatin (CRESTOR) 20 mg, oral, Nightly        Review of Systems:      12 point review of systems was obtained in detail and is negative other than that detailed above.    Vital Signs:     Vitals:    04/17/25 1210 04/17/25 1309 04/17/25 1339 04/17/25 1430   BP: 139/90 139/89 (!) 163/120 (!) 137/95   Pulse: (!) 110 (!) 122 (!) 124    Resp: 20 20 20 20   Temp:    36.4 °C (97.5 °F)   TempSrc:    Temporal   SpO2: 95% 95% 95% 93%   Weight:       Height:         No intake or output data in the 24 hours ending 04/17/25 1513    Wt Readings from Last 4 Encounters:   04/17/25 63.5 kg (140 lb)   04/01/25 63.6 kg (140 lb 3.2 oz)   03/18/25 63.8 kg (140 lb 10.5 oz)   01/23/25 62.1 kg (136 lb 12.8 oz)       Physical Examination:     GENERAL APPEARANCE: Well developed, well nourished appearing elderly female, in no acute distress.  Able to answer questions in full sentences without difficulty  CHEST: Symmetric and non-tender.  INTEGUMENT: Skin warm and dry, without gross excoriationis or lesions.  HEENT: No gross abnormalities of conjunctiva, poor dentition   NECK: Supple, no JVD, no bruit. Thyroid not palpable. Carotid upstrokes normal.  NEURO/PSHCY: Alert and oriented x3; appropriate behavior and responses. Moves all extremities equally.  LUNGS: Bibasilar rales left greater than right; normal respiratory effort.  HEART: Irregularly irregular, grade 2/6 systolic murmur.  ABDOMEN: Soft, nontender, positive bowel sounds.   MUSCULOSKELETAL: Normal range of motion.  EXTREMITIES: Warm with good color, no clubbing or  cyanois. There is no edema noted.  PERIPHERAL VASCULAR: 2+ radial pulse and feet warm to touch bilateral.     Lab:     CBC:   Results from last 7 days   Lab Units 04/17/25  1007   WBC AUTO x10*3/uL 7.6   RBC AUTO x10*6/uL 4.12   HEMOGLOBIN g/dL 12.2   HEMATOCRIT % 37.8   MCV fL 92   MCH pg 29.6   MCHC g/dL 32.3   RDW % 15.1*   PLATELETS AUTO x10*3/uL 368     CMP:    Results from last 7 days   Lab Units 04/17/25  1007   SODIUM mmol/L 134*   POTASSIUM mmol/L 4.5   CHLORIDE mmol/L 101   CO2 mmol/L 25   BUN mg/dL 20   CREATININE mg/dL 0.88   GLUCOSE mg/dL 178*   PROTEIN TOTAL g/dL 7.1   CALCIUM mg/dL 9.4   BILIRUBIN TOTAL mg/dL 0.9   ALK PHOS U/L 153*   AST U/L 54*   ALT U/L 49*     BMP:    Results from last 7 days   Lab Units 04/17/25  1007   SODIUM mmol/L 134*   POTASSIUM mmol/L 4.5   CHLORIDE mmol/L 101   CO2 mmol/L 25   BUN mg/dL 20   CREATININE mg/dL 0.88   CALCIUM mg/dL 9.4   GLUCOSE mg/dL 178*     Magnesium:    Troponin:    Results from last 7 days   Lab Units 04/17/25  1108 04/17/25  1007   TROPHS ng/L 92* 106*     BNP:   Results from last 7 days   Lab Units 04/17/25  1007   BNP pg/mL 1,434*     Lipid Panel:         Diagnostic Studies:   @No results found for this or any previous visit.    ECG 12 lead  Result Date: 4/17/2025  Atrial fibrillation with rapid ventricular response Left axis deviation Right bundle branch block T wave abnormality, consider inferior ischemia Abnormal ECG When compared with ECG of 17-APR-2025 11:26, (unconfirmed) MANUAL COMPARISON REQUIRED PREVIOUS ECG IS INCOMPATIBLE See ED provider note for full interpretation and clinical correlation Confirmed by Leandra Woodward ([id number]) on 4/17/2025 12:29:34 PM    CT abdomen pelvis w IV contrast  Result Date: 4/17/2025  Interpreted By:  Janet Kirby, STUDY: CT ABDOMEN PELVIS W IV CONTRAST; ;  4/17/2025 11:03 am   INDICATION: Signs/Symptoms:bloating.     COMPARISON: None.   ACCESSION NUMBER(S): [id number]   ORDERING CLINICIAN: JOHNATHON BUSTAMANTE    TECHNIQUE: Serial axial CT images obtained of the abdomen and pelvis following intravenous administration of the 75 mL of Omnipaque 350. Images reformatted in the coronal and sagittal projection.   All CT examinations are performed with 1 or more of the following dose reduction techniques: Automated exposure control, adjustment of mA and/or kv according to patient's size, or use of iterative reconstruction techniques.   FINDINGS: Included lung bases evaluated on CT chest performed at the same time   Liver is unremarkable.   Spleen is unremarkable   Adrenal glands are unremarkable   Gallbladder demonstrates findings suggesting wall thickening nonspecific and can be seen with multiple etiologies including hypoproteinemia or even fluid overload. No cholelithiasis demonstrated.   Pancreas is atrophic.   Right kidney demonstrates areas of renal cortical scar   Left kidney is unremarkable   Retroperitoneum demonstrates diffuse vascular calcification of the abdominal aorta. There is a no lymphadenopathy.   Loops of large bowel demonstrates uncomplicated colonic diverticulosis with diffuse descending and sigmoid diverticulosis demonstrated. Appendix is seen and is unremarkable. Small bowel loops are fluid and gas-filled and nondilated. Some loops of small bowel are distended measuring up to 2.2 cm. However, no dilatation. Stomach is contracted limiting characterization with component of wall thickening suggested within the gastric body. Correlate with patient's symptoms and concern for underlying gastritis.   CT pelvis:   Unopacified bladder is unremarkable. There is no pelvic lymphadenopathy. Subtle free fluid in the lower pelvis. Uterus and adnexa are unremarkable.   Visualized osseous structures demonstrate multilevel vacuum disc phenomena within the visualized thoracolumbar spine with multilevel endplate sclerosis as well as anterior and posterior disc osteophyte complex. There is a severe facet arthropathy lower lumbar  spine. Moderate narrowing thecal sac L4/5 with mild narrowing L3/4.               1. Stomach is contracted limiting characterization with suggestion of wall thickening within the gastric body. Correlate with underlying symptoms of gastritis   2. No significant gaseous or fluid dilatation of the loops of large or small bowel with distention of the few loops of small bowel nonspecific.   3. Gallbladder wall thickening nonspecific and can be seen with multiple etiologies including hypoproteinemia or even fluid overload.   4. Uncomplicated diffuse descending and sigmoid diverticulosis.     MACRO: None   Signed by: Janet Kirby 4/17/2025 11:52 AM Dictation workstation:   [id number]    CT angio chest for pulmonary embolism  Result Date: 4/17/2025  Interpreted By:  Janet Kirby, STUDY: CT ANGIO CHEST FOR PULMONARY EMBOLISM; ;  4/17/2025 11:03 am   INDICATION: Signs/Symptoms:SOB.     COMPARISON: None.   ACCESSION NUMBER(S): [id number]   ORDERING CLINICIAN: JOHNATHON BUSTAMANTE   TECHNIQUE: CT angiogram performed through the chest following intravenous administration of 75 mL of Omnipaque 350. MIP reconstruction imaging performed in the coronal and sagittal projection   All CT examinations are performed with 1 or more of the following dose reduction techniques: Automated exposure control, adjustment of mA and/or kv according to patient's size, or use of iterative reconstruction techniques.   FINDINGS: Mediastinum demonstrates paratracheal and AP window lymphadenopathy. For example, pretracheal lymphadenopathy identified measuring 10 mm in the short axis. Also, component of AP window lymphadenopathy identified measuring 11 mm in the short axis intervally increased from prior imaging. Also, there is subcarinal lymphadenopathy measuring 14 mm in the short axis. No hilar lymphadenopathy. Esophagus is unremarkable   Heart and great vessels demonstrate ascending thoracic aorta to measure 3.3 cm which is within normal limits.  Moderate vascular calcification of the thoracic aorta. Main pulmonary artery measures 3.5 cm which is enlarged. Central and segmental pulmonary arteries demonstrate no filling defects to suggest the possibility of pulmonary embolus   Lung parenchyma demonstrates small bilateral basilar effusions and compressive atelectasis. There is component of geographic attenuation lung parenchyma which can be seen with pulmonary edema with areas of septal thickening demonstrated which likely relates to chronic interstitial change. There is a nodular density along the left midlung anteriorly as seen on prior imaging measuring 1.3 cm also, there is a irregular appearing nodular lesion in the right lower lobe posteriorly measuring 2.8 cm as seen on the prior examination. Correlate with prior workup nodular lesion right infrahilar location identified measures 1.6 cm appearing unchanged. Scattered bulla noted.   Included portions visualized abdomen demonstrates reflux of contrast into the IVC with component of fluid overload suggested.   Visualized osseous structures demonstrate exaggerated thoracic kyphosis with multilevel anterior and lateral osteophyte formation mid to lower thoracic spine with multilevel vacuum phenomenon lower thoracic spine. No compression deformity demonstrated.               1. Geographic attenuation with component of pulmonary edema with small bilateral basilar pleural effusions demonstrated   2. Scattered nodular lesions in particular within the right lung largest measuring 2.8 cm as seen on the prior examination. Further workup including PET scan recommended with underlying neoplasm/metastatic disease not excluded. Correlate with prior workup.       MACRO: None   Signed by: Janet Kirby 4/17/2025 11:43 AM Dictation workstation:   [id number]    ECG 12 lead  Result Date: 4/17/2025  Atrial fibrillation with rapid ventricular response Left axis deviation Right bundle branch block Minimal voltage criteria  for LVH, may be normal variant Septal infarct , age undetermined Abnormal ECG When compared with ECG of 19-MAR-2025 06:33, Atrial fibrillation has replaced Sinus rhythm Vent. rate has increased BY  86 BPM Septal infarct is now Present See ED provider note for full interpretation and clinical correlation Confirmed by Leandra Woodward ([id number]) on 4/17/2025 10:29:03 AM    XR chest 1 view  Result Date: 4/17/2025  Interpreted By:  Kofi Stoll, STUDY: XR CHEST 1 VIEW; 4/17/2025 10:09 am   INDICATION: Signs/Symptoms:SOB.   COMPARISON: 03/14/2025   ACCESSION NUMBER(S): [id number]   ORDERING CLINICIAN: JOHNATHON BUSTAMANTE   TECHNIQUE: 1 view of the chest was performed.   FINDINGS: The lungs are adequately inflated. No significant interval change. Prominent interstitial lung markings and reticulations are again seen consistent with mild chronic interstitial lung disease. No acute consolidation. No pleural effusion. No pneumothorax.  The cardiomediastinal silhouette is mildly enlarged but stable.       Stable appearing examination. No acute cardiopulmonary disease.   Signed by: Kofi Stoll 4/17/2025 10:23 AM Dictation workstation:   [id number]      No echocardiogram results found for the past 14 days    Radiology:     CT angio chest for pulmonary embolism   Final Result   1. Geographic attenuation with component of pulmonary edema with   small bilateral basilar pleural effusions demonstrated        2. Scattered nodular lesions in particular within the right lung   largest measuring 2.8 cm as seen on the prior examination. Further   workup including PET scan recommended with underlying   neoplasm/metastatic disease not excluded. Correlate with prior workup.                  MACRO:   None        Signed by: Janet Kirby 4/17/2025 11:43 AM   Dictation workstation:   [id number]      CT abdomen pelvis w IV contrast   Final Result   1. Stomach is contracted limiting characterization with suggestion of   wall thickening  within the gastric body. Correlate with underlying   symptoms of gastritis        2. No significant gaseous or fluid dilatation of the loops of large   or small bowel with distention of the few loops of small bowel   nonspecific.        3. Gallbladder wall thickening nonspecific and can be seen with   multiple etiologies including hypoproteinemia or even fluid overload.        4. Uncomplicated diffuse descending and sigmoid diverticulosis.             MACRO:   None        Signed by: Janet Kirby 4/17/2025 11:52 AM   Dictation workstation:   [id number]      XR chest 1 view   Final Result   Stable appearing examination. No acute cardiopulmonary disease.        Signed by: Kofi Stoll 4/17/2025 10:23 AM   Dictation workstation:   [id number]          Problem List:     Problem List[8]    Assessment:     Persistent atrial fibrillation with RVR managed with amiodarone and bisoprolol  Acute on chronic systolic/diastolic heart failure with a EF 35 to 40% per KARINA March 2025  Flat pattern elevated troponin dialysis, type II MI likely secondary to A-fib with RVR  Coronary artery disease, no recent angina  Status post LAAO with Watchman device November 2024 currently on dual antiplatelet therapy with aspirin Plavix  Moderate to severe aortic stenosis  Pulmonary nodules on CT scan being addressed by primary service      Plan:     Continue telemetry  Continue diuresis, patient on 40 mg IV Lasix x 1 dose this evening and will defer further diuretic access recommendations to general cardiology  Continue amiodarone  Add metoprolol 25 mg twice daily for rate control for now (takes daily bisoprolol prior to admission)  Monitor strict intake output, daily weight, electrolytes and renal function  Final EP recommendation per Dr. Lane, will keep NPO after midnight  pending Dr Lane recommendations  Katharina Hendrickson, APRN-CNP  [facility]  [facility]      Of note, this  documentation is completed using the Dragon Dictation system (voice recognition software). There may be spelling and/or grammatical errors that were not corrected prior to final submission.      Electronically signed by LEONOR Moffett, on 4/17/2025 at 3:13 PM       [1]   Allergies  Allergen Reactions    Sulfa (Sulfonamide Antibiotics) Rash     Skin peeling     Lisinopril Cough   [2]   Past Medical History:  Diagnosis Date    Diverticulitis of intestine, part unspecified, without perforation or abscess without bleeding     Diverticulitis    Personal history of other diseases of the circulatory system     History of hypertension    Personal history of other endocrine, nutritional and metabolic disease     History of hypercholesterolemia    Personal history of other endocrine, nutritional and metabolic disease     History of thyroid disease    Vitreomacular adhesion, left eye 12/04/2017    Vitreomacular traction syndrome of left eye    Vitreous degeneration, right eye 12/04/2017    Posterior vitreous detachment of right eye   [3]   Past Surgical History:  Procedure Laterality Date    CARDIAC CATHETERIZATION N/A 8/9/2024    Procedure: Left And Right Heart Cath, With LV;  Surgeon: Stephon Squires MD;  Location: [facility];  Service: Cardiovascular;  Laterality: N/A;    CARDIAC CATHETERIZATION N/A 11/8/2024    Procedure: LAAO (Left Atrial Appendage Occlusion);  Surgeon: Liam Joyce MD;  Location: [place] [address] [facility];  Service: Cardiovascular;  Laterality: N/A;  same day CT 1000    CATARACT EXTRACTION  11/29/2016    Cataract Surgery    OTHER SURGICAL HISTORY  11/29/2016    Iridotomy By YAG Laser    OTHER SURGICAL HISTORY  11/29/2016    Discission Of Secondary Membranous Cataract By Laser   [4] No family history on file.  [5]   Social History  Tobacco Use    Smoking status: Former     Types: Cigarettes    Smokeless tobacco: Never   Substance Use Topics    Alcohol use: Never    Drug  use: Never   [6] heparin (porcine), 5,000 Units, subcutaneous, q8h LIVAN  polyethylene glycol, 17 g, oral, Daily  [7]    [8]   Patient Active Problem List  Diagnosis    Radiculopathy of lumbar region    Joint stiffness of spine    Gait difficulty    Syncope and collapse    Syncope, unspecified syncope type    NSTEMI (non-ST elevated myocardial infarction) (Multi)    Pulmonary hypertension (Multi)    Acute systolic CHF (congestive heart failure), NYHA class 2    Cardiomyopathy, ischemic    Coronary artery disease involving native coronary artery of native heart without angina pectoris    Paroxysmal atrial fibrillation with RVR (Multi)    Hypertension    Atrial fibrillation (Multi)    Presence of Watchman left atrial appendage closure device    Atrial fibrillation, unspecified type (Multi)    Acute on chronic diastolic heart failure    Type 2 MI (myocardial infarction) (Multi)    Acquired hypothyroidism    Acute hypoxic respiratory failure    Myocardiopathy (Multi)    Shortness of breath

## 2025-04-18 ENCOUNTER — APPOINTMENT (OUTPATIENT)
Dept: CARDIOLOGY | Facility: HOSPITAL | Age: OVER 89
DRG: 242 | End: 2025-04-18
Payer: MEDICARE

## 2025-04-18 ENCOUNTER — SURGERY (OUTPATIENT)
Age: OVER 89
End: 2025-04-18
Payer: MEDICARE

## 2025-04-18 ENCOUNTER — ANESTHESIA EVENT (OUTPATIENT)
Dept: CARDIOLOGY | Facility: HOSPITAL | Age: OVER 89
End: 2025-04-18
Payer: MEDICARE

## 2025-04-18 ENCOUNTER — APPOINTMENT (OUTPATIENT)
Dept: RADIOLOGY | Facility: HOSPITAL | Age: OVER 89
DRG: 242 | End: 2025-04-18
Payer: MEDICARE

## 2025-04-18 ENCOUNTER — ANESTHESIA (OUTPATIENT)
Dept: CARDIOLOGY | Facility: HOSPITAL | Age: OVER 89
End: 2025-04-18
Payer: MEDICARE

## 2025-04-18 PROBLEM — I48.91 ATRIAL FIBRILLATION WITH RVR (MULTI): Status: ACTIVE | Noted: 2025-04-17

## 2025-04-18 LAB
ANION GAP SERPL CALC-SCNC: 12 MMOL/L (ref 10–20)
BUN SERPL-MCNC: 19 MG/DL (ref 6–23)
CALCIUM SERPL-MCNC: 8.9 MG/DL (ref 8.6–10.3)
CARDIAC TROPONIN I PNL SERPL HS: 103 NG/L (ref 0–13)
CHLORIDE SERPL-SCNC: 99 MMOL/L (ref 98–107)
CO2 SERPL-SCNC: 32 MMOL/L (ref 21–32)
CREAT SERPL-MCNC: 0.95 MG/DL (ref 0.5–1.05)
EGFRCR SERPLBLD CKD-EPI 2021: 56 ML/MIN/1.73M*2
ERYTHROCYTE [DISTWIDTH] IN BLOOD BY AUTOMATED COUNT: 15.1 % (ref 11.5–14.5)
GLUCOSE SERPL-MCNC: 100 MG/DL (ref 74–99)
HCT VFR BLD AUTO: 33.1 % (ref 36–46)
HGB BLD-MCNC: 11 G/DL (ref 12–16)
MAGNESIUM SERPL-MCNC: 1.84 MG/DL (ref 1.6–2.4)
MCH RBC QN AUTO: 30 PG (ref 26–34)
MCHC RBC AUTO-ENTMCNC: 33.2 G/DL (ref 32–36)
MCV RBC AUTO: 90 FL (ref 80–100)
NRBC BLD-RTO: 0 /100 WBCS (ref 0–0)
PLATELET # BLD AUTO: 307 X10*3/UL (ref 150–450)
POTASSIUM SERPL-SCNC: 3.1 MMOL/L (ref 3.5–5.3)
RBC # BLD AUTO: 3.67 X10*6/UL (ref 4–5.2)
SODIUM SERPL-SCNC: 140 MMOL/L (ref 136–145)
WBC # BLD AUTO: 9.3 X10*3/UL (ref 4.4–11.3)

## 2025-04-18 PROCEDURE — 0JH607Z INSERTION OF CARDIAC RESYNCHRONIZATION PACEMAKER PULSE GENERATOR INTO CHEST SUBCUTANEOUS TISSUE AND FASCIA, OPEN APPROACH: ICD-10-PCS | Performed by: INTERNAL MEDICINE

## 2025-04-18 PROCEDURE — 3700000001 HC GENERAL ANESTHESIA TIME - INITIAL BASE CHARGE: Performed by: INTERNAL MEDICINE

## 2025-04-18 PROCEDURE — 02HL3JZ INSERTION OF PACEMAKER LEAD INTO LEFT VENTRICLE, PERCUTANEOUS APPROACH: ICD-10-PCS | Performed by: INTERNAL MEDICINE

## 2025-04-18 PROCEDURE — 2720000007 HC OR 272 NO HCPCS: Performed by: INTERNAL MEDICINE

## 2025-04-18 PROCEDURE — 2500000002 HC RX 250 W HCPCS SELF ADMINISTERED DRUGS (ALT 637 FOR MEDICARE OP, ALT 636 FOR OP/ED): Performed by: REGISTERED NURSE

## 2025-04-18 PROCEDURE — 93005 ELECTROCARDIOGRAM TRACING: CPT

## 2025-04-18 PROCEDURE — 2500000004 HC RX 250 GENERAL PHARMACY W/ HCPCS (ALT 636 FOR OP/ED): Mod: JZ | Performed by: STUDENT IN AN ORGANIZED HEALTH CARE EDUCATION/TRAINING PROGRAM

## 2025-04-18 PROCEDURE — 93010 ELECTROCARDIOGRAM REPORT: CPT | Performed by: INTERNAL MEDICINE

## 2025-04-18 PROCEDURE — C1887 CATHETER, GUIDING: HCPCS | Performed by: INTERNAL MEDICINE

## 2025-04-18 PROCEDURE — 84484 ASSAY OF TROPONIN QUANT: CPT | Performed by: STUDENT IN AN ORGANIZED HEALTH CARE EDUCATION/TRAINING PROGRAM

## 2025-04-18 PROCEDURE — 93650 ICAR CATH ABLTJ AV NODE FUNC: CPT | Performed by: INTERNAL MEDICINE

## 2025-04-18 PROCEDURE — C1760 CLOSURE DEV, VASC: HCPCS | Performed by: INTERNAL MEDICINE

## 2025-04-18 PROCEDURE — 2500000004 HC RX 250 GENERAL PHARMACY W/ HCPCS (ALT 636 FOR OP/ED): Mod: JZ | Performed by: NURSE PRACTITIONER

## 2025-04-18 PROCEDURE — C1730 CATH, EP, 19 OR FEW ELECT: HCPCS | Performed by: INTERNAL MEDICINE

## 2025-04-18 PROCEDURE — 97161 PT EVAL LOW COMPLEX 20 MIN: CPT | Mod: GP

## 2025-04-18 PROCEDURE — 2780000003 HC OR 278 NO HCPCS: Performed by: INTERNAL MEDICINE

## 2025-04-18 PROCEDURE — 2500000004 HC RX 250 GENERAL PHARMACY W/ HCPCS (ALT 636 FOR OP/ED): Performed by: STUDENT IN AN ORGANIZED HEALTH CARE EDUCATION/TRAINING PROGRAM

## 2025-04-18 PROCEDURE — 97165 OT EVAL LOW COMPLEX 30 MIN: CPT | Mod: GO

## 2025-04-18 PROCEDURE — C2621 PMKR, OTHER THAN SING/DUAL: HCPCS | Performed by: INTERNAL MEDICINE

## 2025-04-18 PROCEDURE — 02H63JZ INSERTION OF PACEMAKER LEAD INTO RIGHT ATRIUM, PERCUTANEOUS APPROACH: ICD-10-PCS | Performed by: INTERNAL MEDICINE

## 2025-04-18 PROCEDURE — C1732 CATH, EP, DIAG/ABL, 3D/VECT: HCPCS | Performed by: INTERNAL MEDICINE

## 2025-04-18 PROCEDURE — 33225 L VENTRIC PACING LEAD ADD-ON: CPT | Performed by: INTERNAL MEDICINE

## 2025-04-18 PROCEDURE — 80048 BASIC METABOLIC PNL TOTAL CA: CPT | Performed by: REGISTERED NURSE

## 2025-04-18 PROCEDURE — 36415 COLL VENOUS BLD VENIPUNCTURE: CPT | Performed by: REGISTERED NURSE

## 2025-04-18 PROCEDURE — 99232 SBSQ HOSP IP/OBS MODERATE 35: CPT | Performed by: STUDENT IN AN ORGANIZED HEALTH CARE EDUCATION/TRAINING PROGRAM

## 2025-04-18 PROCEDURE — 02HK3JZ INSERTION OF PACEMAKER LEAD INTO RIGHT VENTRICLE, PERCUTANEOUS APPROACH: ICD-10-PCS | Performed by: INTERNAL MEDICINE

## 2025-04-18 PROCEDURE — G0269 OCCLUSIVE DEVICE IN VEIN ART: HCPCS | Performed by: INTERNAL MEDICINE

## 2025-04-18 PROCEDURE — 2500000001 HC RX 250 WO HCPCS SELF ADMINISTERED DRUGS (ALT 637 FOR MEDICARE OP): Performed by: NURSE PRACTITIONER

## 2025-04-18 PROCEDURE — 71045 X-RAY EXAM CHEST 1 VIEW: CPT

## 2025-04-18 PROCEDURE — 2500000001 HC RX 250 WO HCPCS SELF ADMINISTERED DRUGS (ALT 637 FOR MEDICARE OP): Performed by: REGISTERED NURSE

## 2025-04-18 PROCEDURE — 2500000004 HC RX 250 GENERAL PHARMACY W/ HCPCS (ALT 636 FOR OP/ED): Mod: JW | Performed by: INTERNAL MEDICINE

## 2025-04-18 PROCEDURE — C1900 LEAD, CORONARY VENOUS: HCPCS | Performed by: INTERNAL MEDICINE

## 2025-04-18 PROCEDURE — 7100000010 HC PHASE TWO TIME - EACH INCREMENTAL 1 MINUTE: Performed by: INTERNAL MEDICINE

## 2025-04-18 PROCEDURE — 7100000001 HC RECOVERY ROOM TIME - INITIAL BASE CHARGE: Performed by: INTERNAL MEDICINE

## 2025-04-18 PROCEDURE — C1769 GUIDE WIRE: HCPCS | Performed by: INTERNAL MEDICINE

## 2025-04-18 PROCEDURE — 2500000004 HC RX 250 GENERAL PHARMACY W/ HCPCS (ALT 636 FOR OP/ED): Performed by: REGISTERED NURSE

## 2025-04-18 PROCEDURE — 33208 INSRT HEART PM ATRIAL & VENT: CPT | Performed by: INTERNAL MEDICINE

## 2025-04-18 PROCEDURE — 71045 X-RAY EXAM CHEST 1 VIEW: CPT | Performed by: RADIOLOGY

## 2025-04-18 PROCEDURE — 83735 ASSAY OF MAGNESIUM: CPT | Performed by: NURSE PRACTITIONER

## 2025-04-18 PROCEDURE — C1898 LEAD, PMKR, OTHER THAN TRANS: HCPCS | Performed by: INTERNAL MEDICINE

## 2025-04-18 PROCEDURE — 99222 1ST HOSP IP/OBS MODERATE 55: CPT | Performed by: INTERNAL MEDICINE

## 2025-04-18 PROCEDURE — 02583ZZ DESTRUCTION OF CONDUCTION MECHANISM, PERCUTANEOUS APPROACH: ICD-10-PCS | Performed by: INTERNAL MEDICINE

## 2025-04-18 PROCEDURE — 75860 VEIN X-RAY NECK: CPT | Performed by: INTERNAL MEDICINE

## 2025-04-18 PROCEDURE — 85027 COMPLETE CBC AUTOMATED: CPT | Performed by: REGISTERED NURSE

## 2025-04-18 PROCEDURE — C1892 INTRO/SHEATH,FIXED,PEEL-AWAY: HCPCS | Performed by: INTERNAL MEDICINE

## 2025-04-18 PROCEDURE — 2060000001 HC INTERMEDIATE ICU ROOM DAILY

## 2025-04-18 PROCEDURE — 7100000009 HC PHASE TWO TIME - INITIAL BASE CHARGE: Performed by: INTERNAL MEDICINE

## 2025-04-18 PROCEDURE — 93600 BUNDLE OF HIS RECORDING: CPT | Performed by: INTERNAL MEDICINE

## 2025-04-18 PROCEDURE — 2750000001 HC OR 275 NO HCPCS: Performed by: INTERNAL MEDICINE

## 2025-04-18 PROCEDURE — 3700000002 HC GENERAL ANESTHESIA TIME - EACH INCREMENTAL 1 MINUTE: Performed by: INTERNAL MEDICINE

## 2025-04-18 PROCEDURE — 2500000004 HC RX 250 GENERAL PHARMACY W/ HCPCS (ALT 636 FOR OP/ED)

## 2025-04-18 PROCEDURE — 2500000002 HC RX 250 W HCPCS SELF ADMINISTERED DRUGS (ALT 637 FOR MEDICARE OP, ALT 636 FOR OP/ED): Performed by: STUDENT IN AN ORGANIZED HEALTH CARE EDUCATION/TRAINING PROGRAM

## 2025-04-18 PROCEDURE — 7100000002 HC RECOVERY ROOM TIME - EACH INCREMENTAL 1 MINUTE: Performed by: INTERNAL MEDICINE

## 2025-04-18 PROCEDURE — 2550000001 HC RX 255 CONTRASTS: Performed by: INTERNAL MEDICINE

## 2025-04-18 PROCEDURE — 2500000005 HC RX 250 GENERAL PHARMACY W/O HCPCS: Performed by: NURSE PRACTITIONER

## 2025-04-18 DEVICE — LEAD 459888 MRI S-TIP US
Type: IMPLANTABLE DEVICE | Site: HEART | Status: FUNCTIONAL
Brand: ATTAIN PERFORMA™ S MRI SURESCAN™

## 2025-04-18 DEVICE — LEAD 5076-52 CAPSUREFIX NOVUS US EN
Type: IMPLANTABLE DEVICE | Site: HEART | Status: FUNCTIONAL
Brand: CAPSUREFIX® NOVUS

## 2025-04-18 DEVICE — LEAD 5076-45 CAPSUREFIX NOVUS US EN
Type: IMPLANTABLE DEVICE | Site: HEART | Status: FUNCTIONAL
Brand: CAPSUREFIX® NOVUS

## 2025-04-18 DEVICE — CRTP W4TR01 PERCEPTA QUAD CRTP MRI US
Type: IMPLANTABLE DEVICE | Site: CHEST | Status: FUNCTIONAL
Brand: PERCEPTA™ QUAD CRT-P MRI SURESCAN™

## 2025-04-18 RX ORDER — TRAMADOL HYDROCHLORIDE 50 MG/1
50 TABLET ORAL EVERY 8 HOURS PRN
Status: DISCONTINUED | OUTPATIENT
Start: 2025-04-18 | End: 2025-04-19 | Stop reason: HOSPADM

## 2025-04-18 RX ORDER — POTASSIUM CHLORIDE 20 MEQ/1
40 TABLET, EXTENDED RELEASE ORAL ONCE
Status: COMPLETED | OUTPATIENT
Start: 2025-04-18 | End: 2025-04-18

## 2025-04-18 RX ORDER — LIDOCAINE HYDROCHLORIDE 10 MG/ML
INJECTION, SOLUTION EPIDURAL; INFILTRATION; INTRACAUDAL; PERINEURAL AS NEEDED
Status: DISCONTINUED | OUTPATIENT
Start: 2025-04-18 | End: 2025-04-18 | Stop reason: HOSPADM

## 2025-04-18 RX ORDER — METOPROLOL SUCCINATE 25 MG/1
25 TABLET, EXTENDED RELEASE ORAL NIGHTLY
Status: DISCONTINUED | OUTPATIENT
Start: 2025-04-18 | End: 2025-04-19 | Stop reason: HOSPADM

## 2025-04-18 RX ORDER — VANCOMYCIN HYDROCHLORIDE 1 G/200ML
1000 INJECTION, SOLUTION INTRAVENOUS ONCE
Status: COMPLETED | OUTPATIENT
Start: 2025-04-18 | End: 2025-04-18

## 2025-04-18 RX ORDER — MUPIROCIN 20 MG/G
1 OINTMENT TOPICAL ONCE
Status: COMPLETED | OUTPATIENT
Start: 2025-04-18 | End: 2025-04-18

## 2025-04-18 RX ORDER — SODIUM CHLORIDE 9 MG/ML
INJECTION, SOLUTION INTRAVENOUS CONTINUOUS PRN
Status: DISCONTINUED | OUTPATIENT
Start: 2025-04-18 | End: 2025-04-18

## 2025-04-18 RX ORDER — IODIXANOL 320 MG/ML
INJECTION, SOLUTION INTRAVASCULAR AS NEEDED
Status: DISCONTINUED | OUTPATIENT
Start: 2025-04-18 | End: 2025-04-18 | Stop reason: HOSPADM

## 2025-04-18 RX ORDER — MIDAZOLAM HYDROCHLORIDE 1 MG/ML
INJECTION, SOLUTION INTRAMUSCULAR; INTRAVENOUS AS NEEDED
Status: DISCONTINUED | OUTPATIENT
Start: 2025-04-18 | End: 2025-04-18

## 2025-04-18 RX ORDER — POTASSIUM CHLORIDE 14.9 MG/ML
20 INJECTION INTRAVENOUS ONCE
Status: COMPLETED | OUTPATIENT
Start: 2025-04-18 | End: 2025-04-18

## 2025-04-18 RX ORDER — FENTANYL CITRATE 50 UG/ML
INJECTION, SOLUTION INTRAMUSCULAR; INTRAVENOUS AS NEEDED
Status: DISCONTINUED | OUTPATIENT
Start: 2025-04-18 | End: 2025-04-18

## 2025-04-18 RX ORDER — CHLORHEXIDINE GLUCONATE 40 MG/ML
SOLUTION TOPICAL ONCE
Status: COMPLETED | OUTPATIENT
Start: 2025-04-18 | End: 2025-04-18

## 2025-04-18 RX ADMIN — POTASSIUM CHLORIDE 40 MEQ: 1500 TABLET, EXTENDED RELEASE ORAL at 09:05

## 2025-04-18 RX ADMIN — FENTANYL CITRATE 25 MCG: 50 INJECTION, SOLUTION INTRAMUSCULAR; INTRAVENOUS at 14:30

## 2025-04-18 RX ADMIN — FENTANYL CITRATE 50 MCG: 50 INJECTION, SOLUTION INTRAMUSCULAR; INTRAVENOUS at 13:34

## 2025-04-18 RX ADMIN — MAGNESIUM OXIDE 400 MG (241.3 MG MAGNESIUM) TABLET 400 MG: TABLET at 09:05

## 2025-04-18 RX ADMIN — IODIXANOL 20 ML: 320 INJECTION, SOLUTION INTRAVASCULAR at 14:32

## 2025-04-18 RX ADMIN — KETOTIFEN FUMARATE 1 DROP: 0.25 SOLUTION/ DROPS OPHTHALMIC at 20:21

## 2025-04-18 RX ADMIN — AMIODARONE HYDROCHLORIDE 200 MG: 200 TABLET ORAL at 09:05

## 2025-04-18 RX ADMIN — DOCUSATE SODIUM 100 MG: 100 CAPSULE, LIQUID FILLED ORAL at 20:20

## 2025-04-18 RX ADMIN — HEPARIN SODIUM 5000 UNITS: 5000 INJECTION INTRAVENOUS; SUBCUTANEOUS at 20:26

## 2025-04-18 RX ADMIN — LOSARTAN POTASSIUM 75 MG: 50 TABLET, FILM COATED ORAL at 09:05

## 2025-04-18 RX ADMIN — MIDAZOLAM 1 MG: 1 INJECTION INTRAMUSCULAR; INTRAVENOUS at 13:05

## 2025-04-18 RX ADMIN — HEPARIN SODIUM 5000 UNITS: 5000 INJECTION INTRAVENOUS; SUBCUTANEOUS at 06:12

## 2025-04-18 RX ADMIN — MIDAZOLAM 1 MG: 1 INJECTION INTRAMUSCULAR; INTRAVENOUS at 13:16

## 2025-04-18 RX ADMIN — SODIUM CHLORIDE 500 ML: 9 INJECTION, SOLUTION INTRAVENOUS at 20:21

## 2025-04-18 RX ADMIN — POTASSIUM CHLORIDE 20 MEQ: 14.9 INJECTION, SOLUTION INTRAVENOUS at 09:05

## 2025-04-18 RX ADMIN — LEVOTHYROXINE SODIUM 88 MCG: 0.09 TABLET ORAL at 06:12

## 2025-04-18 RX ADMIN — LIDOCAINE HYDROCHLORIDE 10 ML: 10 INJECTION, SOLUTION EPIDURAL; INFILTRATION; INTRACAUDAL; PERINEURAL at 15:03

## 2025-04-18 RX ADMIN — LIDOCAINE HYDROCHLORIDE 20 ML: 10 INJECTION, SOLUTION EPIDURAL; INFILTRATION; INTRACAUDAL; PERINEURAL at 13:32

## 2025-04-18 RX ADMIN — Medication: at 12:30

## 2025-04-18 RX ADMIN — IODIXANOL 15 ML: 320 INJECTION, SOLUTION INTRAVASCULAR at 13:59

## 2025-04-18 RX ADMIN — GABAPENTIN 300 MG: 300 CAPSULE ORAL at 20:20

## 2025-04-18 RX ADMIN — DULOXETINE 30 MG: 30 CAPSULE, DELAYED RELEASE ORAL at 09:05

## 2025-04-18 RX ADMIN — DOCUSATE SODIUM 100 MG: 100 CAPSULE, LIQUID FILLED ORAL at 09:05

## 2025-04-18 RX ADMIN — VANCOMYCIN HYDROCHLORIDE 1000 MG: 1 INJECTION, SOLUTION INTRAVENOUS at 13:07

## 2025-04-18 RX ADMIN — METOPROLOL TARTRATE 25 MG: 25 TABLET, FILM COATED ORAL at 09:05

## 2025-04-18 RX ADMIN — SODIUM CHLORIDE: 9 INJECTION, SOLUTION INTRAVENOUS at 12:59

## 2025-04-18 RX ADMIN — LIDOCAINE HYDROCHLORIDE 4 ML: 10 INJECTION, SOLUTION EPIDURAL; INFILTRATION; INTRACAUDAL; PERINEURAL at 13:35

## 2025-04-18 RX ADMIN — IODIXANOL 5 ML: 320 INJECTION, SOLUTION INTRAVASCULAR at 14:10

## 2025-04-18 RX ADMIN — ROSUVASTATIN CALCIUM 20 MG: 20 TABLET, FILM COATED ORAL at 20:25

## 2025-04-18 RX ADMIN — FENTANYL CITRATE 25 MCG: 50 INJECTION, SOLUTION INTRAMUSCULAR; INTRAVENOUS at 13:05

## 2025-04-18 RX ADMIN — KETOTIFEN FUMARATE 1 DROP: 0.25 SOLUTION/ DROPS OPHTHALMIC at 09:04

## 2025-04-18 RX ADMIN — MUPIROCIN 1 APPLICATION: 20 OINTMENT TOPICAL at 12:18

## 2025-04-18 RX ADMIN — LIDOCAINE HYDROCHLORIDE 10 ML: 10 INJECTION, SOLUTION EPIDURAL; INFILTRATION; INTRACAUDAL; PERINEURAL at 14:47

## 2025-04-18 ASSESSMENT — COGNITIVE AND FUNCTIONAL STATUS - GENERAL
TURNING FROM BACK TO SIDE WHILE IN FLAT BAD: A LITTLE
DRESSING REGULAR LOWER BODY CLOTHING: A LITTLE
DAILY ACTIVITIY SCORE: 19
MOBILITY SCORE: 16
MOBILITY SCORE: 23
WALKING IN HOSPITAL ROOM: A LITTLE
DRESSING REGULAR UPPER BODY CLOTHING: A LITTLE
DAILY ACTIVITIY SCORE: 24
MOVING TO AND FROM BED TO CHAIR: A LITTLE
MOBILITY SCORE: 23
PERSONAL GROOMING: A LITTLE
HELP NEEDED FOR BATHING: A LITTLE
STANDING UP FROM CHAIR USING ARMS: A LITTLE
CLIMB 3 TO 5 STEPS WITH RAILING: TOTAL
CLIMB 3 TO 5 STEPS WITH RAILING: A LITTLE
DAILY ACTIVITIY SCORE: 24
TOILETING: A LITTLE
MOVING FROM LYING ON BACK TO SITTING ON SIDE OF FLAT BED WITH BEDRAILS: A LITTLE
CLIMB 3 TO 5 STEPS WITH RAILING: A LITTLE

## 2025-04-18 ASSESSMENT — PAIN SCALES - GENERAL
PAINLEVEL_OUTOF10: 0 - NO PAIN
PAIN_LEVEL: 1
PAINLEVEL_OUTOF10: 0 - NO PAIN

## 2025-04-18 ASSESSMENT — PAIN - FUNCTIONAL ASSESSMENT
PAIN_FUNCTIONAL_ASSESSMENT: 0-10

## 2025-04-18 ASSESSMENT — ACTIVITIES OF DAILY LIVING (ADL): BATHING_ASSISTANCE: MINIMAL

## 2025-04-18 NOTE — PROGRESS NOTES
Occupational Therapy    Evaluation    Patient Name: Meryl Hester  MRN: [id number]  Department: [facility]  Room: [facility]81-  Today's Date: 4/18/2025  Time Calculation  Start Time: 0924  Stop Time: 0937  Time Calculation (min): 13 min        Assessment:  OT Assessment: Pt demonstrates a decrease in endurance impacting ADL completion. Pt would benefit from skilled OT to address  Prognosis: Good  Barriers to Discharge Home: Physical needs  Physical Needs: Intermittent mobility assistance needed, Intermittent ADL assistance needed  Evaluation/Treatment Tolerance: Patient limited by fatigue  Medical Staff Made Aware: Yes  End of Session Communication: Bedside nurse  End of Session Patient Position: Up in chair, Alarm on  OT Assessment Results: Decreased ADL status, Decreased endurance, Decreased functional mobility  Prognosis: Good  Evaluation/Treatment Tolerance: Patient limited by fatigue  Medical Staff Made Aware: Yes  Strengths: Support of Caregivers, Premorbid level of function, Attitude of self, Housing layout  Barriers to Participation: Comorbidities  Plan:  Treatment Interventions: Endurance training, Patient/family training, Compensatory technique education  OT Frequency: 2 times per week  OT Discharge Recommendations: Low intensity level of continued care  OT Recommended Transfer Status: Assist of 1  OT - OK to Discharge: Yes (Pt ok to d/c pending medical clearance)  Treatment Interventions: Endurance training, Patient/family training, Compensatory technique education    Subjective   Current Problem:  1. Shortness of breath        2. Atrial fibrillation with RVR (Multi)          General:  General  Reason for Referral: ADL impairment  Referred By: PT/OT Omero 4/17  Past Medical History Relevant to Rehab: MI, CAD, CHF, AFib, hypothyroidism, watchman, HTN  Family/Caregiver Present: No  Co-Treatment: PT  Co-Treatment Reason: To maximize pt safety and functional outcomes  Prior to Session Communication: Bedside nurse  (cleared for therapy evals)  Patient Position Received: Alarm on (seated EOB, IV intact, external cath intact)  General Comment: Pt is a 89 yo Female who presents to hospital on 4/17 with reports of SOB. Dx: Afib with RVR and possible gastritis. Test/labs: Trop 103, Hgb 11.0, BNP 1434; CXR (-); CT angio shows bilateral pleural effusion with scattered R lung nodules  Precautions:  Medical Precautions: Fall precautions     Date/Time Vitals Session Patient Position Pulse Resp SpO2 BP MAP (mmHg)    04/18/25 10:43:54 --  --  112  --  96 %  147/83  106                 Pain:  Pain Assessment  Pain Assessment: 0-10  0-10 (Numeric) Pain Score: 0 - No pain    Objective   Cognition:  Overall Cognitive Status: Within Functional Limits     Home Living:  Home Living Comments: Pt lives in a ranch style home with Dtr. 1 JORGE ALBERTO with HR. Has tub shower with seat and grab bars.  Prior Function:  Prior Function Comments: Pt reports indep with ADLs. Dtr completes IADLs. Pt able to complete simple meals. Uses RW for mobility. Owns cane. Denies falls. (-) drives. Goes to pilates 1x/wk and water exercise class 2x/wk.    ADL:  Eating Assistance: Independent  Grooming Assistance: Stand by  Bathing Assistance: Minimal  UE Dressing Assistance: Stand by  LE Dressing Assistance: Minimal  Toileting Assistance with Device: Minimal  Functional Assistance: Minimal  ADL Comments: Pt with SOB throughout session. Pt able to complete socks with Claudio; assist provided due to SOB. Pt quickly becomes fatigued.  Activity Tolerance:  Endurance: Decreased tolerance for upright activites  Bed Mobility/Transfers: Bed Mobility  Bed Mobility:  (Pt seated EOB upon arrival into room.)    Transfers  Transfer:  (Sit <> stand completed with FWW and CGA for safety with cueing for hand placement and sequencing.)    Functional Mobility:  Functional Mobility  Functional Mobility Performed:  (Completes functional mobility for household distance with FWW and CGA. Assist  provided for safety due to difficulty with obstacle navigation within room secondary to macular degeneration. Cueing for sequencing throughout.)  Sitting Balance:  Static Sitting Balance  Static Sitting-Comment/Number of Minutes: Good  Dynamic Sitting Balance  Dynamic Sitting-Comments: Good  Standing Balance:  Static Standing Balance  Static Standing-Comment/Number of Minutes: Fair -  Dynamic Standing Balance  Dynamic Standing-Comments: Fair -     Vision:Vision - Basic Assessment  Visual History: Macular degeneration (per pt report)    Sensation:  Light Touch: No apparent deficits  Strength:  Strength Comments: BUE strength 4+/5    Coordination:  Movements are Fluid and Coordinated: Yes   Hand Function:  Gross Grasp: Functional  Coordination: Functional  Extremities: RUE   RUE : Within Functional Limits and LUE   LUE: Within Functional Limits    Outcome Measures:[facility] Daily Activity  Putting on and taking off regular lower body clothing: A little  Bathing (including washing, rinsing, drying): A little  Putting on and taking off regular upper body clothing: A little  Toileting, which includes using toilet, bedpan or urinal: A little  Taking care of personal grooming such as brushing teeth: A little  Eating Meals: None  Daily Activity - Total Score: 19    Education Documentation  Body Mechanics, taught by Marian Stoddard OT at 4/18/2025 10:55 AM.  Learner: Patient  Readiness: Acceptance  Method: Explanation  Response: Verbalizes Understanding    ADL Training, taught by Marian Stoddard OT at 4/18/2025 10:55 AM.  Learner: Patient  Readiness: Acceptance  Method: Explanation  Response: Verbalizes Understanding    Education Comments  No comments found.      Goals:  Encounter Problems       Encounter Problems (Active)       OT Goals       Pt will complete LB dressing with Rashad for use of AE.   (Progressing)       Start:  04/18/25    Expected End:  05/02/25            Pt will improve dynamic standing balance to good  level in order to complete standing grooming task.   (Progressing)       Start:  04/18/25    Expected End:  05/02/25            Pt will tolerate 15 minutes of activity with 2 rest breaks to improve endurance with I/ADL tasks.  (Progressing)       Start:  04/18/25    Expected End:  05/02/25            Pt will ambulate functional household distance with Rashad of use of FWW to complete I/ADL task.   (Progressing)       Start:  04/18/25    Expected End:  05/02/25

## 2025-04-18 NOTE — DISCHARGE INSTRUCTIONS
You have been referred to pulmonology for follow up of lung nodules incidentally found on CT here. You will need follow up imaging to determine if there is anything to be done about these.     1 week follow up with Dr. Lane for wound check    Home going instructions after a Pacemaker/ Defibrillator Implant    After a procedure using sedation  You should return home and rest for the remainder of the day and evening.   It is recommended a responsible adult be with you for the first 24 hours after the procedure.  Do not make any legal decisions for 24 hours after your procedure.  Do not drink alcoholic beverages for 24 hours after your procedure.    Wound care  Leave the surgical dressing in place for 7 days post implant  The dressing will be removed at the 1 week follow up appointment.    Please keep your incision dry, the dressing is water resistant.    You may shower avoid water directly hitting the dressing.     Inspect your incisional site/ dressing each day  Apply ice to the site 3-4 times per day in 20 minute intervals for at least 2 days after surgery  It is normal for the area around the incision to be tender for a few weeks following surgery.     Pain relievers such as Tylenol or Motrin are usually sufficient for pain relief.    You may be prescribed Tramadol in addition.  Take as prescribed alternating with Tylenol or Motrin.    Activity  Avoid driving for 1 week.  If you have had passing out spells or previously restricted from driving, discuss driving restrictions with your doctor.  For the first 4 to 6 weeks after implant avoid excessive/repetitive arm movement on the side of your new implant.    Do not raise, push, pull, or stretch your arm above shoulder level.    Do not  items that weigh greater than 10 lbs with the device arm.    Wear your binder/sling for the first 48 hours then at night to remind you not to move the arm over your head and during the day as needed.    Report to your  provider   Increased redness, swelling, drainage or gaping of your incisional site.  Increased pain at site unrelieved by pain medication.  Fever or chills prior to the 1 week appointment.  Bright red bleeding from the incisional site or complete saturation of the dressing.  Dizziness, lightheadedness, passing out, or defibrillator shocks.    Remote monitoring/ Device ID card  You have been instructed by the device company representative regarding remote home monitoring.   There are multiple types of home monitoring units, please follow the instructions given  If you have questions please contact the device clinic for further instruction  After implant you will receive a temporary card.    Permanent card will come in the mail in the next few weeks.  It is important that you carry your pacemaker ID card with you at all times.    Inform all doctors and healthcare providers that you have a pacemaker.      Electromagnetic Interference:    Microwave ovens are safe to use.    Please inform any physicians of your pacemaker implant prior to MRI for appropriate scheduling.    You should be prepared to show your pacemaker identification card to the security guards at metal detectors.    Hand wand detector should be kept away from the pacemaker.    Read the patient booklet for more information.      Follow up appointments  Incision (wound) check in 1 week.  Appointment for Chest xray, device check, and provider in 3 months.  These appointments will be scheduled and appear on your After Visit Summary.

## 2025-04-18 NOTE — CONSULTS
Cardiology Consult Note  Patient: Meryl Hester  Unit/Bed: 814/814-A  YOB: 1931  MRN: [id number]  Acct: [id number]   Admitting Diagnosis: Shortness of breath [R06.02]  Atrial fibrillation with RVR (Multi) [I48.91]  Date:  4/17/2025  Hospital Day: 1  Attending: Jerome Gray MD    Rounding Cardiologist:  Wilmer Corona MD,    Consultant: Dr. Wilmer Corona     Primary Cardiologist: Dr. Wilmer Lane    Complaint:  Chief Complaint   Patient presents with    Shortness of Breath     SOB, hx of chf, afib        History of Present Illness:  89-year-old woman with persistent atrial fibrillation, hypertension and coronary disease with tachycardia mediated cardiomyopathy.  Had undergone cardioversion just last month presented to the emergency department yesterday with increasing shortness of breath found to again be in rapid atrial fibrillation.  Has been seen by electrophysiology with recommendation of AV node ablation and CRT.    She currently is lying supine relatively comfortable but still short of breath whenever she does anything in particular.  I just seen her 2 weeks ago she was feeling reasonably well in sinus rhythm but she cannot tolerate atrial fibrillation.  She has been eating and drinking well she has no GI complaint      Cardiac History  Persistent atrial fibrillation  Hypertension  Pulmonary hypertension  Watchman device November 2024  Coronary artery disease angiography 8/9/2024 LM-less than 10%.  LAD-60%.  CX-small 90%.  RCA 40%.  KARINA 3/18/2025 EF 20-25% on review probably 5% felt at that time related to tachycardia mediated cardiomyopathy from the rapid A-fib mild RV dilatation.  Severe left atrial dilatation.  Moderate MR/TR.  3/13/2025 admission for rapid A-fib treated with amiodarone cardioversion            Allergies:  [RX Allergies]     [RX Allergies]  Allergies  Allergen Reactions    Sulfa (Sulfonamide Antibiotics) Rash     Skin peeling     Lisinopril Cough        PMHx:  [Medical History]    [Medical History]  Past Medical History  Diagnosis Date    Diverticulitis of intestine, part unspecified, without perforation or abscess without bleeding     Diverticulitis    Personal history of other diseases of the circulatory system     History of hypertension    Personal history of other endocrine, nutritional and metabolic disease     History of hypercholesterolemia    Personal history of other endocrine, nutritional and metabolic disease     History of thyroid disease    Vitreomacular adhesion, left eye 12/04/2017    Vitreomacular traction syndrome of left eye    Vitreous degeneration, right eye 12/04/2017    Posterior vitreous detachment of right eye       PSHx:  [Surgical History]    [Surgical History]  Past Surgical History  Procedure Laterality Date    CARDIAC CATHETERIZATION N/A 8/9/2024    Procedure: Left And Right Heart Cath, With LV;  Surgeon: Stephon Squires MD;  Location: [facility];  Service: Cardiovascular;  Laterality: N/A;    CARDIAC CATHETERIZATION N/A 11/8/2024    Procedure: LAAO (Left Atrial Appendage Occlusion);  Surgeon: Liam Joyce MD;  Location: [place] [address] [facility];  Service: Cardiovascular;  Laterality: N/A;  same day CT 1000    CATARACT EXTRACTION  11/29/2016    Cataract Surgery    OTHER SURGICAL HISTORY  11/29/2016    Iridotomy By YAG Laser    OTHER SURGICAL HISTORY  11/29/2016    Discission Of Secondary Membranous Cataract By Laser       Social Hx:  [Social History]    [Social History]  Socioeconomic History    Marital status:    Tobacco Use    Smoking status: Former     Types: Cigarettes    Smokeless tobacco: Never   Substance and Sexual Activity    Alcohol use: Never    Drug use: Never     Social Drivers of Health     Financial Resource Strain: Low Risk  (4/17/2025)    Overall Financial Resource Strain (CARDIA)     Difficulty of Paying Living Expenses: Not hard at all   Food Insecurity: No Food Insecurity  (4/17/2025)    Hunger Vital Sign     Worried About Running Out of Food in the Last Year: Never true     Ran Out of Food in the Last Year: Never true   Transportation Needs: No Transportation Needs (4/17/2025)    PRAPARE - Transportation     Lack of Transportation (Medical): No     Lack of Transportation (Non-Medical): No   Physical Activity: Sufficiently Active (4/1/2025)    Received from [facility]    Exercise Vital Sign     Days of Exercise per Week: 3 days     Minutes of Exercise per Session: 50 min   Stress: No Stress Concern Present (8/14/2024)    [employer] - Occupational Stress Questionnaire     Feeling of Stress : Not at all   Social Connections: Moderately Isolated (8/14/2024)    Social Connection and Isolation Panel [NHANES]     Frequency of Communication with Friends and Family: More than three times a week     Frequency of Social Gatherings with Friends and Family: More than three times a week     Attends Restoration Services: 1 to 4 times per year     Active Member of Clubs or Organizations: No     Attends Club or Organization Meetings: Never     Marital Status:    Intimate Partner Violence: Not At Risk (4/17/2025)    Humiliation, Afraid, Rape, and Kick questionnaire     Fear of Current or Ex-Partner: No     Emotionally Abused: No     Physically Abused: No     Sexually Abused: No   Housing Stability: Low Risk  (4/17/2025)    Housing Stability Vital Sign     Unable to Pay for Housing in the Last Year: No     Number of Times Moved in the Last Year: 0     Homeless in the Last Year: No       Family Hx:  [Family History]    [Family History]  No family history on file.      Review of Systems:   Review of Systems   All other systems reviewed and are negative.      Vitals:    04/18/25 0444 04/18/25 0713 04/18/25 1043 04/18/25 1100   BP: 117/74 116/69 147/83 147/83   BP Location: Right arm Right arm  Right arm   Patient Position: Lying Lying  Lying   Pulse: 101 97 (!) 112     Resp: 20 18 18   Temp: 35.8 °C (96.4 °F) 36.2 °C (97.2 °F) 36.1 °C (97 °F) 36.1 °C (97 °F)   TempSrc: Temporal Temporal  Temporal   SpO2: 93% (!) 89% 96% 96%   Weight:       Height:           Intake/Output Summary (Last 24 hours) at 4/18/2025 1230  Last data filed at 4/18/2025 0713  Gross per 24 hour   Intake 300 ml   Output 4100 ml   Net -3800 ml      Wt Readings from Last 4 Encounters:   04/17/25 63.5 kg (140 lb)   04/01/25 63.6 kg (140 lb 3.2 oz)   03/18/25 63.8 kg (140 lb 10.5 oz)   01/23/25 62.1 kg (136 lb 12.8 oz)      Physical Examination:       Physical Exam  Constitutional:       Appearance: She is ill-appearing. She is not diaphoretic.   HENT:      Head: Normocephalic.      Mouth/Throat:      Mouth: Mucous membranes are moist.   Eyes:      Pupils: Pupils are equal, round, and reactive to light.   Cardiovascular:      Rate and Rhythm: Tachycardia present. Rhythm irregular.      Pulses: Normal pulses.   Pulmonary:      Breath sounds: Normal breath sounds.      Comments: Crackles and wheezing though decreased breath   Abdominal:      General: There is no distension.      Palpations: Abdomen is soft.   Musculoskeletal:         General: No swelling.      Cervical back: Normal range of motion.   Neurological:      Mental Status: She is oriented to person, place, and time.         LABS:  CBC:   Results from last 7 days   Lab Units 04/18/25  0605 04/17/25  1007   WBC AUTO x10*3/uL 9.3 7.6   RBC AUTO x10*6/uL 3.67* 4.12   HEMOGLOBIN g/dL 11.0* 12.2   HEMATOCRIT % 33.1* 37.8   MCV fL 90 92   MCH pg 30.0 29.6   MCHC g/dL 33.2 32.3   RDW % 15.1* 15.1*   PLATELETS AUTO x10*3/uL 307 368     CMP:    Results from last 7 days   Lab Units 04/18/25  0605 04/17/25  1007   SODIUM mmol/L 140 134*   POTASSIUM mmol/L 3.1* 4.5   CHLORIDE mmol/L 99 101   CO2 mmol/L 32 25   BUN mg/dL 19 20   CREATININE mg/dL 0.95 0.88   GLUCOSE mg/dL 100* 178*   PROTEIN TOTAL g/dL  --  7.1   CALCIUM mg/dL 8.9 9.4   BILIRUBIN TOTAL mg/dL  --  0.9    ALK PHOS U/L  --  153*   AST U/L  --  54*   ALT U/L  --  49*     BMP:    Results from last 7 days   Lab Units 04/18/25  0605 04/17/25  1007   SODIUM mmol/L 140 134*   POTASSIUM mmol/L 3.1* 4.5   CHLORIDE mmol/L 99 101   CO2 mmol/L 32 25   BUN mg/dL 19 20   CREATININE mg/dL 0.95 0.88   CALCIUM mg/dL 8.9 9.4   GLUCOSE mg/dL 100* 178*     Magnesium:  Results from last 7 days   Lab Units 04/18/25  0605   MAGNESIUM mg/dL 1.84     Troponin:    Results from last 7 days   Lab Units 04/18/25  0605 04/17/25  1447 04/17/25  1108   TROPHS ng/L 103* 105* 92*     BNP:   Results from last 7 days   Lab Units 04/17/25  1007   BNP pg/mL 1,434*     Lipid Panel:         Current Medications:  [Scheduled Medications]     [Scheduled Medications]  amiodarone, 200 mg, oral, Daily  [Held by provider] aspirin, 81 mg, oral, Daily  chlorhexidine, , Topical, Once  [Held by provider] clopidogrel, 75 mg, oral, Daily  docusate sodium, 100 mg, oral, BID  DULoxetine, 30 mg, oral, Daily  gabapentin, 300 mg, oral, Nightly  heparin (porcine), 5,000 Units, subcutaneous, q8h LIVAN  ketotifen, 1 drop, Both Eyes, BID  levothyroxine, 88 mcg, oral, Daily  losartan, 75 mg, oral, Daily  magnesium oxide, 400 mg, oral, Daily  metoprolol tartrate, 25 mg, oral, BID  pantoprazole, 40 mg, oral, Daily before breakfast  polyethylene glycol, 17 g, oral, Daily  rosuvastatin, 20 mg, oral, Nightly  vancomycin, 1,000 mg, intravenous, Once    [Continuous Medications]     [Continuous Medications]       Current Outpatient Medications   Medication Instructions    acetaminophen (TYLENOL) 650 mg, oral, Every 6 hours PRN    amiodarone (PACERONE) 200 mg, oral, Daily    aspirin 81 mg, oral, Daily    bisoprolol (ZEBETA) 10 mg, oral, Daily    calcium carbonate-vitamin D3 500 mg-5 mcg (200 unit) tablet 1 tablet, Daily    carboxymethylcellulose (THERATears) 0.25 % ophthalmic solution 1 drop, 3 times daily PRN    cetirizine (ALLERGY RELIEF (CETIRIZINE)) 10 mg, Daily    clopidogrel  (PLAVIX) 75 mg, oral, Daily    docusate sodium (COLACE) 100 mg, 2 times daily    DULoxetine (CYMBALTA) 30 mg, Daily RT    fluticasone (Flonase) 50 mcg/actuation nasal spray 1 spray, Daily PRN    furosemide (LASIX) 20 mg, oral, Daily RT    gabapentin (NEURONTIN) 300 mg, Nightly    krill oil 500 mg, 2 times daily    LACTOBACILLUS ACIDOPHILUS ORAL 1 tablet, Daily    levothyroxine (SYNTHROID, LEVOXYL) 88 mcg, Daily RT    losartan (COZAAR) 75 mg, oral, Daily    magnesium oxide 500 mg, Daily    multivitamin with minerals tablet 1 tablet, Daily    mv-min-FA-vit K-lutein-zeaxant (PreserVision AREDS 2 Plus MV) 200 mcg-15 mcg- 5 mg-1 mg capsule 1 capsule, 2 times daily    olopatadine (Patanol) 0.1 % ophthalmic solution 1 drop, 2 times daily    omeprazole (PRILOSEC) 40 mg, Daily RT    potassium chloride CR 20 mEq ER tablet 20 mEq, oral, Daily RT    rosuvastatin (CRESTOR) 20 mg, oral, Nightly        ECG 12 Lead  Result Date: 4/18/2025  Atrial flutter with variable AV block Left axis deviation Right bundle branch block Minimal voltage criteria for LVH, may be normal variant T wave abnormality, consider inferior ischemia Abnormal ECG When compared with ECG of 17-APR-2025 18:14, (unconfirmed) No significant change was found    ECG 12 lead  Result Date: 4/18/2025  Atrial flutter with variable AV block Right bundle branch block Abnormal ECG When compared with ECG of 17-APR-2025 11:29, Atrial flutter has replaced Atrial fibrillation T wave inversion more evident in Anterior leads    ECG 12 lead  Result Date: 4/17/2025  Atrial fibrillation with rapid ventricular response Left axis deviation Right bundle branch block T wave abnormality, consider inferior ischemia Abnormal ECG When compared with ECG of 17-APR-2025 11:26, (unconfirmed) MANUAL COMPARISON REQUIRED PREVIOUS ECG IS INCOMPATIBLE See ED provider note for full interpretation and clinical correlation Confirmed by Leandra Woodward ([id number]) on 4/17/2025 12:29:34 PM    CT abdomen  pelvis w IV contrast  Result Date: 4/17/2025  Interpreted By:  Janet Kirby, STUDY: CT ABDOMEN PELVIS W IV CONTRAST; ;  4/17/2025 11:03 am   INDICATION: Signs/Symptoms:bloating.     COMPARISON: None.   ACCESSION NUMBER(S): [id number]   ORDERING CLINICIAN: JOHNATHON BUSTAMANTE   TECHNIQUE: Serial axial CT images obtained of the abdomen and pelvis following intravenous administration of the 75 mL of Omnipaque 350. Images reformatted in the coronal and sagittal projection.   All CT examinations are performed with 1 or more of the following dose reduction techniques: Automated exposure control, adjustment of mA and/or kv according to patient's size, or use of iterative reconstruction techniques.   FINDINGS: Included lung bases evaluated on CT chest performed at the same time   Liver is unremarkable.   Spleen is unremarkable   Adrenal glands are unremarkable   Gallbladder demonstrates findings suggesting wall thickening nonspecific and can be seen with multiple etiologies including hypoproteinemia or even fluid overload. No cholelithiasis demonstrated.   Pancreas is atrophic.   Right kidney demonstrates areas of renal cortical scar   Left kidney is unremarkable   Retroperitoneum demonstrates diffuse vascular calcification of the abdominal aorta. There is a no lymphadenopathy.   Loops of large bowel demonstrates uncomplicated colonic diverticulosis with diffuse descending and sigmoid diverticulosis demonstrated. Appendix is seen and is unremarkable. Small bowel loops are fluid and gas-filled and nondilated. Some loops of small bowel are distended measuring up to 2.2 cm. However, no dilatation. Stomach is contracted limiting characterization with component of wall thickening suggested within the gastric body. Correlate with patient's symptoms and concern for underlying gastritis.   CT pelvis:   Unopacified bladder is unremarkable. There is no pelvic lymphadenopathy. Subtle free fluid in the lower pelvis. Uterus and adnexa  are unremarkable.   Visualized osseous structures demonstrate multilevel vacuum disc phenomena within the visualized thoracolumbar spine with multilevel endplate sclerosis as well as anterior and posterior disc osteophyte complex. There is a severe facet arthropathy lower lumbar spine. Moderate narrowing thecal sac L4/5 with mild narrowing L3/4.               1. Stomach is contracted limiting characterization with suggestion of wall thickening within the gastric body. Correlate with underlying symptoms of gastritis   2. No significant gaseous or fluid dilatation of the loops of large or small bowel with distention of the few loops of small bowel nonspecific.   3. Gallbladder wall thickening nonspecific and can be seen with multiple etiologies including hypoproteinemia or even fluid overload.   4. Uncomplicated diffuse descending and sigmoid diverticulosis.     MACRO: None   Signed by: Janet Kirby 4/17/2025 11:52 AM Dictation workstation:   [id number]    CT angio chest for pulmonary embolism  Result Date: 4/17/2025  Interpreted By:  Janet Kirby, STUDY: CT ANGIO CHEST FOR PULMONARY EMBOLISM; ;  4/17/2025 11:03 am   INDICATION: Signs/Symptoms:SOB.     COMPARISON: None.   ACCESSION NUMBER(S): [id number]   ORDERING CLINICIAN: JOHNATHON BUSTAMANTE   TECHNIQUE: CT angiogram performed through the chest following intravenous administration of 75 mL of Omnipaque 350. MIP reconstruction imaging performed in the coronal and sagittal projection   All CT examinations are performed with 1 or more of the following dose reduction techniques: Automated exposure control, adjustment of mA and/or kv according to patient's size, or use of iterative reconstruction techniques.   FINDINGS: Mediastinum demonstrates paratracheal and AP window lymphadenopathy. For example, pretracheal lymphadenopathy identified measuring 10 mm in the short axis. Also, component of AP window lymphadenopathy identified measuring 11 mm in the short axis  intervally increased from prior imaging. Also, there is subcarinal lymphadenopathy measuring 14 mm in the short axis. No hilar lymphadenopathy. Esophagus is unremarkable   Heart and great vessels demonstrate ascending thoracic aorta to measure 3.3 cm which is within normal limits. Moderate vascular calcification of the thoracic aorta. Main pulmonary artery measures 3.5 cm which is enlarged. Central and segmental pulmonary arteries demonstrate no filling defects to suggest the possibility of pulmonary embolus   Lung parenchyma demonstrates small bilateral basilar effusions and compressive atelectasis. There is component of geographic attenuation lung parenchyma which can be seen with pulmonary edema with areas of septal thickening demonstrated which likely relates to chronic interstitial change. There is a nodular density along the left midlung anteriorly as seen on prior imaging measuring 1.3 cm also, there is a irregular appearing nodular lesion in the right lower lobe posteriorly measuring 2.8 cm as seen on the prior examination. Correlate with prior workup nodular lesion right infrahilar location identified measures 1.6 cm appearing unchanged. Scattered bulla noted.   Included portions visualized abdomen demonstrates reflux of contrast into the IVC with component of fluid overload suggested.   Visualized osseous structures demonstrate exaggerated thoracic kyphosis with multilevel anterior and lateral osteophyte formation mid to lower thoracic spine with multilevel vacuum phenomenon lower thoracic spine. No compression deformity demonstrated.               1. Geographic attenuation with component of pulmonary edema with small bilateral basilar pleural effusions demonstrated   2. Scattered nodular lesions in particular within the right lung largest measuring 2.8 cm as seen on the prior examination. Further workup including PET scan recommended with underlying neoplasm/metastatic disease not excluded. Correlate with  prior workup.       MACRO: None   Signed by: Janet Kirby 4/17/2025 11:43 AM Dictation workstation:   [id number]    ECG 12 lead  Result Date: 4/17/2025  Atrial fibrillation with rapid ventricular response Left axis deviation Right bundle branch block Minimal voltage criteria for LVH, may be normal variant Septal infarct , age undetermined Abnormal ECG When compared with ECG of 19-MAR-2025 06:33, Atrial fibrillation has replaced Sinus rhythm Vent. rate has increased BY  86 BPM Septal infarct is now Present See ED provider note for full interpretation and clinical correlation Confirmed by Leandra Woodward ([id number]) on 4/17/2025 10:29:03 AM    XR chest 1 view  Result Date: 4/17/2025  Interpreted By:  Kofi Stoll, STUDY: XR CHEST 1 VIEW; 4/17/2025 10:09 am   INDICATION: Signs/Symptoms:SOB.   COMPARISON: 03/14/2025   ACCESSION NUMBER(S): [id number]   ORDERING CLINICIAN: JOHNATHON BUSTAMANTE   TECHNIQUE: 1 view of the chest was performed.   FINDINGS: The lungs are adequately inflated. No significant interval change. Prominent interstitial lung markings and reticulations are again seen consistent with mild chronic interstitial lung disease. No acute consolidation. No pleural effusion. No pneumothorax.  The cardiomediastinal silhouette is mildly enlarged but stable.       Stable appearing examination. No acute cardiopulmonary disease.   Signed by: Kofi Stoll 4/17/2025 10:23 AM Dictation workstation:   [id number]       No results found for this or any previous visit from the past 1095 days.                      Tele monitoring: Atrial fibrillation        Assessment:    Plan:  Chronic systolic CHF secondary to continuous and recurrent atrial fibrillation with rapid rates  Failed amiodarone to maintain sinus rhythm  Currently to undergo CRT and AV node ablation  Diminished ejection fraction very likely tachycardia mediated as she has had frequent and prolonged episodes of rapid rates.  Had been on bisoprolol and  losartan as outpatient.  Would be inclined to continue these agents as with the addition of CRT I suspect she will do quite well.  Could consider adding spironolactone not an optimal candidate for Jardiance secondary to urinary tract infection risk.  She has Watchman device which was with no evidence of thrombus and with excellent placement as of KARINA 3/18/2025              Electronically signed by Wilmer Corona MD on 4/18/2025 at 12:30 PM

## 2025-04-18 NOTE — ANESTHESIA PREPROCEDURE EVALUATION
Patient: Meryl Hester    Procedure Information       Date/Time: 04/18/25 7460    Procedures:       PPM BIV Implant      Ablation AV Node    Location: [facility]    Providers: Moe Lane MD            Relevant Problems   Cardiac   (+) Atrial fibrillation (Multi)   (+) Atrial fibrillation with RVR (Multi)   (+) Atrial fibrillation, unspecified type (Multi)   (+) Coronary artery disease involving native coronary artery of native heart without angina pectoris   (+) Hypertension   (+) NSTEMI (non-ST elevated myocardial infarction) (Multi)   (+) Paroxysmal atrial fibrillation with RVR (Multi)   (+) Type 2 MI (myocardial infarction) (Multi)      Pulmonary   (+) Pulmonary hypertension (Multi)      Neuro   (+) Radiculopathy of lumbar region      Endocrine   (+) Acquired hypothyroidism       Clinical information reviewed:   Tobacco  Allergies  Meds   Med Hx  Surg Hx  OB Status  Fam Hx  Soc   Hx        NPO Detail:  No data recorded     Physical Exam    Airway  Mallampati: II     Cardiovascular   Rhythm: regular  Rate: normal     Dental    Pulmonary - normal exam   Abdominal - normal exam           Anesthesia Plan    History of general anesthesia?: yes  History of complications of general anesthesia?: no    ASA 4     MAC     intravenous induction   Postoperative pain plan includes opioids.  Anesthetic plan and risks discussed with patient.

## 2025-04-18 NOTE — ANESTHESIA POSTPROCEDURE EVALUATION
Patient: Meryl Hester    Procedure Summary       Date: 04/18/25 Room / Location: [facility]    Anesthesia Start: 1259 Anesthesia Stop: 1524    Procedures:       PPM BIV Implant      Ablation AV Node Diagnosis:       Atrial fibrillation with RVR (Multi)      Acute on chronic diastolic heart failure    Providers: Moe Lane MD Responsible Provider: Chivo Middleton DO    Anesthesia Type: MAC ASA Status: 4            Anesthesia Type: MAC    Vitals Value Taken Time   /84 04/18/25 15:24   Temp 36 04/18/25 15:24   Pulse 89 04/18/25 15:24   Resp 16 04/18/25 15:24   SpO2 99 04/18/25 15:24       Anesthesia Post Evaluation    Patient location during evaluation: bedside  Patient participation: complete - patient participated  Level of consciousness: awake  Pain score: 1  Pain management: adequate  Airway patency: patent  Cardiovascular status: acceptable  Respiratory status: acceptable  Hydration status: acceptable  Postoperative Nausea and Vomiting: none        There were no known notable events for this encounter.

## 2025-04-18 NOTE — SIGNIFICANT EVENT
Upon arrival to unit focused assessment performed and WDL. Right groin/left chest site soft and stable with no hematoma or oozing. Pt denies dizziness/lightheadedness/pain at this time. Educated Pt with current restrictions r/t right groin venous punctures and PPM implant, she states understanding and denies needs. Ice pack applied to chest, arm immobilizer in place over left upper extremity. Family at bedside.

## 2025-04-18 NOTE — PROGRESS NOTES
ASSESSMENT & PLAN:     # Atrial Fib with RVR  # Hx Watchman Device  # Acute on Chronic Diastolic HF exacerbation   # Dyspnea with exertion   # Elevated Troponin     -Recent TTE with LVEF 40%, KARINA with LVEF 20-25%  -Cards consulted for eval, recs appreciated. Suspect diminshed EF likely tachycardia mediated.   -Underwent PPM BIV implantation and AV node ablation earlier today per EP. Now in Vpaced rhythm  -Cont ASA/plavix  -Goal mag > 2 and K> 4,     # Hypothyroidism  Continue home medications   Tsh pending      # Probable Gastritis  CT shows wall thickening  Cont PPI     # Pulmonary  Nodules on CT scan   Need to follow up with PET scan outpatient  Nodular lesion Right lung irregular appearing 2.8cm recommend PET scan to rule out neoplasm and metastatic disease       VTE Prophylaxis: Heparin SubQ      Jerome Gray MD    SUBJECTIVE     Patient resting in bed at time of evaluation. Fatigued from anesthesia. Has no acute concerns. Accompanied by family at bedside who were updated on plan of care.       OBJECTIVE:       Last Recorded Vitals:  Vitals:    04/18/25 0444 04/18/25 0713 04/18/25 1043 04/18/25 1100   BP: 117/74 116/69 147/83 147/83   BP Location: Right arm Right arm  Right arm   Patient Position: Lying Lying  Lying   Pulse: 101 97 (!) 112    Resp: 20 18  18   Temp: 35.8 °C (96.4 °F) 36.2 °C (97.2 °F) 36.1 °C (97 °F) 36.1 °C (97 °F)   TempSrc: Temporal Temporal  Temporal   SpO2: 93% (!) 89% 96% 96%   Weight:       Height:           Last I/O:  I/O last 3 completed shifts:  In: 300 (4.7 mL/kg) [P.O.:300]  Out: 3850 (60.6 mL/kg) [Urine:3850 (1.7 mL/kg/hr)]  Weight: 63.5 kg     Physical Exam:  General: Ill appearing elderly female in mild distress  HEENT: Clear sclera, EOMI, trachea midline, moist mucous membranes  Respiratory: Equal chest rise, no retractions   Cardiovascular: S1 and S2 auscultated, no murmurs clicks or rubs  Abdomen: Soft, nontender, nondistended  Extremities: No cyanosis or clubbing  appreciated  Neurological: Spontaneously moves all extremities, no dysarthria, cranial nerves grossly intact  Psychiatric: Appropriate mood and affect  Skin: Warm, dry    Inpatient Medications:  Scheduled Medications[1]    PRN Medications  PRN Medications[2]    Continuous Medications:  Continuous Medications[3]      LABS AND IMAGING:     Labs:  Results for orders placed or performed during the hospital encounter of 04/17/25 (from the past 24 hours)   Basic metabolic panel   Result Value Ref Range    Glucose 100 (H) 74 - 99 mg/dL    Sodium 140 136 - 145 mmol/L    Potassium 3.1 (L) 3.5 - 5.3 mmol/L    Chloride 99 98 - 107 mmol/L    Bicarbonate 32 21 - 32 mmol/L    Anion Gap 12 10 - 20 mmol/L    Urea Nitrogen 19 6 - 23 mg/dL    Creatinine 0.95 0.50 - 1.05 mg/dL    eGFR 56 (L) >60 mL/min/1.73m*2    Calcium 8.9 8.6 - 10.3 mg/dL   CBC   Result Value Ref Range    WBC 9.3 4.4 - 11.3 x10*3/uL    nRBC 0.0 0.0 - 0.0 /100 WBCs    RBC 3.67 (L) 4.00 - 5.20 x10*6/uL    Hemoglobin 11.0 (L) 12.0 - 16.0 g/dL    Hematocrit 33.1 (L) 36.0 - 46.0 %    MCV 90 80 - 100 fL    MCH 30.0 26.0 - 34.0 pg    MCHC 33.2 32.0 - 36.0 g/dL    RDW 15.1 (H) 11.5 - 14.5 %    Platelets 307 150 - 450 x10*3/uL   Magnesium   Result Value Ref Range    Magnesium 1.84 1.60 - 2.40 mg/dL   Troponin I, High Sensitivity   Result Value Ref Range    Troponin I, High Sensitivity 103 (HH) 0 - 13 ng/L   ECG 12 lead   Result Value Ref Range    Ventricular Rate 111 BPM    Atrial Rate 300 BPM    QRS Duration 142 ms    QT Interval 406 ms    QTC Calculation(Bazett) 552 ms    R Axis -26 degrees    T Axis -14 degrees    QRS Count 18 beats    Q Onset 224 ms    T Offset 427 ms    QTC Fredericia 498 ms   ECG 12 Lead   Result Value Ref Range    Ventricular Rate 93 BPM    Atrial Rate 293 BPM    QRS Duration 140 ms    QT Interval 450 ms    QTC Calculation(Bazett) 559 ms    R Axis -39 degrees    T Axis -52 degrees    QRS Count 16 beats    Q Onset 205 ms    T Offset 430 ms    QTC  Carlos 521 ms        Imaging:  ECG 12 Lead  Atrial flutter with variable AV block  Left axis deviation  Right bundle branch block  Minimal voltage criteria for LVH, may be normal variant  T wave abnormality, consider inferior ischemia  Abnormal ECG  When compared with ECG of 17-APR-2025 18:14, (unconfirmed)  No significant change was found  ECG 12 lead  Atrial flutter with variable AV block  Right bundle branch block  Abnormal ECG  When compared with ECG of 17-APR-2025 11:29,  Atrial flutter has replaced Atrial fibrillation  T wave inversion more evident in Anterior leads            [1] amiodarone, 200 mg, oral, Daily  [Held by provider] aspirin, 81 mg, oral, Daily  [Held by provider] clopidogrel, 75 mg, oral, Daily  docusate sodium, 100 mg, oral, BID  DULoxetine, 30 mg, oral, Daily  gabapentin, 300 mg, oral, Nightly  heparin (porcine), 5,000 Units, subcutaneous, q8h LIVAN  ketotifen, 1 drop, Both Eyes, BID  levothyroxine, 88 mcg, oral, Daily  losartan, 75 mg, oral, Daily  magnesium oxide, 400 mg, oral, Daily  metoprolol tartrate, 25 mg, oral, BID  pantoprazole, 40 mg, oral, Daily before breakfast  polyethylene glycol, 17 g, oral, Daily  rosuvastatin, 20 mg, oral, Nightly  [2] PRN medications: acetaminophen **OR** acetaminophen **OR** acetaminophen, acetaminophen **OR** acetaminophen **OR** acetaminophen, benzocaine-menthol, iodixanol, lidocaine PF, ondansetron **OR** ondansetron, oxygen  [3]

## 2025-04-18 NOTE — PROGRESS NOTES
Physical Therapy    Physical Therapy Evaluation    Patient Name: Meryl Hester  MRN: [id number]  Today's Date: 4/18/2025   Time Calculation  Start Time: 0924  Stop Time: 0937  Time Calculation (min): 13 min  814/814-A    Assessment/Plan   PT Assessment  PT Assessment Results: Decreased strength, Decreased endurance, Impaired balance, Decreased mobility, Decreased coordination  Rehab Prognosis: Good  Barriers to Discharge Home: No anticipated barriers  Evaluation/Treatment Tolerance: Patient limited by fatigue  Strengths: Support of extended family/friends, Premorbid level of function, Coping skills, Attitude of self  Barriers to Participation: Comorbidities  End of Session Communication: Bedside nurse  Assessment Comment: pt with decreased mobility /gait strength balance endurance pt to benefit from skilled PT to address deficits and improve functional mobility  End of Session Patient Position: Up in chair, Alarm on  IP OR SWING BED PT PLAN  Inpatient or Swing Bed: Inpatient  PT Plan  Treatment/Interventions: Bed mobility, Transfer training, Stair training, Gait training, Balance training, Strengthening, Endurance training, Therapeutic exercise, Therapeutic activity  PT Plan: Ongoing PT  PT Frequency: 3 times per week  PT Discharge Recommendations: Low intensity level of continued care  Equipment Recommended upon Discharge: Wheeled walker (pt has walker)  PT Recommended Transfer Status: Assist x1, Assistive device, Contact guard  PT - OK to Discharge: Yes (once medically ready for discharge to next level of care.)    Subjective     Current Problem:  1. Shortness of breath        2. Atrial fibrillation with RVR (Multi)          General Visit Information:  General  Reason for Referral: weakness/ impaired mobility  Referred By: PT/OT Omero 4/17  Past Medical History Relevant to Rehab: MI, CAD, CHF, AFib, hypothyroidism, watchman, HTN  Family/Caregiver Present: No  Co-Treatment: OT  Co-Treatment Reason: To maximize pt  safety and functional outcomes  Prior to Session Communication: Bedside nurse (cleared to see for therapy evals)  Patient Position Received: Alarm on (seated EOB, IV intact, external cath intact)  General Comment: pt is a 89 yo female came to the hospital on 4/17  with reports of SOB .  dx  : AFIB with RVR and possible gastritis .  test/labs : trop 103, HGB 11.0, BNP : 1434, CXR neg, CT angio : Bilat pleural effusion. scattered R lung nodules.  Home Living:  Home Living  Home Living Comments: Pt lives in a ranch style home with Dtr. 1 JORGE ALBERTO with HR. Has tub shower with seat and grab bars.  Prior Level of Function:  Prior Function Per Pt/Caregiver Report  Prior Function Comments: Pt reports indep with ADLs. Dtr completes IADLs. Pt able to complete simple meals. Uses RW for mobility. Owns cane. Denies falls. (-) drives. Goes to pilates 1x/wk and water exercise class 2x/wk.  Precautions:  Precautions  Medical Precautions: Fall precautions  Objective   Pain:  Pain Assessment  Pain Assessment: 0-10  0-10 (Numeric) Pain Score: 0 - No pain  Cognition:  Cognition  Overall Cognitive Status: Within Functional Limits  General Assessments:  Activity Tolerance  Endurance: Decreased tolerance for upright activites  Sensation  Sensation Comment: intact BLEs  Strength  Strength Comments: BLEs 4/5  Coordination  Movements are Fluid and Coordinated: Yes  Static Sitting Balance  Static Sitting-Comment/Number of Minutes: fair  Dynamic Sitting Balance  Dynamic Sitting-Comments: fair  Static Standing Balance  Static Standing-Comment/Number of Minutes: fair  Dynamic Standing Balance  Dynamic Standing-Comments: fair  Functional Assessments:  Bed Mobility  Bed Mobility: No (pt up sitting on EOB)  Transfers  Transfer: Yes  Transfer 1  Technique 1: Sit to stand, Stand to sit  Transfer Device 1: Walker (FWW)  Transfer Level of Assistance 1: Contact guard  Trials/Comments 1: cga with FWW cues to push up and reach back with  transfers  Ambulation/Gait Training  Ambulation/Gait Training Performed: Yes  Ambulation/Gait Training 1  Surface 1: Level tile  Device 1: Rolling walker  Assistance 1: Contact guard  Quality of Gait 1: Decreased step length, Inconsistent stride length (slow gait speed.  pt becomes SOB rapidly with gait)  Comments/Distance (ft) 1: 30 ft with FWW CGA  Extremity/Trunk Assessments:  RLE   RLE : Within Functional Limits  LLE   LLE : Within Functional Limits  Outcome Measures:  [facility] Basic Mobility  Turning from your back to your side while in a flat bed without using bedrails: A little  Moving from lying on your back to sitting on the side of a flat bed without using bedrails: A little  Moving to and from bed to chair (including a wheelchair): A little  Standing up from a chair using your arms (e.g. wheelchair or bedside chair): A little  To walk in hospital room: A little  Climbing 3-5 steps with railing: Total  Basic Mobility - Total Score: 16  Goals:  Encounter Problems       Encounter Problems (Active)       PT Problem       Pt will demonstrate mod I  with bed mobility to edge of bed.   (Progressing)       Start:  04/18/25    Expected End:  05/02/25            Pt will demonstrate mod I with sit to stand/chair transfers with FWW.   (Progressing)       Start:  04/18/25    Expected End:  05/02/25            Pt will ambulate 50 feet with FWW SUP.   (Progressing)       Start:  04/18/25    Expected End:  05/02/25            Pt to demo improved BLE strength by being able to complete supine/seated thera ex 2x20 BLEs with 4 or less rest breaks .   (Progressing)       Start:  04/18/25    Expected End:  05/02/25               Pain - Adult            Education Documentation  Mobility Training, taught by Renzo Hickman, PT at 4/18/2025 10:50 AM.  Learner: Patient  Readiness: Acceptance  Method: Explanation  Response: Verbalizes Understanding  Comment: transfers with FWW    Education Comments  No comments found.

## 2025-04-19 ENCOUNTER — APPOINTMENT (OUTPATIENT)
Dept: RADIOLOGY | Facility: HOSPITAL | Age: OVER 89
DRG: 242 | End: 2025-04-19
Payer: MEDICARE

## 2025-04-19 ENCOUNTER — APPOINTMENT (OUTPATIENT)
Dept: CARDIOLOGY | Facility: HOSPITAL | Age: OVER 89
DRG: 242 | End: 2025-04-19
Payer: MEDICARE

## 2025-04-19 VITALS
RESPIRATION RATE: 17 BRPM | DIASTOLIC BLOOD PRESSURE: 67 MMHG | WEIGHT: 140 LBS | SYSTOLIC BLOOD PRESSURE: 131 MMHG | HEIGHT: 59 IN | HEART RATE: 89 BPM | TEMPERATURE: 97.5 F | OXYGEN SATURATION: 93 % | BODY MASS INDEX: 28.22 KG/M2

## 2025-04-19 LAB
ANION GAP SERPL CALC-SCNC: 11 MMOL/L (ref 10–20)
ATRIAL RATE: 278 BPM
ATRIAL RATE: 293 BPM
ATRIAL RATE: 300 BPM
BUN SERPL-MCNC: 19 MG/DL (ref 6–23)
CALCIUM SERPL-MCNC: 8.7 MG/DL (ref 8.6–10.3)
CHLORIDE SERPL-SCNC: 102 MMOL/L (ref 98–107)
CO2 SERPL-SCNC: 29 MMOL/L (ref 21–32)
CREAT SERPL-MCNC: 0.86 MG/DL (ref 0.5–1.05)
EGFRCR SERPLBLD CKD-EPI 2021: 63 ML/MIN/1.73M*2
ERYTHROCYTE [DISTWIDTH] IN BLOOD BY AUTOMATED COUNT: 15.3 % (ref 11.5–14.5)
GLUCOSE SERPL-MCNC: 97 MG/DL (ref 74–99)
HCT VFR BLD AUTO: 35.2 % (ref 36–46)
HGB BLD-MCNC: 11.3 G/DL (ref 12–16)
MCH RBC QN AUTO: 29.6 PG (ref 26–34)
MCHC RBC AUTO-ENTMCNC: 32.1 G/DL (ref 32–36)
MCV RBC AUTO: 92 FL (ref 80–100)
NRBC BLD-RTO: 0 /100 WBCS (ref 0–0)
PLATELET # BLD AUTO: 278 X10*3/UL (ref 150–450)
POTASSIUM SERPL-SCNC: 3.8 MMOL/L (ref 3.5–5.3)
Q ONSET: 199 MS
Q ONSET: 205 MS
Q ONSET: 224 MS
QRS COUNT: 15 BEATS
QRS COUNT: 16 BEATS
QRS COUNT: 18 BEATS
QRS DURATION: 128 MS
QRS DURATION: 140 MS
QRS DURATION: 142 MS
QT INTERVAL: 406 MS
QT INTERVAL: 432 MS
QT INTERVAL: 450 MS
QTC CALCULATION(BAZETT): 525 MS
QTC CALCULATION(BAZETT): 552 MS
QTC CALCULATION(BAZETT): 559 MS
QTC FREDERICIA: 492 MS
QTC FREDERICIA: 498 MS
QTC FREDERICIA: 521 MS
R AXIS: -26 DEGREES
R AXIS: -39 DEGREES
R AXIS: 268 DEGREES
RBC # BLD AUTO: 3.82 X10*6/UL (ref 4–5.2)
SODIUM SERPL-SCNC: 138 MMOL/L (ref 136–145)
T AXIS: -14 DEGREES
T AXIS: -52 DEGREES
T AXIS: 0 DEGREES
T OFFSET: 415 MS
T OFFSET: 427 MS
T OFFSET: 430 MS
VENTRICULAR RATE: 111 BPM
VENTRICULAR RATE: 89 BPM
VENTRICULAR RATE: 93 BPM
WBC # BLD AUTO: 8.9 X10*3/UL (ref 4.4–11.3)

## 2025-04-19 PROCEDURE — 2500000004 HC RX 250 GENERAL PHARMACY W/ HCPCS (ALT 636 FOR OP/ED): Performed by: REGISTERED NURSE

## 2025-04-19 PROCEDURE — 93005 ELECTROCARDIOGRAM TRACING: CPT

## 2025-04-19 PROCEDURE — 2500000004 HC RX 250 GENERAL PHARMACY W/ HCPCS (ALT 636 FOR OP/ED)

## 2025-04-19 PROCEDURE — 85027 COMPLETE CBC AUTOMATED: CPT | Performed by: REGISTERED NURSE

## 2025-04-19 PROCEDURE — 80048 BASIC METABOLIC PNL TOTAL CA: CPT | Performed by: REGISTERED NURSE

## 2025-04-19 PROCEDURE — 71046 X-RAY EXAM CHEST 2 VIEWS: CPT | Performed by: RADIOLOGY

## 2025-04-19 PROCEDURE — 99239 HOSP IP/OBS DSCHRG MGMT >30: CPT | Performed by: INTERNAL MEDICINE

## 2025-04-19 PROCEDURE — 2500000001 HC RX 250 WO HCPCS SELF ADMINISTERED DRUGS (ALT 637 FOR MEDICARE OP): Performed by: NURSE PRACTITIONER

## 2025-04-19 PROCEDURE — 93286 PERI-PX EVAL PM/LDLS PM IP: CPT | Performed by: INTERNAL MEDICINE

## 2025-04-19 PROCEDURE — 2500000002 HC RX 250 W HCPCS SELF ADMINISTERED DRUGS (ALT 637 FOR MEDICARE OP, ALT 636 FOR OP/ED): Performed by: REGISTERED NURSE

## 2025-04-19 PROCEDURE — 36415 COLL VENOUS BLD VENIPUNCTURE: CPT | Performed by: REGISTERED NURSE

## 2025-04-19 PROCEDURE — 93010 ELECTROCARDIOGRAM REPORT: CPT | Performed by: INTERNAL MEDICINE

## 2025-04-19 PROCEDURE — 2500000001 HC RX 250 WO HCPCS SELF ADMINISTERED DRUGS (ALT 637 FOR MEDICARE OP): Performed by: REGISTERED NURSE

## 2025-04-19 PROCEDURE — 71046 X-RAY EXAM CHEST 2 VIEWS: CPT

## 2025-04-19 RX ADMIN — KETOTIFEN FUMARATE 1 DROP: 0.25 SOLUTION/ DROPS OPHTHALMIC at 09:14

## 2025-04-19 RX ADMIN — MAGNESIUM OXIDE 400 MG (241.3 MG MAGNESIUM) TABLET 400 MG: TABLET at 09:14

## 2025-04-19 RX ADMIN — HEPARIN SODIUM 5000 UNITS: 5000 INJECTION INTRAVENOUS; SUBCUTANEOUS at 06:28

## 2025-04-19 RX ADMIN — DOCUSATE SODIUM 100 MG: 100 CAPSULE, LIQUID FILLED ORAL at 09:13

## 2025-04-19 RX ADMIN — DULOXETINE 30 MG: 30 CAPSULE, DELAYED RELEASE ORAL at 09:14

## 2025-04-19 RX ADMIN — ACETAMINOPHEN 650 MG: 325 TABLET ORAL at 09:13

## 2025-04-19 RX ADMIN — ASPIRIN 81 MG: 81 TABLET, COATED ORAL at 09:13

## 2025-04-19 RX ADMIN — PANTOPRAZOLE SODIUM 40 MG: 40 TABLET, DELAYED RELEASE ORAL at 06:29

## 2025-04-19 RX ADMIN — LEVOTHYROXINE SODIUM 88 MCG: 0.09 TABLET ORAL at 06:29

## 2025-04-19 RX ADMIN — POLYETHYLENE GLYCOL 3350 17 G: 17 POWDER, FOR SOLUTION ORAL at 09:14

## 2025-04-19 RX ADMIN — CLOPIDOGREL BISULFATE 75 MG: 75 TABLET, FILM COATED ORAL at 09:14

## 2025-04-19 ASSESSMENT — COGNITIVE AND FUNCTIONAL STATUS - GENERAL
MOVING TO AND FROM BED TO CHAIR: A LITTLE
DRESSING REGULAR UPPER BODY CLOTHING: A LITTLE
HELP NEEDED FOR BATHING: A LITTLE
CLIMB 3 TO 5 STEPS WITH RAILING: A LOT
TURNING FROM BACK TO SIDE WHILE IN FLAT BAD: A LITTLE
STANDING UP FROM CHAIR USING ARMS: A LITTLE
TOILETING: A LITTLE
DAILY ACTIVITIY SCORE: 20
MOBILITY SCORE: 18
WALKING IN HOSPITAL ROOM: A LITTLE
DRESSING REGULAR LOWER BODY CLOTHING: A LITTLE

## 2025-04-19 ASSESSMENT — PAIN SCALES - GENERAL
PAINLEVEL_OUTOF10: 3
PAINLEVEL_OUTOF10: 2

## 2025-04-19 ASSESSMENT — PAIN - FUNCTIONAL ASSESSMENT: PAIN_FUNCTIONAL_ASSESSMENT: 0-10

## 2025-04-19 NOTE — CARE PLAN
The patient's goals for the shift include  sit up in chair     The clinical goals for the shift include Patient will remain hemodynamically stable throughout shift    Over the shift, the patient made progress toward the following goals.       Problem: Pain - Adult  Goal: Verbalizes/displays adequate comfort level or baseline comfort level  Outcome: Progressing     Problem: Safety - Adult  Goal: Free from fall injury  Outcome: Progressing     Problem: Discharge Planning  Goal: Discharge to home or other facility with appropriate resources  Outcome: Progressing     Problem: Chronic Conditions and Co-morbidities  Goal: Patient's chronic conditions and co-morbidity symptoms are monitored and maintained or improved  Outcome: Progressing     Problem: Nutrition  Goal: Nutrient intake appropriate for maintaining nutritional needs  Outcome: Progressing

## 2025-04-19 NOTE — DISCHARGE SUMMARY
Discharge Diagnosis  Atrial fibrillation with RVR  Chronic systolic CHF      Issues Requiring Follow-Up  EP follow up  Pulm referral for follow up lung nodules    Discharge Meds     Medication List      CONTINUE taking these medications     acetaminophen 325 mg tablet; Commonly known as: Tylenol; Take 2 tablets   (650 mg) by mouth every 6 hours if needed for mild pain (1 - 3) or   moderate pain (4 - 6).   Allergy Relief (cetirizine) 10 mg tablet; Generic drug: cetirizine   aspirin 81 mg EC tablet; Take 1 tablet (81 mg) by mouth once daily.   bisoprolol 10 mg tablet; Commonly known as: Zebeta; Take 1 tablet (10   mg) by mouth once daily.   calcium carbonate-vitamin D3 500 mg-5 mcg (200 unit) tablet   carboxymethylcellulose 0.25 % ophthalmic solution; Commonly known as:   THERATears   clopidogrel 75 mg tablet; Commonly known as: Plavix; Take 1 tablet (75   mg) by mouth once daily for 180 doses.   docusate sodium 100 mg capsule; Commonly known as: Colace   DULoxetine 30 mg DR capsule; Commonly known as: Cymbalta   fluticasone 50 mcg/actuation nasal spray; Commonly known as: Flonase   furosemide 20 mg tablet; Commonly known as: Lasix; Take 1 tablet (20 mg)   by mouth once daily.   gabapentin 300 mg capsule; Commonly known as: Neurontin   krill oil 500 mg capsule   LACTOBACILLUS ACIDOPHILUS ORAL   levothyroxine 88 mcg tablet; Commonly known as: Synthroid, Levoxyl   losartan 25 mg tablet; Commonly known as: Cozaar; Take 3 tablets (75 mg)   by mouth once daily.   magnesium oxide 500 mg magnesium tablet   multivitamin with minerals tablet   olopatadine 0.1 % ophthalmic solution; Commonly known as: Patanol   omeprazole 40 mg DR capsule; Commonly known as: PriLOSEC   potassium chloride CR 20 mEq ER tablet; Commonly known as: Klor-Con M20;   Take 1 tablet (20 mEq) by mouth once daily.   PreserVision AREDS 2 Plus  mcg-15 mcg- 5 mg-1 mg capsule; Generic   drug: mv-min-FA-vit K-lutein-zeaxant   rosuvastatin 20 mg tablet;  Commonly known as: Crestor; Take 1 tablet (20   mg) by mouth once daily at bedtime.     STOP taking these medications     amiodarone 200 mg tablet; Commonly known as: Pacerone       Test Results Pending At Discharge  Pending Labs       No current pending labs.            Hospital Course   89-year-old  female with past medical history of chronic systolic CHF, atrial fibrillation status post Watchman device, CAD, hypothyroidism, who presented to the emerged part with increased shortness of breath.  She found to be in atrial fibrillation with rapid ventricular rate and she is typically symptomatic when this occurs.  Incidentally CT of the chest did show lung nodules for which she has been referred to pulmonology for outpatient follow-up.  There is back to her A-fib EP was consulted and ultimately she underwent AV node ablation with CRT device placement to prevent recurrent arrhythmia.  She tolerated the procedure well her dyspnea improved with resolution of the A-fib RVR.  She has been resumed on her usual heart failure medications and the suspicion is that her EF will improve without further episodes of A-fib RVR.  She remains hemodynamically stable today.  She is a little bit weak and with limited use of the left arm so HHC has been arranged.  She is otherwise hemodynamically stable and improved for discharge home today with close outpatient EP follow-up.  Greater than 30-minute spent discharge activities.    Pertinent Physical Exam At Time of Discharge  Physical Exam    General: resting comfortably in chair, no distress  HEENT: Clear sclera, EOMI, trachea midline, moist mucous membranes  Respiratory: Equal chest rise, no retractions   Cardiovascular: S1 and S2 auscultated, no murmurs clicks or rubs  Abdomen: Soft, nontender, nondistended  Extremities: No cyanosis or clubbing appreciated  Neurological: Spontaneously moves all extremities, no dysarthria, cranial nerves grossly intact  Psychiatric: Appropriate mood and  affect  Skin: Warm, dry    Outpatient Follow-Up  Future Appointments   Date Time Provider Department Center   4/25/2025 10:00 AM [facility] [id number] CARDIOLOGY RN 1 [id number] [place]   4/30/2025 11:45 AM Moe Lane MD [id number] [place]   5/19/2025 11:20 AM [facility] 1 [email or web address] [place]   6/19/2025 11:40 AM [facility] 1 [email or web address] [place]   7/1/2025 12:30 PM [facility] [id number] ECHO/VASC 3 [id number] [facility] BEVER   7/8/2025  3:30 PM Wilmer Corona MD [id number] [place]   7/23/2025  1:45 PM Moe Lane MD [id number] [place]   7/23/2025  2:20 PM [facility] 2 ELYNIC1 [place]   7/23/2025  3:15 PM Moe Lane MD [id number] [place]         lEiel Umanzor MD

## 2025-04-19 NOTE — PROGRESS NOTES
Pd today . Ash. Amada lives w/ pt. Hco written. Met w/ pt and ash. Demo's confirmed. Pcp. [facility] preferred. Rn updated. [facility] orders to [facility] notified of pd.

## 2025-04-19 NOTE — CARE PLAN
The patient's goals for the shift include  rest    The clinical goals for the shift include Patient will remain hemodynamically stable throughout shift

## 2025-04-19 NOTE — PROGRESS NOTES
[facility] Progress Note               Rounding Cardiologist:  Moe Lane MD, MD   Primary Cardiologist: Dr. Moe Lane    Date:  4/19/2025  Patient:  Meryl Hester  YOB: 1931  MRN:  [id number]   Admit Date:  4/17/2025      SUBJECTIVE    Meryl Hester was seen and examined today at bedside.    Patient is doing well  No chest pain or shortness of breath  Telemetry shows ventricular paced rhythm  Device pocket healing well.  No evidence of hematoma or infection  EKG shows atrial fibrillation ventricular paced rhythm rate of 89 bpm  ms QT corrected 525 ms    Device interrogation reviewed  Chest x-ray review  Sodium 138 potassium 3.8 creatinine 0.86 BUN 19 WBC 8.9 hemoglobin 11.3  VITALS     Vitals:    04/18/25 2139 04/18/25 2354 04/19/25 0312 04/19/25 0816   BP: 139/82 120/76 130/76 143/83   BP Location:       Patient Position:    Sitting   Pulse:  83 98 90   Resp:    17   Temp: 36.2 °C (97.2 °F) 36.3 °C (97.3 °F) 36.4 °C (97.5 °F) 36.4 °C (97.5 °F)   TempSrc:       SpO2: 94% 92% 95% 94%   Weight:       Height:           Intake/Output Summary (Last 24 hours) at 4/19/2025 1114  Last data filed at 4/18/2025 2218  Gross per 24 hour   Intake 700 ml   Output 10 ml   Net 690 ml       [unfilled]    PHYSICAL EXAM   Constitutional:       Appearance: She is ill-appearing. She is not diaphoretic.   HENT:      Head: Normocephalic.      Mouth/Throat:      Mouth: Mucous membranes are moist.   Eyes:      Pupils: Pupils are equal, round, and reactive to light.   Cardiovascular:      Rate and Rhythm: Regular rate and rhythm      Pulses: Normal pulses.  Device in the left prepectoral healing well.  No signs of hematoma or infection.    Pulmonary:      Breath sounds: Normal breath sounds.      Comments: Crackles and wheezing though decreased breath   Abdominal:      General: There is no distension.      Palpations: Abdomen is soft.   Musculoskeletal:         General: No swelling.      Cervical back: Normal range  "of motion.   Neurological:      Mental Status: She is oriented to person, place, and time.       DIAGNOSTIC RESULTS   EKG:     Telemetry: Ventricular paced rhythm.      LAB DATA   BMP:  @LABRCNT(Na:3,K:3,CL:3,CO2:3,Bun:3,Creatinine:3,Glu:3, CA:3,LABGLOM)@    Cardiac Enzymes:  @LABRCNT(CKTOTAL:3,CKMB:3,CKMBINDEX:3,TROPONINI:3)@    CBC:   Lab Results   Component Value Date    WBC 8.9 04/19/2025    RBC 3.82 (L) 04/19/2025    HGB 11.3 (L) 04/19/2025    HCT 35.2 (L) 04/19/2025     04/19/2025       CMP:    Lab Results   Component Value Date     04/19/2025    K 3.8 04/19/2025     04/19/2025    CO2 29 04/19/2025    BUN 19 04/19/2025    CREATININE 0.86 04/19/2025    GLUCOSE 97 04/19/2025    CALCIUM 8.7 04/19/2025       Hepatic Function Panel:    Lab Results   Component Value Date    ALKPHOS 153 (H) 04/17/2025    ALT 49 (H) 04/17/2025    AST 54 (H) 04/17/2025    PROT 7.1 04/17/2025    BILITOT 0.9 04/17/2025       Magnesium:    Lab Results   Component Value Date    MG 1.84 04/18/2025       PT/INR:    Lab Results   Component Value Date    PROTIME 12.1 04/17/2025    INR 1.1 04/17/2025     @LABRCNT(inr:3)@     HgBA1c:  No components found for: \"LABA1C\"    Lipid Profile:  No results found for: \"CHLPL\", \"TRIG\", \"HDL\", \"LDLCALC\", \"LDLDIRECT\"    TSH:    Lab Results   Component Value Date    TSH 4.07 (H) 04/17/2025       ABG:  No results found for: \"PH\"    PRO-BNP: No results found for: \"PROBNP\"       RADIOLOGY     XR chest 1 view   Final Result   1.  Improving findings of moderate congestive failure.   2. Satisfactory appearance post pacemaker implant within limits of   supine radiograph.                  MACRO:   None        Signed by: Joseph Schoenberger 4/18/2025 4:31 PM   Dictation workstation:   [id number]      Electrophysiology procedure   Final Result      CT angio chest for pulmonary embolism   Final Result   1. Geographic attenuation with component of pulmonary edema with   small bilateral basilar " pleural effusions demonstrated        2. Scattered nodular lesions in particular within the right lung   largest measuring 2.8 cm as seen on the prior examination. Further   workup including PET scan recommended with underlying   neoplasm/metastatic disease not excluded. Correlate with prior workup.                  MACRO:   None        Signed by: Janet Kirby 4/17/2025 11:43 AM   Dictation workstation:   [id number]      CT abdomen pelvis w IV contrast   Final Result   1. Stomach is contracted limiting characterization with suggestion of   wall thickening within the gastric body. Correlate with underlying   symptoms of gastritis        2. No significant gaseous or fluid dilatation of the loops of large   or small bowel with distention of the few loops of small bowel   nonspecific.        3. Gallbladder wall thickening nonspecific and can be seen with   multiple etiologies including hypoproteinemia or even fluid overload.        4. Uncomplicated diffuse descending and sigmoid diverticulosis.             MACRO:   None        Signed by: Janet Kirby 4/17/2025 11:52 AM   Dictation workstation:   [id number]      XR chest 1 view   Final Result   Stable appearing examination. No acute cardiopulmonary disease.        Signed by: Kofi Stoll 4/17/2025 10:23 AM   Dictation workstation:   [id number]      Cardiac Device Check - In Clinic    (Results Pending)   Cardiac Device Check - In Clinic    (Results Pending)   Cardiac Device Check - In Clinic    (Results Pending)   XR chest 2 views    (Results Pending)   XR chest 2 views    (Results Pending)       [unfilled]      CURRENT MEDICATIONS    Scheduled Medications[1]  Continuous Medications[2]      ASSESSMENT     Assessment & Plan  Shortness of breath    Acute on chronic diastolic heart failure    Atrial fibrillation with RVR (Multi)        Problem List[3]      Persistent atrial fibrillation with RVR managed with amiodarone and bisoprolol  Acute on chronic  systolic/diastolic heart failure with a EF 35 to 40% per KARINA March 2025  Flat pattern elevated troponin dialysis, type II MI likely secondary to A-fib with RVR  Coronary artery disease, no recent angina  Status post LAAO with Watchman device November 2024 currently on dual antiplatelet therapy with aspirin Plavix  Moderate to severe aortic stenosis  Pulmonary nodules on CT scan being addressed by primary service  8.  Status post biventricular pacemaker implantation status post AV node ablation performed April 18, 2025 with no complications      PLAN     From the electrophysiology standpoint she can be discharged home.  Discontinue amiodarone  Device interrogation reviewed  Chest x-ray reviewed  Follow my office in the next 7 days for wound assessment.    Family updated about plan to follow    Risk factor modification and lifestyle modification discussed with patient. Diet , exercise and hydration discussed with patient.    Please excuse any errors in grammar or translation related to this dictation.  Voice recognition software was utilized to prepare this document.      Please do not hesitate to call with questions.  Electronically signed by Moe Lane MD, [facility] on 4/19/2025 at 11:14 AM         [1] aspirin, 81 mg, oral, Daily  clopidogrel, 75 mg, oral, Daily  docusate sodium, 100 mg, oral, BID  DULoxetine, 30 mg, oral, Daily  gabapentin, 300 mg, oral, Nightly  heparin (porcine), 5,000 Units, subcutaneous, q8h LIVAN  ketotifen, 1 drop, Both Eyes, BID  levothyroxine, 88 mcg, oral, Daily  [Held by provider] losartan, 75 mg, oral, Daily  magnesium oxide, 400 mg, oral, Daily  [Held by provider] metoprolol succinate XL, 25 mg, oral, Nightly  pantoprazole, 40 mg, oral, Daily before breakfast  polyethylene glycol, 17 g, oral, Daily  rosuvastatin, 20 mg, oral, Nightly  [2]    [3]   Patient Active Problem List  Diagnosis    Radiculopathy of lumbar region    Joint stiffness of spine    Gait difficulty    Syncope and collapse     Syncope, unspecified syncope type    NSTEMI (non-ST elevated myocardial infarction) (Multi)    Pulmonary hypertension (Multi)    Acute systolic CHF (congestive heart failure), NYHA class 2    Cardiomyopathy, ischemic    Coronary artery disease involving native coronary artery of native heart without angina pectoris    Paroxysmal atrial fibrillation with RVR (Multi)    Hypertension    Atrial fibrillation (Multi)    Presence of Watchman left atrial appendage closure device    Atrial fibrillation, unspecified type (Multi)    Acute on chronic diastolic heart failure    Type 2 MI (myocardial infarction) (Multi)    Acquired hypothyroidism    Acute hypoxic respiratory failure    Myocardiopathy (Multi)    Shortness of breath    Atrial fibrillation with RVR (Multi)

## 2025-04-20 ENCOUNTER — HOME HEALTH ADMISSION (OUTPATIENT)
Dept: HOME HEALTH SERVICES | Facility: HOME HEALTH | Age: OVER 89
End: 2025-04-20
Payer: MEDICARE

## 2025-04-20 ENCOUNTER — DOCUMENTATION (OUTPATIENT)
Dept: HOME HEALTH SERVICES | Facility: HOME HEALTH | Age: OVER 89
End: 2025-04-20
Payer: MEDICARE

## 2025-04-20 LAB
ATRIAL RATE: 315 BPM
Q ONSET: 200 MS
QRS COUNT: 15 BEATS
QRS DURATION: 122 MS
QT INTERVAL: 436 MS
QTC CALCULATION(BAZETT): 530 MS
QTC FREDERICIA: 497 MS
R AXIS: 250 DEGREES
T AXIS: 0 DEGREES
T OFFSET: 418 MS
VENTRICULAR RATE: 89 BPM

## 2025-04-20 NOTE — HH CARE COORDINATION
Home Care received a Referral for Nursing, Physical Therapy, and Occupational Therapy. We have processed the referral for a Start of Care on 04-21-25, 24-48 hours.     If you have any questions or concerns, please feel free to contact us at [phone]. Follow the prompts, enter your five digit zip code, and you will be directed to your care team on WEST 1.

## 2025-04-21 ENCOUNTER — HOME CARE VISIT (OUTPATIENT)
Dept: HOME HEALTH SERVICES | Facility: HOME HEALTH | Age: OVER 89
End: 2025-04-21
Payer: MEDICARE

## 2025-04-21 VITALS
RESPIRATION RATE: 18 BRPM | DIASTOLIC BLOOD PRESSURE: 72 MMHG | TEMPERATURE: 96.1 F | OXYGEN SATURATION: 94 % | HEART RATE: 77 BPM | SYSTOLIC BLOOD PRESSURE: 112 MMHG

## 2025-04-21 PROCEDURE — G0299 HHS/HOSPICE OF RN EA 15 MIN: HCPCS

## 2025-04-21 PROCEDURE — 0023 HH SOC

## 2025-04-21 ASSESSMENT — ENCOUNTER SYMPTOMS
PAIN SEVERITY GOAL: 0/10
PAIN LOCATION - PAIN DURATION: INTERMITTENT
LOWEST PAIN SEVERITY IN PAST 24 HOURS: 0/10
PAIN: 1
LAST BOWEL MOVEMENT: 67316
PAIN LOCATION - PAIN QUALITY: ACHE
HIGHEST PAIN SEVERITY IN PAST 24 HOURS: 3/10
MUSCLE WEAKNESS: 1
LOSS OF SENSATION IN FEET: 0
STOOL FREQUENCY: DAILY
BOWEL PATTERN NORMAL: 1
PAIN LOCATION - PAIN SEVERITY: 3/10
CHANGE IN APPETITE: UNCHANGED
PAIN LOCATION - EXACERBATING FACTORS: ACTIVITY
PAIN LOCATION: LEFT SHOULDER
HYPERTENSION: 1
PAIN LOCATION - RELIEVING FACTORS: REST/RX
APPETITE LEVEL: GOOD
PERSON REPORTING PAIN: PATIENT
ARTHRALGIAS: 1
DEPRESSION: 0
PAIN LOCATION - PAIN FREQUENCY: INTERMITTENT
OCCASIONAL FEELINGS OF UNSTEADINESS: 1

## 2025-04-21 ASSESSMENT — ACTIVITIES OF DAILY LIVING (ADL)
OASIS_M1830: 03
AMBULATION ASSISTANCE: 1
ENTERING_EXITING_HOME: NEEDS ASSISTANCE
PHYSICAL TRANSFERS ASSESSED: 1
CURRENT_FUNCTION: INDEPENDENT
AMBULATION ASSISTANCE: INDEPENDENT

## 2025-04-21 ASSESSMENT — PAIN SCALES - PAIN ASSESSMENT IN ADVANCED DEMENTIA (PAINAD)
NEGVOCALIZATION: 0 - NONE.
BODYLANGUAGE: 0 - RELAXED.
CONSOLABILITY: 0
BREATHING: 0
NEGVOCALIZATION: 0
FACIALEXPRESSION: 0 - SMILING OR INEXPRESSIVE.
FACIALEXPRESSION: 0
TOTALSCORE: 0
CONSOLABILITY: 0 - NO NEED TO CONSOLE.
BODYLANGUAGE: 0

## 2025-04-22 ENCOUNTER — HOME CARE VISIT (OUTPATIENT)
Dept: HOME HEALTH SERVICES | Facility: HOME HEALTH | Age: OVER 89
End: 2025-04-22
Payer: MEDICARE

## 2025-04-22 VITALS
RESPIRATION RATE: 16 BRPM | HEART RATE: 91 BPM | OXYGEN SATURATION: 97 % | SYSTOLIC BLOOD PRESSURE: 125 MMHG | DIASTOLIC BLOOD PRESSURE: 77 MMHG

## 2025-04-22 PROCEDURE — G0151 HHCP-SERV OF PT,EA 15 MIN: HCPCS

## 2025-04-22 ASSESSMENT — ENCOUNTER SYMPTOMS
PERSON REPORTING PAIN: PATIENT
PAIN LOCATION: CHEST
PAIN LOCATION - PAIN DURATION: CONSTANT
PAIN LOCATION - RELIEVING FACTORS: POSITIONING
SUBJECTIVE PAIN PROGRESSION: GRADUALLY IMPROVING
PAIN LOCATION - PAIN SEVERITY: 2/10
PAIN: 1
PAIN LOCATION - PAIN QUALITY: DULL
PAIN LOCATION - PAIN FREQUENCY: CONSTANT
PAIN SEVERITY GOAL: 0/10
LOWEST PAIN SEVERITY IN PAST 24 HOURS: 1/10
HIGHEST PAIN SEVERITY IN PAST 24 HOURS: 2/10

## 2025-04-24 ENCOUNTER — HOME CARE VISIT (OUTPATIENT)
Dept: HOME HEALTH SERVICES | Facility: HOME HEALTH | Age: OVER 89
End: 2025-04-24
Payer: MEDICARE

## 2025-04-24 ENCOUNTER — TELEPHONE (OUTPATIENT)
Dept: CARDIOLOGY | Facility: CLINIC | Age: OVER 89
End: 2025-04-24
Payer: MEDICARE

## 2025-04-24 PROCEDURE — G0152 HHCP-SERV OF OT,EA 15 MIN: HCPCS

## 2025-04-24 NOTE — TELEPHONE ENCOUNTER
Patients daughter called back.  RN advised her of Dr. Wilmer Corona MD, [facility] message regarding TTE.  She states that she will cancel the test for now. She verbalizes good understanding that test will need to be done in the future but no need to do at this time.      Chiara Enriquez RN

## 2025-04-24 NOTE — TELEPHONE ENCOUNTER
RN called daughter, Amada, back however she was unavailable.  M asking for return call regarding Dr[facility] Wil[facility], [facility] message.    Chiara Enriquez RN

## 2025-04-24 NOTE — TELEPHONE ENCOUNTER
RN received call from patients daughter, Amada, whom we have permission to speak with regarding patients medical care.  Dr. Wilmer Corona MD, FACC order a TTE for patient at her most recent visit, however, she is inquiring if this test still needs to be performed given that her mother just had a pacemaker placed.      RN routed message to Dr. Wilmer Corona MD, FACC for clarification.    Chiara Enriquez RN

## 2025-04-25 ENCOUNTER — APPOINTMENT (OUTPATIENT)
Dept: CARDIOLOGY | Facility: CLINIC | Age: OVER 89
End: 2025-04-25
Payer: MEDICARE

## 2025-04-25 DIAGNOSIS — I50.33 ACUTE ON CHRONIC DIASTOLIC HEART FAILURE: ICD-10-CM

## 2025-04-25 DIAGNOSIS — I48.91 ATRIAL FIBRILLATION WITH RVR (MULTI): ICD-10-CM

## 2025-04-25 DIAGNOSIS — Z95.0 PACEMAKER: ICD-10-CM

## 2025-04-25 SDOH — HEALTH STABILITY: PHYSICAL HEALTH: EXERCISE TYPE: SEATED AND STANDING LE STRENGTHENING EX'S

## 2025-04-25 SDOH — ECONOMIC STABILITY: HOUSING INSECURITY: HOME SAFETY: LIVES IN 1 STORY HOME WITH BASEMENT. DOES NOT GO DOWN BASEMENT. W/D IN BASEMENT. LIVES WITH DAUGHTER.

## 2025-04-25 ASSESSMENT — ENCOUNTER SYMPTOMS
PAIN SEVERITY GOAL: 0/10
PAIN LOCATION - PAIN QUALITY: ACHES
SHORTNESS OF BREATH: 1
HIGHEST PAIN SEVERITY IN PAST 24 HOURS: 2/10
PAIN: 1
SUBJECTIVE PAIN PROGRESSION: GRADUALLY IMPROVING
PAIN LOCATION: CHEST
PAIN LOCATION - PAIN DURATION: SINCE SX
PERSON REPORTING PAIN: PATIENT
DYSPNEA ACTIVITY LEVEL: AFTER AMBULATING MORE THAN 20 FT
LOWEST PAIN SEVERITY IN PAST 24 HOURS: 0/10
MUSCLE WEAKNESS: 1
PAIN LOCATION - PAIN SEVERITY: 2/10
PAIN LOCATION - PAIN FREQUENCY: INTERMITTENT

## 2025-04-25 ASSESSMENT — ACTIVITIES OF DAILY LIVING (ADL)
GROOMING EQUIPMENT USED: SETUP
BATHING ASSESSED: 1
BATHING_CURRENT_FUNCTION: SUPERVISION
TOILETING: SUPERVISION
CURRENT_FUNCTION: STAND BY ASSIST
AMBULATION_DISTANCE/DURATION_TOLERATED: 50'
LIGHT HOUSEKEEPING: DEPENDENT
LAUNDRY: DEPENDENT
AMBULATION ASSISTANCE: SUPERVISION
GROOMING_CURRENT_FUNCTION: SUPERVISION
PREPARING MEALS: NEEDS ASSISTANCE
HOUSEKEEPING ASSESSED: 1
TOILETING: 1
AMBULATION ASSISTANCE: STAND BY ASSIST
GROOMING ASSESSED: 1
DRESSING_UB_CURRENT_FUNCTION: MINIMUM ASSIST
LAUNDRY ASSESSED: 1
DRESSING_LB_CURRENT_FUNCTION: SUPERVISION
AMBULATION ASSISTANCE: 1

## 2025-04-25 ASSESSMENT — BALANCE ASSESSMENTS
IMMEDIATE STANDING BALANCE FIRST 5 SECONDS: 0 - UNSTEADY (STAGGERS, MOVES FEET, TRUNK SWAY)
SITTING BALANCE: 1 - STEADY, SAFE
ATTEMPTS TO ARISE: 0 - UNABLE WITHOUT HELP
NUDGED SCORE: 0
ARISES: 0 - UNABLE WITHOUT HELP
SITTING DOWN: 1 - USES ARMS OR NOT SMOOTH MOTION
STANDING BALANCE: 1 - STEADY BUT WIDE STANCE AND USES CANE OR OTHER SUPPORT
NUDGED: 0 - BEGINS TO FALL
ARISING SCORE: 0
TURNING 360 DEGREES STEPS: 0 - DISCONTINUOUS STEPS
EYES CLOSED AT MAXIMUM POSITION NUDGED: 0 - UNSTEADY
BALANCE SCORE: 4

## 2025-04-25 ASSESSMENT — GAIT ASSESSMENTS
PATH: 1 - MILD/MODERATE DEVIATION OR USES WALKING AID
STEP SYMMETRY: 1 - RIGHT AND LEFT STEP LENGTH APPEAR EQUAL
STEP CONTINUITY: 1 - STEPS APPEAR CONTINUOUS
TRUNK: 0 - MARKED SWAY OR USES WALKING AID
BALANCE AND GAIT SCORE: 7
PATH SCORE: 1
INITIATION OF GAIT IMMEDIATELY AFTER GO: 0 - ANY HESITANCY OR MULTIPLE ATTEMPTS TO START
GAIT SCORE: 3
WALKING STANCE: 0 - HEELS APART
TRUNK SCORE: 0

## 2025-04-25 NOTE — PROGRESS NOTES
Pt. here for wound check of pacemaker implant by Dr. Lane on 4/18/25 at [facility] [place]. L clavicular area is well approximated with no erythema or drainage. Pt. denies any fever or chills. Temp 98.6

## 2025-04-28 ENCOUNTER — HOME CARE VISIT (OUTPATIENT)
Dept: HOME HEALTH SERVICES | Facility: HOME HEALTH | Age: OVER 89
End: 2025-04-28
Payer: MEDICARE

## 2025-04-29 ENCOUNTER — HOME CARE VISIT (OUTPATIENT)
Dept: HOME HEALTH SERVICES | Facility: HOME HEALTH | Age: OVER 89
End: 2025-04-29
Payer: MEDICARE

## 2025-04-30 ENCOUNTER — APPOINTMENT (OUTPATIENT)
Dept: CARDIOLOGY | Facility: CLINIC | Age: OVER 89
End: 2025-04-30
Payer: MEDICARE

## 2025-04-30 ENCOUNTER — OFFICE VISIT (OUTPATIENT)
Dept: CARDIOLOGY | Facility: CLINIC | Age: OVER 89
End: 2025-04-30
Payer: MEDICARE

## 2025-04-30 ENCOUNTER — PATIENT MESSAGE (OUTPATIENT)
Dept: CARDIOLOGY | Facility: CLINIC | Age: OVER 89
End: 2025-04-30

## 2025-04-30 VITALS
WEIGHT: 140.6 LBS | DIASTOLIC BLOOD PRESSURE: 60 MMHG | SYSTOLIC BLOOD PRESSURE: 108 MMHG | BODY MASS INDEX: 28.35 KG/M2 | HEIGHT: 59 IN | HEART RATE: 89 BPM

## 2025-04-30 DIAGNOSIS — I48.11 LONGSTANDING PERSISTENT ATRIAL FIBRILLATION (MULTI): ICD-10-CM

## 2025-04-30 DIAGNOSIS — Z95.0 STATUS POST BIVENTRICULAR PACEMAKER: ICD-10-CM

## 2025-04-30 DIAGNOSIS — I48.91 ATRIAL FIBRILLATION WITH RVR (MULTI): ICD-10-CM

## 2025-04-30 DIAGNOSIS — I42.8 OTHER CARDIOMYOPATHY: ICD-10-CM

## 2025-04-30 DIAGNOSIS — I10 PRIMARY HYPERTENSION: Primary | ICD-10-CM

## 2025-04-30 DIAGNOSIS — Z87.891 FORMER SMOKER: ICD-10-CM

## 2025-04-30 PROCEDURE — 3074F SYST BP LT 130 MM HG: CPT | Performed by: INTERNAL MEDICINE

## 2025-04-30 PROCEDURE — 1159F MED LIST DOCD IN RCRD: CPT | Performed by: INTERNAL MEDICINE

## 2025-04-30 PROCEDURE — 1036F TOBACCO NON-USER: CPT | Performed by: INTERNAL MEDICINE

## 2025-04-30 PROCEDURE — 99215 OFFICE O/P EST HI 40 MIN: CPT | Performed by: INTERNAL MEDICINE

## 2025-04-30 PROCEDURE — 93000 ELECTROCARDIOGRAM COMPLETE: CPT | Performed by: INTERNAL MEDICINE

## 2025-04-30 PROCEDURE — 3078F DIAST BP <80 MM HG: CPT | Performed by: INTERNAL MEDICINE

## 2025-04-30 PROCEDURE — 1111F DSCHRG MED/CURRENT MED MERGE: CPT | Performed by: INTERNAL MEDICINE

## 2025-04-30 ASSESSMENT — ENCOUNTER SYMPTOMS
DYSPNEA ON EXERTION: 1
PALPITATIONS: 0

## 2025-04-30 NOTE — PROGRESS NOTES
CARDIOLOGY OFFICE VISIT      CHIEF COMPLAINT  Chief Complaint   Patient presents with    Follow-up    Hypertension    Atrial Fibrillation       HISTORY OF PRESENT ILLNESS  HPI  89 y.o. female presenting with shortness of breath at [facility] 03/2025.     Patient has a past medical history of paroxysmal atrial flutter status post Watchman device implantation, chronic diastolic heart failure, CAD, hypothyroidism, hypertension, paroxysmal atrial fibrillation, CVA February 2024 secondary to atrial fibrillation, severe and diffuse diverticulosis with recurrent GI bleeding, coronary artery disease with diffuse 50 to 60% stenosis at coronary angiography August 2024.  She had been admitted to [facility] in late July with recurrent GI bleeding rectal bleeding.  During that hospital stay had multiple runs of rapid atrial fibrillation treated transiently with intravenous amiodarone but was not kept on that agent.  Had to undergo 2 colonoscopies was discharged the day after the second colonoscopy.  Was admitted to [facility] there was syncopal episode felt related to volume depletion.  She was hydrated and improved in symptoms but troponins were elevated and echo suggested right ventricular systolic pressure over 70 mmHg.  In light of these findings she underwent coronary angiography as delineated below that did show 50 to 60% diffuse disease. There was one 90% lesion in a small ramus.  EF 40% with RVSP found to be just 43 mmHg.      Holter monitor September 2024 1-2% atrial fibrillation.  Some lasting as long as an hour.  Overall burden likely higher as computer interpretation had mislabeled some SVT as A-fib.     10/23/2024 normal BMP. Hemoglobin 11.4 significant improvement from previously      11/8/2024 watchman implantation.  No complications     Patient presented to emergency room complaining of shortness of breath for a  week.  She has orthopnea and edema in the lower extremities.  She does not notice palpitations.      .     ER Course: Tachycardic with EKG showing atrial fibrillation with RVR, hypoxic requiring nasal cannula, otherwise vitals stable.  Labs notable for markedly elevated BNP and elevated troponin.  Also noted to have mild hyponatremia and anemia.  CXR showed prominent interstitial lung markings consistent with edema versus multifocal pneumonia.  Patient was given antibiotics, lasix, and aspirin in the ER.     Laboratory data shows sodium 137 potassium of 3.0 BUN 13 creatinine 0.81 ALT 36 AST 42 magnesium 2.3 for first troponin 136, subsequent troponin 140, subsequent troponin 138  TSH 1.7.  Hemoglobin 11.5 hematocrit 34.1 WBC 8.8 patient was placed on amiodarone therapy IV by hospitalist     Echocardiogram November 2024    CONCLUSIONS:   1. Poorly visualized anatomical structures due to suboptimal image quality.   2. Left ventricular ejection fraction is low normal, by visual estimate at 50-55%.   3. Abnormal left venticular wall motion.   4. There is reduced right ventricular systolic function.   5. The left atrium is enlarged.     Holter monitor September 2024  Impression  1.  Patient with symptoms correlating to PACs but very infrequent symptoms  2.  Predominantly sinus rhythm with average heart rate 74 bpm normal heart rate variability  3.  1 to 2% burden atrial fibrillation.  Computer reading had identified 50 episodes of SVT but on review of tracings most consistent with atrial fibrillation longest atrial fibrillation was 1 hour with an average rate of 99.  4.  2 episodes of probable ventricular tachycardia with wide-complex minimally irregular rates 5-10 beats.  Cannot exclude A-fib with aberrancy  5.  No evidence of heart block     Cardiac catheterization August 2024  CONCLUSIONS:   1. The entire Left Main: <10% stenosis.   2. Proximal LAD Lesion: The percent stenosis is 60%.   3. Mid LAD Lesion: The percent stenosis is 50%.   4. Prox Ramus CX Lesion: The percent stenosis is 90%,small in size.   5.  Proximal and mid RCA Lesion: The percent stenosis is 40%.   6. Distal RCA Lesion: The percent stenosis is 50%.   7. Right atrial mean pressure 4 mmHg.   8. Right ventricular pressure 39/7 mmHg.   9. Pulmonary artery pressure 43/16 mmHg, mean pressure 27 mmHg.  10. Pulmonary capillary wedge mean pressure 10 mmHg.  11. Cardiac output 6.3 L/min by Daniel method.  12. Cardiac index 3.9 L/min/mï¿½ by Daniel method.  13. The Left Ventricular Ejection Fraction is 40%.    She also underwent KARINA cardioversion to rule out thrombosis,went into normal sinus rhythm.     She was re admitted at [facility] April 2025. She presented to the emerged part with increased shortness of breath. She found to be in atrial fibrillation with rapid ventricular rate and she is typically symptomatic when this occurs. Incidentally CT of the chest did show lung nodules for which she has been referred to pulmonology for outpatient follow-up. There is back to her A-fib EP was consulted and ultimately she underwent AV node ablation with CRT device placement to prevent recurrent arrhythmia in 04/2025. She tolerated the procedure well her dyspnea improved with resolution of the A-fib RVR. She has been resumed on her usual heart failure medications and the suspicion is that her EF will improve without further episodes of A-fib RVR.     Echocardiogram 04/2025    CONCLUSIONS:   1. The left ventricular systolic function is moderately decreased, with a visually estimated ejection fraction of 40%.   2. There is global hypokinesis of the left ventricle with minor regional variations.   3. Spectral Doppler shows an abnormal pattern of left ventricular diastolic filling.   4. In comparison study of August 2024 EF has decreased previously 45% [id number] comparison clearly there has been a deterioration. However atrial fibrillation is currently present prior study was in sinus rhythm heart rates frequently over 100 affecting calculation of ejection fraction.   5. There is  normal right ventricular global systolic function.   6. RVSP 46 mmHg.   7. The left atrial size is moderately dilated.   8. Trace MR.   9. Moderate tricuspid regurgitation.  10. Tricuspid aortic valve with diffuse thickening of leaflet cusps and moderate impairment of leaflet mobility. There is trace aortic insufficiency. There is moderate-severe aortic stenosis V-max 2 m/s peak/mean gradients 16 and 9 mmHg. Calculated valve area approximately 1 cmï¿½ visually appears perhaps slightly greater than that; was calculated at 1.2 cm in August. There may have been some progression of stenosis again calculations not as accurate in the setting of rapid atrial fibrillation.    Since the discharge from the hospital she is feeling better but she still short of breath with moderate activities.    EKG performed today shows ventricular paced rhythm at rate of 89 bpm QRS ration 126 ms QT corrected 513 ms.  Rhythm strip shows the same pattern.        Past Medical History  Medical History[1]    Social History  Social History[2]    Family History   Family History[3]     Allergies:  RX Allergies[4]     Outpatient Medications:  Current Outpatient Medications   Medication Instructions    acetaminophen (TYLENOL) 650 mg, oral, Every 6 hours PRN    aspirin 81 mg, oral, Daily    bisoprolol (ZEBETA) 10 mg, oral, Daily    calcium carbonate-vitamin D3 500 mg-5 mcg (200 unit) tablet 1 tablet, Daily    carboxymethylcellulose (THERATears) 0.25 % ophthalmic solution 1 drop, 3 times daily PRN    cetirizine (ALLERGY RELIEF (CETIRIZINE)) 10 mg, Daily    clopidogrel (PLAVIX) 75 mg, oral, Daily    docusate sodium (COLACE) 100 mg, 2 times daily    DULoxetine (CYMBALTA) 30 mg, Daily RT    fluticasone (Flonase) 50 mcg/actuation nasal spray 1 spray, Daily PRN    furosemide (LASIX) 20 mg, oral, Daily RT    gabapentin (NEURONTIN) 300 mg, Nightly    krill oil 500 mg, 2 times daily    LACTOBACILLUS ACIDOPHILUS ORAL 1 tablet, Daily    levothyroxine (SYNTHROID,  LEVOXYL) 88 mcg, Daily RT    losartan (COZAAR) 75 mg, oral, Daily    magnesium oxide 500 mg, Daily    multivitamin with minerals tablet 1 tablet, Daily    mv-min-FA-vit K-lutein-zeaxant (PreserVision AREDS 2 Plus MV) 200 mcg-15 mcg- 5 mg-1 mg capsule 1 capsule, 2 times daily    olopatadine (Patanol) 0.1 % ophthalmic solution 1 drop, 2 times daily    omeprazole (PRILOSEC) 40 mg, Daily RT    potassium chloride CR 20 mEq ER tablet 20 mEq, oral, Daily RT    rosuvastatin (CRESTOR) 20 mg, oral, Nightly          REVIEW OF SYSTEMS  Review of Systems   Cardiovascular:  Positive for dyspnea on exertion. Negative for chest pain and palpitations.   All other systems reviewed and are negative.        VITALS  Vitals:    04/30/25 1200   BP: 108/60   Pulse: 89       PHYSICAL EXAM  Constitutional:       General: Awake.      Appearance: Normal and healthy appearance. Well-developed and not in distress.   Neck:      Vascular: No JVR. JVD normal.   Pulmonary:      Effort: Pulmonary effort is normal.      Breath sounds: Normal breath sounds. No wheezing. No rhonchi. No rales.   Chest:      Chest wall: Not tender to palpatation.      Comments: Left sided device pocket- healed and well approximated. No swelling or hematoma      Cardiovascular:      PMI at left midclavicular line. Normal rate. Regular rhythm. Normal S1. Normal S2.       Murmurs: There is no murmur.      No gallop.  No click. No rub.   Pulses:     Intact distal pulses.   Edema:     Peripheral edema absent.   Abdominal:      Tenderness: There is no abdominal tenderness.   Musculoskeletal: Normal range of motion.         General: No tenderness. Skin:     General: Skin is warm and dry.   Neurological:      General: No focal deficit present.      Mental Status: Alert and oriented to person, place and time.           ASSESSMENT AND PLAN  Clinical impression     1.  Shortness of breath  2.  Chronic diastolic heart failure  3.  Paroxysmal atrial fibrillation with episodes of rapid  nuclear response during this admission  4.  History of GI bleed status post Watchman device implantation November 2024  5.  History of coronary artery disease  6.  Abnormal cardiac enzymes in the setting of diastolic heart failure  7.  Hypothyroidism  8.  Status post biventricular pacemaker system implanted in April 2025 with no complications    Plan recommendations    I had a lengthy discussion with patient and family member regarding plan to for home management of shortness of breath.  This may be related with atrial fibrillation event rates are very well-controlled during this arrhythmia.  Also could be cardiomyopathy or CAD.  Patient will get a remote check to be sure that function of the leads are appropriate.    Follow-up with cardiology service for adjustment of heart failure therapy if needed.    Follow my office in the next 3 to 4 weeks or sooner if needed.    Follow device clinic as scheduled    Risk factor modification and lifestyle modification discussed with patient. Diet , exercise and hydration discussed with patient.    I have personally review with patient during this office visit, laboratory data, echocardiogram results, stress test results, Holter-event monitor results prior and after the last electrophysiology visit. All questions has been answered.    Please excuse any errors in grammar or translation related to this dictation.  Voice recognition software was utilized to prepare this document.        I, Dr. Lane, personally performed the services described in the documentation as scribed by the nurse in my presence, and confirm it is both accurate and complete.            [1]   Past Medical History:  Diagnosis Date    Diverticulitis of intestine, part unspecified, without perforation or abscess without bleeding     Diverticulitis    Personal history of other diseases of the circulatory system     History of hypertension    Personal history of other endocrine, nutritional and metabolic disease      History of hypercholesterolemia    Personal history of other endocrine, nutritional and metabolic disease     History of thyroid disease    Vitreomacular adhesion, left eye 12/04/2017    Vitreomacular traction syndrome of left eye    Vitreous degeneration, right eye 12/04/2017    Posterior vitreous detachment of right eye   [2]   Social History  Tobacco Use    Smoking status: Former     Types: Cigarettes    Smokeless tobacco: Never   Substance Use Topics    Alcohol use: Never    Drug use: Never   [3] No family history on file.  [4]   Allergies  Allergen Reactions    Sulfa (Sulfonamide Antibiotics) Rash     Skin peeling     Lisinopril Cough

## 2025-04-30 NOTE — PATIENT INSTRUCTIONS
Continue same medications/treatment.  Patient educated on proper medication use.  Patient educated on risk factor modification.  Please bring any lab results from other providers/physicians to your next appointment.    Please bring all medicines, vitamins, and herbal supplements with you when you come to the office.    Prescriptions will not be filled unless you are compliant with your follow up appointments or have a follow up appointment scheduled as per instruction of your physician. Refills should be requested at the time of your visit.    Keep appointments in July with Dr. Veloz with device check. Obtain chest xray prior to this appointment as well.   Follow up with Dr. Corona in the next 3-4 weeks     Elizabeth RUCKER RN, AM SCRIBING FOR, AND IN THE PRESENCE OF DR. CHRISTIE VELOZ MD

## 2025-05-05 ENCOUNTER — HOME CARE VISIT (OUTPATIENT)
Dept: HOME HEALTH SERVICES | Facility: HOME HEALTH | Age: OVER 89
End: 2025-05-05
Payer: MEDICARE

## 2025-05-05 ASSESSMENT — ACTIVITIES OF DAILY LIVING (ADL)
OASIS_M1830: 02
HOME_HEALTH_OASIS: 01

## 2025-05-06 ENCOUNTER — HOSPITAL ENCOUNTER (OUTPATIENT)
Dept: CARDIOLOGY | Facility: HOSPITAL | Age: OVER 89
Discharge: HOME | End: 2025-05-06
Payer: MEDICARE

## 2025-05-06 DIAGNOSIS — Z95.0 CARDIAC PACEMAKER IN SITU: ICD-10-CM

## 2025-05-06 DIAGNOSIS — I42.0 PRIMARY IDIOPATHIC DILATED CARDIOMYOPATHY (MULTI): ICD-10-CM

## 2025-05-10 ENCOUNTER — PATIENT MESSAGE (OUTPATIENT)
Dept: CARDIOLOGY | Facility: CLINIC | Age: OVER 89
End: 2025-05-10
Payer: MEDICARE

## 2025-05-13 ENCOUNTER — HOSPITAL ENCOUNTER (OUTPATIENT)
Dept: RADIOLOGY | Facility: HOSPITAL | Age: OVER 89
Discharge: HOME | End: 2025-05-13
Payer: MEDICARE

## 2025-05-13 ENCOUNTER — TELEPHONE (OUTPATIENT)
Dept: CARDIOLOGY | Facility: CLINIC | Age: OVER 89
End: 2025-05-13
Payer: MEDICARE

## 2025-05-13 DIAGNOSIS — Z95.0 CARDIAC PACEMAKER IN SITU: ICD-10-CM

## 2025-05-13 DIAGNOSIS — Z95.0 CARDIAC PACEMAKER IN SITU: Primary | ICD-10-CM

## 2025-05-13 PROCEDURE — 71046 X-RAY EXAM CHEST 2 VIEWS: CPT | Performed by: RADIOLOGY

## 2025-05-13 PROCEDURE — 71046 X-RAY EXAM CHEST 2 VIEWS: CPT

## 2025-05-13 NOTE — TELEPHONE ENCOUNTER
Called and spoke to patient's daughter Amada and patient to set up appointment with . We scheduled appointment for 05/28 with  and an email was sent to [facility].

## 2025-05-16 ENCOUNTER — TELEPHONE (OUTPATIENT)
Dept: CARDIOLOGY | Facility: CLINIC | Age: OVER 89
End: 2025-05-16
Payer: MEDICARE

## 2025-05-16 NOTE — TELEPHONE ENCOUNTER
Received call from patients daughter, Amada, stating that patient has been experiencing increased shortness of breath with and without exertion, bloating along with a pain under her ribcage area. RN inquired if patient has a pulse oximeter, once applied to finger readings were 94% on right hand and 89% of left hand.  Amada advised that she called Dr. Moe Lane MD office on Wednesday and he ordered a chest xray.    Patient denies lightheadedness, edema, chest pain and chest pressure.      RN advised patients daughter that if shortness of breath persists or gets worse to proceed to the emergency room.  Amada agreeable to plan with no further questions.      Routed message to Dr. Wilmer Corona MD, FACC to advise.    Chiara Enriquez RN

## 2025-05-16 NOTE — TELEPHONE ENCOUNTER
RN called patients daughter, Amada, to advise of Dr[facility].  She is amendable to plan to proceed to the ER if her symptoms get worse.  No further questions at this time.    Chiara Enriquez RN

## 2025-05-18 ENCOUNTER — APPOINTMENT (OUTPATIENT)
Dept: RADIOLOGY | Facility: HOSPITAL | Age: OVER 89
DRG: 280 | End: 2025-05-18
Payer: MEDICARE

## 2025-05-18 ENCOUNTER — APPOINTMENT (OUTPATIENT)
Dept: CARDIOLOGY | Facility: HOSPITAL | Age: OVER 89
DRG: 280 | End: 2025-05-18
Payer: MEDICARE

## 2025-05-18 ENCOUNTER — HOSPITAL ENCOUNTER (EMERGENCY)
Dept: CARDIOLOGY | Facility: HOSPITAL | Age: OVER 89
Discharge: HOME | DRG: 280 | End: 2025-05-18
Payer: MEDICARE

## 2025-05-18 ENCOUNTER — HOSPITAL ENCOUNTER (INPATIENT)
Facility: HOSPITAL | Age: OVER 89
DRG: 280 | End: 2025-05-18
Attending: EMERGENCY MEDICINE | Admitting: HOSPITALIST
Payer: MEDICARE

## 2025-05-18 VITALS
BODY MASS INDEX: 27.01 KG/M2 | DIASTOLIC BLOOD PRESSURE: 82 MMHG | SYSTOLIC BLOOD PRESSURE: 135 MMHG | HEART RATE: 90 BPM | RESPIRATION RATE: 20 BRPM | HEIGHT: 59 IN | WEIGHT: 134 LBS | TEMPERATURE: 97.5 F | OXYGEN SATURATION: 99 %

## 2025-05-18 VITALS
HEART RATE: 90 BPM | SYSTOLIC BLOOD PRESSURE: 183 MMHG | TEMPERATURE: 97.3 F | DIASTOLIC BLOOD PRESSURE: 102 MMHG | OXYGEN SATURATION: 98 %

## 2025-05-18 DIAGNOSIS — I48.0 PAROXYSMAL ATRIAL FIBRILLATION (MULTI): ICD-10-CM

## 2025-05-18 DIAGNOSIS — R09.02 HYPOXIA: ICD-10-CM

## 2025-05-18 DIAGNOSIS — I50.21 ACUTE SYSTOLIC CHF (CONGESTIVE HEART FAILURE), NYHA CLASS 2: ICD-10-CM

## 2025-05-18 DIAGNOSIS — R74.8 CARDIAC ENZYMES ELEVATED: ICD-10-CM

## 2025-05-18 DIAGNOSIS — J90 BILATERAL PLEURAL EFFUSION: ICD-10-CM

## 2025-05-18 DIAGNOSIS — I50.9 ACUTE ON CHRONIC CONGESTIVE HEART FAILURE, UNSPECIFIED HEART FAILURE TYPE: Primary | ICD-10-CM

## 2025-05-18 LAB
ALBUMIN SERPL BCP-MCNC: 4.5 G/DL (ref 3.4–5)
ALP SERPL-CCNC: 105 U/L (ref 33–136)
ALT SERPL W P-5'-P-CCNC: 26 U/L (ref 7–45)
ANION GAP BLDV CALCULATED.4IONS-SCNC: 9 MMOL/L (ref 10–25)
ANION GAP SERPL CALC-SCNC: 13 MMOL/L (ref 10–20)
APTT PPP: 27 SECONDS (ref 26–36)
AST SERPL W P-5'-P-CCNC: 30 U/L (ref 9–39)
ATRIAL RATE: 267 BPM
ATRIAL RATE: 278 BPM
BASE EXCESS BLDV CALC-SCNC: 5.5 MMOL/L (ref -2–3)
BASOPHILS # BLD AUTO: 0.07 X10*3/UL (ref 0–0.1)
BASOPHILS NFR BLD AUTO: 0.8 %
BILIRUB SERPL-MCNC: 1.1 MG/DL (ref 0–1.2)
BNP SERPL-MCNC: 2073 PG/ML (ref 0–99)
BODY TEMPERATURE: ABNORMAL
BUN SERPL-MCNC: 16 MG/DL (ref 6–23)
CA-I BLDV-SCNC: 1.17 MMOL/L (ref 1.1–1.33)
CALCIUM SERPL-MCNC: 9.4 MG/DL (ref 8.6–10.3)
CARDIAC TROPONIN I PNL SERPL HS: 154 NG/L (ref 0–13)
CARDIAC TROPONIN I PNL SERPL HS: 158 NG/L (ref 0–13)
CHLORIDE BLDV-SCNC: 99 MMOL/L (ref 98–107)
CHLORIDE SERPL-SCNC: 99 MMOL/L (ref 98–107)
CO2 SERPL-SCNC: 26 MMOL/L (ref 21–32)
CREAT SERPL-MCNC: 0.87 MG/DL (ref 0.5–1.05)
D DIMER PPP FEU-MCNC: 891 NG/ML FEU
EGFRCR SERPLBLD CKD-EPI 2021: 62 ML/MIN/1.73M*2
EOSINOPHIL # BLD AUTO: 0.05 X10*3/UL (ref 0–0.4)
EOSINOPHIL NFR BLD AUTO: 0.6 %
ERYTHROCYTE [DISTWIDTH] IN BLOOD BY AUTOMATED COUNT: 16 % (ref 11.5–14.5)
FLUAV RNA RESP QL NAA+PROBE: NOT DETECTED
FLUBV RNA RESP QL NAA+PROBE: NOT DETECTED
GLUCOSE BLDV-MCNC: 94 MG/DL (ref 74–99)
GLUCOSE SERPL-MCNC: 189 MG/DL (ref 74–99)
HCO3 BLDV-SCNC: 31.9 MMOL/L (ref 22–26)
HCT VFR BLD AUTO: 37.6 % (ref 36–46)
HCT VFR BLD EST: 35 % (ref 36–46)
HGB BLD-MCNC: 11.9 G/DL (ref 12–16)
HGB BLDV-MCNC: 11.7 G/DL (ref 12–16)
IMM GRANULOCYTES # BLD AUTO: 0.03 X10*3/UL (ref 0–0.5)
IMM GRANULOCYTES NFR BLD AUTO: 0.3 % (ref 0–0.9)
INHALED O2 CONCENTRATION: 21 %
INR PPP: 1.1 (ref 0.9–1.1)
LACTATE BLDV-SCNC: 1.3 MMOL/L (ref 0.4–2)
LIPASE SERPL-CCNC: 8 U/L (ref 9–82)
LYMPHOCYTES # BLD AUTO: 1.22 X10*3/UL (ref 0.8–3)
LYMPHOCYTES NFR BLD AUTO: 13.9 %
MAGNESIUM SERPL-MCNC: 2.23 MG/DL (ref 1.6–2.4)
MCH RBC QN AUTO: 30.4 PG (ref 26–34)
MCHC RBC AUTO-ENTMCNC: 31.6 G/DL (ref 32–36)
MCV RBC AUTO: 96 FL (ref 80–100)
MONOCYTES # BLD AUTO: 0.58 X10*3/UL (ref 0.05–0.8)
MONOCYTES NFR BLD AUTO: 6.6 %
NEUTROPHILS # BLD AUTO: 6.84 X10*3/UL (ref 1.6–5.5)
NEUTROPHILS NFR BLD AUTO: 77.8 %
NRBC BLD-RTO: 0 /100 WBCS (ref 0–0)
OXYHGB MFR BLDV: 36.4 % (ref 45–75)
P AXIS: 66 DEGREES
P OFFSET: 182 MS
P ONSET: 151 MS
PCO2 BLDV: 54 MM HG (ref 41–51)
PH BLDV: 7.38 PH (ref 7.33–7.43)
PLATELET # BLD AUTO: 273 X10*3/UL (ref 150–450)
PO2 BLDV: 28 MM HG (ref 35–45)
POTASSIUM BLDV-SCNC: 4 MMOL/L (ref 3.5–5.3)
POTASSIUM SERPL-SCNC: 4.1 MMOL/L (ref 3.5–5.3)
PROT SERPL-MCNC: 7 G/DL (ref 6.4–8.2)
PROTHROMBIN TIME: 12.1 SECONDS (ref 9.8–12.4)
Q ONSET: 199 MS
Q ONSET: 200 MS
QRS COUNT: 15 BEATS
QRS COUNT: 15 BEATS
QRS DURATION: 126 MS
QRS DURATION: 128 MS
QT INTERVAL: 436 MS
QT INTERVAL: 440 MS
QTC CALCULATION(BAZETT): 530 MS
QTC CALCULATION(BAZETT): 538 MS
QTC FREDERICIA: 497 MS
QTC FREDERICIA: 503 MS
R AXIS: -82 DEGREES
R AXIS: 270 DEGREES
RBC # BLD AUTO: 3.91 X10*6/UL (ref 4–5.2)
RSV RNA RESP QL NAA+PROBE: NOT DETECTED
SAO2 % BLDV: 37 % (ref 45–75)
SARS-COV-2 RNA RESP QL NAA+PROBE: NOT DETECTED
SODIUM BLDV-SCNC: 136 MMOL/L (ref 136–145)
SODIUM SERPL-SCNC: 134 MMOL/L (ref 136–145)
T AXIS: 100 DEGREES
T AXIS: 90 DEGREES
T OFFSET: 418 MS
T OFFSET: 419 MS
VENTRICULAR RATE: 89 BPM
VENTRICULAR RATE: 90 BPM
WBC # BLD AUTO: 8.8 X10*3/UL (ref 4.4–11.3)

## 2025-05-18 PROCEDURE — 2500000005 HC RX 250 GENERAL PHARMACY W/O HCPCS: Performed by: EMERGENCY MEDICINE

## 2025-05-18 PROCEDURE — 71275 CT ANGIOGRAPHY CHEST: CPT | Mod: FOREIGN READ | Performed by: RADIOLOGY

## 2025-05-18 PROCEDURE — 99223 1ST HOSP IP/OBS HIGH 75: CPT | Performed by: HOSPITALIST

## 2025-05-18 PROCEDURE — 84484 ASSAY OF TROPONIN QUANT: CPT | Performed by: EMERGENCY MEDICINE

## 2025-05-18 PROCEDURE — 71045 X-RAY EXAM CHEST 1 VIEW: CPT | Mod: FOREIGN READ | Performed by: RADIOLOGY

## 2025-05-18 PROCEDURE — 2500000002 HC RX 250 W HCPCS SELF ADMINISTERED DRUGS (ALT 637 FOR MEDICARE OP, ALT 636 FOR OP/ED): Performed by: HOSPITALIST

## 2025-05-18 PROCEDURE — 83735 ASSAY OF MAGNESIUM: CPT | Performed by: EMERGENCY MEDICINE

## 2025-05-18 PROCEDURE — 83880 ASSAY OF NATRIURETIC PEPTIDE: CPT | Performed by: EMERGENCY MEDICINE

## 2025-05-18 PROCEDURE — 2500000001 HC RX 250 WO HCPCS SELF ADMINISTERED DRUGS (ALT 637 FOR MEDICARE OP): Performed by: EMERGENCY MEDICINE

## 2025-05-18 PROCEDURE — 93005 ELECTROCARDIOGRAM TRACING: CPT

## 2025-05-18 PROCEDURE — 2500000004 HC RX 250 GENERAL PHARMACY W/ HCPCS (ALT 636 FOR OP/ED): Performed by: HOSPITALIST

## 2025-05-18 PROCEDURE — 71275 CT ANGIOGRAPHY CHEST: CPT

## 2025-05-18 PROCEDURE — 80053 COMPREHEN METABOLIC PANEL: CPT | Performed by: EMERGENCY MEDICINE

## 2025-05-18 PROCEDURE — 99285 EMERGENCY DEPT VISIT HI MDM: CPT | Mod: 25 | Performed by: EMERGENCY MEDICINE

## 2025-05-18 PROCEDURE — 2500000004 HC RX 250 GENERAL PHARMACY W/ HCPCS (ALT 636 FOR OP/ED): Mod: JZ | Performed by: EMERGENCY MEDICINE

## 2025-05-18 PROCEDURE — 2500000001 HC RX 250 WO HCPCS SELF ADMINISTERED DRUGS (ALT 637 FOR MEDICARE OP): Performed by: HOSPITALIST

## 2025-05-18 PROCEDURE — 2550000001 HC RX 255 CONTRASTS: Performed by: EMERGENCY MEDICINE

## 2025-05-18 PROCEDURE — 36415 COLL VENOUS BLD VENIPUNCTURE: CPT | Performed by: EMERGENCY MEDICINE

## 2025-05-18 PROCEDURE — 87637 SARSCOV2&INF A&B&RSV AMP PRB: CPT | Performed by: EMERGENCY MEDICINE

## 2025-05-18 PROCEDURE — 83690 ASSAY OF LIPASE: CPT | Performed by: EMERGENCY MEDICINE

## 2025-05-18 PROCEDURE — 1200000002 HC GENERAL ROOM WITH TELEMETRY DAILY

## 2025-05-18 PROCEDURE — 2500000004 HC RX 250 GENERAL PHARMACY W/ HCPCS (ALT 636 FOR OP/ED): Performed by: INTERNAL MEDICINE

## 2025-05-18 PROCEDURE — 85610 PROTHROMBIN TIME: CPT | Performed by: EMERGENCY MEDICINE

## 2025-05-18 PROCEDURE — 2500000005 HC RX 250 GENERAL PHARMACY W/O HCPCS: Performed by: HOSPITALIST

## 2025-05-18 PROCEDURE — 84132 ASSAY OF SERUM POTASSIUM: CPT | Performed by: EMERGENCY MEDICINE

## 2025-05-18 PROCEDURE — 85379 FIBRIN DEGRADATION QUANT: CPT | Performed by: EMERGENCY MEDICINE

## 2025-05-18 PROCEDURE — 96374 THER/PROPH/DIAG INJ IV PUSH: CPT

## 2025-05-18 PROCEDURE — 85025 COMPLETE CBC W/AUTO DIFF WBC: CPT | Performed by: EMERGENCY MEDICINE

## 2025-05-18 PROCEDURE — 2500000001 HC RX 250 WO HCPCS SELF ADMINISTERED DRUGS (ALT 637 FOR MEDICARE OP): Performed by: INTERNAL MEDICINE

## 2025-05-18 PROCEDURE — 71045 X-RAY EXAM CHEST 1 VIEW: CPT

## 2025-05-18 PROCEDURE — 85730 THROMBOPLASTIN TIME PARTIAL: CPT | Performed by: EMERGENCY MEDICINE

## 2025-05-18 RX ORDER — ONDANSETRON 4 MG/1
4 TABLET, FILM COATED ORAL EVERY 8 HOURS PRN
Status: DISCONTINUED | OUTPATIENT
Start: 2025-05-18 | End: 2025-05-21 | Stop reason: HOSPADM

## 2025-05-18 RX ORDER — POLYETHYLENE GLYCOL 3350 17 G/17G
17 POWDER, FOR SOLUTION ORAL DAILY PRN
Status: DISCONTINUED | OUTPATIENT
Start: 2025-05-18 | End: 2025-05-21 | Stop reason: HOSPADM

## 2025-05-18 RX ORDER — ACETAMINOPHEN 325 MG/1
650 TABLET ORAL EVERY 4 HOURS PRN
Status: DISCONTINUED | OUTPATIENT
Start: 2025-05-18 | End: 2025-05-21 | Stop reason: HOSPADM

## 2025-05-18 RX ORDER — ACETAMINOPHEN 160 MG/5ML
650 SOLUTION ORAL EVERY 4 HOURS PRN
Status: DISCONTINUED | OUTPATIENT
Start: 2025-05-18 | End: 2025-05-21 | Stop reason: HOSPADM

## 2025-05-18 RX ORDER — LANOLIN ALCOHOL/MO/W.PET/CERES
400 CREAM (GRAM) TOPICAL DAILY
Status: DISCONTINUED | OUTPATIENT
Start: 2025-05-18 | End: 2025-05-21 | Stop reason: HOSPADM

## 2025-05-18 RX ORDER — POLYETHYLENE GLYCOL 3350 17 G/17G
17 POWDER, FOR SOLUTION ORAL DAILY PRN
Status: DISCONTINUED | OUTPATIENT
Start: 2025-05-18 | End: 2025-05-18 | Stop reason: SDUPTHER

## 2025-05-18 RX ORDER — GABAPENTIN 300 MG/1
300 CAPSULE ORAL 2 TIMES DAILY
Status: DISCONTINUED | OUTPATIENT
Start: 2025-05-18 | End: 2025-05-21 | Stop reason: HOSPADM

## 2025-05-18 RX ORDER — CETIRIZINE HYDROCHLORIDE 10 MG/1
10 TABLET ORAL DAILY
Status: DISCONTINUED | OUTPATIENT
Start: 2025-05-18 | End: 2025-05-21 | Stop reason: HOSPADM

## 2025-05-18 RX ORDER — ONDANSETRON HYDROCHLORIDE 2 MG/ML
4 INJECTION, SOLUTION INTRAVENOUS EVERY 8 HOURS PRN
Status: DISCONTINUED | OUTPATIENT
Start: 2025-05-18 | End: 2025-05-21 | Stop reason: HOSPADM

## 2025-05-18 RX ORDER — ACETAMINOPHEN 650 MG/1
650 SUPPOSITORY RECTAL EVERY 4 HOURS PRN
Status: DISCONTINUED | OUTPATIENT
Start: 2025-05-18 | End: 2025-05-21 | Stop reason: HOSPADM

## 2025-05-18 RX ORDER — FLUTICASONE PROPIONATE 50 MCG
1 SPRAY, SUSPENSION (ML) NASAL DAILY PRN
Status: DISCONTINUED | OUTPATIENT
Start: 2025-05-18 | End: 2025-05-21 | Stop reason: HOSPADM

## 2025-05-18 RX ORDER — LEVOTHYROXINE SODIUM 88 UG/1
88 TABLET ORAL DAILY
Status: DISCONTINUED | OUTPATIENT
Start: 2025-05-19 | End: 2025-05-21 | Stop reason: HOSPADM

## 2025-05-18 RX ORDER — BISOPROLOL FUMARATE 5 MG/1
10 TABLET, FILM COATED ORAL DAILY
Status: DISCONTINUED | OUTPATIENT
Start: 2025-05-18 | End: 2025-05-21 | Stop reason: HOSPADM

## 2025-05-18 RX ORDER — FUROSEMIDE 10 MG/ML
40 INJECTION INTRAMUSCULAR; INTRAVENOUS ONCE
Status: COMPLETED | OUTPATIENT
Start: 2025-05-18 | End: 2025-05-18

## 2025-05-18 RX ORDER — NAPROXEN SODIUM 220 MG/1
324 TABLET, FILM COATED ORAL ONCE
Status: COMPLETED | OUTPATIENT
Start: 2025-05-18 | End: 2025-05-18

## 2025-05-18 RX ORDER — FUROSEMIDE 10 MG/ML
40 INJECTION INTRAMUSCULAR; INTRAVENOUS EVERY 24 HOURS
Status: DISCONTINUED | OUTPATIENT
Start: 2025-05-19 | End: 2025-05-19

## 2025-05-18 RX ORDER — ASPIRIN 81 MG/1
81 TABLET ORAL DAILY
Status: DISCONTINUED | OUTPATIENT
Start: 2025-05-19 | End: 2025-05-21 | Stop reason: HOSPADM

## 2025-05-18 RX ORDER — ENOXAPARIN SODIUM 100 MG/ML
30 INJECTION SUBCUTANEOUS EVERY 24 HOURS
Status: DISCONTINUED | OUTPATIENT
Start: 2025-05-18 | End: 2025-05-21 | Stop reason: HOSPADM

## 2025-05-18 RX ORDER — DULOXETIN HYDROCHLORIDE 30 MG/1
30 CAPSULE, DELAYED RELEASE ORAL DAILY
Status: DISCONTINUED | OUTPATIENT
Start: 2025-05-18 | End: 2025-05-21 | Stop reason: HOSPADM

## 2025-05-18 RX ORDER — ROSUVASTATIN CALCIUM 20 MG/1
20 TABLET, COATED ORAL NIGHTLY
Status: DISCONTINUED | OUTPATIENT
Start: 2025-05-18 | End: 2025-05-21 | Stop reason: HOSPADM

## 2025-05-18 RX ADMIN — ENOXAPARIN SODIUM 30 MG: 100 INJECTION SUBCUTANEOUS at 16:59

## 2025-05-18 RX ADMIN — IOHEXOL 75 ML: 350 INJECTION, SOLUTION INTRAVENOUS at 11:38

## 2025-05-18 RX ADMIN — ROSUVASTATIN CALCIUM 20 MG: 20 TABLET, FILM COATED ORAL at 20:34

## 2025-05-18 RX ADMIN — Medication 2 L/MIN: at 11:18

## 2025-05-18 RX ADMIN — FUROSEMIDE 40 MG: 10 INJECTION, SOLUTION INTRAMUSCULAR; INTRAVENOUS at 11:03

## 2025-05-18 RX ADMIN — DULOXETINE 30 MG: 30 CAPSULE, DELAYED RELEASE ORAL at 16:59

## 2025-05-18 RX ADMIN — ASPIRIN 324 MG: 81 TABLET, CHEWABLE ORAL at 11:03

## 2025-05-18 RX ADMIN — MAGNESIUM GLUCONATE 500 MG ORAL TABLET 1 TABLET: 500 TABLET ORAL at 16:59

## 2025-05-18 RX ADMIN — POLYETHYLENE GLYCOL 3350 17 G: 17 POWDER, FOR SOLUTION ORAL at 20:34

## 2025-05-18 RX ADMIN — Medication 2 L/MIN: at 20:33

## 2025-05-18 RX ADMIN — GABAPENTIN 300 MG: 300 CAPSULE ORAL at 20:34

## 2025-05-18 ASSESSMENT — PAIN SCALES - GENERAL
PAINLEVEL_OUTOF10: 4
PAINLEVEL_OUTOF10: 0 - NO PAIN

## 2025-05-18 ASSESSMENT — COGNITIVE AND FUNCTIONAL STATUS - GENERAL
DRESSING REGULAR UPPER BODY CLOTHING: A LITTLE
STANDING UP FROM CHAIR USING ARMS: A LITTLE
CLIMB 3 TO 5 STEPS WITH RAILING: A LITTLE
DRESSING REGULAR LOWER BODY CLOTHING: A LITTLE
HELP NEEDED FOR BATHING: A LITTLE
WALKING IN HOSPITAL ROOM: A LITTLE
DRESSING REGULAR UPPER BODY CLOTHING: A LITTLE
MOBILITY SCORE: 20
MOVING TO AND FROM BED TO CHAIR: A LITTLE
STANDING UP FROM CHAIR USING ARMS: A LITTLE
PERSONAL GROOMING: A LITTLE
DAILY ACTIVITIY SCORE: 18
TOILETING: A LITTLE
HELP NEEDED FOR BATHING: A LITTLE
DAILY ACTIVITIY SCORE: 18
TOILETING: A LITTLE
WALKING IN HOSPITAL ROOM: A LITTLE
CLIMB 3 TO 5 STEPS WITH RAILING: A LITTLE
MOVING TO AND FROM BED TO CHAIR: A LITTLE
EATING MEALS: A LITTLE
MOBILITY SCORE: 20
EATING MEALS: A LITTLE
DRESSING REGULAR LOWER BODY CLOTHING: A LITTLE
PERSONAL GROOMING: A LITTLE

## 2025-05-18 ASSESSMENT — PAIN DESCRIPTION - LOCATION: LOCATION: ABDOMEN

## 2025-05-18 ASSESSMENT — PAIN DESCRIPTION - PAIN TYPE: TYPE: ACUTE PAIN

## 2025-05-18 ASSESSMENT — PAIN - FUNCTIONAL ASSESSMENT: PAIN_FUNCTIONAL_ASSESSMENT: 0-10

## 2025-05-18 NOTE — CARE PLAN
The patient's goals for the shift include breathe better    The clinical goals for the shift include Out of bed for all meals and walk to bathroom, no purewick        Problem: Fall/Injury  Goal: Not fall by end of shift  5/18/2025 1608 by Kamilla Scott RN  Outcome: Progressing  5/18/2025 1608 by Kamilla Scott RN  Outcome: Progressing  Goal: Be free from injury by end of the shift  5/18/2025 1608 by Kamilla Scott RN  Outcome: Progressing  5/18/2025 1608 by Kamilla Scott RN  Outcome: Progressing  Goal: Verbalize understanding of personal risk factors for fall in the hospital  5/18/2025 1608 by Kamilla Scott RN  Outcome: Progressing  5/18/2025 1608 by Kamilla Scott RN  Outcome: Progressing  Goal: Verbalize understanding of risk factor reduction measures to prevent injury from fall in the home  5/18/2025 1608 by Kamilla Scott RN  Outcome: Progressing  5/18/2025 1608 by Kamilla Scott RN  Outcome: Progressing  Goal: Use assistive devices by end of the shift  5/18/2025 1608 by Kamilla Scott RN  Outcome: Progressing  5/18/2025 1608 by Kamilla Scott RN  Outcome: Progressing  Goal: Pace activities to prevent fatigue by end of the shift  5/18/2025 1608 by Kamilla Scott RN  Outcome: Progressing  5/18/2025 1608 by Kamilla Scott RN  Outcome: Progressing     Problem: Skin  Goal: Decreased wound size/increased tissue granulation at next dressing change  Outcome: Progressing  Goal: Participates in plan/prevention/treatment measures  Outcome: Progressing  Goal: Prevent/manage excess moisture  Outcome: Progressing  Flowsheets (Taken 5/18/2025 1608)  Prevent/manage excess moisture:   Cleanse incontinence/protect with barrier cream   Monitor for/manage infection if present   Moisturize dry skin  Goal: Prevent/minimize sheer/friction injuries  Outcome: Progressing  Goal: Promote/optimize nutrition  Outcome: Progressing  Goal: Promote skin healing  Outcome: Progressing

## 2025-05-18 NOTE — H&P
History Of Present Illness  Meryl Hester is a 89 y.o. female with history of chronci afib s/p watchman, systolic CHF EF 20-25% in march, hypothyroidism, s/p PM presented to the ED with shortness of breath for the past few weeks. She recently discharged after episodes of afib with RVR at the end of April and underwend AV node ablation. She did ok for bit after discharge but symptoms have now returned. Device check on 5/6 noted to have 70% afib burden. In the ED she found to be dyspneic with pulomonary edema on xray. She was given lasix and admitted for further evaluation and treatment.      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    Surgical History  She has a past surgical history that includes Other surgical history (11/29/2016); Cataract extraction (11/29/2016); Other surgical history (11/29/2016); Cardiac catheterization (N/A, 8/9/2024); Cardiac catheterization (N/A, 11/8/2024); Cardiac electrophysiology procedure (N/A, 4/18/2025); and Cardiac electrophysiology procedure (N/A, 4/18/2025).     Social History  She reports that she has quit smoking. Her smoking use included cigarettes. She has never used smokeless tobacco. She reports that she does not drink alcohol and does not use drugs.    Family History  Family History[1]     10 point review of systems negative except per HPI      Last Recorded Vitals  BP (!) 136/99   Pulse 90   Temp 36 °C (96.8 °F)   Resp 20   Wt 60.8 kg (134 lb)   SpO2 99%     Physical Exam   Gen: NAD  HEENT: EOM, MMM  CV: irregular rhtyhm, no murmurs rubs or gallops  Resp: Clear  to auscultation bilaterally  Abdomen: soft, NT,+BS  LE: No edema        Relevant Results  Labs Reviewed   CBC WITH AUTO DIFFERENTIAL - Abnormal       Result Value    WBC 8.8      nRBC 0.0      RBC 3.91 (*)     Hemoglobin 11.9 (*)     Hematocrit 37.6      MCV 96      MCH 30.4      MCHC 31.6 (*)     RDW 16.0 (*)     Platelets 273      Neutrophils % 77.8      Immature Granulocytes %, Automated 0.3      Lymphocytes % 13.9      Monocytes % 6.6      Eosinophils % 0.6      Basophils % 0.8      Neutrophils Absolute 6.84 (*)     Immature Granulocytes Absolute, Automated 0.03      Lymphocytes Absolute 1.22      Monocytes Absolute 0.58      Eosinophils Absolute 0.05      Basophils Absolute 0.07     COMPREHENSIVE METABOLIC PANEL - Abnormal    Glucose 189 (*)     Sodium 134 (*)     Potassium 4.1      Chloride 99      Bicarbonate 26      Anion Gap 13      Urea Nitrogen 16      Creatinine 0.87      eGFR 62      Calcium 9.4      Albumin 4.5      Alkaline Phosphatase 105      Total Protein 7.0      AST 30      Bilirubin, Total 1.1      ALT 26     B-TYPE NATRIURETIC PEPTIDE - Abnormal    BNP 2,073 (*)     Narrative:        <100 pg/mL - Heart failure unlikely  100-299 pg/mL - Intermediate probability of acute heart                  failure exacerbation. Correlate with clinical                  context and patient history.    >=300 pg/mL - Heart Failure likely. Correlate with clinical                  context and patient history.    BNP testing is performed using different testing methodology at [facility] than at other [facility] hospitals. Direct result comparisons should only be made within the same method.      LIPASE - Abnormal    Lipase 8 (*)     Narrative:     Venipuncture immediately after or during the administration of Metamizole may lead to falsely low results. Testing should be performed immediately prior to Metamizole dosing.   D-DIMER, VTE EXCLUSION - Abnormal    D-Dimer, Quantitative VTE Exclusion 891 (*)      Narrative:     The VTE Exclusion D-Dimer assay is reported in ng/mL Fibrinogen Equivalent Units (FEU).    Per 's instructions for use, a value of less than 500 ng/mL (FEU) may help to exclude DVT or PE in outpatients when the assay is used with a clinical pretest probability assessment.(AE must utilize and document eCalc 'Wells Score Deep Vein Thrombosis Risk' for DVT exclusion only. Emergency Department should utilize  Guidelines for Emergency Department Use of the VTE Exclusion D-Dimer and Clinical Pretest probability assessment model for DVT or PE exclusion.)   SERIAL TROPONIN-INITIAL - Abnormal    Troponin I, High Sensitivity 158 (*)     Narrative:     Less than 99th percentile of normal range cutoff-  Female and children under 18 years old <14 ng/L; Male <21 ng/L: Negative  Repeat testing should be performed if clinically indicated.     Female and children under 18 years old 14-50 ng/L; Male 21-50 ng/L:  Consistent with possible cardiac damage and possible increased clinical   risk. Serial measurements may help to assess extent of myocardial damage.     >50 ng/L: Consistent with cardiac damage, increased clinical risk and  myocardial infarction. Serial measurements may help assess extent of   myocardial damage.      NOTE: Children less than 1 year old may have higher baseline troponin   levels and results should be interpreted in conjunction with the overall   clinical context.     NOTE: Troponin I testing is performed using a different   testing methodology at [facility] than at other   [facility]. Direct result comparisons should only   be made within the same method.   SERIAL TROPONIN, 1 HOUR - Abnormal    Troponin I, High Sensitivity 154 (*)     Narrative:     Less than 99th percentile of normal range cutoff-  Female and children under 18 years old <14 ng/L; Male <21 ng/L: Negative  Repeat testing should be performed if clinically indicated.     Female and children under  18 years old 14-50 ng/L; Male 21-50 ng/L:  Consistent with possible cardiac damage and possible increased clinical   risk. Serial measurements may help to assess extent of myocardial damage.     >50 ng/L: Consistent with cardiac damage, increased clinical risk and  myocardial infarction. Serial measurements may help assess extent of   myocardial damage.      NOTE: Children less than 1 year old may have higher baseline troponin   levels and results should be interpreted in conjunction with the overall   clinical context.     NOTE: Troponin I testing is performed using a different   testing methodology at [facility] than at [facility]. Direct result comparisons should only   be made within the same method.   MAGNESIUM - Normal    Magnesium 2.23     PROTIME-INR - Normal    Protime 12.1      INR 1.1     APTT - Normal    aPTT 27      Narrative:     The APTT is no longer used for monitoring Unfractionated Heparin Therapy. For monitoring Heparin Therapy, use the Heparin Assay.   SARS-COV-2, INFLUENZA A/B AND RSV PCR - Normal    Coronavirus 2019, PCR Not Detected      Flu A Result Not Detected      Flu B Result Not Detected      RSV PCR Not Detected      Narrative:     This assay is an FDA-cleared, in vitro diagnostic nucleic acid amplification test for the qualitative detection and differentiation of SARS CoV-2/ Influenza A/B/ RSV from nasopharyngeal specimens collected from individuals with signs and symptoms of respiratory tract infections, and has been validated for use at [facility]. Negative results do not preclude COVID-19/ Influenza A/B/ RSV infections and should not be used as the sole basis for diagnosis, treatment, or other management decisions. Testing for SARS CoV-2 is recommended only for patients who meet current clinical and/or epidemiological criteria defined by federal, state, or local public health directives.   TROPONIN SERIES- (INITIAL, 1 HR)     Narrative:     The following orders were created for panel order Troponin I Series, High Sensitivity (0, 1 HR).  Procedure                               Abnormality         Status                     ---------                               -----------         ------                     Troponin I, High Sensiti...[[id number]]  Abnormal            Final result               Troponin, High Sensitivi...[[id number]]  Abnormal            Final result                 Please view results for these tests on the individual orders.   BLOOD GAS VENOUS FULL PANEL     CT angio chest for pulmonary embolism   Final Result   1.  No evidence for pulmonary embolus.   2.  Moderate right and small left layering pleural effusions.   3.  Cardiomegaly with multichamber enlargement.   4.  Colonic diverticulosis.   5.  Coronary artery calcifications.   Signed by Gianluca Emmanuel MD      XR chest 1 view   Final Result   1.  Increased history markings bilaterally which may be chronic in   etiology.  If there is an acute component, this could represent   interstitial edema.   2.  Bilateral lower lobe opacities which represent atelectasis or   pneumonia..   Signed by Wilmer Parks MD        Assessment/Plan  89 year old female with history of systolic CHF, chronic afib admitted with dyspnea secondary to acute systolic CHF exacerbation    -continue IV lasix  -device interrogation reviewed from 5/6 and 70% afib, recently AV node ablation  -repeat device check ordered   -will consult EP  -only on aspirin, s/p watchman and hx of GI bleed    Chronic anemia: hx of GI bleed, H/H stable    Hypothyroid: continue synthroid    Ambulatory dysfunction: PT/OT    DVT ppx: lovenox     Code status: DNRCC-A, verfied with daughter       Nima Villegas MD         [1] No family history on file.

## 2025-05-18 NOTE — ED PROVIDER NOTES
89-year-old female presents emergency department accompanied by family with chief complaint of shortness of breath and generalized weakness.  She and family report the symptoms have been progressive over the past week.  She states she is not able to lie flat and feels short of breath with even minimal exertion.  She also reports a decreased appetite and feeling generally weak.  No reported fevers.  Patient admits to occasional cough.  Denies chest pain.  She does report lower rib and upper abdominal aching pain.  No nausea or vomiting.  No dysuria, diarrhea, constipation, black or bloody stools. Chart review shows significant past medical history of syncope, CHF, cardiomyopathy, CAD, paroxysmal atrial fibrillation with pacer, status post Watchman device, hypoxia, non-ST elevation MI, pulmonary hypertension, and remote history of tobacco use.  Denies illicit drug use or regular alcohol use.  Denies any history of COPD or asthma.  States she has been taking her water pill regularly.  Patient is not on anticoagulants.      History provided by:  Patient   used: No           ------------------------------------------------------------------------------------------------------------------------------------------    VS: As documented in the triage note and EMR flowsheet from this visit were reviewed.    Review of Systems  Constitutional: no fever, chills reports fatigue and malaise  Eyes: no redness, discharge, pain  HENT: no sore throat, nose bleeds, congestion, rhinorrhea   Cardiovascular: no chest pain, leg edema, palpitations  Respiratory: Admits to shortness of breath, orthopnea, and cough no wheezing  GI: no nausea, diarrhea, pain, vomiting, constipation, BRBPR, melena  : no dysuria, frequency, hematuria  Musculoskeletal: no neck pain, stiffness,  no joint deformity, swelling reports decreased appetite  Skin: no rash, erythema, wounds  Neurological: no headache, dizziness, lightheadedness,  numbness, or tingling admits to feeling generally weak  Psychiatric: no suicidal thoughts, confusion, agitation  Metabolic: no polyuria or polydipsia  Hematologic: no increased bleeding or bruising  Immunology: No immunocompromise state    [facility]  Nursing notes reviewed and confirmed by me.  Chart review performed including medications, allergies, and medical, surgical, and family history  Visit Vitals  /75   Pulse 88   Temp 36 °C (96.8 °F)   Resp 18     Physical Exam  Vitals and nursing note reviewed.   Constitutional:       General: She is not in acute distress.     Appearance: Normal appearance. She is not ill-appearing.   HENT:      Head: Normocephalic and atraumatic.      Right Ear: External ear normal.      Left Ear: External ear normal.      Nose: Nose normal. No congestion or rhinorrhea.      Mouth/Throat:      Mouth: Mucous membranes are moist.      Pharynx: No oropharyngeal exudate or posterior oropharyngeal erythema.   Eyes:      Extraocular Movements: Extraocular movements intact.      Conjunctiva/sclera: Conjunctivae normal.      Pupils: Pupils are equal, round, and reactive to light.   Cardiovascular:      Rate and Rhythm: Normal rate and regular rhythm.      Pulses: Normal pulses.      Heart sounds: Normal heart sounds.   Pulmonary:      Effort: Pulmonary effort is normal. No respiratory distress.      Breath sounds: No stridor. Rales present. No wheezing or rhonchi.      Comments: Breath sounds diminished bilateral bases with crackles.  Mild tachypnea on my exam.  Left chest pacer site well-healed no erythema or warmth.  No area of fluctuance  Abdominal:      General: There is no distension.      Palpations: Abdomen is soft.      Tenderness: There is no abdominal tenderness. There is no guarding or rebound.   Musculoskeletal:         General: No swelling or deformity. Normal range of motion.      Cervical back: Normal range of motion and neck supple. No rigidity.      Right lower leg: No edema.       Left lower leg: No edema.      Comments: No calf tenderness    Skin:     General: Skin is warm and dry.      Capillary Refill: Capillary refill takes less than 2 seconds.      Coloration: Skin is not jaundiced.      Findings: No rash.   Neurological:      General: No focal deficit present.      Mental Status: She is alert and oriented to person, place, and time.      Sensory: No sensory deficit.      Motor: Weakness (Generalized) present.      Comments: NIH stroke scale 0.  Patient is generally weak on my exam.   Psychiatric:         Mood and Affect: Mood normal.         Behavior: Behavior normal.        Medical History[1]   Surgical History[2]   Social History[3]   ------------------------------------------------------------------------------------------------------------------------------------------  CT angio chest for pulmonary embolism   Final Result   1.  No evidence for pulmonary embolus.   2.  Moderate right and small left layering pleural effusions.   3.  Cardiomegaly with multichamber enlargement.   4.  Colonic diverticulosis.   5.  Coronary artery calcifications.   Signed by Gianluca Emmanuel MD      XR chest 1 view   Final Result   1.  Increased history markings bilaterally which may be chronic in   etiology.  If there is an acute component, this could represent   interstitial edema.   2.  Bilateral lower lobe opacities which represent atelectasis or   pneumonia..   Signed by Wilmer Parks MD      Cardiac device check - Inpatient    (Results Pending)      Labs Reviewed   CBC WITH AUTO DIFFERENTIAL - Abnormal       Result Value    WBC 8.8      nRBC 0.0      RBC 3.91 (*)     Hemoglobin 11.9 (*)     Hematocrit 37.6      MCV 96      MCH 30.4      MCHC 31.6 (*)     RDW 16.0 (*)     Platelets 273      Neutrophils % 77.8      Immature Granulocytes %, Automated 0.3      Lymphocytes % 13.9      Monocytes % 6.6      Eosinophils % 0.6      Basophils % 0.8      Neutrophils Absolute 6.84 (*)     Immature  Granulocytes Absolute, Automated 0.03      Lymphocytes Absolute 1.22      Monocytes Absolute 0.58      Eosinophils Absolute 0.05      Basophils Absolute 0.07     COMPREHENSIVE METABOLIC PANEL - Abnormal    Glucose 189 (*)     Sodium 134 (*)     Potassium 4.1      Chloride 99      Bicarbonate 26      Anion Gap 13      Urea Nitrogen 16      Creatinine 0.87      eGFR 62      Calcium 9.4      Albumin 4.5      Alkaline Phosphatase 105      Total Protein 7.0      AST 30      Bilirubin, Total 1.1      ALT 26     B-TYPE NATRIURETIC PEPTIDE - Abnormal    BNP 2,073 (*)     Narrative:        <100 pg/mL - Heart failure unlikely  100-299 pg/mL - Intermediate probability of acute heart                  failure exacerbation. Correlate with clinical                  context and patient history.    >=300 pg/mL - Heart Failure likely. Correlate with clinical                  context and patient history.    BNP testing is performed using different testing methodology at [facility] than at other [facility]. Direct result comparisons should only be made within the same method.      LIPASE - Abnormal    Lipase 8 (*)     Narrative:     Venipuncture immediately after or during the administration of Metamizole may lead to falsely low results. Testing should be performed immediately prior to Metamizole dosing.   D-DIMER, VTE EXCLUSION - Abnormal    D-Dimer, Quantitative VTE Exclusion 891 (*)     Narrative:     The VTE Exclusion D-Dimer assay is reported in ng/mL Fibrinogen Equivalent Units (FEU).    Per 's instructions for use, a value of less than 500 ng/mL (FEU) may help to exclude DVT or PE in outpatients when the assay is used with a clinical pretest probability assessment.([facility]R must utilize and document eCalc 'Wells Score Deep Vein Thrombosis Risk' for DVT exclusion only. Emergency Department should utilize  Guidelines for Emergency Department Use of the VTE Exclusion D-Dimer and Clinical Pretest  probability assessment model for DVT or PE exclusion.)   SERIAL TROPONIN-INITIAL - Abnormal    Troponin I, High Sensitivity 158 (*)     Narrative:     Less than 99th percentile of normal range cutoff-  Female and children under 18 years old <14 ng/L; Male <21 ng/L: Negative  Repeat testing should be performed if clinically indicated.     Female and children under 18 years old 14-50 ng/L; Male 21-50 ng/L:  Consistent with possible cardiac damage and possible increased clinical   risk. Serial measurements may help to assess extent of myocardial damage.     >50 ng/L: Consistent with cardiac damage, increased clinical risk and  myocardial infarction. Serial measurements may help assess extent of   myocardial damage.      NOTE: Children less than 1 year old may have higher baseline troponin   levels and results should be interpreted in conjunction with the overall   clinical context.     NOTE: Troponin I testing is performed using a different   testing methodology at [facility] than at other   [facility]. Direct result comparisons should only   be made within the same method.   SERIAL TROPONIN, 1 HOUR - Abnormal    Troponin I, High Sensitivity 154 (*)     Narrative:     Less than 99th percentile of normal range cutoff-  Female and children under 18 years old <14 ng/L; Male <21 ng/L: Negative  Repeat testing should be performed if clinically indicated.     Female and children under 18 years old 14-50 ng/L; Male 21-50 ng/L:  Consistent with possible cardiac damage and possible increased clinical   risk. Serial measurements may help to assess extent of myocardial damage.     >50 ng/L: Consistent with cardiac damage, increased clinical risk and  myocardial infarction. Serial measurements may help assess extent of   myocardial damage.      NOTE: Children less than 1 year old may have higher baseline troponin   levels and results should be interpreted in conjunction with the overall   clinical context.      NOTE: Troponin I testing is performed using a different   testing methodology at [facility] than at other   [facility] hospitals. Direct result comparisons should only   be made within the same method.   BLOOD GAS VENOUS FULL PANEL - Abnormal    POCT pH, Venous 7.38      POCT pCO2, Venous 54 (*)     POCT pO2, Venous 28 (*)     POCT SO2, Venous 37 (*)     POCT Oxy Hemoglobin, Venous 36.4 (*)     POCT Hematocrit Calculated, Venous 35.0 (*)     POCT Sodium, Venous 136      POCT Potassium, Venous 4.0      POCT Chloride, Venous 99      POCT Ionized Calicum, Venous 1.17      POCT Glucose, Venous 94      POCT Lactate, Venous 1.3      POCT Base Excess, Venous 5.5 (*)     POCT HCO3 Calculated, Venous 31.9 (*)     POCT Hemoglobin, Venous 11.7 (*)     POCT Anion Gap, Venous 9.0 (*)     Patient Temperature        FiO2 21     MAGNESIUM - Normal    Magnesium 2.23     PROTIME-INR - Normal    Protime 12.1      INR 1.1     APTT - Normal    aPTT 27      Narrative:     The APTT is no longer used for monitoring Unfractionated Heparin Therapy. For monitoring Heparin Therapy, use the Heparin Assay.   SARS-COV-2, INFLUENZA A/B AND RSV PCR - Normal    Coronavirus 2019, PCR Not Detected      Flu A Result Not Detected      Flu B Result Not Detected      RSV PCR Not Detected      Narrative:     This assay is an FDA-cleared, in vitro diagnostic nucleic acid amplification test for the qualitative detection and differentiation of SARS CoV-2/ Influenza A/B/ RSV from nasopharyngeal specimens collected from individuals with signs and symptoms of respiratory tract infections, and has been validated for use at [facility]. Negative results do not preclude COVID-19/ Influenza A/B/ RSV infections and should not be used as the sole basis for diagnosis, treatment, or other management decisions. Testing for SARS CoV-2 is recommended only for patients who meet current clinical and/or epidemiological criteria defined by  federal, state, or local public health directives.   TROPONIN SERIES- (INITIAL, 1 HR)    Narrative:     The following orders were created for panel order Troponin I Series, High Sensitivity (0, 1 HR).  Procedure                               Abnormality         Status                     ---------                               -----------         ------                     Troponin I, High Sensiti...[[id number]]  Abnormal            Final result               Troponin, High Sensitivi...[[id number]]  Abnormal            Final result                 Please view results for these tests on the individual orders.        Medical Decision Making  EKG interpreted by ED physician: Atrial flutter with 3-1 AV conduction rate of 89.  QRS is prolonged consistent with paced rhythm along with QTc.  No significant ST elevations or depressions.  Q waves throughout.  Poor R wave progression.  Left axis deviation.    89-year-old female presents emergency department with chief complaint of shortness of breath and generalized weakness progressive over the past week.  On my exam she is afebrile nontoxic.  During her stay she is appreciated to have mild hypoxia on room air and is requiring 2 L of nasal cannula oxygen a change from her baseline.  Venous blood gas does not show findings of hypercapnic respiratory failure.  CBC does not show significant leukocytosis and shows anemia at baseline.  Patient does not have findings of sepsis.  CMP shows no significant metabolic abnormality.  COVID, flu, RSV testing all negative.  Lipase within normal range I do not suspect pancreatitis.  D-dimer is age-adjusted negative and subsequent CT PE study does not show pulmonary embolus.  BNP is significantly elevated and clinically patient has findings of fluid overload.  I suspect CHF exacerbation.  Cardiac enzymes x 2 are elevated but not significantly increasing and they have been elevated in the past.  EKG does not show acute ACS.  Chest x-ray shows  findings of pulmonary edema and questionable opacities.  CT chest was obtained for further evaluation.  Patient has findings of pleural effusions I do not suspect pneumonia.  Patient is treated symptomatically with aspirin and IV Lasix.  Given her decompensated heart failure I recommend admission for further evaluation and treatment.  She and family are agreeable with this plan.  Case discussed with hospitalist on-call.       Diagnoses as of 05/18/25 1732   Acute on chronic congestive heart failure, unspecified heart failure type   Bilateral pleural effusion   Hypoxia   Cardiac enzymes elevated      1. Acute on chronic congestive heart failure, unspecified heart failure type        2. Bilateral pleural effusion        3. Hypoxia        4. Cardiac enzymes elevated        5. Paroxysmal atrial fibrillation (Multi)  Cardiac device check - Inpatient    Cardiac device check - Inpatient         Procedures     This note was dictated using dragon software and may contain errors related to dictation interpretation errors.        [1]   Past Medical History:  Diagnosis Date    Diverticulitis of intestine, part unspecified, without perforation or abscess without bleeding     Diverticulitis    Personal history of other diseases of the circulatory system     History of hypertension    Personal history of other endocrine, nutritional and metabolic disease     History of hypercholesterolemia    Personal history of other endocrine, nutritional and metabolic disease     History of thyroid disease    Vitreomacular adhesion, left eye 12/04/2017    Vitreomacular traction syndrome of left eye    Vitreous degeneration, right eye 12/04/2017    Posterior vitreous detachment of right eye   [2]   Past Surgical History:  Procedure Laterality Date    CARDIAC CATHETERIZATION N/A 8/9/2024    Procedure: Left And Right Heart Cath, With LV;  Surgeon: Stephon Squires MD;  Location: [facility];  Service: Cardiovascular;  Laterality:  N/A;    CARDIAC CATHETERIZATION N/A 11/8/2024    Procedure: LAAO (Left Atrial Appendage Occlusion);  Surgeon: Liam Joyce MD;  Location: [place] [address] Cardiac Cath Lab;  Service: Cardiovascular;  Laterality: N/A;  same day CT 1000    CARDIAC ELECTROPHYSIOLOGY PROCEDURE N/A 4/18/2025    Procedure: PPM BIV Implant;  Surgeon: Moe Lane MD;  Location: [facility];  Service: Electrophysiology;  Laterality: N/A;    CARDIAC ELECTROPHYSIOLOGY PROCEDURE N/A 4/18/2025    Procedure: Ablation AV Node;  Surgeon: Moe Lane MD;  Location: [facility];  Service: Electrophysiology;  Laterality: N/A;    CATARACT EXTRACTION  11/29/2016    Cataract Surgery    OTHER SURGICAL HISTORY  11/29/2016    Iridotomy By YAG Laser    OTHER SURGICAL HISTORY  11/29/2016    Discission Of Secondary Membranous Cataract By Laser   [3]   Social History  Socioeconomic History    Marital status:    Tobacco Use    Smoking status: Former     Types: Cigarettes    Smokeless tobacco: Never   Substance and Sexual Activity    Alcohol use: Never    Drug use: Never     Social Drivers of Health     Financial Resource Strain: Low Risk  (4/17/2025)    Overall Financial Resource Strain (CARDIA)     Difficulty of Paying Living Expenses: Not hard at all   Food Insecurity: No Food Insecurity (4/17/2025)    Hunger Vital Sign     Worried About Running Out of Food in the Last Year: Never true     Ran Out of Food in the Last Year: Never true   Transportation Needs: No Transportation Needs (5/5/2025)    OASIS : Transportation     Lack of Transportation (Medical): No     Lack of Transportation (Non-Medical): No     Patient Unable or Declines to Respond: No   Physical Activity: Sufficiently Active (4/1/2025)    Received from [facility]    Exercise Vital Sign     Days of Exercise per Week: 3 days     Minutes of Exercise per Session: 50 min   Stress: No Stress Concern Present (8/14/2024)    Citizen of [place] [employer]  - Occupational Stress Questionnaire     Feeling of Stress : Not at all   Social Connections: Feeling Socially Integrated (5/5/2025)    OASIS : Social Isolation     Frequency of experiencing loneliness or isolation: Never   Intimate Partner Violence: Not At Risk (4/17/2025)    Humiliation, Afraid, Rape, and Kick questionnaire     Fear of Current or Ex-Partner: No     Emotionally Abused: No     Physically Abused: No     Sexually Abused: No   Housing Stability: Low Risk  (4/17/2025)    Housing Stability Vital Sign     Unable to Pay for Housing in the Last Year: No     Number of Times Moved in the Last Year: 0     Homeless in the Last Year: No        Jak Carbajal DO  05/18/25 [id number]

## 2025-05-18 NOTE — CARE PLAN
The patient's goals for the shift include breathe better    The clinical goals for the shift include Out of bed for all meals and walk to bathroom, no purewick

## 2025-05-19 ENCOUNTER — APPOINTMENT (OUTPATIENT)
Dept: CARDIOLOGY | Facility: HOSPITAL | Age: OVER 89
End: 2025-05-19
Payer: MEDICARE

## 2025-05-19 ENCOUNTER — APPOINTMENT (OUTPATIENT)
Dept: CARDIOLOGY | Facility: HOSPITAL | Age: OVER 89
DRG: 280 | End: 2025-05-19
Payer: MEDICARE

## 2025-05-19 LAB
ANION GAP SERPL CALC-SCNC: 10 MMOL/L (ref 10–20)
ATRIAL RATE: 278 BPM
BUN SERPL-MCNC: 15 MG/DL (ref 6–23)
CALCIUM SERPL-MCNC: 8.7 MG/DL (ref 8.6–10.3)
CHLORIDE SERPL-SCNC: 104 MMOL/L (ref 98–107)
CO2 SERPL-SCNC: 29 MMOL/L (ref 21–32)
CREAT SERPL-MCNC: 0.92 MG/DL (ref 0.5–1.05)
EGFRCR SERPLBLD CKD-EPI 2021: 58 ML/MIN/1.73M*2
ERYTHROCYTE [DISTWIDTH] IN BLOOD BY AUTOMATED COUNT: 16.1 % (ref 11.5–14.5)
GLUCOSE SERPL-MCNC: 86 MG/DL (ref 74–99)
HCT VFR BLD AUTO: 34.2 % (ref 36–46)
HGB BLD-MCNC: 10.9 G/DL (ref 12–16)
HOLD SPECIMEN: 293
MAGNESIUM SERPL-MCNC: 2.11 MG/DL (ref 1.6–2.4)
MCH RBC QN AUTO: 30.6 PG (ref 26–34)
MCHC RBC AUTO-ENTMCNC: 31.9 G/DL (ref 32–36)
MCV RBC AUTO: 96 FL (ref 80–100)
NRBC BLD-RTO: 0 /100 WBCS (ref 0–0)
PLATELET # BLD AUTO: 227 X10*3/UL (ref 150–450)
POTASSIUM SERPL-SCNC: 3.4 MMOL/L (ref 3.5–5.3)
Q ONSET: 199 MS
QRS COUNT: 15 BEATS
QRS DURATION: 128 MS
QT INTERVAL: 440 MS
QTC CALCULATION(BAZETT): 538 MS
QTC FREDERICIA: 503 MS
R AXIS: 270 DEGREES
RBC # BLD AUTO: 3.56 X10*6/UL (ref 4–5.2)
SODIUM SERPL-SCNC: 140 MMOL/L (ref 136–145)
T AXIS: 90 DEGREES
T OFFSET: 419 MS
VENTRICULAR RATE: 90 BPM
WBC # BLD AUTO: 6.3 X10*3/UL (ref 4.4–11.3)

## 2025-05-19 PROCEDURE — 99232 SBSQ HOSP IP/OBS MODERATE 35: CPT | Performed by: REGISTERED NURSE

## 2025-05-19 PROCEDURE — 93281 PM DEVICE PROGR EVAL MULTI: CPT

## 2025-05-19 PROCEDURE — 2500000004 HC RX 250 GENERAL PHARMACY W/ HCPCS (ALT 636 FOR OP/ED): Mod: JZ | Performed by: NURSE PRACTITIONER

## 2025-05-19 PROCEDURE — 83735 ASSAY OF MAGNESIUM: CPT | Performed by: HOSPITALIST

## 2025-05-19 PROCEDURE — 2500000005 HC RX 250 GENERAL PHARMACY W/O HCPCS: Performed by: HOSPITALIST

## 2025-05-19 PROCEDURE — 93281 PM DEVICE PROGR EVAL MULTI: CPT | Performed by: INTERNAL MEDICINE

## 2025-05-19 PROCEDURE — 36415 COLL VENOUS BLD VENIPUNCTURE: CPT | Performed by: HOSPITALIST

## 2025-05-19 PROCEDURE — 2500000001 HC RX 250 WO HCPCS SELF ADMINISTERED DRUGS (ALT 637 FOR MEDICARE OP): Performed by: INTERNAL MEDICINE

## 2025-05-19 PROCEDURE — 97161 PT EVAL LOW COMPLEX 20 MIN: CPT | Mod: GP | Performed by: PHYSICAL THERAPIST

## 2025-05-19 PROCEDURE — 2500000004 HC RX 250 GENERAL PHARMACY W/ HCPCS (ALT 636 FOR OP/ED): Mod: JZ | Performed by: HOSPITALIST

## 2025-05-19 PROCEDURE — 2500000002 HC RX 250 W HCPCS SELF ADMINISTERED DRUGS (ALT 637 FOR MEDICARE OP, ALT 636 FOR OP/ED): Performed by: HOSPITALIST

## 2025-05-19 PROCEDURE — 2500000001 HC RX 250 WO HCPCS SELF ADMINISTERED DRUGS (ALT 637 FOR MEDICARE OP): Performed by: HOSPITALIST

## 2025-05-19 PROCEDURE — 1200000002 HC GENERAL ROOM WITH TELEMETRY DAILY

## 2025-05-19 PROCEDURE — 85027 COMPLETE CBC AUTOMATED: CPT | Performed by: HOSPITALIST

## 2025-05-19 PROCEDURE — 93005 ELECTROCARDIOGRAM TRACING: CPT

## 2025-05-19 PROCEDURE — 97165 OT EVAL LOW COMPLEX 30 MIN: CPT | Mod: GO

## 2025-05-19 PROCEDURE — 2500000002 HC RX 250 W HCPCS SELF ADMINISTERED DRUGS (ALT 637 FOR MEDICARE OP, ALT 636 FOR OP/ED): Performed by: NURSE PRACTITIONER

## 2025-05-19 PROCEDURE — 80048 BASIC METABOLIC PNL TOTAL CA: CPT | Performed by: HOSPITALIST

## 2025-05-19 RX ORDER — SPIRONOLACTONE 25 MG/1
12.5 TABLET ORAL DAILY
Status: DISCONTINUED | OUTPATIENT
Start: 2025-05-19 | End: 2025-05-21 | Stop reason: HOSPADM

## 2025-05-19 RX ORDER — POTASSIUM CHLORIDE 20 MEQ/1
40 TABLET, EXTENDED RELEASE ORAL ONCE
Status: COMPLETED | OUTPATIENT
Start: 2025-05-19 | End: 2025-05-19

## 2025-05-19 RX ORDER — FUROSEMIDE 10 MG/ML
40 INJECTION INTRAMUSCULAR; INTRAVENOUS EVERY 12 HOURS
Status: DISCONTINUED | OUTPATIENT
Start: 2025-05-19 | End: 2025-05-21

## 2025-05-19 RX ORDER — POTASSIUM CHLORIDE 20 MEQ/1
20 TABLET, EXTENDED RELEASE ORAL ONCE
Status: DISCONTINUED | OUTPATIENT
Start: 2025-05-19 | End: 2025-05-19

## 2025-05-19 RX ADMIN — ROSUVASTATIN CALCIUM 20 MG: 20 TABLET, FILM COATED ORAL at 22:11

## 2025-05-19 RX ADMIN — LEVOTHYROXINE SODIUM 88 MCG: 0.09 TABLET ORAL at 06:34

## 2025-05-19 RX ADMIN — MAGNESIUM GLUCONATE 500 MG ORAL TABLET 1 TABLET: 500 TABLET ORAL at 10:02

## 2025-05-19 RX ADMIN — DULOXETINE 30 MG: 30 CAPSULE, DELAYED RELEASE ORAL at 10:02

## 2025-05-19 RX ADMIN — FUROSEMIDE 40 MG: 10 INJECTION, SOLUTION INTRAMUSCULAR; INTRAVENOUS at 18:35

## 2025-05-19 RX ADMIN — SPIRONOLACTONE 12.5 MG: 25 TABLET ORAL at 10:11

## 2025-05-19 RX ADMIN — FUROSEMIDE 40 MG: 10 INJECTION, SOLUTION INTRAMUSCULAR; INTRAVENOUS at 06:34

## 2025-05-19 RX ADMIN — CETIRIZINE HYDROCHLORIDE 10 MG: 10 TABLET ORAL at 10:02

## 2025-05-19 RX ADMIN — ASPIRIN 81 MG: 81 TABLET, COATED ORAL at 10:02

## 2025-05-19 RX ADMIN — LOSARTAN POTASSIUM 75 MG: 50 TABLET, FILM COATED ORAL at 10:02

## 2025-05-19 RX ADMIN — BISOPROLOL FUMARATE 10 MG: 5 TABLET ORAL at 10:02

## 2025-05-19 RX ADMIN — GABAPENTIN 300 MG: 300 CAPSULE ORAL at 22:11

## 2025-05-19 RX ADMIN — POTASSIUM CHLORIDE 40 MEQ: 1500 TABLET, EXTENDED RELEASE ORAL at 10:12

## 2025-05-19 RX ADMIN — ENOXAPARIN SODIUM 30 MG: 100 INJECTION SUBCUTANEOUS at 17:07

## 2025-05-19 RX ADMIN — Medication 2 L/MIN: at 10:05

## 2025-05-19 ASSESSMENT — COGNITIVE AND FUNCTIONAL STATUS - GENERAL
STANDING UP FROM CHAIR USING ARMS: A LITTLE
HELP NEEDED FOR BATHING: A LITTLE
DRESSING REGULAR UPPER BODY CLOTHING: A LITTLE
DRESSING REGULAR LOWER BODY CLOTHING: A LOT
DAILY ACTIVITIY SCORE: 20
WALKING IN HOSPITAL ROOM: A LITTLE
MOVING TO AND FROM BED TO CHAIR: A LITTLE
DRESSING REGULAR UPPER BODY CLOTHING: A LITTLE
MOBILITY SCORE: 22
HELP NEEDED FOR BATHING: A LITTLE
EATING MEALS: A LITTLE
MOVING TO AND FROM BED TO CHAIR: A LITTLE
TOILETING: A LITTLE
PERSONAL GROOMING: A LOT
MOBILITY SCORE: 19
WALKING IN HOSPITAL ROOM: A LITTLE
TOILETING: A LITTLE
STANDING UP FROM CHAIR USING ARMS: A LITTLE
DRESSING REGULAR LOWER BODY CLOTHING: A LITTLE
CLIMB 3 TO 5 STEPS WITH RAILING: A LOT
WALKING IN HOSPITAL ROOM: A LITTLE
MOBILITY SCORE: 19
CLIMB 3 TO 5 STEPS WITH RAILING: A LITTLE
CLIMB 3 TO 5 STEPS WITH RAILING: A LOT
DAILY ACTIVITIY SCORE: 16

## 2025-05-19 ASSESSMENT — ACTIVITIES OF DAILY LIVING (ADL)
LACK_OF_TRANSPORTATION: NO
BATHING_ASSISTANCE: MINIMAL

## 2025-05-19 ASSESSMENT — ENCOUNTER SYMPTOMS
SHORTNESS OF BREATH: 1
WEAKNESS: 1

## 2025-05-19 ASSESSMENT — PAIN - FUNCTIONAL ASSESSMENT: PAIN_FUNCTIONAL_ASSESSMENT: 0-10

## 2025-05-19 ASSESSMENT — PAIN SCALES - GENERAL
PAINLEVEL_OUTOF10: 0 - NO PAIN

## 2025-05-19 NOTE — CARE PLAN
The patient's goals for the shift include breathe better    The clinical goals for the shift include Remain safe and free from fall      Problem: Fall/Injury  Goal: Not fall by end of shift  Outcome: Progressing  Goal: Be free from injury by end of the shift  Outcome: Progressing  Goal: Verbalize understanding of personal risk factors for fall in the hospital  Outcome: Progressing  Goal: Verbalize understanding of risk factor reduction measures to prevent injury from fall in the home  Outcome: Progressing  Goal: Use assistive devices by end of the shift  Outcome: Progressing  Goal: Pace activities to prevent fatigue by end of the shift  Outcome: Progressing

## 2025-05-19 NOTE — PROGRESS NOTES
Meryl Hester is a 89 y.o. female on day 1 of admission presenting with Acute on chronic congestive heart failure, unspecified heart failure type.      Subjective   Patient examined and seen. Alert and oriented x3, resting comfortably.  Patient denies chest pain, shortness of breath, palpitations, abdominal pain, fever or chills.  Patient is agreeable to go home when cleared.  Reports support system is intact.    Pt reports she is feeling better  Daughter at bedside  Vi RN triad rounding present        Objective     Last Recorded Vitals  /68 (BP Location: Right arm, Patient Position: Sitting)   Pulse 80   Temp 36.6 °C (97.9 °F) (Temporal)   Resp 20   Wt 60.8 kg (134 lb)   SpO2 98%   Intake/Output last 3 Shifts:  No intake or output data in the 24 hours ending 05/19/25 0850    Admission Weight  Weight: 60.8 kg (134 lb) (05/18/25 0914)    Daily Weight  05/18/25 : 60.8 kg (134 lb)    Image Results  ECG 12 lead  Atrial flutter with variable AV block with premature ventricular or aberrantly conducted complexes  Left axis deviation  Nonspecific intraventricular block  Cannot rule out Anteroseptal infarct (cited on or before 18-MAY-2025)  Abnormal ECG  When compared with ECG of 18-MAY-2025 09:33,  No significant change was found    See ED provider note for full interpretation and clinical correlation  ECG 12 lead  Atrial flutter with 3:1 AV conduction  Left axis deviation  Nonspecific intraventricular block  Cannot rule out Anteroseptal infarct , age undetermined  Abnormal ECG  When compared with ECG of 19-APR-2025 12:38,  Atrial flutter has replaced Electronic ventricular pacemaker    See ED provider note for full interpretation and clinical correlation    Confirmed by Rosana Diaz ([id number]) on 5/18/2025 1:12:41 PM  CT angio chest for pulmonary embolism  Narrative: STUDY:  CT Angiogram of the Chest; 5/18/2025 11:43 AM.  INDICATION:  Shortness of breath and elevated D-dimer; Evaluate for  pulmonary  embolism.  COMPARISON:  Chest radiograph performed the same date.  CTA chest 4/17/2025.  ACCESSION NUMBER(S):  [id number]  ORDERING CLINICIAN:  AMBROSIO MARIA  TECHNIQUE:  CTA of the chest was performed with intravenous contrast.   Images are reviewed and processed at a workstation according to the CT  angiogram protocol with 3-D and/or MIP post processing imaging  generated.  Omnipaque 350, 75 mL was administered intravenously.  Automated mA/kV exposure control was utilized and patient examination  was performed in strict accordance with principles of ALARA.  FINDINGS:  Pulmonary arteries are adequately opacified without acute or chronic  filling defects.  The thoracic aorta is normal in course and caliber  without dissection or aneurysm.  The heart is enlarged with multichamber enlargement.  A closure device  is in place in the left atrial appendage.  Coronary artery  calcifications are present.  There is no evidence for pericardial  effusion.  Thoracic lymph nodes are not enlarged.  Moderate right and small left pleural effusions layer dependently in  the bilateral hemithoraces.  There is no pleural thickening, or  pneumothorax.  The airways are patent.  Segmental dependent atelectasis identified at the bilateral lower  lobes.  Lungs are otherwise clear without consolidation, interstitial  disease, or suspicious nodules.  Scattered diverticula in the large bowel represent diverticulosis.  There are no acute fractures.  No suspicious bony lesions.  Impression: 1.  No evidence for pulmonary embolus.  2.  Moderate right and small left layering pleural effusions.  3.  Cardiomegaly with multichamber enlargement.  4.  Colonic diverticulosis.  5.  Coronary artery calcifications.  Signed by Gianluca Emmanuel MD  XR chest 1 view  Narrative: STUDY:  Chest Radiograph;  5/18/2025 9:52 AM  INDICATION:  Shortness of breath.  COMPARISON:  None available.  ACCESSION NUMBER(S):  [id number]  ORDERING  CLINICIAN:  AMBROSIO MARIA  TECHNIQUE:  Frontal chest was obtained at 0952 hours.  FINDINGS:  CARDIOMEDIASTINAL SILHOUETTE:  Cardiomediastinal silhouette is normal in size and configuration.   Pacer leads noted within the heart.  No evidence of a kink or  discontinuity.     LUNGS:  There are increased interstitial markings noted bilaterally which may  be chronic in etiology.  If there is an acute component, this could  represent some element of edema.  There are bilateral lower lobe mild  opacities which may represent atelectasis or pneumonitis..     ABDOMEN:  No remarkable upper abdominal findings.     BONES:  No acute osseous changes.  Impression: 1.  Increased history markings bilaterally which may be chronic in  etiology.  If there is an acute component, this could represent  interstitial edema.  2.  Bilateral lower lobe opacities which represent atelectasis or  pneumonia..  Signed by Wilmer Parks MD      Physical Exam  Constitutional: Well developed, awake/alert/oriented x3, , cooperative  Respiratory/Thorax: Patent airways,  normal breath sounds crackles in left lower lobe  Cardiovascular: Regular, rate and rhythm, murmur 2+ equal pulses of the extremities, normal S 1and S 2 Vpaced   Musculoskeletal: ROM intact, no joint swelling,   Extremities: normal extremities,  no contusions or wounds seen   Skin: warm, dry, intact  Neurological: alert/oriented x 3, speech clear,   Psychiatric: appropriate mood and behavior      Relevant Results  Scheduled medications  Scheduled Medications[1]  Continuous medications  Continuous Medications[2]  PRN medications  PRN Medications[3]    Results for orders placed or performed during the hospital encounter of 05/18/25 (from the past 24 hours)   Basic Metabolic Panel   Result Value Ref Range    Glucose 86 74 - 99 mg/dL    Sodium 140 136 - 145 mmol/L    Potassium 3.4 (L) 3.5 - 5.3 mmol/L    Chloride 104 98 - 107 mmol/L    Bicarbonate 29 21 - 32 mmol/L    Anion Gap 10 10 - 20  mmol/L    Urea Nitrogen 15 6 - 23 mg/dL    Creatinine 0.92 0.50 - 1.05 mg/dL    eGFR 58 (L) >60 mL/min/1.73m*2    Calcium 8.7 8.6 - 10.3 mg/dL   CBC   Result Value Ref Range    WBC 6.3 4.4 - 11.3 x10*3/uL    nRBC 0.0 0.0 - 0.0 /100 WBCs    RBC 3.56 (L) 4.00 - 5.20 x10*6/uL    Hemoglobin 10.9 (L) 12.0 - 16.0 g/dL    Hematocrit 34.2 (L) 36.0 - 46.0 %    MCV 96 80 - 100 fL    MCH 30.6 26.0 - 34.0 pg    MCHC 31.9 (L) 32.0 - 36.0 g/dL    RDW 16.1 (H) 11.5 - 14.5 %    Platelets 227 150 - 450 x10*3/uL   Magnesium   Result Value Ref Range    Magnesium 2.11 1.60 - 2.40 mg/dL   SST TOP   Result Value Ref Range    Extra Tube 293    ECG 12 lead   Result Value Ref Range    Ventricular Rate 81 BPM    Atrial Rate 78 BPM    QRS Duration 124 ms    QT Interval 462 ms    QTC Calculation(Bazett) 536 ms    R Axis 270 degrees    T Axis 122 degrees    QRS Count 14 beats    Q Onset 201 ms    T Offset 432 ms    QTC Fredericia 510 ms       Assessment & Plan      89 year old female with history of systolic CHF, chronic afib admitted with dyspnea secondary to acute systolic CHF exacerbation      # Acute on Chronic CHF systolic HF   # Persistent Afib s/p Watchman Device  # HTN  Admit to Hospital Medicine  EF 20-25%  Consult EP - recent device placement  Consult Cardiology - continue IV lasix, then will transition to PO   ECG prn  Daily weight  Strict intake and output  Continue home medications as appropriate   Telemetry     # Hypothyroidism  Continue home medications     DNR DNI  Cardiac Diet  Disposition: Home 24-48 hours    Time spent  36 minutes obtaining labs, imaging, recommendations, interview, assessment, examination, medication review/ordering, and EMR review.    Plan of care was discussed extensively with patient. Patient verbalized understanding through teach back method. All questions and concerns addressed upon examination.     Of note, this documentation is completed using the Dragon Dictation system (voice recognition software).  There may be spelling and/or grammatical errors that were not corrected prior to final submission.        Elda Jamil, APRN-CNP           [1] aspirin, 81 mg, oral, Daily  bisoprolol, 10 mg, oral, Daily  cetirizine, 10 mg, oral, Daily  DULoxetine, 30 mg, oral, Daily  enoxaparin, 30 mg, subcutaneous, q24h  furosemide, 40 mg, intravenous, q12h  gabapentin, 300 mg, oral, BID  levothyroxine, 88 mcg, oral, Daily  losartan, 75 mg, oral, Daily  magnesium oxide, 400 mg of magnesium oxide, oral, Daily  oxygen, , inhalation, Continuous - Inhalation  perflutren lipid microspheres, 0.5-10 mL of dilution, intravenous, Once in imaging  rosuvastatin, 20 mg, oral, Nightly  spironolactone, 12.5 mg, oral, Daily  [2]    [3] PRN medications: acetaminophen **OR** acetaminophen **OR** acetaminophen, acetaminophen **OR** acetaminophen **OR** acetaminophen, fluticasone, lubricating eye drops, ondansetron **OR** ondansetron, polyethylene glycol

## 2025-05-19 NOTE — PROGRESS NOTES
Occupational Therapy    Evaluation  Patient Name: Meryl Hester  MRN: [id number]  : 1931  Today's Date: 25  Time Calculation  Start Time: 913  Stop Time: 922  Time Calculation (min): 9 min     1117/1117-A    Assessment:  OT Assessment: pt presents with decreased endurance, decreased indep with ADLS, will benefit from con't skilled OT to address impairments.  Prognosis: Good  End of Session Patient Position: Bed, 2 rail up (on 2Lo2 NC)  OT Assessment Results: Decreased ADL status, Decreased endurance, Decreased functional mobility  Prognosis: Good    Plan:  Treatment Interventions: ADL retraining, Functional transfer training, Endurance training, Patient/family training  OT Frequency: 2 times per week  OT Discharge Recommendations: No OT needed after discharge  OT Recommended Transfer Status: Stand by assist  OT - OK to Discharge: Yes  Treatment Interventions: ADL retraining, Functional transfer training, Endurance training, Patient/family training  Subjective     Current Problem:  1. Acute on chronic congestive heart failure, unspecified heart failure type        2. Bilateral pleural effusion        3. Hypoxia        4. Cardiac enzymes elevated        5. Paroxysmal atrial fibrillation (Multi)  Cardiac device check - Inpatient    Cardiac device check - Inpatient        Medical History[1]  Surgical History[2]    General:   OT Received On: 25  General  Reason for Referral: ADL impairment  Referred By: PT/OT 25 Nelly  Past Medical History Relevant to Rehab: HTN, HLD, MI, CHF, CAD,  AFIb, CAD, hypothryoidism ICM, Watchman,, PPM   Co-Treatment: PT  Co-Treatment Reason: to maximize patient/staff safety  Patient Position Received: Bed, 2 rail up, Alarm off, not on at start of session (pt on 2Lo2, does not use O2 at home)  General Comment: To ED 25 with SOB and generalized weakness. Acute on chronic CHF, Bilateral pleural effusion. Lovenox initiated. BNP 2073; Troponin 154 (flat); D Dimer 891;  Flu A/B (-); C19(-); RSV (-); CTA 5/18/25 PE (-) PE; moderate bilateral pleural effusion; cardiomegaly    Precautions:  Medical Precautions: Fall precautions  Post-Surgical Precaution; PPM precautions, PPM insertion 4/18/25    Vital Signs:  SpO2:  (98% at rest, 91% after ambulating)    Pain:  Pain Assessment  Pain Assessment: 0-10  0-10 (Numeric) Pain Score: 0 - No pain  Objective     Cognition:  Overall Cognitive Status: Within Functional Limits             Home Living:  Home Living Comments: Per patient report, resides with daughter in 1 story home with basement laundry. 2 + 1/2 step into kitchen from landing with grab bar. Tub shower with seat and grab bar. Owns rollator (2). Also owns canes.    Prior Function:  Prior Function Comments: Per patient report independent in mobilty and ADLs with rollator PRN. Daughter does laundry and assists with med mgmt.  Denies any falls. Does not drive.           Activities of Daily Living:   Eating Assistance: Independent  Grooming Assistance: Stand by  Bathing Assistance: Minimal  UE Dressing Assistance: Stand by  LE Dressing Assistance: Minimal  Toileting Assistance with Device: Stand by  Functional Assistance:  (pt ambulated with CGA with rollator)                         Activity Tolerance:  Endurance:  (Fair, states she feels better, however note drop in SpO2 98% to 90% post ambulation on RA)           Bed Mobility/Transfers: Bed Mobility  Bed Mobility:  (Supine > sit with HOB 45 degress modified independent Sit > supine bed flat independent)  Transfers  Transfer:  (SIt <> stand at bed level SBA.)                Balance:  Static Sitting: good  Dynamic Sitting: good  Static Standing: good-  Dynamic Standing: fair +         Vision:Vision - Basic Assessment  Current Vision: No visual deficits        Sensation:  Light Touch: No apparent deficits    Strength:  Strength Comments: B UE 4/5 throughout            Extremities: RUE   RUE : Within Functional Limits and LUE   LUE:  Within Functional Limits    Outcome Measures: [facility] Daily Activity  Putting on and taking off regular lower body clothing: A little  Bathing (including washing, rinsing, drying): A little  Putting on and taking off regular upper body clothing: A little  Toileting, which includes using toilet, bedpan or urinal: A little  Taking care of personal grooming such as brushing teeth: None  Eating Meals: None  Daily Activity - Total Score: 20    Education Documentation  ADL Training, taught by Kristen Benjamin OT at 5/19/2025 10:15 AM.  Learner: Patient  Readiness: Acceptance  Method: Explanation, Demonstration  Response: Verbalizes Understanding, Needs Reinforcement               Goals:  Encounter Problems       Encounter Problems (Active)       OT Goals       pt will dress LB with modified indep (Progressing)       Start:  05/19/25    Expected End:  06/02/25            Pt will transfer to bed, chair ,toilet with modified indep (Progressing)       Start:  05/19/25    Expected End:  06/02/25            Pt will verbalize 3 energy conservation strategies to increase indep with ADLS (Progressing)       Start:  05/19/25    Expected End:  06/02/25            Pt will tolerate 10 minutes of activity with one rest break, spo2 >90% (Progressing)       Start:  05/19/25    Expected End:  06/02/25                        [1]   Past Medical History:  Diagnosis Date    Diverticulitis of intestine, part unspecified, without perforation or abscess without bleeding     Diverticulitis    Personal history of other diseases of the circulatory system     History of hypertension    Personal history of other endocrine, nutritional and metabolic disease     History of hypercholesterolemia    Personal history of other endocrine, nutritional and metabolic disease     History of thyroid disease    Vitreomacular adhesion, left eye 12/04/2017    Vitreomacular traction syndrome of left eye    Vitreous degeneration, right eye 12/04/2017    Posterior  vitreous detachment of right eye   [2]   Past Surgical History:  Procedure Laterality Date    CARDIAC CATHETERIZATION N/A 8/9/2024    Procedure: Left And Right Heart Cath, With LV;  Surgeon: Stephon Squires MD;  Location: [facility];  Service: Cardiovascular;  Laterality: N/A;    CARDIAC CATHETERIZATION N/A 11/8/2024    Procedure: LAAO (Left Atrial Appendage Occlusion);  Surgeon: Liam Joyce MD;  Location: [place] [address] [facility];  Service: Cardiovascular;  Laterality: N/A;  same day CT 1000    CARDIAC ELECTROPHYSIOLOGY PROCEDURE N/A 4/18/2025    Procedure: PPM BIV Implant;  Surgeon: Moe Lane MD;  Location: [facility];  Service: Electrophysiology;  Laterality: N/A;    CARDIAC ELECTROPHYSIOLOGY PROCEDURE N/A 4/18/2025    Procedure: Ablation AV Node;  Surgeon: Moe Lane MD;  Location: [facility];  Service: Electrophysiology;  Laterality: N/A;    CATARACT EXTRACTION  11/29/2016    Cataract Surgery    OTHER SURGICAL HISTORY  11/29/2016    Iridotomy By YAG Laser    OTHER SURGICAL HISTORY  11/29/2016    Discission Of Secondary Membranous Cataract By Laser

## 2025-05-19 NOTE — CONSULTS
Inpatient consult to Cardiology  Consult performed by: LEONOR Gibbons  Consult ordered by: RICHY Espino-CNP  Reason for consult: systolic chf                                                          Date:   5/19/2025  Patient name:  Meryl Hester  Date of admission:  5/18/2025  9:16 AM  MRN:   [id number]  YOB: 1931  Time of Consult:  9:35 AM    Consulting Cardiologist/SYLVIA: RICHY Ross, CNP  Primary Cardiologist:  Dr. Wilmer Corona     Referring Provider: Dr. Umanzor      Admission Diagnosis:     Acute on chronic congestive heart failure, unspecified heart failure type      History of Present Illness:      89-year-old with past medical history of persistent atrial fibrillation is post watchman, systolic congestive heart failure with EF of 20 to 25% in March, hypothyroidism, hypertension, coronary artery disease with tachycardia mediated cardiomyopathy who recently underwent AV node ablation and CRT upgrade.  She reports that she has had increased shortness of breath for the past 2 weeks.  She was recently discharged after episode of atrial fibrillation with RVR at the end of April and underwent AV node ablation during that time.  States she was doing fine after discharge but symptoms have now returned.  Previous device check showed 70% A-fib burden.  In the emergency department she was found to be dyspneic along with pulmonary edema on chest x-ray.  She was given Lasix and admitted to the medicine team for further evaluation.  Her normal cardiologist is Dr. Corona.  Lab work showed a glucose of 189, sodium 134, potassium 4.1, BNP 2000, troponin 158.  Repeat troponin was 154.  Magnesium was 2.23.  H&H is stable.  CT angio chest for PE showed no evidence of pulmonary embolus, moderate right and small left layering pleural effusions.  Cardiomegaly with multichamber enlargement.  Colonic diverticulosis and coronary artery calcifications.  EKG showed atrial  flutter with variable AV block with premature ventricular or aberrantly conducted complexes with left axis deviation.  Nonspecific intraventricular block.  Rate of 90.  STEMI criteria.  The patient was admitted to the medicine team and general cardiology was consulted for congestive heart failure and electrophysiology was consulted due to atrial flutter.    Previous Cardiac Testing:  I personally reviewed previous cardiac testing and agree with the current findings.    Echocardiogram:   Recent Labs     03/18/25  0953 03/15/25  1335 11/08/24  1553 11/08/24  1325 08/08/24  1435   EF 23 40 53 53 48   LVIDD  --  4.31  --   --  4.50   RV 40.5 46.3  --   --  84.4   RVFRWALLPKSP  --  11.90  --   --  12.70   TAPSE  --  1.2  --   --  1.7   Transthoracic Echo (TTE) Complete 03/15/2025    [place] [id number]  Tel [phone] Fax [phone]    TRANSTHORACIC ECHOCARDIOGRAM REPORT    Patient Name:       ANNA Moran Physician:    [id number] Wilmer Corona MD, [facility]  Study Date:         3/15/2025            Ordering Provider:    [id number] AMANDA GARCIA  MRN/PID:            [id number]             Fellow:  Accession#:         [id number]         Nurse:  Date of Birth/Age:  4/16/1931 / 89 years Sonographer:          Mora Siddiqui RDCS  Gender Assigned at  F                    Additional Staff:  Birth:  Height:             149.86 cm            Admit Date:           3/14/2025  Weight:             61.24 kg             Admission Status:     Inpatient -  Routine  BSA / BMI:          1.56 m2 / 27.27      Department Location:  [address] [place] [address]/m2  Blood Pressure: 134 /78 mmHg    Study Type:    TRANSTHORACIC ECHO (TTE) COMPLETE  Diagnosis/ICD: Unspecified combined systolic (congestive) and diastolic  (congestive) heart failure (CHF)-I50.40  Indication:    Congestive Heart Failure  CPT Codes:     Echo Complete w Full Doppler-[id number]    Patient History:  Smoker:             Former.  Pertinent History: A-Fib, CAD, Dyspnea, CHF, LE Edema, HTN, Hyperlipidemia and  Watchman.    Study Detail: The following Echo studies were performed: 2D, M-Mode, Doppler and  color flow. Technically challenging study due to body habitus.      PHYSICIAN INTERPRETATION:  Left Ventricle: The left ventricular systolic function is moderately decreased, with a visually estimated ejection fraction of 40%. There is moderate concentric left ventricular hypertrophy. There is global hypokinesis of the left ventricle with minor regional variations. The left ventricular cavity size is upper limits of normal. There is mild increased septal and mildly increased posterior left ventricular wall thickness. Spectral Doppler shows an abnormal pattern of left ventricular diastolic filling. In comparison study of August 2024 EF has decreased previously 45% [id number] comparison clearly there has been a deterioration. However atrial fibrillation is currently present prior study was in sinus rhythm heart rates frequently over 100 affecting calculation of ejection fraction.  Left Atrium: The left atrial size is moderately dilated.  Right Ventricle: The right ventricle is upper limits of normal in size. There is normal right ventricular global systolic function. RVSP 46 mmHg.  Right Atrium: The right atrial size is normal.  Aortic Valve: The aortic valve is trileaflet. The aortic valve dimensionless index is 0.30. There is trace aortic valve regurgitation. The peak instantaneous gradient of the aortic valve is 17 mmHg. The mean gradient of the aortic valve is 9 mmHg. Tricuspid aortic valve with diffuse thickening of leaflet cusps and moderate impairment of leaflet mobility. There is trace aortic insufficiency. There is moderate-severe aortic stenosis V-max 2 m/s peak/mean gradients 16 and 9 mmHg. Calculated valve area approximately 1 cmï¿½ visually appears perhaps slightly greater than that; was calculated at 1.2 cm in August.  There may have been some progression of stenosis again calculations not as accurate in the setting of rapid atrial fibrillation.  Mitral Valve: The mitral valve is mildly thickened. There is trace mitral valve regurgitation. Trace MR.  Tricuspid Valve: The tricuspid valve is structurally normal. There is moderate tricuspid regurgitation.  Pulmonic Valve: The pulmonic valve is structurally normal. There is no indication of pulmonic valve regurgitation.  Pericardium: No pericardial effusion noted.  Aorta: The aortic root is normal.      CONCLUSIONS:  1. The left ventricular systolic function is moderately decreased, with a visually estimated ejection fraction of 40%.  2. There is global hypokinesis of the left ventricle with minor regional variations.  3. Spectral Doppler shows an abnormal pattern of left ventricular diastolic filling.  4. In comparison study of August 2024 EF has decreased previously 45% [id number] comparison clearly there has been a deterioration. However atrial fibrillation is currently present prior study was in sinus rhythm heart rates frequently over 100 affecting calculation of ejection fraction.  5. There is normal right ventricular global systolic function.  6. RVSP 46 mmHg.  7. The left atrial size is moderately dilated.  8. Trace MR.  9. Moderate tricuspid regurgitation.  10. Tricuspid aortic valve with diffuse thickening of leaflet cusps and moderate impairment of leaflet mobility. There is trace aortic insufficiency. There is moderate-severe aortic stenosis V-max 2 m/s peak/mean gradients 16 and 9 mmHg. Calculated valve area approximately 1 cmï¿½ visually appears perhaps slightly greater than that; was calculated at 1.2 cm in August. There may have been some progression of stenosis again calculations not as accurate in the setting of rapid atrial fibrillation.    QUANTITATIVE DATA SUMMARY:    2D MEASUREMENTS:             Normal Ranges:  Ao Root d:       2.50 cm     (2.0-3.7cm)  LAs:              4.10 cm     (2.7-4.0cm)  IVSd:            1.20 cm     (0.6-1.1cm)  LVPWd:           1.21 cm     (0.6-1.1cm)  LVIDd:           4.31 cm     (3.9-5.9cm)  LVIDs:           3.48 cm  LV Mass Index:   119.5 g/m2  LVEDV Index:     56.12 ml/m2  LV % FS          19.3 %      LEFT ATRIUM:                  Normal Ranges:  LA Vol A4C:        88.6 ml    (22+/-6mL/m2)  LA Vol A2C:        94.5 ml  LA Vol BP:         93.5 ml  LA Vol Index A4C:  56.8ml/m2  LA Vol Index A2C:  60.6 ml/m2  LA Vol Index BP:   59.9 ml/m2  LA Area A4C:       27.4 cm2  LA Area A2C:       27.7 cm2  LA Major Axis A4C: 7.2 cm  LA Major Axis A2C: 6.9 cm  LA Volume Index:   56.9 ml/m2      RIGHT ATRIUM:                 Normal Ranges:  RA Vol A4C:        49.3 ml    (8.3-19.5ml)  RA Vol Index A4C:  31.6 ml/m2  RA Area A4C:       17.7 cm2  RA Major Axis A4C: 5.4 cm      LV SYSTOLIC FUNCTION:  Normal Ranges:  EF-A4C View:    42 % (>=55%)  EF-A2C View:    42 %  EF-Biplane:     44 %  EF-Visual:      40 %  LV EF Reported: 40 %      AORTIC VALVE:                      Normal Ranges:  AoV Vmax:                2.04 m/s  (<=1.7m/s)  AoV Peak P.6 mmHg (<20mmHg)  AoV Mean P.0 mmHg  (1.7-11.5mmHg)  LVOT Max Delbert:            0.70 m/s  (<=1.1m/s)  AoV VTI:                 39.10 cm  (18-25cm)  LVOT VTI:                11.80 cm  LVOT Diameter:           2.00 cm   (1.8-2.4cm)  AoV Area, VTI:           0.95 cm2  (2.5-5.5cm2)  AoV Area,Vmax:           1.09 cm2  (2.5-4.5cm2)  AoV Dimensionless Index: 0.30      AORTIC INSUFFICIENCY:  AI Vmax:       3.47 m/s  AI Half-time:  601 msec  AI Decel Rate: 159.50 cm/s2      RIGHT VENTRICLE:  RV Basal 4.20 cm  RV Mid   4.45 cm  RV Major 6.0 cm  TAPSE:   11.8 mm  RV s'    0.12 m/s      TRICUSPID VALVE/RVSP:          Normal Ranges:  Peak TR Velocity:     3.29 m/s  RV Syst Pressure:     46 mmHg  (< 30mmHg)      PULMONIC VALVE:          Normal Ranges:  PV Accel Time:  79 msec  (>120ms)  PV Max Delbert:     0.8 m/s   (0.6-0.9m/s)  PV Max P.5 mmHg      [id number] Wilmer Corona MD, [facility]  Electronically signed on 3/16/2025 at 8:36:34 AM        ** Final **    Coronary Angiography:   LAAO (Left Atrial Appendage Occlusion) 2024    [facility], Cath Lab, [address] [place] [address]    Cardiovascular Catheterization Report    Patient Name:      ANNA SINGH         Performing Physician:  [id number] Joyce MD  Study Date:        2024            Verifying Physician:   Hernandez Joyce MD  MRN/PID:           [id number]             Cardiologist/Co-Scrub:  Accession#:        [id number]         Ordering Provider:     [id number] JOYCE  Date of Birth/Age: 1931 years Cardiologist:  Gender:            F                    Fellow:                [id number] Reji Geller MD  Encounter#:        [id number]           Surgeon:      Study: Left Atrial Appendage Closure      Indications:    Left Atrial Appendage Closure (LAAC):      Sterile prep and appropriate procedure timeout were performed. Using ultrasound guidance and micropuncture technique dual access was obtained in the right common femoral vein. Two 8F sheaths were placed using a modified Seldinger technique. The patient was administered IV Heparin and ACT was confirmed to be therapeutic. An AcuNav ICE probe was then advanced through one of the sheaths and under ICE guidance, transseptal puncture was performed with a versacross (access solutions), accessing the left atrium. The transseptal tract was then dilated with a Watchman FXD Double Curve access sheath and the ICE probe was advanced through the dilated tract into the left atrium. Next, a 6 Malagasy angled pigtail was advanced through the delivery sheath into the left atrial appendage and angiography was performed via the pigtail. Sizing of the device was confirmed by ICE and angiography, we then advanced a 27 mm Watchman FLX Pro Closure Device and confirmed placement both  by ICE and angiography. A  'tug test' was performed and confirmed stability. No color Doppler flow around the device was appreciated. Having satisfied position, anchoring, size and seal criteria, the device was successfully deployed. All equipment was then removed and hemostasis was facilitated by Vascade and Proglide Closure devices The patient was enrolled in a research study and data was included in the LAAO registry.    Hemo Personnel:  +----------------+---------+  Name            Duty       +----------------+---------+  Liam Joyce MD 1  +----------------+---------+      Hemodynamic Pressures:    +----+--------------------+----------+---------+-----------+-----------+  Site     Date Time      Phase NameMean mmHgA-Wave mmHgV-Wave mmHg  +----+--------------------+----------+---------+-----------+-----------+    LA11/8/2024 2:36:24 PM      Rest       21         21         21  +----+--------------------+----------+---------+-----------+-----------+      Cardiac Cath Post Procedure Notes:  Post Procedure Diagnosis: S/p successful LAAO with 27mm Watchman FLX Pro.  Blood Loss:               Estimated blood loss during the procedure was 10 mls.  Specimens Removed:        Number of specimen(s) removed: none.    ____________________________________________________________________________________  CONCLUSIONS:  1. S/p successful LAAO with 27mm Watchman FLX Pro.    ICD 10 Codes:  Paroxysmal atrial fibrillation-I48.0    CPT Codes:  Perc left atrial appendage closure (LAAC)-74601    [id number] Liam Joyce MD  Performing Physician  Electronically signed by [id number] Liam Joyce MD on 11/14/2024 at 10:14:25 AM          ** Final **    Nuclear:No results found for this or any previous visit from the past 1800 days.          Allergies:     Allergies[1]      Past Medical History:     See above    Past Surgical History:     Surgery Date Site/Laterality Comments   TONSILLECTOMY PRIMARY/SECONDARY <AGE 12  1953   Tonsillectomy    BIOPSY BREAST OPEN INCISIONAL 1960   Bx of breast, incisional     DELIVERY ONLY 1965   , low cervical    CHG INCISION THROMB HEMORROID; EXT 1978       NEUROPLASTY &/TRANSPOS MEDIAN NRV CARPAL TUNNE 1998   Carpal tunnel decomp    RPR 1ST INGUN HRNA AGE 5 YRS/> REDUCIBLE 2000   Hernia repair, inguinal left    LAMINECTOMY W/O FFD  VERT SEG LUMBAR 2012   Laminectomy, lumbar    COLONOSCOPY 2012   Repeat in 5 yrs    COLONOSCOPY 2013   [facility]/ DR. Denis MCKEON CATARACT EXTRACAP,INSERT LENS 2012 - 2012 Bilateral        Family History:     Medical History Relation Comments   Breast Cancer Maternal Grandmother       Family History  Relation Status Comments   Brother 1  (Age 78) cancer   Brother 2 Alive good health   Brother 3 Alive good health   Brother 4 Alive good health   Brother 5 Alive good health   Father  (Age 98)     Maternal Grandmother       Mother  (Age 63) endometrial cancer         Social History:     Former nicotine abuse    CURRENT INPATIENT MEDICATIONS    Scheduled Medications[2]  Continuous Medications[3]  Current Outpatient Medications   Medication Instructions    acetaminophen (TYLENOL) 650 mg, oral, Every 6 hours PRN    aspirin 81 mg, oral, Daily    bisoprolol (ZEBETA) 10 mg, oral, Daily    calcium carbonate-vitamin D3 500 mg-5 mcg (200 unit) tablet 1 tablet, Daily    carboxymethylcellulose (THERATears) 0.25 % ophthalmic solution 1 drop, 3 times daily PRN    cetirizine (ALLERGY RELIEF (CETIRIZINE)) 10 mg, Daily    docusate sodium (COLACE) 100 mg, 2 times daily    DULoxetine (CYMBALTA) 30 mg, Daily RT    fluticasone (Flonase) 50 mcg/actuation nasal spray 1 spray, Daily PRN    furosemide (LASIX) 20 mg, oral, Daily RT    krill oil 500 mg, 2 times daily    LACTOBACILLUS ACIDOPHILUS ORAL 1 tablet, Daily    levothyroxine (SYNTHROID, LEVOXYL) 88 mcg, Daily RT    losartan (COZAAR) 75  mg, oral, Daily    magnesium oxide 500 mg, Daily    multivitamin with minerals tablet 1 tablet, Daily    mv-min-FA-vit K-lutein-zeaxant (PreserVision AREDS 2 Plus MV) 200 mcg-15 mcg- 5 mg-1 mg capsule 1 capsule, 2 times daily    omeprazole (PRILOSEC) 40 mg, Daily RT    potassium chloride CR 20 mEq ER tablet 20 mEq, oral, Daily RT    rosuvastatin (CRESTOR) 20 mg, oral, Nightly        Review of Systems:      12 point review of systems was obtained in detail and is negative other than that detailed above.    Vital Signs:     Vitals:    05/18/25 2033 05/18/25 2345 05/19/25 0438 05/19/25 0742   BP:  135/82 121/76 133/68   BP Location:    Right arm   Patient Position:    Sitting   Pulse:  90 88 80   Resp:   18 20   Temp:  36.4 °C (97.5 °F) 36.1 °C (97 °F) 36.6 °C (97.9 °F)   TempSrc:    Temporal   SpO2: 98% 99% 94% 98%   Weight:       Height:         No intake or output data in the 24 hours ending 05/19/25 0935    Wt Readings from Last 4 Encounters:   05/18/25 60.8 kg (134 lb)   04/30/25 63.8 kg (140 lb 9.6 oz)   04/17/25 63.5 kg (140 lb)   04/01/25 63.6 kg (140 lb 3.2 oz)       Physical Examination:     Physical Exam  Vitals and nursing note reviewed.   HENT:      Head: Normocephalic.      Mouth/Throat:      Mouth: Mucous membranes are moist.   Eyes:      Pupils: Pupils are equal, round, and reactive to light.   Cardiovascular:      Rate and Rhythm: Normal rate. Rhythm irregular.      Heart sounds: Normal heart sounds.      Comments: Paced  Pulmonary:      Effort: Pulmonary effort is normal.      Breath sounds: Decreased air movement present.   Abdominal:      General: Bowel sounds are normal.      Palpations: Abdomen is soft.   Musculoskeletal:         General: Normal range of motion.      Right lower leg: No edema.      Left lower leg: No edema.   Skin:     General: Skin is warm and dry.      Capillary Refill: Capillary refill takes less than 2 seconds.   Neurological:      General: No focal deficit present.       Mental Status: She is alert and oriented to person, place, and time.   Psychiatric:         Mood and Affect: Mood normal.           Lab:     CBC:   Results from last 7 days   Lab Units 05/19/25  0614 05/18/25  0940   WBC AUTO x10*3/uL 6.3 8.8   RBC AUTO x10*6/uL 3.56* 3.91*   HEMOGLOBIN g/dL 10.9* 11.9*   HEMATOCRIT % 34.2* 37.6   MCV fL 96 96   MCH pg 30.6 30.4   MCHC g/dL 31.9* 31.6*   RDW % 16.1* 16.0*   PLATELETS AUTO x10*3/uL 227 273     CMP:    Results from last 7 days   Lab Units 05/19/25  0614 05/18/25  0940   SODIUM mmol/L 140 134*   POTASSIUM mmol/L 3.4* 4.1   CHLORIDE mmol/L 104 99   CO2 mmol/L 29 26   BUN mg/dL 15 16   CREATININE mg/dL 0.92 0.87   GLUCOSE mg/dL 86 189*   PROTEIN TOTAL g/dL  --  7.0   CALCIUM mg/dL 8.7 9.4   BILIRUBIN TOTAL mg/dL  --  1.1   ALK PHOS U/L  --  105   AST U/L  --  30   ALT U/L  --  26     BMP:    Results from last 7 days   Lab Units 05/19/25  0614 05/18/25  0940   SODIUM mmol/L 140 134*   POTASSIUM mmol/L 3.4* 4.1   CHLORIDE mmol/L 104 99   CO2 mmol/L 29 26   BUN mg/dL 15 16   CREATININE mg/dL 0.92 0.87   CALCIUM mg/dL 8.7 9.4   GLUCOSE mg/dL 86 189*     Magnesium:  Results from last 7 days   Lab Units 05/19/25  0614 05/18/25  0940   MAGNESIUM mg/dL 2.11 2.23     Troponin:    Results from last 7 days   Lab Units 05/18/25  1112 05/18/25  0940   TROPHS ng/L 154* 158*     BNP:   Results from last 7 days   Lab Units 05/18/25  0940   BNP pg/mL 2,073*     Lipid Panel:         Diagnostic Studies:   @No results found for this or any previous visit.    Results for orders placed during the hospital encounter of 03/14/25    Transesophageal Echo (KARINA)    Narrative  [place] [address]  Tel [phone] Fax [phone]    TRANSESOPHAGEAL ECHOCARDIOGRAM REPORT    Patient Name:       ANNA Moran Physician:    [id number] Dallas Yu MD  Study Date:         3/18/2025            Ordering Provider:    [id number] DALLAS YU  MRN/PID:             [id number]             Fellow:  Accession#:         [id number]         Nurse:                Mckenna Sahni RN  Date of Birth/Age:  4/16/1931 / 89 years Sonographer:          Mora Siddiqui RDCS  Gender Assigned at  F                    Additional Staff:  Birth:  Height:             149.86 cm            Admit Date:           3/14/2025  Weight:             63.50 kg             Admission Status:     Inpatient -  Routine  BSA / BMI:          1.58 m2 / 28.28      Department Location:  [address] [place] [address]  kg/m2  Blood Pressure: 116 /79 mmHg    Study Type:    TRANSESOPHAGEAL ECHO (KARINA)  Diagnosis/ICD: Unspecified atrial fibrillation-I48.91  Indication:    AF  CPT Codes:     KARINA Complete-97880; Moderate Sedation Services initial 15 minutes  patient >5 years-62279  Study Detail: The following Echo studies were performed: 2D, Doppler and color  flow. Agitated saline used as a contrast agent for intraseptal  flow evaluation. The patient was sedated.      PHYSICIAN INTERPRETATION:  KARINA Details: The KARINA probe used was [id number]. Technically adequate omniplane transesophageal echocardiogram performed. Agitated saline contrast and color flow Doppler echo was performed to assess for the presence of a patent foramen ovale.  KARINA Medication: The pharynx was anesthetized with Cetacaine spray and viscous lidocaine. The patient was sedated using moderate sedation. Total intraservice time for moderate sedation was 28 minutes. Midazolam and Fentanyl was used to sedate the patient for this exam.  KARINA Procedure: The probe was passed without difficulty. The following complication was encountered during the procedure: Patient tolerated the procedure well without any apparent complications.  Left Ventricle: The left ventricular systolic function is severely decreased, with a visually estimated ejection fraction of 20-25%. There is global hypokinesis of the left ventricle with minor regional variations. The left ventricular cavity size is  mildly dilated. Left ventricular diastolic filling was indeterminate.  Left Atrium: The left atrial size is moderate to severely dilated. There is no definite left atrial thrombus present. There is a small patent foramen ovale with predominantly left to right shunting across the atrial septum. A patent foramen ovale was visualized using color Doppler. A bubble study using agitated saline was performed. Bubble study is negative. There is spontaneous contrast noted in the left atrial appendage. S/p Watchman device that appears well-seated with no thrombus or peridevice vanessa.  Right Ventricle: The right ventricle is mildly enlarged. There is severely reduced right ventricular systolic function.  Right Atrium: The right atrial size is moderately dilated.  Aortic Valve: The aortic valve is trileaflet. There is mild aortic valve regurgitation.  Mitral Valve: The mitral valve is moderately thickened. There is moderate mitral valve regurgitation.  Tricuspid Valve: The tricuspid valve is structurally normal. There is moderate tricuspid regurgitation.  Pulmonic Valve: The pulmonic valve is not well visualized. The pulmonic valve regurgitation was not well visualized.  Pericardium: Trivial to small pericardial effusion.  Aorta: The aortic root was not well visualized. There is plaque visualized in the descending aorta which is classified as a Grade 3 [moderate atheroma >3-5 mm (no mobile/ulcerated component)] atherosclerosis.  Pulmonary Veins: The pulmonary veins appear normal and return normally to the left atrium.      CONCLUSIONS:  1. The left ventricular systolic function is severely decreased, with a visually estimated ejection fraction of 20-25%.  2. There is global hypokinesis of the left ventricle with minor regional variations.  3. Left ventricular diastolic filling was indeterminate.  4. Left ventricular cavity size is mildly dilated.  5. There is severely reduced right ventricular systolic function.  6. Mildly  enlarged right ventricle.  7. The left atrial size is moderate to severely dilated.  8. S/p Watchman device that appears well-seated with no thrombus or peridevice vanessa.  9. The right atrial size is moderately dilated.  10. The mitral valve is moderately thickened.  11. Moderate mitral valve regurgitation.  12. Moderate tricuspid regurgitation.  13. Mild aortic valve regurgitation.  14. No left atrial thrombus.  15. Small patent foramen ovale.  16. There is plaque visualized in the descending aorta.    QUANTITATIVE DATA SUMMARY:    LV SYSTOLIC FUNCTION:  Normal Ranges:  EF-Visual:      23 %  LV EF Reported: 23 %      TRICUSPID VALVE/RVSP:          Normal Ranges:  Peak TR Velocity:     3.06 m/s  RV Syst Pressure:     40 mmHg  (< 30mmHg)      [id number] Phillip Yu MD  Electronically signed on 3/21/2025 at 11:49:46 AM        ** Final **          Radiology:     Cardiac device check - Inpatient         CT angio chest for pulmonary embolism   Final Result   1.  No evidence for pulmonary embolus.   2.  Moderate right and small left layering pleural effusions.   3.  Cardiomegaly with multichamber enlargement.   4.  Colonic diverticulosis.   5.  Coronary artery calcifications.   Signed by Gianluca Emmanuel MD      XR chest 1 view   Final Result   1.  Increased history markings bilaterally which may be chronic in   etiology.  If there is an acute component, this could represent   interstitial edema.   2.  Bilateral lower lobe opacities which represent atelectasis or   pneumonia..   Signed by Wilmer Parks MD          Problem List:     Problem List[4]    Assessment:   Hypoxia/shortness of breath  Acute on chronic diastolic congestive heart failure with last known EF to be around 20 to 25%.  Paroxysmal atrial fibrillation's with episodes of rapid ventricular response  CAD  Hypothyroidism  Status post biventricular pacemaker system implanted in April 2025  Hypertension  Moderate TR  Trace MR      Plan:   Admitted to medicine.   Remains on telemetry.  Telemetry currently shows atrial flutter with variable AV block with premature ventricular complexes.  Prior EKG showing ventricular paced.  No STEMI criteria.  Device check shows atrial tachycardia/atrial flutter biventricular paced in the 90s.  Currently 2 AT/AF episodes, 75.4% AT/AF burden has watchman.  No ventricular arrhythmias were identified.  Monitor electrolytes, keep potassium greater than 4 and magnesium greater than 2  Supplemental O2, keep SpO2 greater than 92%  Continue gentle diuresis.  Monitor strict I's and O's and daily weights  Will need to have increased dose of Lasix on discharge  Not a good candidate for SGLT2 inhibitor due to recurrent UTIs  Will add in spironolactone  Continue beta-blocker and ARB  No indication repeat echocardiogram at this current time  Heart failure navigator consult  Electrophysiology consulted to manage arrhythmia  Further recommendations to follow    I have reviewed all medications, laboratory results, and imaging pertinent for today's encounter     [facility]  Adult Gerontology Acute Care Nurse Practitioner  [facility] [place]   [facility]  [phone]      Of note, this documentation is completed using the Dragon Dictation system (voice recognition software). There may be spelling and/or grammatical errors that were not corrected prior to final submission.      Electronically signed by LEONOR Gibbons, on 5/19/2025 at 9:35 AM     Patient seen and examined in conjunction with RICHY Newman/CNP and agree with the evaluation as noted above.  I have personally interviewed and examined the patient.   I have personally and independently reviewed the pertinentlabs and diagnostic testing.  I have personally verified the elements of the history and physical listed above and changes, if any, are noted below.    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  She presents with increased shortness of breath of about 2 weeks duration after her recent hospitalization for A-fib with RVR in April at which time she underwent the AV savita ablation.  She says she has been doing well post discharge but symptoms have now recurred in the last few weeks.  Her device check shows 70% A-fib burden.  Admission chest x-ray is consistent with pulmonary edema and the patient has felt better with IV Lasix.  She did not endorse any chest pain with these episodes.  She also denied orthopnea proximal nocturnal dyspnea.    Agree with exam as noted above.  Cardiac exam reveals irregularly irregular S1 and S2, with a 2/6 systolic ejection murmur in the right upper sternal border.  Chest reveals fine bibasilar crackles bilaterally.  Abdomen is soft with no hepatosplenomegaly.  Extremities shows 1+ bilateral ankle edema.    ASSESSMENT AND PLAN:  1.  Acute on chronic CHF exacerbation: Presenting as increased shortness of breath as well as volume overload which has responded to IV Lasix.  We will continue with Lasix IV for now and consider transition to torsemide on discharge for continued optimal diuresis.  Patient is not a candidate for SGLT 2 inhibitor because of recurrent UTI.  2.  Persistent A-fib status post AV savita ablation: With appropriate pacemaker function on recent device check.  3.  Hypertension: Blood pressure appears fairly well-controlled, will continue with current blood pressure medications.         [1]   Allergies  Allergen Reactions    Sulfa (Sulfonamide Antibiotics) Rash     Skin peeling     Lisinopril Cough    Apixaban Other     GI bleeding   [2] aspirin, 81 mg, oral, Daily  bisoprolol, 10 mg, oral, Daily  cetirizine, 10 mg, oral, Daily  DULoxetine, 30 mg, oral, Daily  enoxaparin, 30  mg, subcutaneous, q24h  furosemide, 40 mg, intravenous, q12h  gabapentin, 300 mg, oral, BID  levothyroxine, 88 mcg, oral, Daily  losartan, 75 mg, oral, Daily  magnesium oxide, 400 mg of magnesium oxide, oral, Daily  oxygen, , inhalation, Continuous - Inhalation  perflutren lipid microspheres, 0.5-10 mL of dilution, intravenous, Once in imaging  potassium chloride CR, 40 mEq, oral, Once  rosuvastatin, 20 mg, oral, Nightly  spironolactone, 12.5 mg, oral, Daily  [3]    [4]   Patient Active Problem List  Diagnosis    Radiculopathy of lumbar region    Joint stiffness of spine    Gait difficulty    Syncope and collapse    Syncope, unspecified syncope type    NSTEMI (non-ST elevated myocardial infarction) (Multi)    Pulmonary hypertension (Multi)    Acute systolic CHF (congestive heart failure), NYHA class 2    Cardiomyopathy, ischemic    Coronary artery disease involving native coronary artery of native heart without angina pectoris    Paroxysmal atrial fibrillation with RVR (Multi)    Hypertension    Atrial fibrillation (Multi)    Presence of Watchman left atrial appendage closure device    Atrial fibrillation, unspecified type (Multi)    Acute on chronic diastolic heart failure    Type 2 MI (myocardial infarction) (Multi)    Acquired hypothyroidism    Acute hypoxic respiratory failure    Myocardiopathy (Multi)    Shortness of breath    Atrial fibrillation with RVR (Multi)    BMI 28.0-28.9,adult    Status post biventricular pacemaker    Acute on chronic congestive heart failure, unspecified heart failure type

## 2025-05-19 NOTE — PROGRESS NOTES
Physical Therapy    Physical Therapy    Physical Therapy Evaluation    Patient Name: Meryl Hester  MRN: [id number]  Today's Date: 5/19/2025   Time Calculation  Start Time: 0910  Stop Time: 0920  Time Calculation (min): 10 min  1117/1117-A    Assessment/Plan   PT Assessment  PT Assessment Results: Decreased strength, Impaired balance, Decreased mobility, Decreased endurance  Rehab Prognosis: Good  Barriers to Discharge Home: No anticipated barriers  Evaluation/Treatment Tolerance: Patient tolerated treatment well  Assessment Comment: Patient will benefit from continued PT while inhouse to increase actiivty tolerance.  End of Session Patient Position: Bed, 2 rail up, Alarm off, not on at start of session  IP OR SWING BED PT PLAN  Inpatient or Swing Bed: Inpatient  PT Plan  Treatment/Interventions: Gait training, Transfer training, Endurance training, Therapeutic activity  PT Plan: Ongoing PT  PT Frequency: 3 times per week  PT Discharge Recommendations: No PT needed after discharge  PT Recommended Transfer Status: Contact guard  PT - OK to Discharge:  (once deemed medically appropriate)    Subjective   General Visit Information:  General  Reason for Referral: Impaired mobility  Referred By: PT/OT 5/18/25 Nelly  Past Medical History Relevant to Rehab: HTN, HLD, MI, CHF< CAD,  AFIb, CAD, hypothryoidism ICM, Watchman,, PPM 4/25  Co-Treatment: OT  Co-Treatment Reason: to maximize patient/staff safety  Patient Position Received: Bed, 2 rail up, Alarm off, not on at start of session  General Comment: To ED 5/18/25 with SOB and generalized weakness. Acute on chronic CHF, Bilateral pleural effusion. Lovenox initiated. BNP 2073; TRoponin 154 (flat); D Dimer 891; Flu A/B (-); C19(-); RSV (-); cTA 5/18/25 PE (-) PE; moderate bilateral pleural effusion; cardiomegaly    Home Living:  Home Living  Home Living Comments: Per pateint report resides with daughter in 1 story home with basement laundry. 2 + 1/2 step into kitchen from  lalnding with grab bar. Tub shower with seat and grab bar. Owns rollator (2). Also owns canes.    Prior Level of Function:  Prior Function Per Pt/Caregiver Report  Prior Function Comments: Per patient report independent in mobilty and ADLs with rollator PRN. Daughter does laundry. Denies any falls. Does not drive.    Precautions:  Precautions  Medical Precautions: Fall precautions  Post-Surgical Precautions:  (Pacemaker precautions)    Vital Signs:  Pre activity 98% SpO2 88 HR  Post activity 90% SpO2 80 HR RA. Oxygen reapplied post ambulation. Patient unable to improve with pursed lip breathing.     Objective     Pain:  Pain Assessment  0-10 (Numeric) Pain Score: 0 - No pain    Cognition:  Cognition  Overall Cognitive Status: Within Functional Limits  Following Commands:  (Follows simple commands 100%)    General Assessments:  General Observation  General Observation: Patient lying in bed. Oxygen turned on but not in nasal canula   Activity Tolerance  Endurance:  (Fair, states she feels better, however note drop in SpO2 98% to 90% post ambulation on RA)                 Static Sitting Balance: Good  Dynamic Sitting Balance: Good  Static Standing Balance: Good  Dynamic Standing Balance: Fair +    Functional Assessments:     Bed Mobility  Bed Mobility:  (Supine > sit with HOB 45 degress modified independent Sit > supine bed flat independent)  Transfers  Transfer:  (SIt <> stand at bed level SBA.)  Ambulation/Gait Training  Ambulation/Gait Training Performed:  (Patient ambulated with rollator 100' with good dean CGA. on RA. No complaints of SOB during ambulation. Oxygen returned once supine)          Extremity/Trunk Assessments:  RUE   RUE : Within Functional Limits     RLE   RLE :  (AROM Hip/knee/ankle WFL MMT 4/5 grossly)  LLE   LLE :  (AROM Hip/knee/ankle WFL MMT 4/5 grossly)    Outcome Measures:     [facility] Basic Mobility  Turning from your back to your side while in a flat bed without using bedrails: None  Moving  from lying on your back to sitting on the side of a flat bed without using bedrails: None  Moving to and from bed to chair (including a wheelchair): None  Standing up from a chair using your arms (e.g. wheelchair or bedside chair): None  To walk in hospital room: A little  Climbing 3-5 steps with railing: A little  Basic Mobility - Total Score: 22        Goals:  Encounter Problems       Encounter Problems (Active)       PT Problem       Transfers sit <> stand with rollator modified independent  (Progressing)       Start:  05/19/25    Expected End:  06/02/25            Patient ambulated with rollator 100' modified independent with SpO2> 92% (Progressing)       Start:  05/19/25    Expected End:  06/02/25                 Education Documentation  Mobility Training, taught by Rachael Pearce, PT at 5/19/2025  9:53 AM.  Learner: Patient  Readiness: Acceptance  Method: Explanation, Demonstration  Response: Demonstrated Understanding  Comment: see note

## 2025-05-19 NOTE — PROGRESS NOTES
05/19/25 165   Discharge Planning   Living Arrangements Children  (lives with her daughter)   Support Systems Children;Family members   Type of Residence Private residence   Number of Stairs Within Residence 2  (basement)   Who is requesting discharge planning? Provider   Expected Discharge Disposition Home   Does the patient need discharge transport arranged? No   Financial Resource Strain   How hard is it for you to pay for the very basics like food, housing, medical care, and heating? Not hard   Housing Stability   In the last 12 months, was there a time when you were not able to pay the mortgage or rent on time? N   At any time in the past 12 months, were you homeless or living in a shelter (including now)? N   Transportation Needs   In the past 12 months, has lack of transportation kept you from medical appointments or from getting medications? no   In the past 12 months, has lack of transportation kept you from meetings, work, or from getting things needed for daily living? No   Patient Choice   Provider Choice list and CMS website (https://medicare.gov/care-compare#search) for post-acute Quality and Resource Measure Data were provided and reviewed with: Patient;Family   Intensity of Service   Intensity of Service 0-30 min     Met with pt & 2 sons in room to discuss dc planning. Pt A&O x4. [facility] PT 22 OT 20. No dc needs currently identified. Plan for dc home. Has rollator in room. Family provides transportation.

## 2025-05-19 NOTE — CARE PLAN
Problem: Fall/Injury  Goal: Not fall by end of shift  Outcome: Progressing  Goal: Be free from injury by end of the shift  Outcome: Progressing  Goal: Verbalize understanding of personal risk factors for fall in the hospital  Outcome: Progressing  Goal: Verbalize understanding of risk factor reduction measures to prevent injury from fall in the home  Outcome: Progressing  Goal: Use assistive devices by end of the shift  Outcome: Progressing  Goal: Pace activities to prevent fatigue by end of the shift  Outcome: Progressing     Problem: Skin  Goal: Decreased wound size/increased tissue granulation at next dressing change  Outcome: Progressing  Flowsheets (Taken 5/18/2025 2325 by Kenny Shepard RN)  Decreased wound size/increased tissue granulation at next dressing change: Promote sleep for wound healing  Goal: Participates in plan/prevention/treatment measures  Outcome: Progressing  Flowsheets (Taken 5/19/2025 1309)  Participates in plan/prevention/treatment measures: Increase activity/out of bed for meals  Goal: Prevent/manage excess moisture  Outcome: Progressing  Flowsheets (Taken 5/19/2025 1309)  Prevent/manage excess moisture: Cleanse incontinence/protect with barrier cream  Goal: Prevent/minimize sheer/friction injuries  Outcome: Progressing  Flowsheets (Taken 5/19/2025 1309)  Prevent/minimize sheer/friction injuries: Increase activity/out of bed for meals  Goal: Promote/optimize nutrition  Outcome: Progressing  Flowsheets (Taken 5/19/2025 1309)  Promote/optimize nutrition: Monitor/record intake including meals  Goal: Promote skin healing  Outcome: Progressing  Flowsheets (Taken 5/19/2025 1309)  Promote skin healing: Assess skin/pad under line(s)/device(s)   The patient's goals for the shift include breathe better    The clinical goals for the shift include Safety to prevent falls and injury advance mobility wean O2 SPO2 >92% MOnitor daily weights and I/O

## 2025-05-19 NOTE — CONSULTS
Inpatient consult to Cardiology  Consult performed by: LEONOR Espino  Consult ordered by: Nima Villegas MD  Reason for consult: acute on chronic heart failure/ atrial fibrillation  Assessment/Recommendations:   Patient was seen, chart reviewed.    Patient was admitted for shortness of breath and generalized weakness.  Patient has significant heart failure decompensation  Patient had a biventricular pacemaker implanted with AV node ablation done this year  Patient is still pacemaker dependent.  Device functioning well.  Continue with heart failure therapy for now  Rate decrease from the device from VVI 90 bpm to VVI 80 bpm.  Continue telemetry  X-ray reviewed.  LV lead and the rest of the leads in adequate position.  Device interrogation reviewed    Risk factor modification and lifestyle modification discussed with patient. Diet , exercise and hydration discussed with patient.    Please excuse any errors in grammar or translation related to this dictation.  Voice recognition software was utilized to prepare this document.              Cardiology Consult Note  Patient: Meryl Hester  Unit/Bed: 1117/1117-A  YOB: 1931  MRN: [id number]  Acct: [id number]   Admitting Diagnosis: Paroxysmal atrial fibrillation (Multi) [I48.0]  Cardiac enzymes elevated [R74.8]  Hypoxia [R09.02]  Bilateral pleural effusion [J90]  Acute on chronic congestive heart failure, unspecified heart failure type [I50.9]  Date:  5/18/2025  Hospital Day: 1  Attending: Eliel Umanzor MD    Consultant: Dr. Moe Lane  / Kristen Garza CNP  Primary Cardiologist: Dr. Moe Lane      Complaint:  Chief Complaint   Patient presents with    Shortness of Breath    Abdominal Pain        History of Present Illness:  89-year-old female evaluated [facility] emergency room on 5/18/2025 secondary to shortness of breath and generalized weakness.  EP service was asked to see and evaluate patient per Dr. Villegas  secondary to acute on chronic heart failure and atrial fibrillation.  All epic notes reviewed.   Patient stated her symptoms have been getting worse over the past 1 week and she is short of breath with minimal exertion.  She has had a decreased appetite and is feeling weak.  She has an occasional cough.  She denies palpitations, dizziness, lightheadedness, fever, chills, or chest pain.  Laboratory values showed a BUN of 16, creatinine 0.87, BNP 2073, troponin 158 then 154, D-dimer of 891, and WBCs 8.8.  Chest x-ray shows increased bilaterally which are chronic but may represent acute component with interstitial edema, bilateral lower lobe opacities which represent atelectasis or pneumonia.  CT angio which showed no pulmonary embolism, moderate right and small left pleural effusions, cardiomegaly with multichamber enlargement, colonic diverticulosis, and coronary artery calcifications.  EKG shows atrial flutter with heart rate 90 bpm and QTc of 538 ms.  Patient was treated with aspirin and IV Lasix.  She was admitted to medicine service.  At this time patient is comfortable.  Shortness of breath is improved.  Device interrogation was performed with adjustments to device settings.  Device has normal lead and device function, patient is device dependent with no R wave greater than 40 bpm, patient device settings are VVI 80, patient in permanent atrial fibrillation.  EKG today shows atrial fibrillation with heart rate 81 bpm V-paced and QTc of 536 ms.  Left prepectoral PPM site well-healed.    Past medical history includes chronic diastolic heart failure, persistent atrial fib with episodes of RVR status post AV node ablation 4/18/2025 and AV BiV PPM implant with Medtronic device, GI bleed status post Watchman device implant November 2024, CAD with left heart catheterization 8/9/2024 showing less than 10% stenosis in the left main, 60% stenosis in the proximal LAD, 50% stenosis in the mid LAD, 90% stenosis in the  proximal ramus, 40% stenosis in the proximal and mid RCA and 50% stenosis in the distal RCA with recommendations for medical management, KARINA 3/18/2025 showing cardiomyopathy with LVEF 20 to 25%, and hypothyroidism.        Allergies:  RX Allergies[1]     PMHx:  Medical History[2]    PSHx:  Surgical History[3]    Social Hx:  Social History[4]    Family Hx:  Family History[5]    Review of Systems:   Review of Systems   Respiratory:  Positive for shortness of breath.    Neurological:  Positive for weakness.         Physical Examination:    Visit Vitals  /68 (BP Location: Right arm, Patient Position: Sitting)   Pulse 80   Temp 36.6 °C (97.9 °F) (Temporal)   Resp 20      Physical Exam  Constitutional:       Appearance: Normal appearance.   HENT:      Head: Normocephalic and atraumatic.      Nose: Nose normal.      Mouth/Throat:      Mouth: Mucous membranes are moist.   Eyes:      Conjunctiva/sclera: Conjunctivae normal.   Cardiovascular:      Rate and Rhythm: Normal rate. Rhythm irregular.      Pulses: Normal pulses.      Heart sounds: Normal heart sounds.      Comments: Left prepectoral PPM site well healed  Pulmonary:      Effort: Pulmonary effort is normal.      Breath sounds: Normal breath sounds.      Comments: Diminished    Musculoskeletal:         General: Normal range of motion.   Skin:     General: Skin is warm and dry.   Neurological:      General: No focal deficit present.      Mental Status: She is alert and oriented to person, place, and time.   Psychiatric:         Mood and Affect: Mood normal.         Behavior: Behavior normal.         LABS:  Results for orders placed or performed during the hospital encounter of 05/18/25 (from the past 96 hours)   CBC and Auto Differential   Result Value Ref Range    WBC 8.8 4.4 - 11.3 x10*3/uL    nRBC 0.0 0.0 - 0.0 /100 WBCs    RBC 3.91 (L) 4.00 - 5.20 x10*6/uL    Hemoglobin 11.9 (L) 12.0 - 16.0 g/dL    Hematocrit 37.6 36.0 - 46.0 %    MCV 96 80 - 100 fL    MCH 30.4  26.0 - 34.0 pg    MCHC 31.6 (L) 32.0 - 36.0 g/dL    RDW 16.0 (H) 11.5 - 14.5 %    Platelets 273 150 - 450 x10*3/uL    Neutrophils % 77.8 40.0 - 80.0 %    Immature Granulocytes %, Automated 0.3 0.0 - 0.9 %    Lymphocytes % 13.9 13.0 - 44.0 %    Monocytes % 6.6 2.0 - 10.0 %    Eosinophils % 0.6 0.0 - 6.0 %    Basophils % 0.8 0.0 - 2.0 %    Neutrophils Absolute 6.84 (H) 1.60 - 5.50 x10*3/uL    Immature Granulocytes Absolute, Automated 0.03 0.00 - 0.50 x10*3/uL    Lymphocytes Absolute 1.22 0.80 - 3.00 x10*3/uL    Monocytes Absolute 0.58 0.05 - 0.80 x10*3/uL    Eosinophils Absolute 0.05 0.00 - 0.40 x10*3/uL    Basophils Absolute 0.07 0.00 - 0.10 x10*3/uL   Comprehensive Metabolic Panel   Result Value Ref Range    Glucose 189 (H) 74 - 99 mg/dL    Sodium 134 (L) 136 - 145 mmol/L    Potassium 4.1 3.5 - 5.3 mmol/L    Chloride 99 98 - 107 mmol/L    Bicarbonate 26 21 - 32 mmol/L    Anion Gap 13 10 - 20 mmol/L    Urea Nitrogen 16 6 - 23 mg/dL    Creatinine 0.87 0.50 - 1.05 mg/dL    eGFR 62 >60 mL/min/1.73m*2    Calcium 9.4 8.6 - 10.3 mg/dL    Albumin 4.5 3.4 - 5.0 g/dL    Alkaline Phosphatase 105 33 - 136 U/L    Total Protein 7.0 6.4 - 8.2 g/dL    AST 30 9 - 39 U/L    Bilirubin, Total 1.1 0.0 - 1.2 mg/dL    ALT 26 7 - 45 U/L   Magnesium   Result Value Ref Range    Magnesium 2.23 1.60 - 2.40 mg/dL   B-Type Natriuretic Peptide   Result Value Ref Range    BNP 2,073 (H) 0 - 99 pg/mL   Protime-INR   Result Value Ref Range    Protime 12.1 9.8 - 12.4 seconds    INR 1.1 0.9 - 1.1   aPTT   Result Value Ref Range    aPTT 27 26 - 36 seconds   Lipase   Result Value Ref Range    Lipase 8 (L) 9 - 82 U/L   D-Dimer, Quantitative VTE Exclusion   Result Value Ref Range    D-Dimer, Quantitative VTE Exclusion 891 (H) <=500 ng/mL FEU   Sars-CoV-2, Influenza A/B and RSV PCR   Result Value Ref Range    Coronavirus 2019, PCR Not Detected Not Detected    Flu A Result Not Detected Not Detected    Flu B Result Not Detected Not Detected    RSV PCR Not  Detected Not Detected   Troponin I, High Sensitivity, Initial   Result Value Ref Range    Troponin I, High Sensitivity 158 (HH) 0 - 13 ng/L   ECG 12 lead   Result Value Ref Range    Ventricular Rate 89 BPM    Atrial Rate 267 BPM    QRS Duration 126 ms    QT Interval 436 ms    QTC Calculation(Bazett) 530 ms    P Axis 66 degrees    R Axis -82 degrees    T Axis 100 degrees    QRS Count 15 beats    Q Onset 200 ms    P Onset 151 ms    P Offset 182 ms    T Offset 418 ms    QTC Fredericia 497 ms   Troponin, High Sensitivity, 1 Hour   Result Value Ref Range    Troponin I, High Sensitivity 154 (HH) 0 - 13 ng/L   BLOOD GAS VENOUS FULL PANEL   Result Value Ref Range    POCT pH, Venous 7.38 7.33 - 7.43 pH    POCT pCO2, Venous 54 (H) 41 - 51 mm Hg    POCT pO2, Venous 28 (L) 35 - 45 mm Hg    POCT SO2, Venous 37 (L) 45 - 75 %    POCT Oxy Hemoglobin, Venous 36.4 (L) 45.0 - 75.0 %    POCT Hematocrit Calculated, Venous 35.0 (L) 36.0 - 46.0 %    POCT Sodium, Venous 136 136 - 145 mmol/L    POCT Potassium, Venous 4.0 3.5 - 5.3 mmol/L    POCT Chloride, Venous 99 98 - 107 mmol/L    POCT Ionized Calicum, Venous 1.17 1.10 - 1.33 mmol/L    POCT Glucose, Venous 94 74 - 99 mg/dL    POCT Lactate, Venous 1.3 0.4 - 2.0 mmol/L    POCT Base Excess, Venous 5.5 (H) -2.0 - 3.0 mmol/L    POCT HCO3 Calculated, Venous 31.9 (H) 22.0 - 26.0 mmol/L    POCT Hemoglobin, Venous 11.7 (L) 12.0 - 16.0 g/dL    POCT Anion Gap, Venous 9.0 (L) 10.0 - 25.0 mmol/L    Patient Temperature      FiO2 21 %   ECG 12 lead   Result Value Ref Range    Ventricular Rate 90 BPM    Atrial Rate 278 BPM    QRS Duration 128 ms    QT Interval 440 ms    QTC Calculation(Bazett) 538 ms    R Axis 270 degrees    T Axis 90 degrees    QRS Count 15 beats    Q Onset 199 ms    T Offset 419 ms    QTC Fredericia 503 ms   Basic Metabolic Panel   Result Value Ref Range    Glucose 86 74 - 99 mg/dL    Sodium 140 136 - 145 mmol/L    Potassium 3.4 (L) 3.5 - 5.3 mmol/L    Chloride 104 98 - 107 mmol/L     Bicarbonate 29 21 - 32 mmol/L    Anion Gap 10 10 - 20 mmol/L    Urea Nitrogen 15 6 - 23 mg/dL    Creatinine 0.92 0.50 - 1.05 mg/dL    eGFR 58 (L) >60 mL/min/1.73m*2    Calcium 8.7 8.6 - 10.3 mg/dL   CBC   Result Value Ref Range    WBC 6.3 4.4 - 11.3 x10*3/uL    nRBC 0.0 0.0 - 0.0 /100 WBCs    RBC 3.56 (L) 4.00 - 5.20 x10*6/uL    Hemoglobin 10.9 (L) 12.0 - 16.0 g/dL    Hematocrit 34.2 (L) 36.0 - 46.0 %    MCV 96 80 - 100 fL    MCH 30.6 26.0 - 34.0 pg    MCHC 31.9 (L) 32.0 - 36.0 g/dL    RDW 16.1 (H) 11.5 - 14.5 %    Platelets 227 150 - 450 x10*3/uL   Magnesium   Result Value Ref Range    Magnesium 2.11 1.60 - 2.40 mg/dL   SST TOP   Result Value Ref Range    Extra Tube 293    ECG 12 lead   Result Value Ref Range    Ventricular Rate 81 BPM    Atrial Rate 78 BPM    QRS Duration 124 ms    QT Interval 462 ms    QTC Calculation(Bazett) 536 ms    R Axis 270 degrees    T Axis 122 degrees    QRS Count 14 beats    Q Onset 201 ms    T Offset 432 ms    QTC Fredericia 510 ms          Current Medications:  Scheduled medications  Scheduled Medications[6]  Continuous medications  Continuous Medications[7]  PRN medications  PRN Medications[8]     CT head wo IV contrast    Result Date: 10/18/2023  Interpreted By:  Sharif Smith, STUDY: CT HEAD WO IV CONTRAST;  10/18/2023 7:02 am   INDICATION: Signs/Symptoms:AMS.   COMPARISON: CT Brain, 4 March 2020   ACCESSION NUMBER(S): [id number]   ORDERING CLINICIAN: LINDSEY SINGH   TECHNIQUE: CT of the brain from the skull vertex to the skull base, without intravenous contrast   FINDINGS: ACUTE INTRA-AXIAL HEMORRHAGE:  Negative   ACUTE EXTRA-AXIAL/SUBDURAL HEMORRHAGE:  Negative   ACUTE INTRACRANIAL MASS EFFECT:  Negative   CT EVIDENCE OF ACUTE / SUBACUTE TERRITORIAL ISCHEMIA:  Negative   VENTRICLES:  Unchanged diffuse dilation. No acute obstructive hydrocephalus by CT   OTHER BRAIN FINDINGS:  No significant interval change to the CT appearance of the brain including the degree of atrophy and deep  white matter changes from chronic microvascular disease and encephalomalacia in the left occipital lobe from old infarct and another in or near the left thalamus   INCLUDED PARANASAL SINUSES: All clear   INCLUDED MASTOID AIR CELLS: All clear   SKULL:  No lytic or blastic lesion   EXTRACRANIAL SOFT TISSUES:  Scalp and occular globes grossly normal by CT       NO ACUTE INTRACRANIAL PROCESS   NO SIGNIFICANT INTERVAL CHANGE TO THE CT APPEARANCE OF THE BRAIN WHEN COMPARED TO 4 MARCH 2020   MACRO: None   Signed by: Sharif Smith 10/18/2023 7:59 AM Dictation workstation:   [id number]    CT abdomen pelvis w IV contrast    Result Date: 10/18/2023  Interpreted By:  Nikia Perez, STUDY: CT ABDOMEN PELVIS W IV CONTRAST;  10/18/2023 12:46 am   INDICATION: Signs/Symptoms:vomiting.   COMPARISON: None.   ACCESSION NUMBER(S): [id number]   ORDERING CLINICIAN: AMBROSIO MARIA   TECHNIQUE: Axial CT images of the abdomen and pelvis with coronal and sagittal reconstructed images obtained after intravenous administration of contrast   FINDINGS: LOWER CHEST: Mild bibasilar dependent atelectasis. Pacemaker/AICD leads noted the right atrium and right ventricle. Mitral annulus calcifications noted. BONES: No acute osseous abnormality. Mild multilevel degenerative disc changes. ABDOMINAL WALL: Within normal limits.   ABDOMEN:   LIVER: Within normal limits. BILE DUCTS: No biliary dilatation. GALLBLADDER: No calcified gallstones. No pericholecystic inflammatory changes. PANCREAS: Within normal limits. SPLEEN: Within normal limits. ADRENALS: Within normal limits. KIDNEYS and URETERS: Partial staghorn calculus versus numerous layering calculi in the right kidney. Mild right hydronephrosis. Mild right urothelial thickening, particularly in the distal ureter. No calculus along the course of the ureter. No parenchymal abnormality to suggest pyelonephritis. No hydronephrosis on the left.     VESSELS: No aortic aneurysm. RETROPERITONEUM: No  pathologically enlarged retroperitoneal lymph nodes.   PELVIS:   REPRODUCTIVE ORGANS: No pelvic masses. BLADDER: Diffuse urinary bladder wall thickening and mucosal hyperenhancement.   BOWEL: The stomach is moderately distended. No dilated small bowel. Large amount of stool in the rectum. There is significant narrowing of the ascending colon and cecum, with the small bowel loops extending lateral to the cecum and ascending colon (series 301, image 128). The appendix is not seen with certainty. There are no pericecal inflammatory changes to suggest acute appendicitis. PERITONEUM: No ascites or free air, no fluid collection.       Findings suspicious for cystitis with possible right ascending UTI/pyelitis.   Large amount of stool in the rectum suggesting fecal impaction.   Suspected internal pericecal hernia. No evidence of bowel obstruction.   MACRO: None   Signed by: Nikia Perez 10/18/2023 1:42 AM Dictation workstation:   [id number]    XR chest 1 view    Result Date: 10/18/2023  Interpreted By:  Gavino Oconnor, STUDY: XR CHEST 1 VIEW;  10/17/2023 11:16 pm   INDICATION: Signs/Symptoms:nausea and vomiting.   COMPARISON: None.   ACCESSION NUMBER(S): [id number]   ORDERING CLINICIAN: AMBROSIO MARIA   FINDINGS: Dual lead left-sided pacemaker.   The cardiomediastinal silhouette and pulmonary vasculature are within normal limits. No consolidation, pleural effusion or pneumothorax.   Chronic right mid clavicle fracture.       No acute cardiopulmonary process.     MACRO: None.   Signed by: Gavino Oconnor 10/18/2023 12:15 AM Dictation workstation:   [id number]     .No echocardiogram results found for the past 14 days     Encounter Date: 05/18/25   ECG 12 lead   Result Value    Ventricular Rate 81    Atrial Rate 78    QRS Duration 124    QT Interval 462    QTC Calculation(Bazett) 536    R Axis 270    T Axis 122    QRS Count 14    Q Onset 201    T Offset 432    QTC Fredericia 510    Narrative    Ventricular-paced  rhythm with occasional Premature ventricular complexes  Abnormal ECG  When compared with ECG of 18-MAY-2025 13:22,  Electronic ventricular pacemaker has replaced Atrial flutter            I/O:  No intake or output data in the 24 hours ending 05/19/25 1124     Assessment:    Shortness of breath/ hypoxia  Acute on chronic diastolic heart failure with last known LVEF 20 to 25%  Persistent/permanent atrial fibrillation rate controlled after AV node ablation 4/2025  S/P BIV PPM implant with Medtronic device 4/2025  HTN  6.  Valvular heart disease with moderate TR and Trace MR  7.  Hypothyroidism  8.  Moderate CAD with recommendations for medical management  9.  History of GI bleed status post Watchman device placement November 2024    Plan:  Device interrogation shows normal lead and device function, patient is device dependent post implant and AV node ablation.  Device adjustments/ HR adjustment made to VVI 80 BPM.  Patient will continue with follow up as scheduled with device check and device adjustment as scheduled in 1 month then follow with Dr Lane in 2 months, with device check and CXR.   EP to sigh off case    Electronically signed by LEONOR Espino on 5/19/2025 at 11:24 AM                           [1]   Allergies  Allergen Reactions    Sulfa (Sulfonamide Antibiotics) Rash     Skin peeling     Lisinopril Cough    Apixaban Other     GI bleeding   [2]   Past Medical History:  Diagnosis Date    Diverticulitis of intestine, part unspecified, without perforation or abscess without bleeding     Diverticulitis    Personal history of other diseases of the circulatory system     History of hypertension    Personal history of other endocrine, nutritional and metabolic disease     History of hypercholesterolemia    Personal history of other endocrine, nutritional and metabolic disease     History of thyroid disease    Vitreomacular adhesion, left eye 12/04/2017    Vitreomacular traction syndrome of left eye     Vitreous degeneration, right eye 12/04/2017    Posterior vitreous detachment of right eye   [3]   Past Surgical History:  Procedure Laterality Date    CARDIAC CATHETERIZATION N/A 8/9/2024    Procedure: Left And Right Heart Cath, With LV;  Surgeon: Stephon Squires MD;  Location: [facility];  Service: Cardiovascular;  Laterality: N/A;    CARDIAC CATHETERIZATION N/A 11/8/2024    Procedure: LAAO (Left Atrial Appendage Occlusion);  Surgeon: Liam Joyce MD;  Location: [place] [address] [facility];  Service: Cardiovascular;  Laterality: N/A;  same day CT 1000    CARDIAC ELECTROPHYSIOLOGY PROCEDURE N/A 4/18/2025    Procedure: PPM BIV Implant;  Surgeon: Moe Lane MD;  Location: [facility];  Service: Electrophysiology;  Laterality: N/A;    CARDIAC ELECTROPHYSIOLOGY PROCEDURE N/A 4/18/2025    Procedure: Ablation AV Node;  Surgeon: Moe Lane MD;  Location: [facility];  Service: Electrophysiology;  Laterality: N/A;    CATARACT EXTRACTION  11/29/2016    Cataract Surgery    OTHER SURGICAL HISTORY  11/29/2016    Iridotomy By YAG Laser    OTHER SURGICAL HISTORY  11/29/2016    Discission Of Secondary Membranous Cataract By Laser   [4]   Social History  Socioeconomic History    Marital status:    Tobacco Use    Smoking status: Former     Types: Cigarettes    Smokeless tobacco: Never   Substance and Sexual Activity    Alcohol use: Never    Drug use: Never     Social Drivers of Health     Financial Resource Strain: Low Risk  (4/17/2025)    Overall Financial Resource Strain (CARDIA)     Difficulty of Paying Living Expenses: Not hard at all   Food Insecurity: No Food Insecurity (4/17/2025)    Hunger Vital Sign     Worried About Running Out of Food in the Last Year: Never true     Ran Out of Food in the Last Year: Never true   Transportation Needs: No Transportation Needs (5/5/2025)    OASIS : Transportation     Lack of Transportation (Medical): No     Lack of  Transportation (Non-Medical): No     Patient Unable or Declines to Respond: No   Physical Activity: Sufficiently Active (4/1/2025)    [facility] from [facility]    Exercise Vital Sign     Days of Exercise per Week: 3 days     Minutes of Exercise per Session: 50 min   Stress: No Stress Concern Present (8/14/2024)    [employer] - Occupational Stress Questionnaire     Feeling of Stress : Not at all   Social Connections: Feeling Socially Integrated (5/5/2025)    OASIS : Social Isolation     Frequency of experiencing loneliness or isolation: Never   Intimate Partner Violence: Not At Risk (4/17/2025)    Humiliation, Afraid, Rape, and Kick questionnaire     Fear of Current or Ex-Partner: No     Emotionally Abused: No     Physically Abused: No     Sexually Abused: No   Housing Stability: Low Risk  (4/17/2025)    Housing Stability Vital Sign     Unable to Pay for Housing in the Last Year: No     Number of Times Moved in the Last Year: 0     Homeless in the Last Year: No   [5] No family history on file.  [6] aspirin, 81 mg, oral, Daily  bisoprolol, 10 mg, oral, Daily  cetirizine, 10 mg, oral, Daily  DULoxetine, 30 mg, oral, Daily  enoxaparin, 30 mg, subcutaneous, q24h  furosemide, 40 mg, intravenous, q12h  gabapentin, 300 mg, oral, BID  levothyroxine, 88 mcg, oral, Daily  losartan, 75 mg, oral, Daily  magnesium oxide, 400 mg of magnesium oxide, oral, Daily  oxygen, , inhalation, Continuous - Inhalation  perflutren lipid microspheres, 0.5-10 mL of dilution, intravenous, Once in imaging  rosuvastatin, 20 mg, oral, Nightly  spironolactone, 12.5 mg, oral, Daily  [7]    [8] PRN medications: acetaminophen **OR** acetaminophen **OR** acetaminophen, acetaminophen **OR** acetaminophen **OR** acetaminophen, fluticasone, lubricating eye drops, ondansetron **OR** ondansetron, polyethylene glycol

## 2025-05-20 ENCOUNTER — APPOINTMENT (OUTPATIENT)
Dept: CARDIOLOGY | Facility: HOSPITAL | Age: OVER 89
DRG: 280 | End: 2025-05-20
Payer: MEDICARE

## 2025-05-20 LAB
ANION GAP SERPL CALC-SCNC: 14 MMOL/L (ref 10–20)
ATRIAL RATE: 197 BPM
ATRIAL RATE: 78 BPM
BUN SERPL-MCNC: 16 MG/DL (ref 6–23)
CALCIUM SERPL-MCNC: 9.5 MG/DL (ref 8.6–10.3)
CHLORIDE SERPL-SCNC: 95 MMOL/L (ref 98–107)
CO2 SERPL-SCNC: 33 MMOL/L (ref 21–32)
CREAT SERPL-MCNC: 0.99 MG/DL (ref 0.5–1.05)
EGFRCR SERPLBLD CKD-EPI 2021: 53 ML/MIN/1.73M*2
GLUCOSE SERPL-MCNC: 202 MG/DL (ref 74–99)
POTASSIUM SERPL-SCNC: 3.3 MMOL/L (ref 3.5–5.3)
Q ONSET: 200 MS
Q ONSET: 201 MS
QRS COUNT: 14 BEATS
QRS COUNT: 14 BEATS
QRS DURATION: 124 MS
QRS DURATION: 126 MS
QT INTERVAL: 454 MS
QT INTERVAL: 462 MS
QTC CALCULATION(BAZETT): 523 MS
QTC CALCULATION(BAZETT): 536 MS
QTC FREDERICIA: 500 MS
QTC FREDERICIA: 510 MS
R AXIS: 266 DEGREES
R AXIS: 270 DEGREES
SODIUM SERPL-SCNC: 139 MMOL/L (ref 136–145)
T AXIS: 108 DEGREES
T AXIS: 122 DEGREES
T OFFSET: 427 MS
T OFFSET: 432 MS
VENTRICULAR RATE: 80 BPM
VENTRICULAR RATE: 81 BPM

## 2025-05-20 PROCEDURE — 93005 ELECTROCARDIOGRAM TRACING: CPT

## 2025-05-20 PROCEDURE — 36415 COLL VENOUS BLD VENIPUNCTURE: CPT | Performed by: REGISTERED NURSE

## 2025-05-20 PROCEDURE — 2500000002 HC RX 250 W HCPCS SELF ADMINISTERED DRUGS (ALT 637 FOR MEDICARE OP, ALT 636 FOR OP/ED): Performed by: NURSE PRACTITIONER

## 2025-05-20 PROCEDURE — 2500000001 HC RX 250 WO HCPCS SELF ADMINISTERED DRUGS (ALT 637 FOR MEDICARE OP): Performed by: HOSPITALIST

## 2025-05-20 PROCEDURE — 99232 SBSQ HOSP IP/OBS MODERATE 35: CPT | Performed by: REGISTERED NURSE

## 2025-05-20 PROCEDURE — 2500000002 HC RX 250 W HCPCS SELF ADMINISTERED DRUGS (ALT 637 FOR MEDICARE OP, ALT 636 FOR OP/ED): Performed by: HOSPITALIST

## 2025-05-20 PROCEDURE — 97535 SELF CARE MNGMENT TRAINING: CPT | Mod: GO,CO

## 2025-05-20 PROCEDURE — 2500000004 HC RX 250 GENERAL PHARMACY W/ HCPCS (ALT 636 FOR OP/ED): Performed by: HOSPITALIST

## 2025-05-20 PROCEDURE — 2500000001 HC RX 250 WO HCPCS SELF ADMINISTERED DRUGS (ALT 637 FOR MEDICARE OP): Performed by: INTERNAL MEDICINE

## 2025-05-20 PROCEDURE — 1200000002 HC GENERAL ROOM WITH TELEMETRY DAILY

## 2025-05-20 PROCEDURE — 80048 BASIC METABOLIC PNL TOTAL CA: CPT | Performed by: REGISTERED NURSE

## 2025-05-20 PROCEDURE — 2500000004 HC RX 250 GENERAL PHARMACY W/ HCPCS (ALT 636 FOR OP/ED): Performed by: INTERNAL MEDICINE

## 2025-05-20 PROCEDURE — 97116 GAIT TRAINING THERAPY: CPT | Mod: GP,CQ

## 2025-05-20 PROCEDURE — 83036 HEMOGLOBIN GLYCOSYLATED A1C: CPT | Mod: ELYLAB | Performed by: REGISTERED NURSE

## 2025-05-20 PROCEDURE — 2500000001 HC RX 250 WO HCPCS SELF ADMINISTERED DRUGS (ALT 637 FOR MEDICARE OP): Performed by: REGISTERED NURSE

## 2025-05-20 PROCEDURE — 2500000004 HC RX 250 GENERAL PHARMACY W/ HCPCS (ALT 636 FOR OP/ED): Mod: JZ | Performed by: NURSE PRACTITIONER

## 2025-05-20 RX ORDER — DOCUSATE SODIUM 100 MG/1
100 CAPSULE, LIQUID FILLED ORAL 2 TIMES DAILY
Status: DISCONTINUED | OUTPATIENT
Start: 2025-05-20 | End: 2025-05-21 | Stop reason: HOSPADM

## 2025-05-20 RX ORDER — POTASSIUM CHLORIDE 20 MEQ/1
40 TABLET, EXTENDED RELEASE ORAL 2 TIMES DAILY
Status: COMPLETED | OUTPATIENT
Start: 2025-05-20 | End: 2025-05-20

## 2025-05-20 RX ADMIN — CETIRIZINE HYDROCHLORIDE 10 MG: 10 TABLET ORAL at 08:53

## 2025-05-20 RX ADMIN — GABAPENTIN 300 MG: 300 CAPSULE ORAL at 22:06

## 2025-05-20 RX ADMIN — FUROSEMIDE 40 MG: 10 INJECTION, SOLUTION INTRAMUSCULAR; INTRAVENOUS at 06:41

## 2025-05-20 RX ADMIN — DOCUSATE SODIUM 100 MG: 100 CAPSULE, LIQUID FILLED ORAL at 22:06

## 2025-05-20 RX ADMIN — MAGNESIUM GLUCONATE 500 MG ORAL TABLET 1 TABLET: 500 TABLET ORAL at 08:53

## 2025-05-20 RX ADMIN — POLYETHYLENE GLYCOL 3350 17 G: 17 POWDER, FOR SOLUTION ORAL at 06:41

## 2025-05-20 RX ADMIN — FUROSEMIDE 40 MG: 10 INJECTION, SOLUTION INTRAMUSCULAR; INTRAVENOUS at 18:52

## 2025-05-20 RX ADMIN — ASPIRIN 81 MG: 81 TABLET, COATED ORAL at 08:54

## 2025-05-20 RX ADMIN — LEVOTHYROXINE SODIUM 88 MCG: 0.09 TABLET ORAL at 06:41

## 2025-05-20 RX ADMIN — ENOXAPARIN SODIUM 30 MG: 100 INJECTION SUBCUTANEOUS at 15:10

## 2025-05-20 RX ADMIN — POTASSIUM CHLORIDE 40 MEQ: 1500 TABLET, EXTENDED RELEASE ORAL at 22:06

## 2025-05-20 RX ADMIN — ROSUVASTATIN CALCIUM 20 MG: 20 TABLET, FILM COATED ORAL at 22:06

## 2025-05-20 RX ADMIN — BISOPROLOL FUMARATE 10 MG: 5 TABLET ORAL at 08:54

## 2025-05-20 RX ADMIN — DULOXETINE 30 MG: 30 CAPSULE, DELAYED RELEASE ORAL at 08:54

## 2025-05-20 RX ADMIN — POTASSIUM CHLORIDE 40 MEQ: 1500 TABLET, EXTENDED RELEASE ORAL at 14:41

## 2025-05-20 RX ADMIN — SPIRONOLACTONE 12.5 MG: 25 TABLET ORAL at 08:54

## 2025-05-20 RX ADMIN — LOSARTAN POTASSIUM 75 MG: 50 TABLET, FILM COATED ORAL at 08:53

## 2025-05-20 SDOH — SOCIAL STABILITY: SOCIAL INSECURITY: DOES ANYONE TRY TO KEEP YOU FROM HAVING/CONTACTING OTHER FRIENDS OR DOING THINGS OUTSIDE YOUR HOME?: NO

## 2025-05-20 SDOH — SOCIAL STABILITY: SOCIAL INSECURITY: HAS ANYONE EVER THREATENED TO HURT YOUR FAMILY OR YOUR PETS?: NO

## 2025-05-20 SDOH — SOCIAL STABILITY: SOCIAL INSECURITY: DO YOU FEEL ANYONE HAS EXPLOITED OR TAKEN ADVANTAGE OF YOU FINANCIALLY OR OF YOUR PERSONAL PROPERTY?: NO

## 2025-05-20 SDOH — SOCIAL STABILITY: SOCIAL INSECURITY: HAVE YOU HAD ANY THOUGHTS OF HARMING ANYONE ELSE?: NO

## 2025-05-20 SDOH — SOCIAL STABILITY: SOCIAL INSECURITY: ARE THERE ANY APPARENT SIGNS OF INJURIES/BEHAVIORS THAT COULD BE RELATED TO ABUSE/NEGLECT?: NO

## 2025-05-20 SDOH — SOCIAL STABILITY: SOCIAL INSECURITY: ABUSE: ADULT

## 2025-05-20 SDOH — SOCIAL STABILITY: SOCIAL INSECURITY: WERE YOU ABLE TO COMPLETE ALL THE BEHAVIORAL HEALTH SCREENINGS?: YES

## 2025-05-20 SDOH — SOCIAL STABILITY: SOCIAL INSECURITY: DO YOU FEEL UNSAFE GOING BACK TO THE PLACE WHERE YOU ARE LIVING?: NO

## 2025-05-20 SDOH — SOCIAL STABILITY: SOCIAL INSECURITY: ARE YOU OR HAVE YOU BEEN THREATENED OR ABUSED PHYSICALLY, EMOTIONALLY, OR SEXUALLY BY ANYONE?: NO

## 2025-05-20 SDOH — SOCIAL STABILITY: SOCIAL INSECURITY: HAVE YOU HAD THOUGHTS OF HARMING ANYONE ELSE?: NO

## 2025-05-20 ASSESSMENT — COGNITIVE AND FUNCTIONAL STATUS - GENERAL
STANDING UP FROM CHAIR USING ARMS: A LITTLE
MOBILITY SCORE: 19
WALKING IN HOSPITAL ROOM: A LITTLE
TOILETING: A LITTLE
MOBILITY SCORE: 22
WALKING IN HOSPITAL ROOM: A LITTLE
PERSONAL GROOMING: A LITTLE
HELP NEEDED FOR BATHING: A LITTLE
CLIMB 3 TO 5 STEPS WITH RAILING: A LITTLE
STANDING UP FROM CHAIR USING ARMS: A LITTLE
DRESSING REGULAR LOWER BODY CLOTHING: A LOT
EATING MEALS: A LITTLE
TOILETING: A LITTLE
MOBILITY SCORE: 19
CLIMB 3 TO 5 STEPS WITH RAILING: A LOT
DRESSING REGULAR UPPER BODY CLOTHING: A LITTLE
DAILY ACTIVITIY SCORE: 21
WALKING IN HOSPITAL ROOM: A LITTLE
HELP NEEDED FOR BATHING: A LITTLE
DAILY ACTIVITIY SCORE: 17
PATIENT BASELINE BEDBOUND: NO
CLIMB 3 TO 5 STEPS WITH RAILING: A LOT
DRESSING REGULAR LOWER BODY CLOTHING: A LITTLE
MOVING TO AND FROM BED TO CHAIR: A LITTLE
MOVING TO AND FROM BED TO CHAIR: A LITTLE

## 2025-05-20 ASSESSMENT — ACTIVITIES OF DAILY LIVING (ADL)
FEEDING YOURSELF: INDEPENDENT
DRESSING YOURSELF: INDEPENDENT
HEARING - LEFT EAR: FUNCTIONAL
FEEDING YOURSELF: INDEPENDENT
TOILETING: INDEPENDENT
HOME_MANAGEMENT_TIME_ENTRY: 18
JUDGMENT_ADEQUATE_SAFELY_COMPLETE_DAILY_ACTIVITIES: YES
HEARING - LEFT EAR: FUNCTIONAL
BATHING: INDEPENDENT
ADEQUATE_TO_COMPLETE_ADL: YES
DRESSING YOURSELF: INDEPENDENT
HEARING - RIGHT EAR: FUNCTIONAL
ASSISTIVE_DEVICE: EYEGLASSES;WALKER
HEARING - RIGHT EAR: FUNCTIONAL
JUDGMENT_ADEQUATE_SAFELY_COMPLETE_DAILY_ACTIVITIES: YES
TOILETING: INDEPENDENT
GROOMING: INDEPENDENT
WALKS IN HOME: INDEPENDENT
GROOMING: INDEPENDENT
ADEQUATE_TO_COMPLETE_ADL: YES
WALKS IN HOME: INDEPENDENT
PATIENT'S MEMORY ADEQUATE TO SAFELY COMPLETE DAILY ACTIVITIES?: YES
ASSISTIVE_DEVICE: WALKER;EYEGLASSES

## 2025-05-20 ASSESSMENT — PAIN SCALES - GENERAL
PAINLEVEL_OUTOF10: 0 - NO PAIN

## 2025-05-20 ASSESSMENT — PAIN - FUNCTIONAL ASSESSMENT
PAIN_FUNCTIONAL_ASSESSMENT: 0-10

## 2025-05-20 ASSESSMENT — PATIENT HEALTH QUESTIONNAIRE - PHQ9
1. LITTLE INTEREST OR PLEASURE IN DOING THINGS: NOT AT ALL
SUM OF ALL RESPONSES TO PHQ9 QUESTIONS 1 & 2: 0
2. FEELING DOWN, DEPRESSED OR HOPELESS: NOT AT ALL

## 2025-05-20 ASSESSMENT — LIFESTYLE VARIABLES
SKIP TO QUESTIONS 9-10: 1
AUDIT-C TOTAL SCORE: 0
AUDIT-C TOTAL SCORE: 0
HOW OFTEN DO YOU HAVE A DRINK CONTAINING ALCOHOL: NEVER
HOW MANY STANDARD DRINKS CONTAINING ALCOHOL DO YOU HAVE ON A TYPICAL DAY: PATIENT DOES NOT DRINK
HOW OFTEN DO YOU HAVE 6 OR MORE DRINKS ON ONE OCCASION: NEVER

## 2025-05-20 NOTE — PROGRESS NOTES
Physical Therapy    Physical Therapy Treatment    Patient Name: Meryl Hester  MRN: [id number]  Today's Date: 5/20/2025  Time Calculation  Start Time: 1044  Stop Time: 1058  Time Calculation (min): 14 min     1117/1117-A    Assessment/Plan   PT Assessment  PT Assessment Results: Decreased strength, Decreased endurance  Rehab Prognosis: Good  Barriers to Discharge Home: No anticipated barriers  Evaluation/Treatment Tolerance: Patient tolerated treatment well  Medical Staff Made Aware: Yes  End of Session Communication: Bedside nurse  Assessment Comment: pt would benefit from continued therapy to improve functional mobility and endurance  End of Session Patient Position: Alarm off, not on at start of session     Treatment/Interventions: Gait training, Transfer training, Endurance training, Therapeutic activity  PT Plan: Ongoing PT  PT Frequency: 3 times per week  PT Discharge Recommendations: No PT needed after discharge    PT Recommended Transfer Status: Contact guard    General Visit Information:   PT  Visit  PT Received On: 05/20/25  General  Reason for Referral: impaired mobility  Past Medical History Relevant to Rehab: HTN, HLD, MI, CHF, CAD,  AFIb, CAD, hypothryoidism ICM, Watchman,, PPM 4/25  Family/Caregiver Present: No  Prior to Session Communication: Bedside nurse (cleared to participate, nursing stating pt has been ambulating in room with assistance)  Patient Position Received: Bed, 2 rail up, Alarm off, not on at start of session  General Comment: agreeable to particpate, states that she [facility] , reports she goes twice a week and mainly does water exercies but is currently not allowed due to her recent pacemaker (pt states she plans to go home upon discharge and hopes she will be discharged today)      Subjective     Precautions:  Precautions  Medical Precautions: Fall precautions  Post-Surgical Precautions:  (pacemaker precautions)  Precautions Comment: pt able to  recall pacemaker precautions    Vital Signs:  Vital Signs  SpO2: 93 % (increasing to 97% during ambulation)  Objective     Pain:  Pain Assessment  Pain Assessment: 0-10  0-10 (Numeric) Pain Score: 0 - No pain (denies pain)    Cognition:  Cognition  Overall Cognitive Status: Within Functional Limits  Orientation Level: Oriented X4    Activity Tolerance:  Activity Tolerance  Endurance: Endurance does not limit participation in activity    Treatments:     Bed Mobility  Bed Mobility: Yes  Bed Mobility 1  Bed Mobility Comments 1: transfers supine <--> sit independently  Ambulation/Gait Training  Ambulation/Gait Training Performed: Yes  Ambulation/Gait Training 1  Surface 1: Level tile  Comments/Distance (ft) 1: ambulating  50ft x2 with rollator and Mod I , short standing rest break after 50ft , no c/o fatigue or SOB  Transfers  Transfer: Yes  Transfer 1  Technique 1: Sit to stand, Stand to sit  Trials/Comments 1: transfers sit <--> stand with rollator and S, with vc for hand placement and to maintain pacemaker precautions    Pt able to recall pacemaker precautions , states she tries to consistently maintain them and wears her arm immobilzer to bed  Stairs  Stairs: No          Outcome Measures:     [facility] Basic Mobility  Turning from your back to your side while in a flat bed without using bedrails: None  Moving from lying on your back to sitting on the side of a flat bed without using bedrails: None  Moving to and from bed to chair (including a wheelchair): None  Standing up from a chair using your arms (e.g. wheelchair or bedside chair): None  To walk in hospital room: A little  Climbing 3-5 steps with railing: A little  Basic Mobility - Total Score: 22         EDUCATION:    Individual(s) Educated: Patient  Education Provided: Fall Risk, Home Exercise Program, Home Safety  Patient Response to Education: Patient/Caregiver Verbalized Understanding of Information  Education Comment: reviewed pacemaker  precautions    Encounter Problems       Encounter Problems (Active)       PT Problem       Transfers sit <> stand with rollator modified independent  (Progressing)       Start:  05/19/25    Expected End:  06/02/25            Patient ambulated with rollator 100' modified independent with SpO2> 92% (Progressing)       Start:  05/19/25    Expected End:  06/02/25               Pain - Adult

## 2025-05-20 NOTE — CARE PLAN
The patient's goals for the shift include breathe better    The clinical goals for the shift include Incentive spirometry, respiratory hygeine, safety falls prevention      Problem: Fall/Injury  Goal: Not fall by end of shift  5/20/2025 1232 by Rosie Garrison RN  Outcome: Progressing  5/20/2025 1232 by Rosie Garrison RN  Outcome: Progressing  Goal: Be free from injury by end of the shift  5/20/2025 1232 by Rosie Garrison RN  Outcome: Progressing  5/20/2025 1232 by Rosie Garrison RN  Outcome: Progressing  Goal: Verbalize understanding of personal risk factors for fall in the hospital  5/20/2025 1232 by Rosie Garrison RN  Outcome: Progressing  5/20/2025 1232 by Rosie Garrison RN  Outcome: Progressing  Goal: Verbalize understanding of risk factor reduction measures to prevent injury from fall in the home  5/20/2025 1232 by Rosie Garrison RN  Outcome: Progressing  5/20/2025 1232 by Rosie Garrison RN  Outcome: Progressing  Goal: Use assistive devices by end of the shift  5/20/2025 1232 by Rosie Garrison RN  Outcome: Progressing  5/20/2025 1232 by Rosie Garrison RN  Outcome: Progressing  Goal: Pace activities to prevent fatigue by end of the shift  5/20/2025 1232 by Rosie Garrison RN  Outcome: Progressing  5/20/2025 1232 by Rosie Garrison RN  Outcome: Progressing     Problem: Skin  Goal: Decreased wound size/increased tissue granulation at next dressing change  5/20/2025 1232 by Rosie Garrison RN  Outcome: Progressing  5/20/2025 1232 by Rosie Garrison RN  Outcome: Progressing  Goal: Participates in plan/prevention/treatment measures  5/20/2025 1232 by Rosie Garrison RN  Outcome: Progressing  5/20/2025 1232 by Rosie Garrison RN  Outcome: Progressing  Goal: Prevent/manage excess moisture  5/20/2025 1232 by Rosie Garrison RN  Outcome: Progressing  5/20/2025 1232 by Rosie Garrison RN  Outcome: Progressing  Goal: Prevent/minimize sheer/friction injuries  5/20/2025 1232 by Rosie Danczak, RN  Outcome: Progressing  5/20/2025 1232 by Rosie Garrison,  RN  Outcome: Progressing  Goal: Promote/optimize nutrition  5/20/2025 1232 by Rosie Garrison RN  Outcome: Progressing  5/20/2025 1232 by Rosie Garrison RN  Outcome: Progressing  Goal: Promote skin healing  5/20/2025 1232 by Rosie Garrison RN  Outcome: Progressing  5/20/2025 1232 by Rosie Garrison RN  Outcome: Progressing     Problem: Pain - Adult  Goal: Verbalizes/displays adequate comfort level or baseline comfort level  5/20/2025 1232 by Rosie Garrison RN  Outcome: Progressing  5/20/2025 1232 by Rosie Garrison RN  Outcome: Progressing     Problem: Safety - Adult  Goal: Free from fall injury  5/20/2025 1232 by Rosie Garrison RN  Outcome: Progressing  5/20/2025 1232 by Rosie Garrison RN  Outcome: Progressing     Problem: Discharge Planning  Goal: Discharge to home or other facility with appropriate resources  5/20/2025 1232 by Rosie Garrison RN  Outcome: Progressing  5/20/2025 1232 by Rosie Garrison RN  Outcome: Progressing     Problem: Chronic Conditions and Co-morbidities  Goal: Patient's chronic conditions and co-morbidity symptoms are monitored and maintained or improved  5/20/2025 1232 by Rosie Garrison RN  Outcome: Progressing  5/20/2025 1232 by Rosie Garrison RN  Outcome: Progressing     Problem: Nutrition  Goal: Nutrient intake appropriate for maintaining nutritional needs  5/20/2025 1232 by Rosie Garrison RN  Outcome: Progressing  5/20/2025 1232 by Rosie Garrison RN  Outcome: Progressing     Problem: Heart Failure  Goal: Improved gas exchange this shift  5/20/2025 1232 by Rosie Garrison RN  Outcome: Progressing  5/20/2025 1232 by Rosie Garrison RN  Outcome: Progressing  Goal: Improved urinary output this shift  5/20/2025 1232 by Rosie Garrison RN  Outcome: Progressing  5/20/2025 1232 by Rosie Garrison RN  Outcome: Progressing  Goal: Reduction in peripheral edema within 24 hours  5/20/2025 1232 by Rosie Garrison RN  Outcome: Progressing  5/20/2025 1232 by Rosie Garrison RN  Outcome: Progressing  Goal: Report improvement of  dyspnea/breathlessness this shift  5/20/2025 1232 by Rosie Garrison RN  Outcome: Progressing  5/20/2025 1232 by Rosie Garrison RN  Outcome: Progressing  Goal: Weight from fluid excess reduced over 2-3 days, then stabilize  5/20/2025 1232 by Rosie Garrison RN  Outcome: Progressing  5/20/2025 1232 by Rosie Garrison RN  Outcome: Progressing  Goal: Increase self care and/or family involvement in 24 hours  5/20/2025 1232 by Rosie Garrison RN  Outcome: Progressing  5/20/2025 1232 by Rosie Garrison RN  Outcome: Progressing

## 2025-05-20 NOTE — PROGRESS NOTES
Meryl Hester is a 89 y.o. female on day 2 of admission presenting with Acute on chronic congestive heart failure, unspecified heart failure type.      Subjective   Patient examined and seen. Alert and oriented x3, resting comfortably.  Patient denies chest pain, shortness of breath, palpitations, abdominal pain, fever or chills.  Patient is agreeable to go home when cleared.  Reports support system is intact.    She states she did not sleep much overnight. Voices no concerns  Per RN patient had tightness around her chest overnight and ECG was completed. But it resolved. Cardiology to continue to monitor overnight. Voices no chest pain or shortness of breath at this time with rest.          Objective     Last Recorded Vitals  BP (!) 140/95 (BP Location: Right arm, Patient Position: Lying)   Pulse 79   Temp 36.1 °C (97 °F) (Temporal)   Resp 16   Wt 63.9 kg (140 lb 14 oz)   SpO2 93% Comment: increasing to 97% during ambulation  Intake/Output last 3 Shifts:    Intake/Output Summary (Last 24 hours) at 5/20/2025 1540  Last data filed at 5/19/2025 2200  Gross per 24 hour   Intake --   Output 300 ml   Net -300 ml       Admission Weight  Weight: 60.8 kg (134 lb) (05/18/25 0914)    Daily Weight  05/20/25 : 63.9 kg (140 lb 14 oz)    Image Results      Physical Exam  Constitutional: Well developed, awake/alert/oriented x3, , cooperative  Respiratory/Thorax: Patent airways,  normal breath sounds crackles in left lower lobe  Cardiovascular: Regular, rate and rhythm, murmur 2+ equal pulses of the extremities, normal S 1and S 2 Vpaced   Musculoskeletal: ROM intact, no joint swelling,   Extremities: normal extremities,  no contusions or wounds seen   Skin: warm, dry, intact  Neurological: alert/oriented x 3, speech clear,   Psychiatric: appropriate mood and behavior    Relevant Results  Scheduled medications  Scheduled Medications[1]  Continuous medications  Continuous Medications[2]  PRN medications  PRN  Medications[3]    Results for orders placed or performed during the hospital encounter of 05/18/25 (from the past 24 hours)   ECG 12 lead   Result Value Ref Range    Ventricular Rate 80 BPM    Atrial Rate 197 BPM    QRS Duration 126 ms    QT Interval 454 ms    QTC Calculation(Bazett) 523 ms    R Axis 266 degrees    T Axis 108 degrees    QRS Count 14 beats    Q Onset 200 ms    T Offset 427 ms    QTC Fredericia 500 ms   Basic Metabolic Panel   Result Value Ref Range    Glucose 202 (H) 74 - 99 mg/dL    Sodium 139 136 - 145 mmol/L    Potassium 3.3 (L) 3.5 - 5.3 mmol/L    Chloride 95 (L) 98 - 107 mmol/L    Bicarbonate 33 (H) 21 - 32 mmol/L    Anion Gap 14 10 - 20 mmol/L    Urea Nitrogen 16 6 - 23 mg/dL    Creatinine 0.99 0.50 - 1.05 mg/dL    eGFR 53 (L) >60 mL/min/1.73m*2    Calcium 9.5 8.6 - 10.3 mg/dL       Assessment & Plan    89 year old female with history of systolic CHF, chronic afib admitted with dyspnea secondary to acute systolic CHF exacerbation   # Acute on Chronic CHF systolic HF   # Persistent Afib s/p Watchman Device  # HTN  Admit to Hospital Medicine  EF 20-25%  Consult EP - recent device placement  Consult Cardiology - continue IV lasix, then will transition to PO   ECG prn  Daily weight  Strict intake and output  Continue home medications as appropriate   Telemetry      # Hypothyroidism  Continue home medications     # Hypokalemia  Replace      DNR DNI  Cardiac Diet  Disposition: Home 24-48 hours pending cardiology recommendations      Time spent  36 minutes obtaining labs, imaging, recommendations, interview, assessment, examination, medication review/ordering, and EMR review.     Plan of care was discussed extensively with patient. Patient verbalized understanding through teach back method. All questions and concerns addressed upon examination.      Of note, this documentation is completed using the Dragon Dictation system (voice recognition software). There may be spelling and/or grammatical errors  that were not corrected prior to final submission.              Elda Jamil, APRN-CNP           [1] aspirin, 81 mg, oral, Daily  bisoprolol, 10 mg, oral, Daily  cetirizine, 10 mg, oral, Daily  DULoxetine, 30 mg, oral, Daily  enoxaparin, 30 mg, subcutaneous, q24h  furosemide, 40 mg, intravenous, q12h  gabapentin, 300 mg, oral, BID  levothyroxine, 88 mcg, oral, Daily  losartan, 75 mg, oral, Daily  magnesium oxide, 400 mg of magnesium oxide, oral, Daily  perflutren lipid microspheres, 0.5-10 mL of dilution, intravenous, Once in imaging  potassium chloride CR, 40 mEq, oral, BID  rosuvastatin, 20 mg, oral, Nightly  spironolactone, 12.5 mg, oral, Daily  [2]    [3] PRN medications: acetaminophen **OR** acetaminophen **OR** acetaminophen, acetaminophen **OR** acetaminophen **OR** acetaminophen, fluticasone, lubricating eye drops, ondansetron **OR** ondansetron, polyethylene glycol

## 2025-05-20 NOTE — PROGRESS NOTES
Occupational Therapy    OT Treatment    Patient Name: Meryl Hester  MRN: [id number]  Department: [facility]  Room: [address] [place] [address]  Today's Date: 5/20/2025  Time Calculation  Start Time: 1124  Stop Time: 1142  Time Calculation (min): 18 min      Assessment:  OT Assessment: Focused on pt Ind w/ Adl's, transfers and safety, Pt demo improvement w/ task.  Prognosis: Good  End of Session Communication: Bedside nurse  End of Session Patient Position: Alarm on (pt seated EOB w/ daughter. Call light and all needs within reach)  Prognosis: Good  Plan:  Treatment Interventions: ADL retraining, Functional transfer training, Endurance training, Patient/family training  OT Frequency: 2 times per week  OT Discharge Recommendations: No OT needed after discharge  OT Recommended Transfer Status: Stand by assist  OT - OK to Discharge: Yes  Treatment Interventions: ADL retraining, Functional transfer training, Endurance training, Patient/family training    Subjective   OT Visit Info:  OT Received On: 05/20/25  General Visit Info:  General  Reason for Referral: impaired mobility  Referred By: PT/OT 5/18/25 Nelly  Past Medical History Relevant to Rehab: HTN, HLD, MI, CHF, CAD,  AFIb, CAD, hypothryoidism ICM, Watchman,, PPM 4/25  Prior to Session Communication: Bedside nurse  Patient Position Received:  (seated EOB w/ daughter in room)  General Comment:  (pt agreeable to therapy, pt state she has been moving around her room IND.)  Precautions:  Medical Precautions: Fall precautions  Post-Surgical Precautions:  (PPM- 4/25)  Precautions Comment: pt able to recall and follow pacemaker preacution    Pain:  Pain Assessment  Pain Assessment: 0-10  0-10 (Numeric) Pain Score: 0 - No pain    Objective    Cognition:  Cognition  Overall Cognitive Status: Within Functional Limits  Orientation Level: Oriented X4    Activities of Daily Living:    Grooming  Grooming Comments: pt complete hand hygiene in stance at sinkside w/ SBA.    LE Dressing  LE Dressing:  (pt  change brief seated at toilet w/ SBA. Pt able to pull brief above hips, demo fair dyn standing balance w/o UE support)    Toileting  Toileting Comments:  (pt SBA w/ clothing management/ anterior alexia care in stance, demo fair dyn stand balance w/ L UE support to grab bar.)    Bed Mobility/Transfers:    Transfer 1  Technique 1: Sit to stand, Stand to sit  Trials/Comments 1:  (pt SBA in STS at EOB x2 trials/ toilet x2 trial, demo safe hand placement. denies dizziness.)    Functional Mobility:  Functional Mobility  Functional Mobility Performed:  (pt completed short functional distances in room from EOB> toilet> sinkside> EOB w/ rollator, close supervision, pt demo safe manueverbility w/ rollator, Able to lock and unlock device w/o cues)    Outcome Measures:[facility] Daily Activity  Putting on and taking off regular lower body clothing: A little  Bathing (including washing, rinsing, drying): A little  Putting on and taking off regular upper body clothing: None  Toileting, which includes using toilet, bedpan or urinal: A little  Taking care of personal grooming such as brushing teeth: None  Eating Meals: None  Daily Activity - Total Score: 21    Education Documentation  ADL Training, taught by MARK Patel at 5/20/2025  1:12 PM.  Learner: Patient  Readiness: Acceptance  Method: Explanation, Demonstration, Teach-back  Response: Verbalizes Understanding, Demonstrated Understanding    IP EDUCATION:  Education  Individual(s) Educated: Patient  Education Provided: Joint protection and energy conservation  Patient Response to Education: Patient/Caregiver Verbalized Understanding of Information  Education Comment: Discuss w/ pt on ECS, pt verbalized understanding and state she already follows them, daughter agrees, Pt and daughter state that pt is never alone and has plenty of help at home.     Goals:  Encounter Problems       Encounter Problems (Active)       OT Goals       pt will dress LB with modified indep  (Progressing)       Start:  05/19/25    Expected End:  06/02/25            Pt will transfer to bed, chair ,toilet with modified indep (Progressing)       Start:  05/19/25    Expected End:  06/02/25            Pt will verbalize 3 energy conservation strategies to increase indep with ADLS (Progressing)       Start:  05/19/25    Expected End:  06/02/25            Pt will tolerate 10 minutes of activity with one rest break, spo2 >90% (Progressing)       Start:  05/19/25    Expected End:  06/02/25

## 2025-05-20 NOTE — CARE PLAN
The patient's goals for the shift include breathe better    The clinical goals for the shift include Safety to prevent falls and injury advance mobility wean O2 SPO2 >92% MOnitor daily weights and I/O    Over the shift, the patient did not make progress toward the following goals. Barriers to progression include. Recommendations to address these barriers include   Problem: Fall/Injury  Goal: Not fall by end of shift  Outcome: Progressing  Goal: Be free from injury by end of the shift  Outcome: Progressing  Goal: Verbalize understanding of personal risk factors for fall in the hospital  Outcome: Progressing  Goal: Verbalize understanding of risk factor reduction measures to prevent injury from fall in the home  Outcome: Progressing  Goal: Use assistive devices by end of the shift  Outcome: Progressing  Goal: Pace activities to prevent fatigue by end of the shift  Outcome: Progressing     Problem: Skin  Goal: Decreased wound size/increased tissue granulation at next dressing change  Outcome: Progressing  Goal: Participates in plan/prevention/treatment measures  Outcome: Progressing  Goal: Prevent/manage excess moisture  Outcome: Progressing  Goal: Prevent/minimize sheer/friction injuries  Outcome: Progressing  Goal: Promote/optimize nutrition  Outcome: Progressing  Goal: Promote skin healing  Outcome: Progressing   .

## 2025-05-20 NOTE — PROGRESS NOTES
[facility] Heart Progress Note           Rounding SYLVIA/Cardiologist:  Sam Vang, RICHY-ROSALINO, Dr. Phillip Yu  Primary Cardiologist: Dr. Wilmer Corona     Date:  5/20/2025  Patient:  Meryl Hester  YOB: 1931  MRN:  [id number]   Admit Date:  5/18/2025      SUBJECTIVE:    5/20/25  Patient is awake and alert and oriented x 3.  She is resting quietly I did discuss with her at length that I spoke with the PA and we know that she is in chronic A-fib.  She still has shortness of breath with exertion and bilateral scattered Rales  EKGs paced 80  Telemetry is paced  Pacemaker interrogation done on underlying rhythm is chronic A-fib     CONCLUSIONS:   1. The left ventricular systolic function is severely decreased, with a visually estimated ejection fraction of 20-25%.   2. There is global hypokinesis of the left ventricle with minor regional variations.   3. Left ventricular diastolic filling was indeterminate.   4. Left ventricular cavity size is mildly dilated.   5. There is severely reduced right ventricular systolic function.   6. Mildly enlarged right ventricle.   7. The left atrial size is moderate to severely dilated.   8. S/p Watchman device that appears well-seated with no thrombus or peridevice vanessa.   9. The right atrial size is moderately dilated.  10. The mitral valve is moderately thickened.  11. Moderate mitral valve regurgitation.  12. Moderate tricuspid regurgitation.  13. Mild aortic valve regurgitation.  14. No left atrial thrombus.  15. Small patent foramen ovale.  16. There is plaque visualized in the descending aorta.     QUANTITATIVE DATA SUMMARY:  VITALS:     Vitals:    05/20/25 0358 05/20/25 0600 05/20/25 0742 05/20/25 1044   BP: 116/74  (!) 140/95    BP Location: Right arm  Right arm    Patient Position: Lying  Lying    Pulse: 79  79    Resp: 16  16    Temp: 36.4 °C (97.5 °F)  36.1 °C (97 °F)    TempSrc: Temporal  Temporal    SpO2: 93%  92% 93%   Weight:  63.9 kg (140 lb 14 oz)      Height:           Intake/Output Summary (Last 24 hours) at 5/20/2025 1316  Last data filed at 5/19/2025 2200  Gross per 24 hour   Intake --   Output 300 ml   Net -300 ml       Wt Readings from Last 4 Encounters:   05/20/25 63.9 kg (140 lb 14 oz)   04/30/25 63.8 kg (140 lb 9.6 oz)   04/17/25 63.5 kg (140 lb)   04/01/25 63.6 kg (140 lb 3.2 oz)       CURRENT HOSPITAL MEDICATIONS:   Scheduled Medications[1]  Continuous Medications[2]  Current Outpatient Medications   Medication Instructions    acetaminophen (TYLENOL) 650 mg, oral, Every 6 hours PRN    aspirin 81 mg, oral, Daily    bisoprolol (ZEBETA) 10 mg, oral, Daily    calcium carbonate-vitamin D3 500 mg-5 mcg (200 unit) tablet 1 tablet, Daily    carboxymethylcellulose (THERATears) 0.25 % ophthalmic solution 1 drop, 3 times daily PRN    cetirizine (ALLERGY RELIEF (CETIRIZINE)) 10 mg, Daily    docusate sodium (COLACE) 100 mg, 2 times daily    DULoxetine (CYMBALTA) 30 mg, Daily RT    fluticasone (Flonase) 50 mcg/actuation nasal spray 1 spray, Daily PRN    furosemide (LASIX) 20 mg, oral, Daily RT    krill oil 500 mg, 2 times daily    LACTOBACILLUS ACIDOPHILUS ORAL 1 tablet, Daily    levothyroxine (SYNTHROID, LEVOXYL) 88 mcg, Daily RT    losartan (COZAAR) 75 mg, oral, Daily    magnesium oxide 500 mg, Daily    multivitamin with minerals tablet 1 tablet, Daily    mv-min-FA-vit K-lutein-zeaxant (PreserVision AREDS 2 Plus MV) 200 mcg-15 mcg- 5 mg-1 mg capsule 1 capsule, 2 times daily    omeprazole (PRILOSEC) 40 mg, Daily RT    potassium chloride CR 20 mEq ER tablet 20 mEq, oral, Daily RT    rosuvastatin (CRESTOR) 20 mg, oral, Nightly        PHYSICAL EXAMINATION:   GENERAL:  Well developed, well nourished, in no acute distress.  CHEST:  Symmetric and nontender.  NEURO/PSYCH:  Alert and oriented times three with approppriate behavior and responses.  NECK:  Supple, no JVD, no bruit.  LUNGS: Rales  HEART: S1, S2 regular 2 out of 6 systolic murmur  EXTREMITIES:  Warm with good  color, no clubbing or cyanosis.  Trace edema  PERIPHERAL VASCULAR:  Pulses present and equally palpable; 1+ throughout.      LAB DATA:     CBC:   Results from last 7 days   Lab Units 05/19/25  0614 05/18/25  0940   WBC AUTO x10*3/uL 6.3 8.8   RBC AUTO x10*6/uL 3.56* 3.91*   HEMOGLOBIN g/dL 10.9* 11.9*   HEMATOCRIT % 34.2* 37.6   MCV fL 96 96   MCH pg 30.6 30.4   MCHC g/dL 31.9* 31.6*   RDW % 16.1* 16.0*   PLATELETS AUTO x10*3/uL 227 273     CMP:    Results from last 7 days   Lab Units 05/20/25  0929 05/19/25  0614 05/18/25  0940   SODIUM mmol/L 139 140 134*   POTASSIUM mmol/L 3.3* 3.4* 4.1   CHLORIDE mmol/L 95* 104 99   CO2 mmol/L 33* 29 26   BUN mg/dL 16 15 16   CREATININE mg/dL 0.99 0.92 0.87   GLUCOSE mg/dL 202* 86 189*   PROTEIN TOTAL g/dL  --   --  7.0   CALCIUM mg/dL 9.5 8.7 9.4   BILIRUBIN TOTAL mg/dL  --   --  1.1   ALK PHOS U/L  --   --  105   AST U/L  --   --  30   ALT U/L  --   --  26     BMP:    Results from last 7 days   Lab Units 05/20/25  0929 05/19/25  0614 05/18/25  0940   SODIUM mmol/L 139 140 134*   POTASSIUM mmol/L 3.3* 3.4* 4.1   CHLORIDE mmol/L 95* 104 99   CO2 mmol/L 33* 29 26   BUN mg/dL 16 15 16   CREATININE mg/dL 0.99 0.92 0.87   CALCIUM mg/dL 9.5 8.7 9.4   GLUCOSE mg/dL 202* 86 189*     Magnesium:  Results from last 7 days   Lab Units 05/19/25  0614 05/18/25  0940   MAGNESIUM mg/dL 2.11 2.23     Troponin:    Results from last 7 days   Lab Units 05/18/25  1112 05/18/25  0940   TROPHS ng/L 154* 158*     BNP:   Results from last 7 days   Lab Units 05/18/25  0940   BNP pg/mL 2,073*     Lipid Panel:         DIAGNOSTIC TESTING:       RADIOLOGY:     Cardiac device check - Inpatient   Final Result      CT angio chest for pulmonary embolism   Final Result   1.  No evidence for pulmonary embolus.   2.  Moderate right and small left layering pleural effusions.   3.  Cardiomegaly with multichamber enlargement.   4.  Colonic diverticulosis.   5.  Coronary artery calcifications.   Signed by Gianluca  MD Cholo      XR chest 1 view   Final Result   1.  Increased history markings bilaterally which may be chronic in   etiology.  If there is an acute component, this could represent   interstitial edema.   2.  Bilateral lower lobe opacities which represent atelectasis or   pneumonia..   Signed by Wilmer Parks MD          PROBLEM LIST   Problem List[3]    ASSESSMENT:   Hypoxia/shortness of breath  Acute on chronic diastolic congestive heart failure with last known EF to be around 20 to 25%.  Paroxysmal atrial fibrillation's with episodes of rapid ventricular response  CAD  Hypothyroidism  Status post biventricular pacemaker system implanted in April 2025  Hypertension  Moderate TR  Trace MR    PLAN:     Patient seen and examined in conjunction with RICHY Newman/CNP and agree with the evaluation as noted above.  I have personally interviewed and examined the patient.   I have personally and independently reviewed the pertinentlabs and diagnostic testing.  I have personally verified the elements of the history and physical listed above and changes, if any, are noted below.     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  She presents with increased shortness of breath of about 2 weeks duration after her recent hospitalization for A-fib with RVR in April at which time she underwent the AV savita ablation.  She says she has been doing well post discharge but symptoms have now recurred in the last few weeks.  Her device check shows 70% A-fib burden.  Admission chest x-ray is consistent with pulmonary edema and the patient has felt better with IV Lasix.  She did not endorse any chest pain with these episodes.  She also denied orthopnea proximal nocturnal dyspnea.     Agree with exam as noted above.  Cardiac exam  reveals irregularly irregular S1 and S2, with a 2/6 systolic ejection murmur in the right upper sternal border.  Chest reveals fine bibasilar crackles bilaterally.  Abdomen is soft with no hepatosplenomegaly.  Extremities shows 1+ bilateral ankle edema.     ASSESSMENT AND PLAN:  1.  Acute on chronic CHF exacerbation: Presenting as increased shortness of breath as well as volume overload which has responded to IV Lasix.  We will continue with Lasix IV for now and consider transition to torsemide on discharge for continued optimal diuresis.  Patient is not a candidate for SGLT 2 inhibitor because of recurrent UTI.  2.  Persistent A-fib status post AV savita ablation: With appropriate pacemaker function on recent device check.  3.  Hypertension: Blood pressure appears fairly well-controlled, will continue with current blood pressure medications.    5/20/25  Tele monitoring  Aspirin 81 mg daily  Cozaar 75 mg daily  Crestor 20 mg daily  Lasix 40 mg IV push every 12  Aldactone 12.5 mg daily  Patient is a poor candidate for Jardiance due to age and UTIs  Strict intake and output  Daily weights  Keep magnesium greater than 2.0  Daily EKG  Potassium between 4.0-4.5    Nikos Vang CNP  [facility]      Of note, this documentation is completed using the Dragon Dictation system (voice recognition software). There may be spelling and/or grammatical errors that were not corrected prior to final submission.    Please do not hesitate to call with questions.  Electronically signed by RICHY Linn-CNP, on 5/20/2025 at 1:16 PM       [1] aspirin, 81 mg, oral, Daily  bisoprolol, 10 mg, oral, Daily  cetirizine, 10 mg, oral, Daily  DULoxetine, 30 mg, oral, Daily  enoxaparin, 30 mg, subcutaneous, q24h  furosemide, 40 mg, intravenous, q12h  gabapentin, 300 mg, oral, BID  levothyroxine, 88 mcg, oral, Daily  losartan, 75 mg, oral, Daily  magnesium oxide, 400 mg of magnesium  oxide, oral, Daily  perflutren lipid microspheres, 0.5-10 mL of dilution, intravenous, Once in imaging  rosuvastatin, 20 mg, oral, Nightly  spironolactone, 12.5 mg, oral, Daily    [2]    [3]   Patient Active Problem List  Diagnosis    Radiculopathy of lumbar region    Joint stiffness of spine    Gait difficulty    Syncope and collapse    Syncope, unspecified syncope type    NSTEMI (non-ST elevated myocardial infarction) (Multi)    Pulmonary hypertension (Multi)    Acute systolic CHF (congestive heart failure), NYHA class 2    Cardiomyopathy, ischemic    Coronary artery disease involving native coronary artery of native heart without angina pectoris    Paroxysmal atrial fibrillation with RVR (Multi)    Hypertension    Atrial fibrillation (Multi)    Presence of Watchman left atrial appendage closure device    Atrial fibrillation, unspecified type (Multi)    Acute on chronic diastolic heart failure    Type 2 MI (myocardial infarction) (Multi)    Acquired hypothyroidism    Acute hypoxic respiratory failure    Myocardiopathy (Multi)    Shortness of breath    Atrial fibrillation with RVR (Multi)    BMI 28.0-28.9,adult    Status post biventricular pacemaker    Acute on chronic congestive heart failure, unspecified heart failure type

## 2025-05-20 NOTE — DISCHARGE INSTRUCTIONS
HEART FAILURE EDUCATION:  1. Weigh yourself daily and record on your weight log.  2. If you gain more than 2 or 3 pounds overnight, call your cardiologist.  3. Follow a low sodium diet. No more than 2000 mg in one day, or more than 650 mg per meal.  4. Limit total fluids to no more than 8 cups (or 2 liters) per day - this includes all fluids (water, coffee, juice, milk, tea, etc.)  5. Monitor your blood pressure daily and record on your weight log.  6. Call to schedule your follow-up appointments when you get home if they were not already scheduled for you.  7. Keep your follow-up appointments! Bring your weight log with you so the doctors can see your weight trend and blood pressure readings.  8. Be sure to  any new prescriptions and take them as directed. If unsure of the medications, be sure to call your cardiologist.  9. Stay as active as you can tolerate.   10. If you notice subtle change of symptoms (slight increase in swelling, slight shortness of breath, a new intolerance to laying flat, a new cough), be sure to call your cardiologist.  11. If you have any questions or concerns or you have not heard back from the cardiologist, feel free to call Tatum Frances heart failure navigator at [phone].

## 2025-05-21 VITALS
HEIGHT: 59 IN | HEART RATE: 80 BPM | SYSTOLIC BLOOD PRESSURE: 133 MMHG | TEMPERATURE: 97.2 F | RESPIRATION RATE: 17 BRPM | OXYGEN SATURATION: 89 % | WEIGHT: 140.43 LBS | BODY MASS INDEX: 28.31 KG/M2 | DIASTOLIC BLOOD PRESSURE: 81 MMHG

## 2025-05-21 LAB
EST. AVERAGE GLUCOSE BLD GHB EST-MCNC: 103 MG/DL
HBA1C MFR BLD: 5.2 % (ref ?–5.7)

## 2025-05-21 PROCEDURE — 2500000004 HC RX 250 GENERAL PHARMACY W/ HCPCS (ALT 636 FOR OP/ED): Mod: JZ | Performed by: NURSE PRACTITIONER

## 2025-05-21 PROCEDURE — 2500000001 HC RX 250 WO HCPCS SELF ADMINISTERED DRUGS (ALT 637 FOR MEDICARE OP): Performed by: HOSPITALIST

## 2025-05-21 PROCEDURE — 2500000004 HC RX 250 GENERAL PHARMACY W/ HCPCS (ALT 636 FOR OP/ED): Performed by: HOSPITALIST

## 2025-05-21 PROCEDURE — 2500000002 HC RX 250 W HCPCS SELF ADMINISTERED DRUGS (ALT 637 FOR MEDICARE OP, ALT 636 FOR OP/ED): Performed by: NURSE PRACTITIONER

## 2025-05-21 PROCEDURE — 2500000002 HC RX 250 W HCPCS SELF ADMINISTERED DRUGS (ALT 637 FOR MEDICARE OP, ALT 636 FOR OP/ED): Performed by: HOSPITALIST

## 2025-05-21 PROCEDURE — 2500000001 HC RX 250 WO HCPCS SELF ADMINISTERED DRUGS (ALT 637 FOR MEDICARE OP): Performed by: REGISTERED NURSE

## 2025-05-21 PROCEDURE — 99239 HOSP IP/OBS DSCHRG MGMT >30: CPT

## 2025-05-21 RX ORDER — SPIRONOLACTONE 25 MG/1
12.5 TABLET ORAL DAILY
Qty: 15 TABLET | Refills: 3 | Status: SHIPPED | OUTPATIENT
Start: 2025-05-22 | End: 2025-09-19

## 2025-05-21 RX ORDER — FUROSEMIDE 40 MG/1
40 TABLET ORAL DAILY
Status: DISCONTINUED | OUTPATIENT
Start: 2025-05-21 | End: 2025-05-21 | Stop reason: HOSPADM

## 2025-05-21 RX ORDER — POTASSIUM CHLORIDE 20 MEQ/1
40 TABLET, EXTENDED RELEASE ORAL ONCE
Status: COMPLETED | OUTPATIENT
Start: 2025-05-21 | End: 2025-05-21

## 2025-05-21 RX ORDER — FUROSEMIDE 40 MG/1
40 TABLET ORAL DAILY
Qty: 30 TABLET | Refills: 3 | Status: SHIPPED | OUTPATIENT
Start: 2025-05-22 | End: 2025-09-19

## 2025-05-21 RX ADMIN — LEVOTHYROXINE SODIUM 88 MCG: 0.09 TABLET ORAL at 06:47

## 2025-05-21 RX ADMIN — POTASSIUM CHLORIDE 40 MEQ: 1500 TABLET, EXTENDED RELEASE ORAL at 08:59

## 2025-05-21 RX ADMIN — MAGNESIUM GLUCONATE 500 MG ORAL TABLET 1 TABLET: 500 TABLET ORAL at 08:59

## 2025-05-21 RX ADMIN — FUROSEMIDE 40 MG: 10 INJECTION, SOLUTION INTRAMUSCULAR; INTRAVENOUS at 06:47

## 2025-05-21 RX ADMIN — SPIRONOLACTONE 12.5 MG: 25 TABLET ORAL at 08:59

## 2025-05-21 RX ADMIN — DULOXETINE 30 MG: 30 CAPSULE, DELAYED RELEASE ORAL at 08:59

## 2025-05-21 RX ADMIN — ASPIRIN 81 MG: 81 TABLET, COATED ORAL at 08:59

## 2025-05-21 RX ADMIN — DOCUSATE SODIUM 100 MG: 100 CAPSULE, LIQUID FILLED ORAL at 08:59

## 2025-05-21 RX ADMIN — CETIRIZINE HYDROCHLORIDE 10 MG: 10 TABLET ORAL at 08:59

## 2025-05-21 RX ADMIN — LOSARTAN POTASSIUM 75 MG: 50 TABLET, FILM COATED ORAL at 08:59

## 2025-05-21 RX ADMIN — BISOPROLOL FUMARATE 10 MG: 5 TABLET ORAL at 08:59

## 2025-05-21 ASSESSMENT — COGNITIVE AND FUNCTIONAL STATUS - GENERAL
DRESSING REGULAR LOWER BODY CLOTHING: A LITTLE
DRESSING REGULAR UPPER BODY CLOTHING: A LITTLE
WALKING IN HOSPITAL ROOM: A LITTLE
EATING MEALS: A LITTLE
DAILY ACTIVITIY SCORE: 18
MOVING TO AND FROM BED TO CHAIR: A LITTLE
MOBILITY SCORE: 19
STANDING UP FROM CHAIR USING ARMS: A LITTLE
HELP NEEDED FOR BATHING: A LITTLE
PERSONAL GROOMING: A LITTLE
TOILETING: A LITTLE
CLIMB 3 TO 5 STEPS WITH RAILING: A LOT

## 2025-05-21 ASSESSMENT — PAIN SCALES - GENERAL: PAINLEVEL_OUTOF10: 0 - NO PAIN

## 2025-05-21 ASSESSMENT — PAIN - FUNCTIONAL ASSESSMENT: PAIN_FUNCTIONAL_ASSESSMENT: 0-10

## 2025-05-21 NOTE — PROGRESS NOTES
[facility] Heart Progress Note           Rounding SYLVIA/Cardiologist:  Omero Joseph, APRN-CNP,   Primary Cardiologist: Dr. Wilmer Corona     Date:  5/21/2025  Patient:  Meryl Hester  YOB: 1931  MRN:  [id number]   Admit Date:  5/18/2025      SUBJECTIVE:    5/20/25  Alert and oriented.  Breathing much better.  Blood pressure and heart rate remain stable.      CONCLUSIONS:   1. The left ventricular systolic function is severely decreased, with a visually estimated ejection fraction of 20-25%.   2. There is global hypokinesis of the left ventricle with minor regional variations.   3. Left ventricular diastolic filling was indeterminate.   4. Left ventricular cavity size is mildly dilated.   5. There is severely reduced right ventricular systolic function.   6. Mildly enlarged right ventricle.   7. The left atrial size is moderate to severely dilated.   8. S/p Watchman device that appears well-seated with no thrombus or peridevice vanessa.   9. The right atrial size is moderately dilated.  10. The mitral valve is moderately thickened.  11. Moderate mitral valve regurgitation.  12. Moderate tricuspid regurgitation.  13. Mild aortic valve regurgitation.  14. No left atrial thrombus.  15. Small patent foramen ovale.  16. There is plaque visualized in the descending aorta.     QUANTITATIVE DATA SUMMARY:  VITALS:     Vitals:    05/20/25 2011 05/21/25 0452 05/21/25 0600 05/21/25 0759   BP: 134/69 140/71  133/81   BP Location: Left arm Right arm     Patient Position: Lying Lying     Pulse: 80 80  80   Resp: 18 17     Temp: 36.4 °C (97.5 °F) 36 °C (96.8 °F)  36.2 °C (97.2 °F)   TempSrc: Temporal Temporal     SpO2: 94% 96%  (!) 89%   Weight:   63.7 kg (140 lb 6.9 oz)    Height:         No intake or output data in the 24 hours ending 05/21/25 0934      Wt Readings from Last 4 Encounters:   05/21/25 63.7 kg (140 lb 6.9 oz)   04/30/25 63.8 kg (140 lb 9.6 oz)   04/17/25 63.5 kg (140 lb)   04/01/25 63.6 kg (140 lb 3.2 oz)        CURRENT HOSPITAL MEDICATIONS:   Scheduled Medications[1]  Continuous Medications[2]  Current Outpatient Medications   Medication Instructions    acetaminophen (TYLENOL) 650 mg, oral, Every 6 hours PRN    aspirin 81 mg, oral, Daily    bisoprolol (ZEBETA) 10 mg, oral, Daily    calcium carbonate-vitamin D3 500 mg-5 mcg (200 unit) tablet 1 tablet, Daily    carboxymethylcellulose (THERATears) 0.25 % ophthalmic solution 1 drop, 3 times daily PRN    cetirizine (ALLERGY RELIEF (CETIRIZINE)) 10 mg, Daily    docusate sodium (COLACE) 100 mg, 2 times daily    DULoxetine (CYMBALTA) 30 mg, Daily RT    fluticasone (Flonase) 50 mcg/actuation nasal spray 1 spray, Daily PRN    furosemide (LASIX) 20 mg, oral, Daily RT    krill oil 500 mg, 2 times daily    LACTOBACILLUS ACIDOPHILUS ORAL 1 tablet, Daily    levothyroxine (SYNTHROID, LEVOXYL) 88 mcg, Daily RT    losartan (COZAAR) 75 mg, oral, Daily    magnesium oxide 500 mg, Daily    multivitamin with minerals tablet 1 tablet, Daily    mv-min-FA-vit K-lutein-zeaxant (PreserVision AREDS 2 Plus MV) 200 mcg-15 mcg- 5 mg-1 mg capsule 1 capsule, 2 times daily    omeprazole (PRILOSEC) 40 mg, Daily RT    potassium chloride CR 20 mEq ER tablet 20 mEq, oral, Daily RT    rosuvastatin (CRESTOR) 20 mg, oral, Nightly        PHYSICAL EXAMINATION:   GENERAL:  Well developed, well nourished, in no acute distress.  CHEST:  Symmetric and nontender.  NEURO/PSYCH:  Alert and oriented times three with approppriate behavior and responses.  NECK:  Supple, no JVD, no bruit.  LUNGS: Rales  HEART: S1, S2 regular 2 out of 6 systolic murmur  EXTREMITIES:  Warm with good color, no clubbing or cyanosis.  Trace edema  PERIPHERAL VASCULAR:  Pulses present and equally palpable; 1+ throughout.      LAB DATA:     CBC:   Results from last 7 days   Lab Units 05/19/25  0614 05/18/25  0940   WBC AUTO x10*3/uL 6.3 8.8   RBC AUTO x10*6/uL 3.56* 3.91*   HEMOGLOBIN g/dL 10.9* 11.9*   HEMATOCRIT % 34.2* 37.6   MCV fL 96 96    MCH pg 30.6 30.4   MCHC g/dL 31.9* 31.6*   RDW % 16.1* 16.0*   PLATELETS AUTO x10*3/uL 227 273     CMP:    Results from last 7 days   Lab Units 05/20/25  0929 05/19/25  0614 05/18/25  0940   SODIUM mmol/L 139 140 134*   POTASSIUM mmol/L 3.3* 3.4* 4.1   CHLORIDE mmol/L 95* 104 99   CO2 mmol/L 33* 29 26   BUN mg/dL 16 15 16   CREATININE mg/dL 0.99 0.92 0.87   GLUCOSE mg/dL 202* 86 189*   PROTEIN TOTAL g/dL  --   --  7.0   CALCIUM mg/dL 9.5 8.7 9.4   BILIRUBIN TOTAL mg/dL  --   --  1.1   ALK PHOS U/L  --   --  105   AST U/L  --   --  30   ALT U/L  --   --  26     BMP:    Results from last 7 days   Lab Units 05/20/25  0929 05/19/25  0614 05/18/25  0940   SODIUM mmol/L 139 140 134*   POTASSIUM mmol/L 3.3* 3.4* 4.1   CHLORIDE mmol/L 95* 104 99   CO2 mmol/L 33* 29 26   BUN mg/dL 16 15 16   CREATININE mg/dL 0.99 0.92 0.87   CALCIUM mg/dL 9.5 8.7 9.4   GLUCOSE mg/dL 202* 86 189*     Magnesium:  Results from last 7 days   Lab Units 05/19/25  0614 05/18/25  0940   MAGNESIUM mg/dL 2.11 2.23     Troponin:    Results from last 7 days   Lab Units 05/18/25  1112 05/18/25  0940   TROPHS ng/L 154* 158*     BNP:   Results from last 7 days   Lab Units 05/18/25  0940   BNP pg/mL 2,073*     Lipid Panel:         DIAGNOSTIC TESTING:       RADIOLOGY:     Cardiac device check - Inpatient   Final Result      CT angio chest for pulmonary embolism   Final Result   1.  No evidence for pulmonary embolus.   2.  Moderate right and small left layering pleural effusions.   3.  Cardiomegaly with multichamber enlargement.   4.  Colonic diverticulosis.   5.  Coronary artery calcifications.   Signed by Gianluca Emmanuel MD      XR chest 1 view   Final Result   1.  Increased history markings bilaterally which may be chronic in   etiology.  If there is an acute component, this could represent   interstitial edema.   2.  Bilateral lower lobe opacities which represent atelectasis or   pneumonia..   Signed by Wilmer Parks MD          PROBLEM LIST   Problem  List[3]    ASSESSMENT:   Hypoxia/shortness of breath  Acute on chronic diastolic congestive heart failure with last known EF to be around 20 to 25%.  Paroxysmal atrial fibrillation's with episodes of rapid ventricular response  CAD  Hypothyroidism  Status post biventricular pacemaker system implanted in April 2025  Hypertension  Moderate TR  Trace MR    PLAN:     Patient seen and examined in conjunction with RICHY Newman/CNP and agree with the evaluation as noted above.  I have personally interviewed and examined the patient.   I have personally and independently reviewed the pertinentlabs and diagnostic testing.  I have personally verified the elements of the history and physical listed above and changes, if any, are noted below.     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  She presents with increased shortness of breath of about 2 weeks duration after her recent hospitalization for A-fib with RVR in April at which time she underwent the AV saviat ablation.  She says she has been doing well post discharge but symptoms have now recurred in the last few weeks.  Her device check shows 70% A-fib burden.  Admission chest x-ray is consistent with pulmonary edema and the patient has felt better with IV Lasix.  She did not endorse any chest pain with these episodes.  She also denied orthopnea proximal nocturnal dyspnea.     Agree with exam as noted above.  Cardiac exam reveals irregularly irregular S1 and S2, with a 2/6 systolic ejection murmur in the right upper sternal border.  Chest reveals fine bibasilar crackles bilaterally.  Abdomen is soft with no hepatosplenomegaly.  Extremities shows 1+ bilateral ankle edema.     ASSESSMENT AND PLAN:  1.  Acute on chronic CHF exacerbation: Presenting as increased  shortness of breath as well as volume overload which has responded to IV Lasix.  We will continue with Lasix IV for now and consider transition to torsemide on discharge for continued optimal diuresis.  Patient is not a candidate for SGLT 2 inhibitor because of recurrent UTI.  2.  Persistent A-fib status post AV savita ablation: With appropriate pacemaker function on recent device check.  3.  Hypertension: Blood pressure appears fairly well-controlled, will continue with current blood pressure medications.    5/20/25  Tele monitoring  Aspirin 81 mg daily  Cozaar 75 mg daily  Crestor 20 mg daily  Lasix 40 mg IV push every 12  Aldactone 12.5 mg daily  Patient is a poor candidate for Jardiance due to age and UTIs  Strict intake and output  Daily weights  Keep magnesium greater than 2.0  Daily EKG  Potassium between 4.0-4.5    Nikos Vang CNP  [facility]    5/21/25  Continue telemetry monitor  Continue current medical management  Continue current cardiac medications  Increase Lasix to 40 mg p.o. daily  Poor candidate for SGLT2 inhibitor due to recurrent UTIs  Optimize GDMT for heart failure management  Message sent to schedulers to follow-up outpatient within 2 weeks for TCM visit with Dr. Chloe Vail to discharge later today from general cardiology perspective  Will sign off    Omero Joseph [facility]  Adult Gerontology Acute Care Nurse Practitioner  [facility] [place]   [facility]  [phone]        Of note, this documentation is completed using the Dragon Dictation system (voice recognition software). There may be spelling and/or grammatical errors that were not corrected prior to final submission.    Please do not hesitate to call with questions.  Electronically signed by RICHY Gibbons-CNP, on 5/21/2025 at 9:34 AM       [1] aspirin, 81 mg, oral, Daily  bisoprolol, 10 mg, oral, Daily  cetirizine,  10 mg, oral, Daily  docusate sodium, 100 mg, oral, BID  DULoxetine, 30 mg, oral, Daily  enoxaparin, 30 mg, subcutaneous, q24h  furosemide, 40 mg, oral, Daily  gabapentin, 300 mg, oral, BID  levothyroxine, 88 mcg, oral, Daily  losartan, 75 mg, oral, Daily  magnesium oxide, 400 mg of magnesium oxide, oral, Daily  perflutren lipid microspheres, 0.5-10 mL of dilution, intravenous, Once in imaging  rosuvastatin, 20 mg, oral, Nightly  spironolactone, 12.5 mg, oral, Daily     [2]    [3]   Patient Active Problem List  Diagnosis    Radiculopathy of lumbar region    Joint stiffness of spine    Gait difficulty    Syncope and collapse    Syncope, unspecified syncope type    NSTEMI (non-ST elevated myocardial infarction) (Multi)    Pulmonary hypertension (Multi)    Acute systolic CHF (congestive heart failure), NYHA class 2    Cardiomyopathy, ischemic    Coronary artery disease involving native coronary artery of native heart without angina pectoris    Paroxysmal atrial fibrillation with RVR (Multi)    Hypertension    Atrial fibrillation (Multi)    Presence of Watchman left atrial appendage closure device    Atrial fibrillation, unspecified type (Multi)    Acute on chronic diastolic heart failure    Type 2 MI (myocardial infarction) (Multi)    Acquired hypothyroidism    Acute hypoxic respiratory failure    Myocardiopathy (Multi)    Shortness of breath    Atrial fibrillation with RVR (Multi)    BMI 28.0-28.9,adult    Status post biventricular pacemaker    Acute on chronic congestive heart failure, unspecified heart failure type

## 2025-05-21 NOTE — DISCHARGE SUMMARY
Discharge Diagnosis  Acute on chronic congestive heart failure, systolic           Issues Requiring Follow-Up  Follow-up with cardiology and PCP    Discharge Meds     Medication List      START taking these medications     spironolactone 25 mg tablet; Commonly known as: Aldactone; Take 0.5   tablets (12.5 mg) by mouth once daily.; Start taking on: May 22, 2025     CHANGE how you take these medications     furosemide 40 mg tablet; Commonly known as: Lasix; Take 1 tablet (40 mg)   by mouth once daily.; Start taking on: May 22, 2025; What changed:   medication strength, how much to take, when to take this     CONTINUE taking these medications     acetaminophen 325 mg tablet; Commonly known as: Tylenol; Take 2 tablets   (650 mg) by mouth every 6 hours if needed for mild pain (1 - 3) or   moderate pain (4 - 6).   Allergy Relief (cetirizine) 10 mg tablet; Generic drug: cetirizine   aspirin 81 mg EC tablet; Take 1 tablet (81 mg) by mouth once daily.   bisoprolol 10 mg tablet; Commonly known as: Zebeta; Take 1 tablet (10   mg) by mouth once daily.   calcium carbonate-vitamin D3 500 mg-5 mcg (200 unit) tablet   carboxymethylcellulose 0.25 % ophthalmic solution; Commonly known as:   THERATears   docusate sodium 100 mg capsule; Commonly known as: Colace   DULoxetine 30 mg DR capsule; Commonly known as: Cymbalta   fluticasone 50 mcg/actuation nasal spray; Commonly known as: Flonase   krill oil 500 mg capsule   LACTOBACILLUS ACIDOPHILUS ORAL   levothyroxine 88 mcg tablet; Commonly known as: Synthroid, Levoxyl   losartan 25 mg tablet; Commonly known as: Cozaar; Take 3 tablets (75 mg)   by mouth once daily.   magnesium oxide 500 mg magnesium tablet   multivitamin with minerals tablet   omeprazole 40 mg DR capsule; Commonly known as: PriLOSEC   potassium chloride CR 20 mEq ER tablet; Commonly known as: Klor-Con M20;   Take 1 tablet (20 mEq) by mouth once daily.   PreserVision AREDS 2 Plus  mcg-15 mcg- 5 mg-1 mg capsule;  Generic   drug: mv-min-FA-vit K-lutein-zeaxant   rosuvastatin 20 mg tablet; Commonly known as: Crestor; Take 1 tablet (20   mg) by mouth once daily at bedtime.       Test Results Pending At Discharge  Pending Labs       Order Current Status    Extra Tubes In process    Lavender Top In process    SST TOP In process            Hospital Course   This is a 89-year-old female with past medical history including chronic A-fib s/p watchman, systolic CHF with EF of 20-25%, hypothyroidism, and s/p PPM who presented on 5/18 for shortness of breath secondary to acute CHF exacerbation.  Initially requiring oxygen, has now weaned to room air and remains stable.  CXR showed pulmonary edema.  CTA chest without PE but showed bilateral pleural effusions and cardiomegaly.  Initial labs remarkable for BNP 2073, troponin 158/154, D-dimer 891, hemoglobin 11.9.  Cardiology consulted, treated with IV Lasix and transitioned to oral.  She will need to increase her Lasix to 40 mg daily on discharge.  Per cardiology, should also start taking spironolactone 25 mg daily.  EP consulted, adjusted her device and recommend follow-up in 1 month for device adjustment again.  She will also need to follow-up with EP in 2 months as scheduled.  The patient can resume all other home medications.  Recommend follow-up with PCP in 1 week.  With treatment, the patient's symptoms have significantly improved and she feels comfortable returning home.  The patient will be discharged home today.  She is hemodynamically stable at time of discharge.    Plan of care was discussed extensively with patient.  Patient verbalized understanding through teach back method.  All question and concerns addressed upon examination.     Of note, this documentation is completed using the Dragon Dictation system (voice recognition software). There may be spelling and/or grammatical errors that were not corrected prior to final submission.      Pertinent Physical Exam At Time of  Discharge  Physical Exam  Constitutional:       Appearance: Normal appearance.   HENT:      Head: Normocephalic.      Mouth/Throat:      Mouth: Mucous membranes are moist.   Eyes:      Pupils: Pupils are equal, round, and reactive to light.   Cardiovascular:      Rate and Rhythm: Normal rate and regular rhythm.      Heart sounds: Normal heart sounds, S1 normal and S2 normal.   Pulmonary:      Effort: Pulmonary effort is normal.      Breath sounds: Normal breath sounds.   Abdominal:      General: Bowel sounds are normal.      Palpations: Abdomen is soft.   Musculoskeletal:         General: Normal range of motion.      Cervical back: Neck supple.      Comments: Trace BLE edema   Skin:     General: Skin is warm.   Neurological:      Mental Status: She is alert and oriented to person, place, and time.   Psychiatric:         Mood and Affect: Mood normal.         Behavior: Behavior normal.         Outpatient Follow-Up  Future Appointments   Date Time Provider Department Center   5/28/2025 10:15 AM Moe Lane MD [id number] [place]   6/3/2025  2:45 PM Wilmer Corona MD [id number] [place]   6/19/2025 11:40 AM [facility] [address]   7/8/2025  3:30 PM Wilmer Corona MD [id number] [place]   7/23/2025 12:40 PM [facility] [id number] [place]   7/23/2025  1:45 PM Moe Lane MD [id number] [place]         Bee Hester PA-C    I spent 35 minutes on discharge. Time calculated includes bedside evaluation, counseling, and outpatient care coordination.

## 2025-05-21 NOTE — CARE PLAN
The patient's goals for the shift include breathe better    The clinical goals for the shift include Incentive spirometry, respiratory hygeine, safety falls prevention    Over the shift, the patient did not make progress toward the following goals. Barriers to progression include . Recommendations to address these barriers include   Problem: Fall/Injury  Goal: Not fall by end of shift  Outcome: Progressing  Goal: Be free from injury by end of the shift  Outcome: Progressing  Goal: Verbalize understanding of personal risk factors for fall in the hospital  Outcome: Progressing  Goal: Verbalize understanding of risk factor reduction measures to prevent injury from fall in the home  Outcome: Progressing  Goal: Use assistive devices by end of the shift  Outcome: Progressing  Goal: Pace activities to prevent fatigue by end of the shift  Outcome: Progressing     Problem: Skin  Goal: Decreased wound size/increased tissue granulation at next dressing change  Outcome: Progressing  Goal: Participates in plan/prevention/treatment measures  Outcome: Progressing  Goal: Prevent/manage excess moisture  Outcome: Progressing  Goal: Prevent/minimize sheer/friction injuries  Outcome: Progressing  Goal: Promote/optimize nutrition  Outcome: Progressing  Goal: Promote skin healing  Outcome: Progressing     Problem: Pain - Adult  Goal: Verbalizes/displays adequate comfort level or baseline comfort level  Outcome: Progressing     Problem: Safety - Adult  Goal: Free from fall injury  Outcome: Progressing     Problem: Discharge Planning  Goal: Discharge to home or other facility with appropriate resources  Outcome: Progressing     Problem: Chronic Conditions and Co-morbidities  Goal: Patient's chronic conditions and co-morbidity symptoms are monitored and maintained or improved  Outcome: Progressing     Problem: Nutrition  Goal: Nutrient intake appropriate for maintaining nutritional needs  Outcome: Progressing     Problem: Heart  Failure  Goal: Improved gas exchange this shift  Outcome: Progressing  Goal: Improved urinary output this shift  Outcome: Progressing  Goal: Reduction in peripheral edema within 24 hours  Outcome: Progressing  Goal: Report improvement of dyspnea/breathlessness this shift  Outcome: Progressing  Goal: Weight from fluid excess reduced over 2-3 days, then stabilize  Outcome: Progressing  Goal: Increase self care and/or family involvement in 24 hours  Outcome: Progressing   .

## 2025-05-21 NOTE — CARE PLAN
The patient's goals for the shift include breathe better    The clinical goals for the shift include Safety and Comfort

## 2025-05-21 NOTE — DOCUMENTATION CLARIFICATION NOTE
"    PATIENT:               ANNA SINGH  ACCT #:                  [id number]  MRN:                       [id number]  :                       1931  ADMIT DATE:       2025 9:16 AM  DISCH DATE:  RESPONDING PROVIDER #:        [id number]          PROVIDER RESPONSE TEXT:    Acute on Chronic Systolic Congestive Heart Failure    CDI QUERY TEXT:    Clarification        Instruction:    Based on your assessment of the patient and the clinical information, please provide the requested documentation by clicking on the appropriate radio button and enter any additional information if prompted.    Question: Please further clarify the type and acuity of congestive heart failure    When answering this query, please exercise your independent professional judgment. The fact that a question is being asked, does not imply that any particular answer is desired or expected.    The patient's clinical indicators include:  Clinical Information: 89 yof with acute on chronic CHF    Clinical Indicators:     Cardiology EP: \"Acute on chronic diastolic heart failure with last known LVEF 20 to 25%\"     Internal Medicine: \"Acute on Chronic CHF systolic HF, history of systolic CHF\"     Cardiology: \"Acute on chronic diastolic congestive heart failure with last known EF to be around 20 to 25%. 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 Her device check shows 70% A-fib burden.\"    3/18/2025 ECHO  \"CONCLUSIONS:  1. The left ventricular systolic function is severely decreased, with a visually estimated ejection fraction of 20-25%.  2. There is global hypokinesis of the left ventricle with minor regional variations.  3. Left ventricular diastolic filling was indeterminate.  4. Left ventricular cavity size is mildly dilated.  5. There is " "severely reduced right ventricular systolic function.  6. Mildly enlarged right ventricle.  7. The left atrial size is moderate to severely dilated.  8. S/p Watchman device that appears well-seated with no thrombus or peridevice vanessa.  9. The right atrial size is moderately dilated.  10. The mitral valve is moderately thickened.  11. Moderate mitral valve regurgitation.  12. Moderate tricuspid regurgitation.  13. Mild aortic valve regurgitation.  14. No left atrial thrombus.  15. Small patent foramen ovale.  16. There is plaque visualized in the descending aorta.\"    Treatment: Cardiology consult, device check, 5/18 IV Lasix 40mg, 5/19-5/21 IV Lasix q12hrs, 5/19-current Aldactone, 5/19-current Cozaar    Risk Factors: 89 yof, persistent atrial fibrillation, PMH CHF  Options provided:  -- Acute on Chronic Systolic Congestive Heart Failure  -- Acute on Chronic Diastolic Congestive Heart Failure  -- Acute on Chronic combined systolic/diastolic Congestive Heart Failure  -- Other - I will add my own diagnosis  -- Refer to Clinical Documentation Reviewer    Query created by: Cecilia Gudino on 5/21/2025 9:09 AM      Electronically signed by:  DEAN ZUÑIGA MD 5/21/2025 9:27 AM          "

## 2025-05-21 NOTE — DOCUMENTATION CLARIFICATION NOTE
"    PATIENT:               ANNA SINGH  ACCT #:                  [id number]  MRN:                       [id number]  :                       1931  ADMIT DATE:       2025 9:16 AM  DISCH DATE:  RESPONDING PROVIDER #:        [id number]          PROVIDER RESPONSE TEXT:    Type II MI    CDI QUERY TEXT:    Clarification        Instruction:    Based on your assessment of the patient and the clinical information, please provide the requested documentation by clicking on the appropriate radio button and enter any additional information if prompted.    Question: Is there a diagnosis indicative of the patient elevated Troponins and symptoms    When answering this query, please exercise your independent professional judgment. The fact that a question is being asked, does not imply that any particular answer is desired or expected.    The patient's clinical indicators include:  Clinical Information: 89 yof with acute on chronic CHF    Clinical Indicators:     Troponins: 158/154     ED Provider: \"EKG interpreted by ED physician: Atrial flutter with 3-1 AV conduction rate of 89.  EKG does not show acute ACS.\"     Cardiology EP: \"EKG today shows atrial fibrillation with heart rate 81 bpm V-paced and QTc of 536 ms.\"     Internal Medicine: \"Acute on Chronic CHF systolic HF, history of systolic CHF\"     Cardiology: \"Telemetry is paced, EKGs paced 80\"    Treatment: Daily EKG's, Cardiology consult, cardiac device check, telemetry    Risk Factors: 89 yof, persistent atrial fibrillation, PMH CHF, HTN  Options provided:  -- Type II MI  -- Acute Myocardial Injury  -- Chronic Myocardial Injury  -- Other - I will add my own diagnosis  -- Refer to Clinical Documentation Reviewer    Query created by: Cecilia Gudino on 2025 9:21 AM      Electronically signed by:  DEAN ZUÑIGA MD 2025 9:27 AM          "

## 2025-05-22 ENCOUNTER — PATIENT OUTREACH (OUTPATIENT)
Dept: CARDIOLOGY | Facility: CLINIC | Age: OVER 89
End: 2025-05-22
Payer: MEDICARE

## 2025-05-22 NOTE — PROGRESS NOTES
Discharge Facility:   [facility]  Discharge Diagnosis:  CHF  Admission Date:  5/18/25  Discharge Date:  5/21/25    PCP Appointment Date:  Encouraged to make an appt with PCP  Specialist Appointment Date:   MAY 28  2025 10:15 AM - Cardiology Clinic Visit  [facility] - Moe Lane MD     DOMENICO 3  2025 02:45 PM - Cardiology Hospital Discharge  [facility] - Wilmer Corona MD     Hospital Encounter and Summary Linked: Yes  Discharge Summary by Bee Hester PA-C (05/21/2025 12:45)   ED Provider Notes by Jak Carbajal DO (05/18/2025 09:27)       See discharge assessment below for further details       Wrap Up  Wrap Up Additional Comments: This CM spoke with patient's daughter and she states her mom got a good nights rest last night. Reviewed new medications and changes she states understanding. Reviewed upcoming appts and encouraged an appt with PCP. REviewed Bp and wt log and when to call Dr Corona's office patient's duaghter states understanding. This CM gives contact information for non urgent matters (5/22/2025 10:13 AM)    Engagement  Call Start Time: 1009 (5/22/2025 10:13 AM)    Medications  Medications reviewed with patient/caregiver?: Yes (5/22/2025 10:13 AM)  Is the patient having any side effects they believe may be caused by any medication additions or changes?: No (5/22/2025 10:13 AM)  Does the patient have all medications ordered at discharge?: Yes (5/22/2025 10:13 AM)  Prescription Comments: START taking:  spironolactone (Aldactone)   Start taking on: May 22, 2025   CHANGE how you take:  furosemide (Lasix) (5/22/2025 10:13 AM)    Appointments  Does the patient have a primary care provider?: Yes (5/22/2025 10:13 AM)  Care Management Interventions: Advised patient to make appointment (5/22/2025 10:13 AM)  Care Management Interventions: Advised patient to keep appointment (5/22/2025 10:13 AM)    Self Management  What is the home health agency?: n/a (5/22/2025 10:13  AM)  What Durable Medical Equipment (DME) was ordered?: n/a (5/22/2025 10:13 AM)    Patient Teaching  Does the patient have access to their discharge instructions?: Yes (5/22/2025 10:13 AM)  Care Management Interventions: Reviewed instructions with patient (5/22/2025 10:13 AM)  What is the patient's perception of their health status since discharge?: Improving (5/22/2025 10:13 AM)  Is the patient/caregiver able to teach back the hierarchy of who to call/visit for symptoms/problems? PCP, Specialist, Home Health nurse, Urgent Care, ED, 911: Yes (5/22/2025 10:13 AM)

## 2025-05-23 LAB
HOLD SPECIMEN: NORMAL
HOLD SPECIMEN: NORMAL

## 2025-05-28 ENCOUNTER — TELEPHONE (OUTPATIENT)
Dept: INPATIENT UNIT | Facility: HOSPITAL | Age: OVER 89
End: 2025-05-28

## 2025-05-28 ENCOUNTER — APPOINTMENT (OUTPATIENT)
Dept: CARDIOLOGY | Facility: CLINIC | Age: OVER 89
End: 2025-05-28
Payer: MEDICARE

## 2025-05-28 VITALS
BODY MASS INDEX: 28.22 KG/M2 | DIASTOLIC BLOOD PRESSURE: 50 MMHG | WEIGHT: 140 LBS | HEIGHT: 59 IN | HEART RATE: 80 BPM | SYSTOLIC BLOOD PRESSURE: 110 MMHG

## 2025-05-28 DIAGNOSIS — I50.21 ACUTE SYSTOLIC CHF (CONGESTIVE HEART FAILURE), NYHA CLASS 2: ICD-10-CM

## 2025-05-28 DIAGNOSIS — Z95.0 STATUS POST BIVENTRICULAR PACEMAKER: ICD-10-CM

## 2025-05-28 DIAGNOSIS — I48.91 ATRIAL FIBRILLATION WITH RVR (MULTI): ICD-10-CM

## 2025-05-28 DIAGNOSIS — I50.33 ACUTE ON CHRONIC DIASTOLIC HEART FAILURE: Primary | ICD-10-CM

## 2025-05-28 DIAGNOSIS — Z87.891 FORMER SMOKER: ICD-10-CM

## 2025-05-28 DIAGNOSIS — I10 PRIMARY HYPERTENSION: ICD-10-CM

## 2025-05-28 DIAGNOSIS — I48.11 LONGSTANDING PERSISTENT ATRIAL FIBRILLATION (MULTI): ICD-10-CM

## 2025-05-28 PROCEDURE — 3074F SYST BP LT 130 MM HG: CPT | Performed by: INTERNAL MEDICINE

## 2025-05-28 PROCEDURE — 1159F MED LIST DOCD IN RCRD: CPT | Performed by: INTERNAL MEDICINE

## 2025-05-28 PROCEDURE — 93000 ELECTROCARDIOGRAM COMPLETE: CPT | Performed by: INTERNAL MEDICINE

## 2025-05-28 PROCEDURE — 3078F DIAST BP <80 MM HG: CPT | Performed by: INTERNAL MEDICINE

## 2025-05-28 PROCEDURE — 1036F TOBACCO NON-USER: CPT | Performed by: INTERNAL MEDICINE

## 2025-05-28 PROCEDURE — 99214 OFFICE O/P EST MOD 30 MIN: CPT | Performed by: INTERNAL MEDICINE

## 2025-05-28 PROCEDURE — 1111F DSCHRG MED/CURRENT MED MERGE: CPT | Performed by: INTERNAL MEDICINE

## 2025-05-28 ASSESSMENT — ENCOUNTER SYMPTOMS
IRREGULAR HEARTBEAT: 0
CLAUDICATION: 0
SYNCOPE: 0
WHEEZING: 0
NEAR-SYNCOPE: 0
COUGH: 0
ORTHOPNEA: 0
SNORING: 0
PND: 0
SHORTNESS OF BREATH: 0
CARDIOVASCULAR NEGATIVE: 1

## 2025-05-28 NOTE — PATIENT INSTRUCTIONS
Follow up in 6 months with Dr. Lane , in- clinic device check same day  Remote device checks at 3 and 9 months  Continue same medications and treatments.   Patient educated on proper medication use.   Patient educated on risk factor modification.   Please bring any lab results from other providers / physicians to your next appointment.     Please bring all medicines, vitamins, and herbal supplements with you when you come to the office.     Prescriptions will not be filled unless you are compliant with your follow up appointments or have a follow up appointment scheduled as per instruction of your physician. Refills should be requested at the time of your visit.

## 2025-05-28 NOTE — PROGRESS NOTES
CARDIOLOGY OFFICE VISIT      CHIEF COMPLAINT  Chief Complaint   Patient presents with    Follow-up     Follow post discharge 5/21, Acute on chronic congestive heart failure, systolic       HISTORY OF PRESENT ILLNESS  HPI  89 y.o. female presenting with shortness of breath at [facility] 03/2025.     Patient has a past medical history of paroxysmal atrial flutter status post Watchman device implantation, chronic diastolic heart failure, CAD, hypothyroidism, hypertension, paroxysmal atrial fibrillation, CVA February 2024 secondary to atrial fibrillation, severe and diffuse diverticulosis with recurrent GI bleeding, coronary artery disease with diffuse 50 to 60% stenosis at coronary angiography August 2024.  She had been admitted to [facility] in late July with recurrent GI bleeding rectal bleeding.  During that hospital stay had multiple runs of rapid atrial fibrillation treated transiently with intravenous amiodarone but was not kept on that agent.  Had to undergo 2 colonoscopies was discharged the day after the second colonoscopy.  Was admitted to [facility] there was syncopal episode felt related to volume depletion.  She was hydrated and improved in symptoms but troponins were elevated and echo suggested right ventricular systolic pressure over 70 mmHg.  In light of these findings she underwent coronary angiography as delineated below that did show 50 to 60% diffuse disease. There was one 90% lesion in a small ramus.  EF 40% with RVSP found to be just 43 mmHg.      Holter monitor September 2024 1-2% atrial fibrillation.  Some lasting as long as an hour.  Overall burden likely higher as computer interpretation had mislabeled some SVT as A-fib.     10/23/2024 normal BMP. Hemoglobin 11.4 significant improvement from previously      11/8/2024 watchman implantation.  No complications     Patient presented to emergency room complaining of shortness of breath for a  week.  She has orthopnea and edema in the lower  extremities.  She does not notice palpitations.     .     ER Course: Tachycardic with EKG showing atrial fibrillation with RVR, hypoxic requiring nasal cannula, otherwise vitals stable.  Labs notable for markedly elevated BNP and elevated troponin.  Also noted to have mild hyponatremia and anemia.  CXR showed prominent interstitial lung markings consistent with edema versus multifocal pneumonia.  Patient was given antibiotics, lasix, and aspirin in the ER.     Laboratory data shows sodium 137 potassium of 3.0 BUN 13 creatinine 0.81 ALT 36 AST 42 magnesium 2.3 for first troponin 136, subsequent troponin 140, subsequent troponin 138  TSH 1.7.  Hemoglobin 11.5 hematocrit 34.1 WBC 8.8 patient was placed on amiodarone therapy IV by hospitalist     Echocardiogram November 2024    CONCLUSIONS:   1. Poorly visualized anatomical structures due to suboptimal image quality.   2. Left ventricular ejection fraction is low normal, by visual estimate at 50-55%.   3. Abnormal left venticular wall motion.   4. There is reduced right ventricular systolic function.   5. The left atrium is enlarged.     Holter monitor September 2024  Impression  1.  Patient with symptoms correlating to PACs but very infrequent symptoms  2.  Predominantly sinus rhythm with average heart rate 74 bpm normal heart rate variability  3.  1 to 2% burden atrial fibrillation.  Computer reading had identified 50 episodes of SVT but on review of tracings most consistent with atrial fibrillation longest atrial fibrillation was 1 hour with an average rate of 99.  4.  2 episodes of probable ventricular tachycardia with wide-complex minimally irregular rates 5-10 beats.  Cannot exclude A-fib with aberrancy  5.  No evidence of heart block     Cardiac catheterization August 2024  CONCLUSIONS:   1. The entire Left Main: <10% stenosis.   2. Proximal LAD Lesion: The percent stenosis is 60%.   3. Mid LAD Lesion: The percent stenosis is 50%.   4. Prox Ramus CX Lesion: The  percent stenosis is 90%,small in size.   5. Proximal and mid RCA Lesion: The percent stenosis is 40%.   6. Distal RCA Lesion: The percent stenosis is 50%.   7. Right atrial mean pressure 4 mmHg.   8. Right ventricular pressure 39/7 mmHg.   9. Pulmonary artery pressure 43/16 mmHg, mean pressure 27 mmHg.  10. Pulmonary capillary wedge mean pressure 10 mmHg.  11. Cardiac output 6.3 L/min by Daniel method.  12. Cardiac index 3.9 L/min/mï¿½ by Daniel method.  13. The Left Ventricular Ejection Fraction is 40%.     She also underwent KARINA cardioversion to rule out thrombosis,went into normal sinus rhythm.      She was re admitted at [facility] April 2025. She presented to the emerged part with increased shortness of breath. She found to be in atrial fibrillation with rapid ventricular rate and she is typically symptomatic when this occurs. Incidentally CT of the chest did show lung nodules for which she has been referred to pulmonology for outpatient follow-up. There is back to her A-fib EP was consulted and ultimately she underwent AV node ablation with CRT device placement to prevent recurrent arrhythmia in 04/2025. She tolerated the procedure well her dyspnea improved with resolution of the A-fib RVR. She has been resumed on her usual heart failure medications and the suspicion is that her EF will improve without further episodes of A-fib RVR.      Echocardiogram 04/2025    CONCLUSIONS:   1. The left ventricular systolic function is moderately decreased, with a visually estimated ejection fraction of 40%.   2. There is global hypokinesis of the left ventricle with minor regional variations.   3. Spectral Doppler shows an abnormal pattern of left ventricular diastolic filling.   4. In comparison study of August 2024 EF has decreased previously 45% [id number] comparison clearly there has been a deterioration. However atrial fibrillation is currently present prior study was in sinus rhythm heart rates frequently over 100 affecting  calculation of ejection fraction.   5. There is normal right ventricular global systolic function.   6. RVSP 46 mmHg.   7. The left atrial size is moderately dilated.   8. Trace MR.   9. Moderate tricuspid regurgitation.  10. Tricuspid aortic valve with diffuse thickening of leaflet cusps and moderate impairment of leaflet mobility. There is trace aortic insufficiency. There is moderate-severe aortic stenosis V-max 2 m/s peak/mean gradients 16 and 9 mmHg. Calculated valve area approximately 1 cmï¿½ visually appears perhaps slightly greater than that; was calculated at 1.2 cm in August. There may have been some progression of stenosis again calculations not as accurate in the setting of rapid atrial fibrillation.    She was admitted at [facility] 05/2025. She presented on 5/18 for shortness of breath secondary to acute CHF exacerbation.  Initially requiring oxygen, has now weaned to room air and remains stable.  CXR showed pulmonary edema.  CTA chest without PE but showed bilateral pleural effusions and cardiomegaly.  Initial labs remarkable for BNP 2073, troponin 158/154, D-dimer 891, hemoglobin 11.9.  Cardiology consulted, treated with IV Lasix and transitioned to oral.  She will need to increase her Lasix to 40 mg daily on discharge.  Per cardiology, should also start taking spironolactone 25 mg daily.  EP consulted, adjusted her device and recommend follow-up in 1 month for device adjustment again.  She will also need to follow-up with EP in 2 months as scheduled.  The patient can resume all other home medications.  Recommend follow-up with PCP in 1 week.  With treatment, the patient's symptoms have significantly improved and she feels comfortable returning home.  The patient will be discharged home today.  She is hemodynamically stable at time of discharge.    Since the discharge from the hospital she is feeling much better.  Her weight is stable.    Patient had a device interrogation on May 25, 2025 that shows  biventricular pacemaker with battery longevity 8 years 4 months.  Wilton of atrial fibrillation 75% of the time.  The rate was decreased from VVI 90 bpm to VVI 80 bpm.  No high ventricular events noted.    EKG performed today shows atrial fibrillation at a rate of 80 bpm QRS duration 116 ms QT corrected 498 ms.  Rhythm strip shows the same pattern.      Past Medical History  Medical History[1]    Social History  Social History[2]    Family History   Family History[3]     Allergies:  RX Allergies[4]     Outpatient Medications:  Current Outpatient Medications   Medication Instructions    acetaminophen (TYLENOL) 650 mg, oral, Every 6 hours PRN    aspirin 81 mg, oral, Daily    bisoprolol (ZEBETA) 10 mg, oral, Daily    calcium carbonate-vitamin D3 500 mg-5 mcg (200 unit) tablet 1 tablet, Daily    carboxymethylcellulose (THERATears) 0.25 % ophthalmic solution 1 drop, 3 times daily PRN    cetirizine (ALLERGY RELIEF (CETIRIZINE)) 10 mg, Daily    docusate sodium (COLACE) 100 mg, 2 times daily    DULoxetine (CYMBALTA) 30 mg, Daily RT    fluticasone (Flonase) 50 mcg/actuation nasal spray 1 spray, Daily PRN    furosemide (LASIX) 40 mg, oral, Daily    krill oil 500 mg, 2 times daily    LACTOBACILLUS ACIDOPHILUS ORAL 1 tablet, Daily    levothyroxine (SYNTHROID, LEVOXYL) 88 mcg, Daily RT    losartan (COZAAR) 75 mg, oral, Daily    magnesium oxide 500 mg, Daily    multivitamin with minerals tablet 1 tablet, Daily    mv-min-FA-vit K-lutein-zeaxant (PreserVision AREDS 2 Plus MV) 200 mcg-15 mcg- 5 mg-1 mg capsule 1 capsule, 2 times daily    omeprazole (PRILOSEC) 40 mg, Daily RT    potassium chloride CR 20 mEq ER tablet 20 mEq, oral, Daily RT    rosuvastatin (CRESTOR) 20 mg, oral, Nightly    spironolactone (ALDACTONE) 12.5 mg, oral, Daily          REVIEW OF SYSTEMS  Review of Systems   Constitutional: Negative for malaise/fatigue.   Cardiovascular: Negative.  Negative for claudication, cyanosis, irregular heartbeat, leg swelling,  near-syncope, orthopnea, paroxysmal nocturnal dyspnea and syncope.   Respiratory:  Negative for cough, shortness of breath, snoring and wheezing.    All other systems reviewed and are negative.        VITALS  Vitals:    05/28/25 1026   BP: 110/50   Pulse: 80       PHYSICAL EXAM  Constitutional:       Appearance: Normal and healthy appearance. Well-developed, overweight and not in distress.      Comments: Left sided device well healed   Neck:      Vascular: No JVR. JVD normal.   Pulmonary:      Effort: Pulmonary effort is normal.      Breath sounds: Normal breath sounds. No wheezing. No rhonchi. No rales.   Chest:      Chest wall: Not tender to palpatation.   Cardiovascular:      PMI at left midclavicular line. Normal rate. Regular rhythm. Normal S1. Normal S2.       Murmurs: There is no murmur.      No gallop.  No click. No rub.   Pulses:     Intact distal pulses.   Edema:     Peripheral edema absent.   Abdominal:      Tenderness: There is no abdominal tenderness.   Musculoskeletal: Normal range of motion.         General: No tenderness. Skin:     General: Skin is warm and dry.   Neurological:      General: No focal deficit present.      Mental Status: Alert and oriented to person, place and time.           ASSESSMENT AND PLAN    Clinical impression     1.  Shortness of breath  2.  Chronic diastolic heart failure  3.  Paroxysmal atrial fibrillation with episodes of rapid nuclear response during this admission  4.  History of GI bleed status post Watchman device implantation November 2024  5.  History of coronary artery disease  6.  Abnormal cardiac enzymes in the setting of diastolic heart failure  7.  Hypothyroidism  8.  Status post biventricular pacemaker system implanted in April 2025 with no complications      Plan recommendation    Patient was recently admitted for heart failure decompensation.  Now feeling better.  Medical therapy for heart failure was adjusted.    Follow my office every 6 months or sooner if  needed.    From the electrophysical standpoint she is stable.  Continue rate control strategy for atrial fibrillation status post AV node ablation.    Follow device clinic as scheduled.    Patient will be reevaluated by the device clinic in a month to decrease the rate from VVI 80 bpm to VVI 70 bpm.    Risk factor modification and lifestyle modification discussed with patient. Diet , exercise and hydration discussed with patient.    I have personally review with patient during this office visit, laboratory data, echocardiogram results, stress test results, Holter-event monitor results prior and after the last electrophysiology visit. All questions has been answered.''  Please excuse any errors in grammar or translation related to this dictation.  Voice recognition software was utilized to prepare this document.    ICOOKIE LPN, AM SCRIBING FOR, AND IN THE PRESENCE OF DR. CHRISTIE VELOZ MD    I, Dr. Veloz, personally performed the services described in the documentation as scribed by the nurse in my presence, and confirm it is both accurate and complete.            [1]   Past Medical History:  Diagnosis Date    Diverticulitis of intestine, part unspecified, without perforation or abscess without bleeding     Diverticulitis    Personal history of other diseases of the circulatory system     History of hypertension    Personal history of other endocrine, nutritional and metabolic disease     History of hypercholesterolemia    Personal history of other endocrine, nutritional and metabolic disease     History of thyroid disease    Vitreomacular adhesion, left eye 12/04/2017    Vitreomacular traction syndrome of left eye    Vitreous degeneration, right eye 12/04/2017    Posterior vitreous detachment of right eye   [2]   Social History  Tobacco Use    Smoking status: Former     Types: Cigarettes    Smokeless tobacco: Never   Substance Use Topics    Alcohol use: Never    Drug use: Never   [3] No family history  on file.  [4]   Allergies  Allergen Reactions    Sulfa (Sulfonamide Antibiotics) Rash     Skin peeling     Lisinopril Cough    Apixaban Other     GI bleeding

## 2025-05-28 NOTE — SIGNIFICANT EVENT
Heart Failure Nurse Navigator Transition of Care Phone Call    The role of the HF nurse navigator is to (1) characterize risk profiles of patients with heart failure transitioning from [id number] after hospitalization, (2) recommend interventions to improve care and reduce risks of worse post-hospitalization outcomes.     Assessment  Call attempted to family. Spoke with patient and obtained the following information.    HF Symptoms  Chest pain? No  Shortness of breath? none  Orthopnea? No  Paroxysmal nocturnal dyspnea? No  Edema? No  Weight gain >2lbs in 3 days or 5lbs in 1 week? No    Medications  Is the patient prescribed the following medications?  ARNi/ACEi/ARB: Losartan 75mg daily  BB: None  MRA: spironolactone 12.5 mg daily  SGLT2i: None  Diuretic: Furosemide 40 mg daily  Is the patient adherent to prescribed medications? YES    Management    Is the patient obtaining daily weights? YES  If yes, current weight? 140  Is the patient following diet limitations (2-3g Na, fluid restriction)? YES  Does the patient have a  cardiology follow-up scheduled? YES  If yes, appointment details? Dr. Lane May 28  If no, what is the reason? NA  Does the patient require HF education reinforcement? No  If yes, what was discussed? NA    Recommendations/Comments:  Per daughter, patient is doing well and following all things discussed during her admission to help keep her out of the hospital.  Encouraged daughter to call with any questions or concerns

## 2025-05-29 LAB
ANION GAP SERPL CALCULATED.4IONS-SCNC: 9 MMOL/L (CALC) (ref 7–17)
BNP SERPL-MCNC: 388 PG/ML
BUN SERPL-MCNC: 21 MG/DL (ref 7–25)
BUN/CREAT SERPL: ABNORMAL (CALC) (ref 6–22)
CALCIUM SERPL-MCNC: 9.5 MG/DL (ref 8.6–10.4)
CHLORIDE SERPL-SCNC: 100 MMOL/L (ref 98–110)
CO2 SERPL-SCNC: 30 MMOL/L (ref 20–32)
CREAT SERPL-MCNC: 0.92 MG/DL (ref 0.6–0.95)
EGFRCR SERPLBLD CKD-EPI 2021: 58 ML/MIN/1.73M2
GLUCOSE SERPL-MCNC: 89 MG/DL (ref 65–99)
POTASSIUM SERPL-SCNC: 4.5 MMOL/L (ref 3.5–5.3)
SODIUM SERPL-SCNC: 139 MMOL/L (ref 135–146)

## 2025-06-03 ENCOUNTER — APPOINTMENT (OUTPATIENT)
Dept: CARDIOLOGY | Facility: CLINIC | Age: OVER 89
End: 2025-06-03
Payer: MEDICARE

## 2025-06-03 VITALS
BODY MASS INDEX: 27.82 KG/M2 | HEART RATE: 80 BPM | SYSTOLIC BLOOD PRESSURE: 136 MMHG | WEIGHT: 138 LBS | HEIGHT: 59 IN | DIASTOLIC BLOOD PRESSURE: 78 MMHG

## 2025-06-03 DIAGNOSIS — I50.22 CHF (CONGESTIVE HEART FAILURE), NYHA CLASS II, CHRONIC, SYSTOLIC: ICD-10-CM

## 2025-06-03 DIAGNOSIS — I10 PRIMARY HYPERTENSION: ICD-10-CM

## 2025-06-03 DIAGNOSIS — I48.21 PERMANENT ATRIAL FIBRILLATION (MULTI): ICD-10-CM

## 2025-06-03 NOTE — PROGRESS NOTES
Patient:  Meryl Hester  YOB: 1931  MRN: [id number]       Impression/Plan:     Diagnoses and all orders for this visit:  CHF (congestive heart failure), NYHA class II, chronic, systolic  -     Compensated at this time.  I think she will benefit most by further adjustments of her device rate.  For now continue current medication with plan of considering switch to Entresto when next seen.  She will look up Entresto to assure it is covered under her insurance.  For now continue on the current dose of diuretic.  BMP will be repleted in 1 month.  Permanent atrial fibrillation (Multi)  -     So far good response to AV node ablation no rapid rate breakthrough as continuous CRT pacing  Primary hypertension  -     Controlled will consider further medication adjustment should she begin to become more short of breath or if blood pressure starts to increase further we will plan to reassess in 1 month.      Chief Complaint/Active Symptoms:      Chief Complaint   Patient presents with    Hospital Follow-up     [facility] 5/18/25 - 5/21/25.  Patient presents for a hospital follow up.        Meryl Hester is a 89 y.o. female who presents with hypertension, paroxysmal atrial fibrillation, CVA February 2024 secondary to atrial fibrillation, severe and diffuse diverticulosis with recurrent GI bleeding, coronary artery disease with diffuse 50 to 60% stenosis at coronary angiography August 2024.  She had been admitted to [facility] in late July with recurrent GI bleeding rectal bleeding.  During that hospital stay had multiple runs of rapid atrial fibrillation treated transiently with intravenous amiodarone but was not kept on that agent.  Had to undergo 2 colonoscopies was discharged the day after the second colonoscopy.  Was admitted to [facility] there was syncopal episode felt related to volume depletion.  She was hydrated and improved in symptoms but troponins were elevated and echo suggested right ventricular  systolic pressure over 70 mmHg.  In light of these findings she underwent coronary angiography as delineated below that did show 50 to 60% diffuse disease. There was one 90% lesion in a small ramus.  EF 40% with RVSP found to be just 43 mmHg.     August 2024 coronary angiography:-Less than 10%.  LAD-60% proximal.  Ramus branch, small vessel-90%.  RCA-40-50%.  LVEF 40%     11/8/2024 watchman implantation. No complications. .      4/18/2025 CRT-P implantation with AV node ablation device Medtronic Quad CRT-P    Patient admitted to [facility] 5/18/2025 through 5/21/2025.  Presented with congestive heart failure once again.  She had just recently undergone AV node ablation with upgrade to CRT pacing device.  She was diuresed.  She had continuous BiV pacing.  Blood pressure was controlled.  Device rate decreased from 90-80.  Of discharge said she felt considerably better blood pressure heart rate were stable.  Echo had shown a 20% ejection fraction device check showed normal function continuous BiV pacing.    5/28/2025  normal electrolytes creatinine 0.92 GFR 58 A1c 5.2    When last hospitalized as described above diuretics were adjusted.  She improved in her dyspnea.  Device rate reduced down to 80 with plan of reducing further to 70 in the next 3 to 4 weeks.  She says she feels reasonably well.  She feels better than she did previously.  She has mild lower extremity edema.  She leads a very sedentary lifestyle but when walking around she is not particularly short of breath if she walks fast she will get tired very easily.  Has had no chest tightness or pressure no dizziness lightheadedness or syncope.                   Review of Systems: Unremarkable except as noted above    Meds     Current Outpatient Medications   Medication Instructions    acetaminophen (TYLENOL) 650 mg, oral, Every 6 hours PRN    aspirin 81 mg, oral, Daily    bisoprolol (ZEBETA) 10 mg, oral, Daily    calcium carbonate-vitamin D3 500 mg-5 mcg  (200 unit) tablet 1 tablet, Daily    carboxymethylcellulose (THERATears) 0.25 % ophthalmic solution 1 drop, 3 times daily PRN    cetirizine (ALLERGY RELIEF (CETIRIZINE)) 10 mg, Daily    docusate sodium (COLACE) 100 mg, 2 times daily    DULoxetine (CYMBALTA) 30 mg, Daily RT    fluticasone (Flonase) 50 mcg/actuation nasal spray 1 spray, Daily PRN    furosemide (LASIX) 40 mg, oral, Daily    krill oil 500 mg, 2 times daily    LACTOBACILLUS ACIDOPHILUS ORAL 1 tablet, Daily    levothyroxine (SYNTHROID, LEVOXYL) 88 mcg, Daily RT    losartan (COZAAR) 75 mg, oral, Daily    magnesium oxide 500 mg, Daily    multivitamin with minerals tablet 1 tablet, Daily    mv-min-FA-vit K-lutein-zeaxant (PreserVision AREDS 2 Plus MV) 200 mcg-15 mcg- 5 mg-1 mg capsule 1 capsule, 2 times daily    omeprazole (PRILOSEC) 40 mg, Daily RT    potassium chloride CR 20 mEq ER tablet 20 mEq, oral, Daily RT    rosuvastatin (CRESTOR) 20 mg, oral, Nightly    spironolactone (ALDACTONE) 12.5 mg, oral, Daily        Allergies   Allergies[1]      Annotated Problems     Specialty Problems          Cardiology Problems    Acute systolic CHF (congestive heart failure), NYHA class 2    Cardiomyopathy, ischemic    Coronary artery disease involving native coronary artery of native heart without angina pectoris    Paroxysmal atrial fibrillation with RVR (Multi)    Hypertension    Atrial fibrillation, unspecified type (Multi)    Presence of Watchman left atrial appendage closure device    Acute on chronic diastolic heart failure    Type 2 MI (myocardial infarction) (Multi)    Myocardiopathy (Multi)    Status post biventricular pacemaker    Acute on chronic congestive heart failure, unspecified heart failure type    Former smoker    NSTEMI (non-ST elevated myocardial infarction) (Multi)    Atrial fibrillation (Multi)    Atrial fibrillation with RVR (Multi)        Problem List     Patient Active Problem List    Diagnosis Date Noted    Former smoker 05/28/2025    Acute  "on chronic congestive heart failure, unspecified heart failure type 05/18/2025    BMI 28.0-28.9,adult 04/30/2025    Status post biventricular pacemaker 04/30/2025    Shortness of breath 04/17/2025    Myocardiopathy (Multi) 04/01/2025    Acute on chronic diastolic heart failure 03/14/2025    Type 2 MI (myocardial infarction) (Multi) 03/14/2025    Acquired hypothyroidism 03/14/2025    Acute hypoxic respiratory failure 03/14/2025    Presence of Watchman left atrial appendage closure device 11/07/2024    Atrial fibrillation, unspecified type (Multi) 11/07/2024    Hypertension 10/10/2024    Acute systolic CHF (congestive heart failure), NYHA class 2 08/28/2024    Cardiomyopathy, ischemic 08/28/2024    Coronary artery disease involving native coronary artery of native heart without angina pectoris 08/28/2024    Paroxysmal atrial fibrillation with RVR (Multi) 08/28/2024    Syncope, unspecified syncope type 08/08/2024    Syncope and collapse 08/07/2024    Radiculopathy of lumbar region 01/03/2024    Joint stiffness of spine 01/03/2024    Gait difficulty 01/03/2024    Atrial fibrillation with RVR (Multi) 04/17/2025    Atrial fibrillation (Multi) 10/23/2024    NSTEMI (non-ST elevated myocardial infarction) (Multi) 08/07/2024    Pulmonary hypertension (Multi) 08/07/2024       Objective:     Vitals:    06/03/25 1444   BP: 136/78   BP Location: Right arm   Patient Position: Sitting   Pulse: 80   Weight: 62.6 kg (138 lb)   Height: 1.499 m (4' 11\")      Wt Readings from Last 4 Encounters:   06/03/25 62.6 kg (138 lb)   05/28/25 63.5 kg (140 lb)   05/21/25 63.7 kg (140 lb 6.9 oz)   04/30/25 63.8 kg (140 lb 9.6 oz)           LAB:     Lab Results   Component Value Date    WBC 6.3 05/19/2025    HGB 10.9 (L) 05/19/2025    HCT 34.2 (L) 05/19/2025     05/19/2025    ALT 26 05/18/2025    AST 30 05/18/2025     05/28/2025    K 4.5 05/28/2025     05/28/2025    CREATININE 0.92 05/28/2025    BUN 21 05/28/2025    CO2 30 " 05/28/2025    TSH 4.07 (H) 04/17/2025    INR 1.1 05/18/2025    HGBA1C 5.2 05/20/2025         Physical Exam     General Appearance: alert and oriented to person, place and time, in no acute distress, frail  Cardiovascular: normal rate, regular rhythm, normal S1 and S2, 1/6 systolic murmur rubs, clicks, or gallops,  no JVD  Pulmonary/Chest: clear to auscultation bilaterally- no wheezes, rales or rhonchi, normal air movement, no respiratory distress  Abdomen: soft, non-tender, non-distended, normal bowel sounds, no masses   Extremities: no cyanosis, clubbing.  1+ distal leg and ankle edema  Skin: warm and dry, no rash or erythema  Eyes: EOMI  Neck: supple and non-tender without mass, no thyromegaly   Neurological: alert, oriented, normal speech, no focal findings or movement disorder noted      Scribe Attestation  By signing my name below, I, Chiara Enriquez RN, Scribe   attest that this documentation has been prepared under the direction and in the presence of Wilmer Corona MD.        Provider attestation-scribe documentation  Any medical record entries made by the scribe were at my discretion and personally dictated by me.  I have reviewed the chart and agree that the record accurately reflects my personal performance of the history, physical exam, discussion and plan.                 [1]   Allergies  Allergen Reactions    Sulfa (Sulfonamide Antibiotics) Rash     Skin peeling     Lisinopril Cough    Apixaban Other     GI bleeding

## 2025-06-03 NOTE — PATIENT INSTRUCTIONS
KEEP SCHEDULED APPT WITH Dr. Wilmer Corona MD, [facility] ON 07/08/25    CONTINUE SAME MEDICATION REGIMEN    CALL INSURANCE COMPANY REGARDING ENTRESTO     NON FASTING BLOODWORK TO BE DONE 1 WEEK PRIOR TO JULY APPT/ON OR AFTER 06/23/25     DID YOU KNOW  We have a pharmacy here in the [facility].  They can fill all prescriptions, not just cardiac medications.  Prescriptions from other pharmacies can easily be transferred to the  pharmacy by the  pharmacist on site.   pharmacies offer FREE HOME DELIVERY on medications to anywhere in [place]. They can sync your medications. Typically prescriptions can be ready in 10 - 15 minutes. If pharmacy is unable to fill your  prescription or if cost is more than your paying now the Pharmacist can easily transfer back to your Pharmacy of choice. Pharmacy phone # [phone].     Please bring all medicines, vitamins, and herbal supplements with you in original bottles to every appointment! This is the best way to ensure your medication list in your chart is accurate.    Prescriptions will not be filled unless you are compliant with your follow up appointments or have a follow up appointment scheduled as per instruction of your physician. Refills should be requested at the time of your visit.

## 2025-06-04 ENCOUNTER — PATIENT OUTREACH (OUTPATIENT)
Dept: CARDIOLOGY | Facility: CLINIC | Age: OVER 89
End: 2025-06-04

## 2025-06-04 ENCOUNTER — APPOINTMENT (OUTPATIENT)
Dept: CARDIOLOGY | Facility: CLINIC | Age: OVER 89
End: 2025-06-04
Payer: MEDICARE

## 2025-06-04 NOTE — PROGRESS NOTES
Confirmation of at least 2 patient identifiers.    Completed telephonic follow-up with patient after recent visit with Dr Lane 5/28/25  Spoke to patient's daughter during outreach call.    Patient reports feeling: Improved    Patient has questions or concerns about medications: No    Have all prescribed medications been filled? Yes    Patient has necessary resources to manage their care? Yes    Patient has questions or concerns? No    Next care management follow-up approximately within one month.  Care  information provided to patient.

## 2025-06-10 ENCOUNTER — TELEPHONE (OUTPATIENT)
Dept: CARDIOLOGY | Facility: CLINIC | Age: OVER 89
End: 2025-06-10
Payer: MEDICARE

## 2025-06-10 DIAGNOSIS — I50.21 ACUTE SYSTOLIC CHF (CONGESTIVE HEART FAILURE), NYHA CLASS 2: ICD-10-CM

## 2025-06-10 NOTE — TELEPHONE ENCOUNTER
RN called patient daughter, Amada, whom we have permission to speak with regarding medical care. LVM asking to return phone call regarding Dr. Wilmer Corona MD, FACC message to confirm that patient takes Losartan 25mg take 3 tablets daily (75mg).      Also need to let patient know that refills on Losartan are good until January 2026 at hospitals, she should not need refills at this time.  However patient will need refills on Lasix since refills were sent to [facility] pharmacy by Omero Joseph CNP, back on 05/22/25 (30 day supply with 3 refills).      Orders placed for Lasix to be sent to hospitals and will be routed to Dr. Wilmer Corona MD, FACC once Amada calls back.    hCiara Enriquez RN

## 2025-06-11 RX ORDER — FUROSEMIDE 40 MG/1
40 TABLET ORAL DAILY
Qty: 90 TABLET | Refills: 3 | Status: SHIPPED | OUTPATIENT
Start: 2025-06-11 | End: 2025-12-08

## 2025-06-11 RX ORDER — FUROSEMIDE 40 MG/1
40 TABLET ORAL DAILY
Qty: 14 TABLET | Refills: 0 | Status: SHIPPED | OUTPATIENT
Start: 2025-06-11 | End: 2025-12-08

## 2025-06-11 NOTE — TELEPHONE ENCOUNTER
RN called patients daughter, Amada, and LVM asking for return phone call regarding Dr. Wilmer Corona MD, [facility] response to her message.      Chiara Enriquez RN

## 2025-06-11 NOTE — TELEPHONE ENCOUNTER
Patient's daughter returned call to office.  She was given Dr. Wilmer Corona F.A.C.C. orders.  She verbalized understanding.  She confirmed she has plenty of Losartan 25 mg 3 daily(75 mg daily) and states patient will not have enough Furosemide until mail order arrives.  Orders for both local pharmacy short term and rx for mail order routed to Dr. Wilmer Corona F.A.C.C. for review.  Katharine Nguyen, CMA

## 2025-06-19 ENCOUNTER — HOSPITAL ENCOUNTER (OUTPATIENT)
Dept: CARDIOLOGY | Facility: HOSPITAL | Age: OVER 89
Discharge: HOME | End: 2025-06-19
Payer: MEDICARE

## 2025-06-19 DIAGNOSIS — Z95.0 PACEMAKER: ICD-10-CM

## 2025-06-19 DIAGNOSIS — I50.33 ACUTE ON CHRONIC DIASTOLIC HEART FAILURE: ICD-10-CM

## 2025-06-19 PROCEDURE — 93281 PM DEVICE PROGR EVAL MULTI: CPT

## 2025-06-19 PROCEDURE — 93281 PM DEVICE PROGR EVAL MULTI: CPT | Performed by: INTERNAL MEDICINE

## 2025-06-24 DIAGNOSIS — I50.22 CHF (CONGESTIVE HEART FAILURE), NYHA CLASS II, CHRONIC, SYSTOLIC: ICD-10-CM

## 2025-06-24 DIAGNOSIS — I48.21 PERMANENT ATRIAL FIBRILLATION (MULTI): ICD-10-CM

## 2025-07-01 ENCOUNTER — APPOINTMENT (OUTPATIENT)
Dept: CARDIOLOGY | Facility: HOSPITAL | Age: OVER 89
End: 2025-07-01
Payer: MEDICARE

## 2025-07-02 ENCOUNTER — PATIENT OUTREACH (OUTPATIENT)
Dept: CARDIOLOGY | Facility: CLINIC | Age: OVER 89
End: 2025-07-02
Payer: MEDICARE

## 2025-07-03 LAB
ANION GAP SERPL CALCULATED.4IONS-SCNC: 9 MMOL/L (CALC) (ref 7–17)
BUN SERPL-MCNC: 15 MG/DL (ref 7–25)
BUN/CREAT SERPL: NORMAL (CALC) (ref 6–22)
CALCIUM SERPL-MCNC: 9.4 MG/DL (ref 8.6–10.4)
CHLORIDE SERPL-SCNC: 101 MMOL/L (ref 98–110)
CO2 SERPL-SCNC: 30 MMOL/L (ref 20–32)
CREAT SERPL-MCNC: 0.87 MG/DL (ref 0.6–0.95)
EGFRCR SERPLBLD CKD-EPI 2021: 62 ML/MIN/1.73M2
GLUCOSE SERPL-MCNC: 77 MG/DL (ref 65–139)
POTASSIUM SERPL-SCNC: 4.5 MMOL/L (ref 3.5–5.3)
SODIUM SERPL-SCNC: 140 MMOL/L (ref 135–146)

## 2025-07-08 ENCOUNTER — APPOINTMENT (OUTPATIENT)
Dept: CARDIOLOGY | Facility: CLINIC | Age: OVER 89
End: 2025-07-08
Payer: MEDICARE

## 2025-07-08 VITALS
DIASTOLIC BLOOD PRESSURE: 70 MMHG | WEIGHT: 140 LBS | HEIGHT: 59 IN | BODY MASS INDEX: 28.22 KG/M2 | SYSTOLIC BLOOD PRESSURE: 134 MMHG | HEART RATE: 75 BPM

## 2025-07-08 DIAGNOSIS — I42.8 OTHER CARDIOMYOPATHY: ICD-10-CM

## 2025-07-08 DIAGNOSIS — I48.21 PERMANENT ATRIAL FIBRILLATION (MULTI): ICD-10-CM

## 2025-07-08 DIAGNOSIS — I50.22 CHF (CONGESTIVE HEART FAILURE), NYHA CLASS II, CHRONIC, SYSTOLIC: ICD-10-CM

## 2025-07-08 PROCEDURE — 1159F MED LIST DOCD IN RCRD: CPT | Performed by: INTERNAL MEDICINE

## 2025-07-08 PROCEDURE — 3075F SYST BP GE 130 - 139MM HG: CPT | Performed by: INTERNAL MEDICINE

## 2025-07-08 PROCEDURE — 99214 OFFICE O/P EST MOD 30 MIN: CPT | Performed by: INTERNAL MEDICINE

## 2025-07-08 PROCEDURE — 3078F DIAST BP <80 MM HG: CPT | Performed by: INTERNAL MEDICINE

## 2025-07-08 PROCEDURE — G2211 COMPLEX E/M VISIT ADD ON: HCPCS | Performed by: INTERNAL MEDICINE

## 2025-07-08 NOTE — PATIENT INSTRUCTIONS
FOLLOW UP WITH Dr. Wilmer Corona MD, [facility] IN 3 MONTHS     ECHO TO BE SCHEDULED JUST PRIOR TO NEXT VISIT     CONTINUE SAME MEDICATION REGIMEN AT THIS TIME    DID YOU KNOW  We have a pharmacy here in the [facility].  They can fill all prescriptions, not just cardiac medications.  Prescriptions from other pharmacies can easily be transferred to the  pharmacy by the  pharmacist on site.   pharmacies offer FREE HOME DELIVERY on medications to anywhere in [place]. They can sync your medications. Typically prescriptions can be ready in 10 - 15 minutes. If pharmacy is unable to fill your  prescription or if cost is more than your paying now the Pharmacist can easily transfer back to your Pharmacy of choice. Pharmacy phone # [phone].     Please bring all medicines, vitamins, and herbal supplements with you in original bottles to every appointment! This is the best way to ensure your medication list in your chart is accurate.    Prescriptions will not be filled unless you are compliant with your follow up appointments or have a follow up appointment scheduled as per instruction of your physician. Refills should be requested at the time of your visit.

## 2025-07-08 NOTE — PROGRESS NOTES
Patient:  Meryl Hester  YOB: 1931  MRN: [id number]       Impression/Plan:     Diagnoses and all orders for this visit:  CHF (congestive heart failure), NYHA class II, chronic, systolic  -     On the current medical regimen she is asymptomatic.  Entresto was expensive for her and she tolerates current dose of losartan well.  I am hopeful that with heart rate now controlled with CRT and AV node ablation LV function will return to normal we will plan to reassess in about 3 months  -     Transthoracic Echo Complete; Future  Permanent atrial fibrillation (Multi)  -     Successful AV node ablation/CRT she has Watchman  Other cardiomyopathy  -     Clinically well compensated  -     Transthoracic Echo Complete; Future      Chief Complaint/Active Symptoms:      Chief Complaint   Patient presents with    Follow-up     Presents today for follow up of CHF/HTN/AFIB       Meryl Hester is a 89 y.o. female who presents with hypertension, paroxysmal atrial fibrillation, CVA February 2024 secondary to atrial fibrillation, severe and diffuse diverticulosis with recurrent GI bleeding, coronary artery disease with diffuse 50 to 60% stenosis at coronary angiography August 2024.  She had been admitted to [facility] in late July with recurrent GI bleeding rectal bleeding.  During that hospital stay had multiple runs of rapid atrial fibrillation treated transiently with intravenous amiodarone but was not kept on that agent.  Had to undergo 2 colonoscopies was discharged the day after the second colonoscopy.  Was admitted to [facility] there was syncopal episode felt related to volume depletion.  She was hydrated and improved in symptoms but troponins were elevated and echo suggested right ventricular systolic pressure over 70 mmHg.  In light of these findings she underwent coronary angiography as delineated below that did show 50 to 60% diffuse disease. There was one 90% lesion in a small ramus.  EF 40% with RVSP  found to be just 43 mmHg.      August 2024 coronary angiography:-Less than 10%.  LAD-60% proximal.  Ramus branch, small vessel-90%.  RCA-40-50%.  LVEF 40%     11/8/2024 watchman implantation. No complications. .       4/18/2025 CRT-P implantation with AV node ablation device Medtronic Quad CRT-P.  Hospitalized 5/18/2025 with congestive heart failure again and device rate reduced from 90 to 80 bpm and she felt considerably better.  Echo at that time 20% EF.    I last saw her 6/3/2025 she was beginning to feel better felt to be reasonably compensated functional class II.  Her rate was going to be further decreased to 70 with follow-up on EP.  Also was planning to utilize Entresto instead of losartan if it was covered under her insurance.  Blood pressure was otherwise well-controlled    7/2/2025 normal BMP    Despite a very stormy spring she says she is feeling considerably better.  She is very sedentary but the activities she does now are not associated with shortness of breath.  Only she does more than what she is used to.  She is unaware of atrial fibrillation.  She has had no adverse effect of placement of her CRT device.  She notes no dizziness or lightheadedness at this time.  No adverse effect of her current medication               Review of Systems: Unremarkable except as noted above    Meds     Current Outpatient Medications   Medication Instructions    acetaminophen (TYLENOL) 650 mg, oral, Every 6 hours PRN    aspirin 81 mg, oral, Daily    bisoprolol (ZEBETA) 10 mg, oral, Daily    calcium carbonate-vitamin D3 500 mg-5 mcg (200 unit) tablet 1 tablet, Daily    carboxymethylcellulose (THERATears) 0.25 % ophthalmic solution 1 drop, 3 times daily PRN    cetirizine (ALLERGY RELIEF (CETIRIZINE)) 10 mg, Daily    docusate sodium (COLACE) 100 mg, 2 times daily    fluticasone (Flonase) 50 mcg/actuation nasal spray 1 spray, Daily PRN    furosemide (LASIX) 40 mg, oral, Daily    furosemide (LASIX) 40 mg, oral, Daily     krill oil 500 mg, 2 times daily    LACTOBACILLUS ACIDOPHILUS ORAL 1 tablet, Daily    levothyroxine (SYNTHROID, LEVOXYL) 88 mcg, Daily RT    losartan (COZAAR) 75 mg, oral, Daily    magnesium oxide 500 mg, Daily    multivitamin with minerals tablet 1 tablet, Daily    mv-min-FA-vit K-lutein-zeaxant (PreserVision AREDS 2 Plus MV) 200 mcg-15 mcg- 5 mg-1 mg capsule 1 capsule, 2 times daily    omeprazole (PRILOSEC) 40 mg, Daily RT    potassium chloride CR 20 mEq ER tablet 20 mEq, oral, Daily RT    rosuvastatin (CRESTOR) 20 mg, oral, Nightly    spironolactone (ALDACTONE) 12.5 mg, oral, Daily        Allergies   Allergies[1]      Annotated Problems     Specialty Problems          Cardiology Problems    Acute systolic CHF (congestive heart failure), NYHA class 2    Cardiomyopathy, ischemic    Coronary artery disease involving native coronary artery of native heart without angina pectoris    Paroxysmal atrial fibrillation with RVR (Multi)    Hypertension    Atrial fibrillation, unspecified type (Multi)    Presence of Watchman left atrial appendage closure device    Acute on chronic diastolic heart failure    Type 2 MI (myocardial infarction) (Multi)    Myocardiopathy (Multi)    Status post biventricular pacemaker    Acute on chronic congestive heart failure, unspecified heart failure type    Former smoker    CHF (congestive heart failure), NYHA class II, chronic, systolic    NSTEMI (non-ST elevated myocardial infarction) (Multi)    Atrial fibrillation (Multi)    Atrial fibrillation with RVR (Multi)        Problem List     Patient Active Problem List    Diagnosis Date Noted    CHF (congestive heart failure), NYHA class II, chronic, systolic 06/03/2025    Former smoker 05/28/2025    Acute on chronic congestive heart failure, unspecified heart failure type 05/18/2025    BMI 28.0-28.9,adult 04/30/2025    Status post biventricular pacemaker 04/30/2025    Shortness of breath 04/17/2025    Myocardiopathy (Multi) 04/01/2025    Acute on  "chronic diastolic heart failure 03/14/2025    Type 2 MI (myocardial infarction) (Multi) 03/14/2025    Acquired hypothyroidism 03/14/2025    Acute hypoxic respiratory failure 03/14/2025    Presence of Watchman left atrial appendage closure device 11/07/2024    Atrial fibrillation, unspecified type (Multi) 11/07/2024    Hypertension 10/10/2024    Acute systolic CHF (congestive heart failure), NYHA class 2 08/28/2024    Cardiomyopathy, ischemic 08/28/2024    Coronary artery disease involving native coronary artery of native heart without angina pectoris 08/28/2024    Paroxysmal atrial fibrillation with RVR (Multi) 08/28/2024    Syncope, unspecified syncope type 08/08/2024    Syncope and collapse 08/07/2024    Radiculopathy of lumbar region 01/03/2024    Joint stiffness of spine 01/03/2024    Gait difficulty 01/03/2024    Atrial fibrillation with RVR (Multi) 04/17/2025    Atrial fibrillation (Multi) 10/23/2024    NSTEMI (non-ST elevated myocardial infarction) (Multi) 08/07/2024    Pulmonary hypertension (Multi) 08/07/2024       Objective:     Vitals:    07/08/25 1533   BP: 134/70   BP Location: Left arm   Patient Position: Sitting   Pulse: 75   Weight: 63.5 kg (140 lb)   Height: (!) 1.499 m (4' 11\")      Wt Readings from Last 4 Encounters:   07/08/25 63.5 kg (140 lb)   06/03/25 62.6 kg (138 lb)   05/28/25 63.5 kg (140 lb)   05/21/25 63.7 kg (140 lb 6.9 oz)           LAB:     Lab Results   Component Value Date    WBC 6.3 05/19/2025    HGB 10.9 (L) 05/19/2025    HCT 34.2 (L) 05/19/2025     05/19/2025    ALT 26 05/18/2025    AST 30 05/18/2025     07/02/2025    K 4.5 07/02/2025     07/02/2025    CREATININE 0.87 07/02/2025    BUN 15 07/02/2025    CO2 30 07/02/2025    TSH 4.07 (H) 04/17/2025    INR 1.1 05/18/2025    HGBA1C 5.2 05/20/2025         Physical Exam     General Appearance: alert and oriented to person, place and time, in no acute distress appears quite comfortable albeit frail  Cardiovascular: " normal rate, regular rhythm, normal S1 and S2, no murmurs, rubs, clicks, or gallops,  no JVD  Pulmonary/Chest: clear to auscultation bilaterally- no wheezes, rales or rhonchi, normal air movement, no respiratory distress  Abdomen: soft, non-tender, non-distended, normal bowel sounds, no masses   Extremities: no cyanosis, clubbing or edema  Skin: warm and dry, no rash or erythema  Eyes: EOMI  Neck: supple and non-tender without mass, no thyromegaly   Neurological: alert, oriented, normal speech, no focal findings or movement disorder noted      Scribe Attestation  By signing my name below, I, Chiara Enriquez RN, Scribe   attest that this documentation has been prepared under the direction and in the presence of Wilmer Corona MD.        Provider attestation-scribe documentation  Any medical record entries made by the scribe were at my discretion and personally dictated by me.  I have reviewed the chart and agree that the record accurately reflects my personal performance of the history, physical exam, discussion and plan.                 [1]   Allergies  Allergen Reactions    Sulfa (Sulfonamide Antibiotics) Rash     Skin peeling     Lisinopril Cough    Apixaban Other     GI bleeding

## 2025-07-23 ENCOUNTER — APPOINTMENT (OUTPATIENT)
Dept: CARDIOLOGY | Facility: HOSPITAL | Age: OVER 89
End: 2025-07-23
Payer: MEDICARE

## 2025-07-23 ENCOUNTER — APPOINTMENT (OUTPATIENT)
Dept: CARDIOLOGY | Facility: CLINIC | Age: OVER 89
End: 2025-07-23
Payer: MEDICARE

## 2025-07-31 ENCOUNTER — TELEPHONE (OUTPATIENT)
Dept: RADIOLOGY | Facility: HOSPITAL | Age: OVER 89
End: 2025-07-31
Payer: MEDICARE

## 2025-07-31 NOTE — TELEPHONE ENCOUNTER
7/31/25 1430  Spoke to patient's daughter Amada who returned my call. She said they are aware of the lung nodules and patient is followed at [facility] by pulmonology. I will cancel this referral. HYALIE Kruse    7/31/25 3710  Patient found on imaging to have 12x8mm RUL nodular density and 13mm LLL opacity and patient was referred to pulm. Patient is 89 yo and a DNR. Therefore, I have left patient a VM and called her daughter Thu and left her a VM as well. HAYLIE Kruse

## 2025-09-16 ENCOUNTER — APPOINTMENT (OUTPATIENT)
Dept: CARDIOLOGY | Facility: CLINIC | Age: OVER 89
End: 2025-09-16
Payer: MEDICARE

## 2025-09-25 ENCOUNTER — APPOINTMENT (OUTPATIENT)
Dept: CARDIOLOGY | Facility: CLINIC | Age: OVER 89
End: 2025-09-25
Payer: MEDICARE

## 2025-12-03 ENCOUNTER — APPOINTMENT (OUTPATIENT)
Dept: CARDIOLOGY | Facility: CLINIC | Age: OVER 89
End: 2025-12-03
Payer: MEDICARE

## (undated) DEVICE — CABLE, HYPERTRONICS, 25 X 34-PIN, RED, 10FT

## (undated) DEVICE — CLOSURE SYSTEM, VASCULAR, MVP 6-12FR, VENOUS

## (undated) DEVICE — CATHETER, DIAGNOSTIC, 5FR,  PIG-145, 110CM, 6SH ANGLED

## (undated) DEVICE — Device

## (undated) DEVICE — CATHETER, ANGIO, IMPULSE, PIG 155, 6 FR X 110 CM

## (undated) DEVICE — SHEATH, PINNACLE, W/.038 GW 10CM, 5FR INTRODUCER, 2.5 CM DIALATOR

## (undated) DEVICE — PATCHES, EXTERNAL REFERENCE, CARTO3

## (undated) DEVICE — GUIDEWIRE, UNIVERSAL BALANCE MID WEIGHT, 190CM, STR

## (undated) DEVICE — CATHETER, ABLATION, MARINR RF, MCXL, 7FR X 110CM

## (undated) DEVICE — BANDAGE, QUIKCLOT, INTERVENTIONAL HEMO, W/O SLIT

## (undated) DEVICE — CATHETER, NAVISTAR, EZ-STEER, F-J TYPE 4MM

## (undated) DEVICE — CATHETER, WEDGE PRESSURE, BALLOON, DOUBLE LUMEN, 6 FR, 110 CM

## (undated) DEVICE — SHIELD, PERIPHERAL, RADPAD, 11 X 34IN, W/ ABSORBENT, ORANGE, STERILE

## (undated) DEVICE — TUBING, MANIFOLD, LOW PRESSURE

## (undated) DEVICE — ACCESS KIT, S-MAK MINI, 4FR 10CM 0.018IN 40CM, NT/PT, ECHO ENHANCE NEEDLE

## (undated) DEVICE — GUIDEWIRE, INQWIRE, 3MM J, .035, 150

## (undated) DEVICE — SHEATH, PINNACLE, 10 CM,  7FR INTRODUCER, 7FR DIA, 2.5 CM DIALATOR

## (undated) DEVICE — GUIDEWIRE, ANGLE TIP,  .035 DIA, 180 CM, 3 CM TIP"

## (undated) DEVICE — CATHETER, ACUSON ACUNAV ULTRASOUND, 8FR 90CM

## (undated) DEVICE — CATHETER, ATTAIN COMMAND, 50CM, STRAIGHT, 3MM PROXIMAL, 2.8MM OD

## (undated) DEVICE — CATHETER, THERMODILUTION, SWAN GANZ, 7 FR, 110CM, STANDARD

## (undated) DEVICE — INTRODUCER SYSTEM, PRELUDE SNAP, SPLITTABLE, HEMOSTATIC, 7FR

## (undated) DEVICE — PAD, ELECTRODE DEFIB PADPRO ADULT STRL W/ADAPTER

## (undated) DEVICE — HEMOSTAT, ARISTA, ABSORBABLE, 3 GRAMS

## (undated) DEVICE — INTRODUCER, SHEATH, FAST-CATH, 8FR X 12CM, C-LOCK

## (undated) DEVICE — SHEATH, PINNACLE, 10 CM,  8FR INTRODUCER, 8FR DIA, 2.5 CM DIALATOR

## (undated) DEVICE — CLOSURE SYSTEM, VASCULAR, VASCADE, 5 F

## (undated) DEVICE — SYSTEM, ACCESS, FXD DBL CURVE, WATCHMAN

## (undated) DEVICE — DRESSING, AQUACEL AG, HYDROFIBER W/SILVER, 3.5 X 6 IN

## (undated) DEVICE — SHEATH, PINNACLE, W/O GUIDEWIRE, 8FR INTRODUCER,  8FR DIA, 2.5 CM DILATOR

## (undated) DEVICE — DEVICE, CLOSURE, PERCLOSE, PROSTYLE

## (undated) DEVICE — SHIELD, RADPAD, ELECTROPHYSIOLOGY, ORANGE, ABSORBENT

## (undated) DEVICE — CATHETER, INFINITI DIAGNOSTIC, 5 FR 100CM 3DRC, WILLIAMS RIGHT OR NO TORQUE

## (undated) DEVICE — SHEATH, PINNACLE, W/.038 GUIDEWIRE, 10 CM,  7FR INTRODUCER, 7FR DIA, 2.5 CM DIALATOR

## (undated) DEVICE — VERSACROSS KIT, ACCESS SOLUTION, RF WIRE 180CM

## (undated) DEVICE — CATHETER, ATTAIN SELECT II, 6248V-90, 65CM, 90-DEG CURVED-TIP

## (undated) DEVICE — SLITTER, UNIVERSAL II

## (undated) DEVICE — INTRODUCER SYSTEM, PRELUDE SNAP, SPLITTABLE, 9 FR X 13 CM

## (undated) DEVICE — CATHETER, DIAGNOSTIC, JUDKINS, LEFT, 5 FR-JL 4.0